# Patient Record
Sex: FEMALE | Race: WHITE | Employment: FULL TIME | URBAN - METROPOLITAN AREA
[De-identification: names, ages, dates, MRNs, and addresses within clinical notes are randomized per-mention and may not be internally consistent; named-entity substitution may affect disease eponyms.]

---

## 2019-10-13 ENCOUNTER — HOSPITAL ENCOUNTER (OUTPATIENT)
Age: 49
Setting detail: OBSERVATION
Discharge: HOME | End: 2019-10-14
Attending: EMERGENCY MEDICINE | Admitting: INTERNAL MEDICINE
Payer: COMMERCIAL

## 2019-10-13 DIAGNOSIS — R53.1 GENERAL WEAKNESS: Primary | ICD-10-CM

## 2019-10-13 PROBLEM — G70.00 MYASTHENIA GRAVIS (HCC): Status: ACTIVE | Noted: 2019-10-13

## 2019-10-13 LAB
A/G RATIO: 1.1 (ref 1.1–2.2)
ALBUMIN SERPL-MCNC: 4.1 G/DL (ref 3.4–5)
ALP BLD-CCNC: 131 U/L (ref 40–129)
ALT SERPL-CCNC: 13 U/L (ref 10–40)
ANION GAP SERPL CALCULATED.3IONS-SCNC: 17 MMOL/L (ref 3–16)
AST SERPL-CCNC: 14 U/L (ref 15–37)
BASOPHILS ABSOLUTE: 0.1 K/UL (ref 0–0.2)
BASOPHILS RELATIVE PERCENT: 1.1 %
BILIRUB SERPL-MCNC: <0.2 MG/DL (ref 0–1)
BILIRUBIN URINE: NEGATIVE
BLOOD, URINE: NEGATIVE
BUN BLDV-MCNC: 13 MG/DL (ref 7–20)
CALCIUM SERPL-MCNC: 9 MG/DL (ref 8.3–10.6)
CHLORIDE BLD-SCNC: 100 MMOL/L (ref 99–110)
CLARITY: CLEAR
CO2: 25 MMOL/L (ref 21–32)
COLOR: YELLOW
CREAT SERPL-MCNC: 0.5 MG/DL (ref 0.6–1.1)
EOSINOPHILS ABSOLUTE: 0.1 K/UL (ref 0–0.6)
EOSINOPHILS RELATIVE PERCENT: 0.9 %
FOLATE: >20 NG/ML (ref 4.78–24.2)
GFR AFRICAN AMERICAN: >60
GFR NON-AFRICAN AMERICAN: >60
GLOBULIN: 3.9 G/DL
GLUCOSE BLD-MCNC: 92 MG/DL (ref 70–99)
GLUCOSE URINE: NEGATIVE MG/DL
HCG(URINE) PREGNANCY TEST: NEGATIVE
HCT VFR BLD CALC: 38.7 % (ref 36–48)
HEMOGLOBIN: 12.9 G/DL (ref 12–16)
KETONES, URINE: NEGATIVE MG/DL
LEUKOCYTE ESTERASE, URINE: NEGATIVE
LYMPHOCYTES ABSOLUTE: 2.2 K/UL (ref 1–5.1)
LYMPHOCYTES RELATIVE PERCENT: 23.3 %
MAGNESIUM: 1.9 MG/DL (ref 1.8–2.4)
MCH RBC QN AUTO: 30.5 PG (ref 26–34)
MCHC RBC AUTO-ENTMCNC: 33.4 G/DL (ref 31–36)
MCV RBC AUTO: 91.6 FL (ref 80–100)
MICROSCOPIC EXAMINATION: NORMAL
MONOCYTES ABSOLUTE: 0.5 K/UL (ref 0–1.3)
MONOCYTES RELATIVE PERCENT: 5.1 %
NEUTROPHILS ABSOLUTE: 6.5 K/UL (ref 1.7–7.7)
NEUTROPHILS RELATIVE PERCENT: 69.6 %
NITRITE, URINE: NEGATIVE
PDW BLD-RTO: 13.7 % (ref 12.4–15.4)
PH UA: 6 (ref 5–8)
PHOSPHORUS: 3.2 MG/DL (ref 2.5–4.9)
PLATELET # BLD: 316 K/UL (ref 135–450)
PMV BLD AUTO: 6.9 FL (ref 5–10.5)
POTASSIUM SERPL-SCNC: 3.8 MMOL/L (ref 3.5–5.1)
PROTEIN UA: NEGATIVE MG/DL
RBC # BLD: 4.22 M/UL (ref 4–5.2)
SODIUM BLD-SCNC: 142 MMOL/L (ref 136–145)
SPECIFIC GRAVITY UA: 1.01 (ref 1–1.03)
TOTAL PROTEIN: 8 G/DL (ref 6.4–8.2)
TSH REFLEX: 0.39 UIU/ML (ref 0.27–4.2)
URINE REFLEX TO CULTURE: NORMAL
URINE TYPE: NORMAL
UROBILINOGEN, URINE: 0.2 E.U./DL
VITAMIN B-12: 358 PG/ML (ref 211–911)
WBC # BLD: 9.3 K/UL (ref 4–11)

## 2019-10-13 PROCEDURE — 36415 COLL VENOUS BLD VENIPUNCTURE: CPT

## 2019-10-13 PROCEDURE — 82607 VITAMIN B-12: CPT

## 2019-10-13 PROCEDURE — 93005 ELECTROCARDIOGRAM TRACING: CPT | Performed by: EMERGENCY MEDICINE

## 2019-10-13 PROCEDURE — 84703 CHORIONIC GONADOTROPIN ASSAY: CPT

## 2019-10-13 PROCEDURE — 2580000003 HC RX 258: Performed by: INTERNAL MEDICINE

## 2019-10-13 PROCEDURE — G0378 HOSPITAL OBSERVATION PER HR: HCPCS

## 2019-10-13 PROCEDURE — 84100 ASSAY OF PHOSPHORUS: CPT

## 2019-10-13 PROCEDURE — 85025 COMPLETE CBC W/AUTO DIFF WBC: CPT

## 2019-10-13 PROCEDURE — 81003 URINALYSIS AUTO W/O SCOPE: CPT

## 2019-10-13 PROCEDURE — 84443 ASSAY THYROID STIM HORMONE: CPT

## 2019-10-13 PROCEDURE — 6370000000 HC RX 637 (ALT 250 FOR IP): Performed by: PSYCHIATRY & NEUROLOGY

## 2019-10-13 PROCEDURE — 6370000000 HC RX 637 (ALT 250 FOR IP): Performed by: INTERNAL MEDICINE

## 2019-10-13 PROCEDURE — 82746 ASSAY OF FOLIC ACID SERUM: CPT

## 2019-10-13 PROCEDURE — 83735 ASSAY OF MAGNESIUM: CPT

## 2019-10-13 PROCEDURE — 6370000000 HC RX 637 (ALT 250 FOR IP): Performed by: NURSE PRACTITIONER

## 2019-10-13 PROCEDURE — 80053 COMPREHEN METABOLIC PANEL: CPT

## 2019-10-13 PROCEDURE — 99285 EMERGENCY DEPT VISIT HI MDM: CPT

## 2019-10-13 RX ORDER — DOXEPIN HYDROCHLORIDE 10 MG/1
10 CAPSULE ORAL NIGHTLY
COMMUNITY

## 2019-10-13 RX ORDER — SODIUM CHLORIDE 9 MG/ML
INJECTION, SOLUTION INTRAVENOUS CONTINUOUS
Status: DISCONTINUED | OUTPATIENT
Start: 2019-10-13 | End: 2019-10-14

## 2019-10-13 RX ORDER — LEVOTHYROXINE SODIUM 0.12 MG/1
125 TABLET ORAL DAILY
Status: DISCONTINUED | OUTPATIENT
Start: 2019-10-14 | End: 2019-10-14 | Stop reason: HOSPADM

## 2019-10-13 RX ORDER — DOXEPIN HYDROCHLORIDE 10 MG/1
10 CAPSULE ORAL NIGHTLY
Status: DISCONTINUED | OUTPATIENT
Start: 2019-10-13 | End: 2019-10-14 | Stop reason: HOSPADM

## 2019-10-13 RX ORDER — ESTRADIOL 1 MG/1
2 TABLET ORAL DAILY
Status: DISCONTINUED | OUTPATIENT
Start: 2019-10-14 | End: 2019-10-14 | Stop reason: HOSPADM

## 2019-10-13 RX ORDER — CITALOPRAM 40 MG/1
40 TABLET ORAL EVERY MORNING
COMMUNITY

## 2019-10-13 RX ORDER — ESTRADIOL 2 MG/1
2 TABLET ORAL NIGHTLY
COMMUNITY

## 2019-10-13 RX ORDER — FOLIC ACID 1 MG/1
1 TABLET ORAL DAILY
COMMUNITY

## 2019-10-13 RX ORDER — ONDANSETRON 2 MG/ML
4 INJECTION INTRAMUSCULAR; INTRAVENOUS EVERY 6 HOURS PRN
Status: DISCONTINUED | OUTPATIENT
Start: 2019-10-13 | End: 2019-10-14 | Stop reason: HOSPADM

## 2019-10-13 RX ORDER — FOLIC ACID 1 MG/1
1 TABLET ORAL DAILY
Status: DISCONTINUED | OUTPATIENT
Start: 2019-10-14 | End: 2019-10-14 | Stop reason: HOSPADM

## 2019-10-13 RX ORDER — CITALOPRAM 20 MG/1
40 TABLET ORAL DAILY
Status: DISCONTINUED | OUTPATIENT
Start: 2019-10-14 | End: 2019-10-14 | Stop reason: HOSPADM

## 2019-10-13 RX ORDER — ZOLPIDEM TARTRATE 5 MG/1
5 TABLET ORAL NIGHTLY PRN
Status: DISCONTINUED | OUTPATIENT
Start: 2019-10-13 | End: 2019-10-14 | Stop reason: HOSPADM

## 2019-10-13 RX ORDER — ACETAMINOPHEN 325 MG/1
650 TABLET ORAL EVERY 4 HOURS PRN
Status: DISCONTINUED | OUTPATIENT
Start: 2019-10-13 | End: 2019-10-14 | Stop reason: HOSPADM

## 2019-10-13 RX ORDER — DIPHENHYDRAMINE HCL 25 MG
25 TABLET ORAL EVERY 6 HOURS PRN
Status: ON HOLD | COMMUNITY
End: 2019-11-05

## 2019-10-13 RX ORDER — ZOLPIDEM TARTRATE 10 MG/1
10 TABLET ORAL NIGHTLY
COMMUNITY

## 2019-10-13 RX ORDER — LEVOTHYROXINE SODIUM 0.12 MG/1
125 TABLET ORAL DAILY
Status: ON HOLD | COMMUNITY
End: 2020-08-09 | Stop reason: SDUPTHER

## 2019-10-13 RX ORDER — SODIUM CHLORIDE 0.9 % (FLUSH) 0.9 %
10 SYRINGE (ML) INJECTION EVERY 12 HOURS SCHEDULED
Status: DISCONTINUED | OUTPATIENT
Start: 2019-10-13 | End: 2019-10-14 | Stop reason: HOSPADM

## 2019-10-13 RX ORDER — PYRIDOSTIGMINE BROMIDE 60 MG/1
30 TABLET ORAL EVERY 8 HOURS SCHEDULED
Status: DISCONTINUED | OUTPATIENT
Start: 2019-10-13 | End: 2019-10-14 | Stop reason: HOSPADM

## 2019-10-13 RX ORDER — SODIUM CHLORIDE 0.9 % (FLUSH) 0.9 %
10 SYRINGE (ML) INJECTION PRN
Status: DISCONTINUED | OUTPATIENT
Start: 2019-10-13 | End: 2019-10-14 | Stop reason: HOSPADM

## 2019-10-13 RX ADMIN — DOXEPIN HYDROCHLORIDE 10 MG: 10 CAPSULE ORAL at 21:56

## 2019-10-13 RX ADMIN — ZOLPIDEM TARTRATE 5 MG: 5 TABLET ORAL at 21:56

## 2019-10-13 RX ADMIN — Medication 10 ML: at 21:57

## 2019-10-13 RX ADMIN — ACETAMINOPHEN 650 MG: 325 TABLET ORAL at 17:52

## 2019-10-13 RX ADMIN — SODIUM CHLORIDE: 9 INJECTION, SOLUTION INTRAVENOUS at 16:58

## 2019-10-13 RX ADMIN — PYRIDOSTIGMINE BROMIDE 30 MG: 60 TABLET ORAL at 21:57

## 2019-10-13 SDOH — HEALTH STABILITY: MENTAL HEALTH: HOW OFTEN DO YOU HAVE A DRINK CONTAINING ALCOHOL?: NEVER

## 2019-10-13 ASSESSMENT — PAIN DESCRIPTION - ONSET: ONSET: SUDDEN

## 2019-10-13 ASSESSMENT — PAIN DESCRIPTION - PAIN TYPE: TYPE: ACUTE PAIN

## 2019-10-13 ASSESSMENT — PAIN SCALES - GENERAL
PAINLEVEL_OUTOF10: 0
PAINLEVEL_OUTOF10: 3
PAINLEVEL_OUTOF10: 0
PAINLEVEL_OUTOF10: 0

## 2019-10-13 ASSESSMENT — PAIN - FUNCTIONAL ASSESSMENT: PAIN_FUNCTIONAL_ASSESSMENT: ACTIVITIES ARE NOT PREVENTED

## 2019-10-13 ASSESSMENT — PAIN DESCRIPTION - FREQUENCY: FREQUENCY: INTERMITTENT

## 2019-10-13 ASSESSMENT — PAIN DESCRIPTION - LOCATION: LOCATION: HEAD

## 2019-10-13 ASSESSMENT — PAIN DESCRIPTION - PROGRESSION: CLINICAL_PROGRESSION: NOT CHANGED

## 2019-10-13 ASSESSMENT — PAIN DESCRIPTION - DESCRIPTORS: DESCRIPTORS: ACHING

## 2019-10-13 ASSESSMENT — PAIN DESCRIPTION - ORIENTATION: ORIENTATION: ANTERIOR

## 2019-10-14 VITALS
WEIGHT: 175.27 LBS | DIASTOLIC BLOOD PRESSURE: 76 MMHG | HEIGHT: 62 IN | TEMPERATURE: 98.6 F | RESPIRATION RATE: 18 BRPM | OXYGEN SATURATION: 93 % | BODY MASS INDEX: 32.25 KG/M2 | SYSTOLIC BLOOD PRESSURE: 129 MMHG | HEART RATE: 65 BPM

## 2019-10-14 LAB
A/G RATIO: 1.1 (ref 1.1–2.2)
ALBUMIN SERPL-MCNC: 3.3 G/DL (ref 3.4–5)
ALP BLD-CCNC: 107 U/L (ref 40–129)
ALT SERPL-CCNC: 11 U/L (ref 10–40)
ANION GAP SERPL CALCULATED.3IONS-SCNC: 13 MMOL/L (ref 3–16)
AST SERPL-CCNC: 13 U/L (ref 15–37)
BILIRUB SERPL-MCNC: <0.2 MG/DL (ref 0–1)
BUN BLDV-MCNC: 12 MG/DL (ref 7–20)
CALCIUM SERPL-MCNC: 8.2 MG/DL (ref 8.3–10.6)
CHLORIDE BLD-SCNC: 106 MMOL/L (ref 99–110)
CO2: 24 MMOL/L (ref 21–32)
CREAT SERPL-MCNC: <0.5 MG/DL (ref 0.6–1.1)
EKG ATRIAL RATE: 63 BPM
EKG DIAGNOSIS: NORMAL
EKG P AXIS: 7 DEGREES
EKG P-R INTERVAL: 198 MS
EKG Q-T INTERVAL: 418 MS
EKG QRS DURATION: 92 MS
EKG QTC CALCULATION (BAZETT): 427 MS
EKG R AXIS: 8 DEGREES
EKG T AXIS: -1 DEGREES
EKG VENTRICULAR RATE: 63 BPM
GFR AFRICAN AMERICAN: >60
GFR NON-AFRICAN AMERICAN: >60
GLOBULIN: 3 G/DL
GLUCOSE BLD-MCNC: 87 MG/DL (ref 70–99)
MAGNESIUM: 1.8 MG/DL (ref 1.8–2.4)
POTASSIUM REFLEX MAGNESIUM: 4 MMOL/L (ref 3.5–5.1)
SODIUM BLD-SCNC: 143 MMOL/L (ref 136–145)
TOTAL PROTEIN: 6.3 G/DL (ref 6.4–8.2)

## 2019-10-14 PROCEDURE — 96372 THER/PROPH/DIAG INJ SC/IM: CPT

## 2019-10-14 PROCEDURE — 6370000000 HC RX 637 (ALT 250 FOR IP): Performed by: PSYCHIATRY & NEUROLOGY

## 2019-10-14 PROCEDURE — 94760 N-INVAS EAR/PLS OXIMETRY 1: CPT

## 2019-10-14 PROCEDURE — 97116 GAIT TRAINING THERAPY: CPT

## 2019-10-14 PROCEDURE — 97161 PT EVAL LOW COMPLEX 20 MIN: CPT

## 2019-10-14 PROCEDURE — 36415 COLL VENOUS BLD VENIPUNCTURE: CPT

## 2019-10-14 PROCEDURE — G0378 HOSPITAL OBSERVATION PER HR: HCPCS

## 2019-10-14 PROCEDURE — 83735 ASSAY OF MAGNESIUM: CPT

## 2019-10-14 PROCEDURE — 93010 ELECTROCARDIOGRAM REPORT: CPT | Performed by: INTERNAL MEDICINE

## 2019-10-14 PROCEDURE — 6370000000 HC RX 637 (ALT 250 FOR IP): Performed by: INTERNAL MEDICINE

## 2019-10-14 PROCEDURE — 80053 COMPREHEN METABOLIC PANEL: CPT

## 2019-10-14 PROCEDURE — 6370000000 HC RX 637 (ALT 250 FOR IP): Performed by: NURSE PRACTITIONER

## 2019-10-14 PROCEDURE — 97165 OT EVAL LOW COMPLEX 30 MIN: CPT

## 2019-10-14 PROCEDURE — 6360000002 HC RX W HCPCS: Performed by: INTERNAL MEDICINE

## 2019-10-14 PROCEDURE — 97530 THERAPEUTIC ACTIVITIES: CPT

## 2019-10-14 RX ORDER — PYRIDOSTIGMINE BROMIDE 60 MG/1
30 TABLET ORAL EVERY 8 HOURS SCHEDULED
Qty: 60 TABLET | Refills: 1 | Status: ON HOLD | OUTPATIENT
Start: 2019-10-14 | End: 2019-11-08 | Stop reason: SDUPTHER

## 2019-10-14 RX ADMIN — ENOXAPARIN SODIUM 40 MG: 40 INJECTION SUBCUTANEOUS at 08:56

## 2019-10-14 RX ADMIN — ACETAMINOPHEN 650 MG: 325 TABLET ORAL at 12:29

## 2019-10-14 RX ADMIN — PYRIDOSTIGMINE BROMIDE 30 MG: 60 TABLET ORAL at 13:47

## 2019-10-14 RX ADMIN — LEVOTHYROXINE SODIUM 125 MCG: 125 TABLET ORAL at 07:31

## 2019-10-14 RX ADMIN — PYRIDOSTIGMINE BROMIDE 30 MG: 60 TABLET ORAL at 07:30

## 2019-10-14 RX ADMIN — CITALOPRAM HYDROBROMIDE 40 MG: 20 TABLET ORAL at 08:56

## 2019-10-14 RX ADMIN — FOLIC ACID 1 MG: 1 TABLET ORAL at 08:56

## 2019-10-14 RX ADMIN — ESTRADIOL 2 MG: 1 TABLET ORAL at 11:04

## 2019-10-14 ASSESSMENT — PAIN SCALES - GENERAL
PAINLEVEL_OUTOF10: 2
PAINLEVEL_OUTOF10: 0
PAINLEVEL_OUTOF10: 4

## 2019-10-14 ASSESSMENT — PAIN DESCRIPTION - PAIN TYPE: TYPE: ACUTE PAIN

## 2019-10-14 ASSESSMENT — PAIN DESCRIPTION - DESCRIPTORS: DESCRIPTORS: ACHING

## 2019-10-14 ASSESSMENT — PAIN DESCRIPTION - PROGRESSION: CLINICAL_PROGRESSION: NOT CHANGED

## 2019-10-14 ASSESSMENT — PAIN DESCRIPTION - LOCATION: LOCATION: HEAD

## 2019-10-14 ASSESSMENT — PAIN - FUNCTIONAL ASSESSMENT: PAIN_FUNCTIONAL_ASSESSMENT: ACTIVITIES ARE NOT PREVENTED

## 2019-11-03 ENCOUNTER — HOSPITAL ENCOUNTER (EMERGENCY)
Age: 49
Discharge: HOME | DRG: 056 | End: 2019-11-03
Attending: EMERGENCY MEDICINE
Payer: COMMERCIAL

## 2019-11-03 ENCOUNTER — APPOINTMENT (OUTPATIENT)
Dept: CT IMAGING | Age: 49
DRG: 056 | End: 2019-11-03
Payer: COMMERCIAL

## 2019-11-03 ENCOUNTER — APPOINTMENT (OUTPATIENT)
Dept: GENERAL RADIOLOGY | Age: 49
DRG: 056 | End: 2019-11-03
Payer: COMMERCIAL

## 2019-11-03 VITALS
TEMPERATURE: 98 F | WEIGHT: 174.82 LBS | HEART RATE: 71 BPM | BODY MASS INDEX: 31.98 KG/M2 | RESPIRATION RATE: 18 BRPM | DIASTOLIC BLOOD PRESSURE: 74 MMHG | SYSTOLIC BLOOD PRESSURE: 137 MMHG | OXYGEN SATURATION: 97 %

## 2019-11-03 DIAGNOSIS — N39.0 ACUTE URINARY TRACT INFECTION: ICD-10-CM

## 2019-11-03 DIAGNOSIS — Z86.69 HISTORY OF MYASTHENIA GRAVIS: ICD-10-CM

## 2019-11-03 DIAGNOSIS — R53.1 GENERALIZED WEAKNESS: Primary | ICD-10-CM

## 2019-11-03 LAB
A/G RATIO: 0.9 (ref 1.1–2.2)
ALBUMIN SERPL-MCNC: 3.6 G/DL (ref 3.4–5)
ALP BLD-CCNC: 142 U/L (ref 40–129)
ALT SERPL-CCNC: 10 U/L (ref 10–40)
ANION GAP SERPL CALCULATED.3IONS-SCNC: 12 MMOL/L (ref 3–16)
AST SERPL-CCNC: 16 U/L (ref 15–37)
BACTERIA: ABNORMAL /HPF
BASOPHILS ABSOLUTE: 0 K/UL (ref 0–0.2)
BASOPHILS RELATIVE PERCENT: 0.2 %
BILIRUB SERPL-MCNC: <0.2 MG/DL (ref 0–1)
BILIRUBIN URINE: NEGATIVE
BLOOD, URINE: NEGATIVE
BUN BLDV-MCNC: 12 MG/DL (ref 7–20)
CALCIUM SERPL-MCNC: 8.9 MG/DL (ref 8.3–10.6)
CHLORIDE BLD-SCNC: 101 MMOL/L (ref 99–110)
CLARITY: ABNORMAL
CO2: 25 MMOL/L (ref 21–32)
COLOR: YELLOW
COMMENT UA: ABNORMAL
CREAT SERPL-MCNC: <0.5 MG/DL (ref 0.6–1.1)
EOSINOPHILS ABSOLUTE: 0.7 K/UL (ref 0–0.6)
EOSINOPHILS RELATIVE PERCENT: 6.2 %
EPITHELIAL CELLS, UA: 2 /HPF (ref 0–5)
GFR AFRICAN AMERICAN: >60
GFR NON-AFRICAN AMERICAN: >60
GLOBULIN: 4.1 G/DL
GLUCOSE BLD-MCNC: 86 MG/DL (ref 70–99)
GLUCOSE URINE: NEGATIVE MG/DL
HCT VFR BLD CALC: 37.7 % (ref 36–48)
HEMOGLOBIN: 12.6 G/DL (ref 12–16)
HYALINE CASTS: 1 /LPF (ref 0–8)
KETONES, URINE: NEGATIVE MG/DL
LEUKOCYTE ESTERASE, URINE: NEGATIVE
LYMPHOCYTES ABSOLUTE: 2.2 K/UL (ref 1–5.1)
LYMPHOCYTES RELATIVE PERCENT: 19.6 %
MCH RBC QN AUTO: 30.3 PG (ref 26–34)
MCHC RBC AUTO-ENTMCNC: 33.4 G/DL (ref 31–36)
MCV RBC AUTO: 90.8 FL (ref 80–100)
MICROSCOPIC EXAMINATION: YES
MONOCYTES ABSOLUTE: 0.5 K/UL (ref 0–1.3)
MONOCYTES RELATIVE PERCENT: 4.5 %
NEUTROPHILS ABSOLUTE: 7.8 K/UL (ref 1.7–7.7)
NEUTROPHILS RELATIVE PERCENT: 69.5 %
NITRITE, URINE: NEGATIVE
PDW BLD-RTO: 14.4 % (ref 12.4–15.4)
PH UA: 6 (ref 5–8)
PLATELET # BLD: 322 K/UL (ref 135–450)
PMV BLD AUTO: 6.8 FL (ref 5–10.5)
POTASSIUM REFLEX MAGNESIUM: 4.2 MMOL/L (ref 3.5–5.1)
PROTEIN UA: NEGATIVE MG/DL
RBC # BLD: 4.15 M/UL (ref 4–5.2)
RBC UA: ABNORMAL /HPF (ref 0–2)
SODIUM BLD-SCNC: 138 MMOL/L (ref 136–145)
SPECIFIC GRAVITY UA: 1.01 (ref 1–1.03)
TOTAL PROTEIN: 7.7 G/DL (ref 6.4–8.2)
TROPONIN: <0.01 NG/ML
URINE REFLEX TO CULTURE: YES
URINE TYPE: ABNORMAL
UROBILINOGEN, URINE: 0.2 E.U./DL
WBC # BLD: 11.2 K/UL (ref 4–11)
WBC UA: 48 /HPF (ref 0–5)

## 2019-11-03 PROCEDURE — 85025 COMPLETE CBC W/AUTO DIFF WBC: CPT

## 2019-11-03 PROCEDURE — 80053 COMPREHEN METABOLIC PANEL: CPT

## 2019-11-03 PROCEDURE — 81001 URINALYSIS AUTO W/SCOPE: CPT

## 2019-11-03 PROCEDURE — 70450 CT HEAD/BRAIN W/O DYE: CPT

## 2019-11-03 PROCEDURE — 87086 URINE CULTURE/COLONY COUNT: CPT

## 2019-11-03 PROCEDURE — 84484 ASSAY OF TROPONIN QUANT: CPT

## 2019-11-03 PROCEDURE — 93005 ELECTROCARDIOGRAM TRACING: CPT | Performed by: PHYSICIAN ASSISTANT

## 2019-11-03 PROCEDURE — 6370000000 HC RX 637 (ALT 250 FOR IP): Performed by: PHYSICIAN ASSISTANT

## 2019-11-03 PROCEDURE — 99285 EMERGENCY DEPT VISIT HI MDM: CPT

## 2019-11-03 PROCEDURE — 71046 X-RAY EXAM CHEST 2 VIEWS: CPT

## 2019-11-03 PROCEDURE — 36415 COLL VENOUS BLD VENIPUNCTURE: CPT

## 2019-11-03 RX ORDER — CEFUROXIME AXETIL 250 MG/1
250 TABLET ORAL 2 TIMES DAILY
Qty: 14 TABLET | Refills: 0 | Status: ON HOLD | OUTPATIENT
Start: 2019-11-03 | End: 2019-11-05

## 2019-11-03 RX ORDER — CEFUROXIME AXETIL 250 MG/1
500 TABLET ORAL ONCE
Status: COMPLETED | OUTPATIENT
Start: 2019-11-03 | End: 2019-11-03

## 2019-11-03 RX ADMIN — CEFUROXIME AXETIL 500 MG: 250 TABLET ORAL at 13:41

## 2019-11-04 ENCOUNTER — HOSPITAL ENCOUNTER (INPATIENT)
Age: 49
LOS: 4 days | Discharge: HOME | DRG: 056 | End: 2019-11-08
Attending: INTERNAL MEDICINE | Admitting: INTERNAL MEDICINE
Payer: COMMERCIAL

## 2019-11-04 DIAGNOSIS — G70.01 MYASTHENIA GRAVIS WITH (ACUTE) EXACERBATION (HCC): Primary | ICD-10-CM

## 2019-11-04 LAB
ALBUMIN SERPL-MCNC: 3.8 G/DL (ref 3.4–5)
ALP BLD-CCNC: 142 U/L (ref 40–129)
ALT SERPL-CCNC: 14 U/L (ref 10–40)
ANION GAP SERPL CALCULATED.3IONS-SCNC: 16 MMOL/L (ref 3–16)
AST SERPL-CCNC: 16 U/L (ref 15–37)
BASOPHILS ABSOLUTE: 0.1 K/UL (ref 0–0.2)
BASOPHILS RELATIVE PERCENT: 1.3 %
BILIRUB SERPL-MCNC: <0.2 MG/DL (ref 0–1)
BILIRUBIN DIRECT: <0.2 MG/DL (ref 0–0.3)
BILIRUBIN, INDIRECT: ABNORMAL MG/DL (ref 0–1)
BUN BLDV-MCNC: 11 MG/DL (ref 7–20)
CALCIUM SERPL-MCNC: 8.9 MG/DL (ref 8.3–10.6)
CHLORIDE BLD-SCNC: 101 MMOL/L (ref 99–110)
CO2: 23 MMOL/L (ref 21–32)
CREAT SERPL-MCNC: <0.5 MG/DL (ref 0.6–1.1)
EKG ATRIAL RATE: 73 BPM
EKG DIAGNOSIS: NORMAL
EKG P AXIS: 7 DEGREES
EKG P-R INTERVAL: 170 MS
EKG Q-T INTERVAL: 418 MS
EKG QRS DURATION: 92 MS
EKG QTC CALCULATION (BAZETT): 460 MS
EKG R AXIS: 15 DEGREES
EKG T AXIS: -8 DEGREES
EKG VENTRICULAR RATE: 73 BPM
EOSINOPHILS ABSOLUTE: 0.4 K/UL (ref 0–0.6)
EOSINOPHILS RELATIVE PERCENT: 5 %
GFR AFRICAN AMERICAN: >60
GFR NON-AFRICAN AMERICAN: >60
GLUCOSE BLD-MCNC: 135 MG/DL (ref 70–99)
HCT VFR BLD CALC: 37.1 % (ref 36–48)
HEMOGLOBIN: 12.6 G/DL (ref 12–16)
IGA: 403 MG/DL (ref 70–400)
LYMPHOCYTES ABSOLUTE: 2.2 K/UL (ref 1–5.1)
LYMPHOCYTES RELATIVE PERCENT: 26.9 %
MCH RBC QN AUTO: 30.6 PG (ref 26–34)
MCHC RBC AUTO-ENTMCNC: 33.9 G/DL (ref 31–36)
MCV RBC AUTO: 90.4 FL (ref 80–100)
MONOCYTES ABSOLUTE: 0.3 K/UL (ref 0–1.3)
MONOCYTES RELATIVE PERCENT: 4 %
NEUTROPHILS ABSOLUTE: 5.1 K/UL (ref 1.7–7.7)
NEUTROPHILS RELATIVE PERCENT: 62.8 %
PDW BLD-RTO: 13.8 % (ref 12.4–15.4)
PHOSPHORUS: 2.9 MG/DL (ref 2.5–4.9)
PLATELET # BLD: 317 K/UL (ref 135–450)
PMV BLD AUTO: 6.4 FL (ref 5–10.5)
POTASSIUM SERPL-SCNC: 3.4 MMOL/L (ref 3.5–5.1)
RBC # BLD: 4.11 M/UL (ref 4–5.2)
SODIUM BLD-SCNC: 140 MMOL/L (ref 136–145)
TOTAL PROTEIN: 7.6 G/DL (ref 6.4–8.2)
URINE CULTURE, ROUTINE: NORMAL
WBC # BLD: 8.2 K/UL (ref 4–11)

## 2019-11-04 PROCEDURE — 6370000000 HC RX 637 (ALT 250 FOR IP): Performed by: INTERNAL MEDICINE

## 2019-11-04 PROCEDURE — 93010 ELECTROCARDIOGRAM REPORT: CPT | Performed by: INTERNAL MEDICINE

## 2019-11-04 PROCEDURE — 6360000002 HC RX W HCPCS: Performed by: INTERNAL MEDICINE

## 2019-11-04 PROCEDURE — 2060000000 HC ICU INTERMEDIATE R&B

## 2019-11-04 PROCEDURE — 80069 RENAL FUNCTION PANEL: CPT

## 2019-11-04 PROCEDURE — 94760 N-INVAS EAR/PLS OXIMETRY 1: CPT

## 2019-11-04 PROCEDURE — 2580000003 HC RX 258: Performed by: INTERNAL MEDICINE

## 2019-11-04 PROCEDURE — 85025 COMPLETE CBC W/AUTO DIFF WBC: CPT

## 2019-11-04 PROCEDURE — 6370000000 HC RX 637 (ALT 250 FOR IP): Performed by: NURSE PRACTITIONER

## 2019-11-04 PROCEDURE — 82784 ASSAY IGA/IGD/IGG/IGM EACH: CPT

## 2019-11-04 PROCEDURE — 80076 HEPATIC FUNCTION PANEL: CPT

## 2019-11-04 PROCEDURE — 36415 COLL VENOUS BLD VENIPUNCTURE: CPT

## 2019-11-04 RX ORDER — DOXEPIN HYDROCHLORIDE 10 MG/1
10 CAPSULE ORAL NIGHTLY
Status: DISCONTINUED | OUTPATIENT
Start: 2019-11-04 | End: 2019-11-08 | Stop reason: HOSPADM

## 2019-11-04 RX ORDER — SODIUM CHLORIDE 0.9 % (FLUSH) 0.9 %
10 SYRINGE (ML) INJECTION PRN
Status: DISCONTINUED | OUTPATIENT
Start: 2019-11-04 | End: 2019-11-05 | Stop reason: SDUPTHER

## 2019-11-04 RX ORDER — PYRIDOSTIGMINE BROMIDE 60 MG/1
30 TABLET ORAL EVERY 8 HOURS SCHEDULED
Status: DISCONTINUED | OUTPATIENT
Start: 2019-11-04 | End: 2019-11-04

## 2019-11-04 RX ORDER — ZOLPIDEM TARTRATE 5 MG/1
5 TABLET ORAL NIGHTLY
Status: DISCONTINUED | OUTPATIENT
Start: 2019-11-04 | End: 2019-11-08 | Stop reason: HOSPADM

## 2019-11-04 RX ORDER — FOLIC ACID 1 MG/1
1 TABLET ORAL DAILY
Status: DISCONTINUED | OUTPATIENT
Start: 2019-11-05 | End: 2019-11-08 | Stop reason: HOSPADM

## 2019-11-04 RX ORDER — DEXTROSE AND SODIUM CHLORIDE 5; .9 G/100ML; G/100ML
INJECTION, SOLUTION INTRAVENOUS CONTINUOUS
Status: DISCONTINUED | OUTPATIENT
Start: 2019-11-04 | End: 2019-11-06

## 2019-11-04 RX ORDER — ONDANSETRON 2 MG/ML
4 INJECTION INTRAMUSCULAR; INTRAVENOUS EVERY 6 HOURS PRN
Status: DISCONTINUED | OUTPATIENT
Start: 2019-11-04 | End: 2019-11-08 | Stop reason: HOSPADM

## 2019-11-04 RX ORDER — PYRIDOSTIGMINE BROMIDE 60 MG/1
60 TABLET ORAL EVERY 8 HOURS SCHEDULED
Status: DISCONTINUED | OUTPATIENT
Start: 2019-11-04 | End: 2019-11-08 | Stop reason: HOSPADM

## 2019-11-04 RX ORDER — ESTRADIOL 1 MG/1
2 TABLET ORAL NIGHTLY
Status: DISCONTINUED | OUTPATIENT
Start: 2019-11-04 | End: 2019-11-08 | Stop reason: HOSPADM

## 2019-11-04 RX ORDER — METHYLPREDNISOLONE SODIUM SUCCINATE 125 MG/2ML
60 INJECTION, POWDER, LYOPHILIZED, FOR SOLUTION INTRAMUSCULAR; INTRAVENOUS DAILY
Status: DISCONTINUED | OUTPATIENT
Start: 2019-11-04 | End: 2019-11-08 | Stop reason: HOSPADM

## 2019-11-04 RX ORDER — LEVOTHYROXINE SODIUM 0.12 MG/1
125 TABLET ORAL
Status: DISCONTINUED | OUTPATIENT
Start: 2019-11-05 | End: 2019-11-08 | Stop reason: HOSPADM

## 2019-11-04 RX ORDER — ACETAMINOPHEN 325 MG/1
650 TABLET ORAL EVERY 4 HOURS PRN
Status: DISCONTINUED | OUTPATIENT
Start: 2019-11-04 | End: 2019-11-08 | Stop reason: HOSPADM

## 2019-11-04 RX ORDER — SODIUM CHLORIDE 0.9 % (FLUSH) 0.9 %
10 SYRINGE (ML) INJECTION EVERY 12 HOURS SCHEDULED
Status: DISCONTINUED | OUTPATIENT
Start: 2019-11-04 | End: 2019-11-05 | Stop reason: SDUPTHER

## 2019-11-04 RX ORDER — CEFUROXIME AXETIL 250 MG/1
250 TABLET ORAL 2 TIMES DAILY
Status: DISCONTINUED | OUTPATIENT
Start: 2019-11-04 | End: 2019-11-08 | Stop reason: HOSPADM

## 2019-11-04 RX ORDER — CITALOPRAM 20 MG/1
40 TABLET ORAL DAILY
Status: DISCONTINUED | OUTPATIENT
Start: 2019-11-05 | End: 2019-11-08 | Stop reason: HOSPADM

## 2019-11-04 RX ADMIN — CEFUROXIME AXETIL 250 MG: 250 TABLET ORAL at 21:28

## 2019-11-04 RX ADMIN — CEFUROXIME AXETIL 250 MG: 250 TABLET ORAL at 15:34

## 2019-11-04 RX ADMIN — DOXEPIN HYDROCHLORIDE 10 MG: 10 CAPSULE ORAL at 21:29

## 2019-11-04 RX ADMIN — ZOLPIDEM TARTRATE 5 MG: 5 TABLET ORAL at 21:28

## 2019-11-04 RX ADMIN — ONDANSETRON 4 MG: 2 INJECTION INTRAMUSCULAR; INTRAVENOUS at 17:26

## 2019-11-04 RX ADMIN — PYRIDOSTIGMINE BROMIDE 60 MG: 60 TABLET ORAL at 21:29

## 2019-11-04 RX ADMIN — PYRIDOSTIGMINE BROMIDE 60 MG: 60 TABLET ORAL at 15:34

## 2019-11-04 RX ADMIN — DEXTROSE AND SODIUM CHLORIDE: 5; 900 INJECTION, SOLUTION INTRAVENOUS at 15:32

## 2019-11-04 RX ADMIN — ESTRADIOL 2 MG: 1 TABLET ORAL at 21:30

## 2019-11-04 RX ADMIN — IMMUNE GLOBULIN INTRAVENOUS (HUMAN) 20 G: 5 INJECTION, SOLUTION INTRAVENOUS at 17:28

## 2019-11-04 RX ADMIN — ENOXAPARIN SODIUM 40 MG: 40 INJECTION SUBCUTANEOUS at 21:27

## 2019-11-04 RX ADMIN — IMMUNE GLOBULIN INTRAVENOUS (HUMAN) 10 G: 5 INJECTION, SOLUTION INTRAVENOUS at 21:09

## 2019-11-04 RX ADMIN — METHYLPREDNISOLONE SODIUM SUCCINATE 60 MG: 125 INJECTION, POWDER, FOR SOLUTION INTRAMUSCULAR; INTRAVENOUS at 17:26

## 2019-11-04 ASSESSMENT — PAIN SCALES - GENERAL
PAINLEVEL_OUTOF10: 0

## 2019-11-05 ENCOUNTER — APPOINTMENT (OUTPATIENT)
Dept: GENERAL RADIOLOGY | Age: 49
DRG: 056 | End: 2019-11-05
Attending: INTERNAL MEDICINE
Payer: COMMERCIAL

## 2019-11-05 ENCOUNTER — APPOINTMENT (OUTPATIENT)
Dept: CT IMAGING | Age: 49
DRG: 056 | End: 2019-11-05
Attending: INTERNAL MEDICINE
Payer: COMMERCIAL

## 2019-11-05 LAB
ALBUMIN SERPL-MCNC: 3.6 G/DL (ref 3.4–5)
ANION GAP SERPL CALCULATED.3IONS-SCNC: 16 MMOL/L (ref 3–16)
BACTERIA: ABNORMAL /HPF
BASE EXCESS ARTERIAL: -1.3 MMOL/L (ref -3–3)
BASOPHILS ABSOLUTE: 0 K/UL (ref 0–0.2)
BASOPHILS RELATIVE PERCENT: 0.4 %
BILIRUBIN URINE: NEGATIVE
BLOOD, URINE: ABNORMAL
BUN BLDV-MCNC: 9 MG/DL (ref 7–20)
CALCIUM SERPL-MCNC: 8.5 MG/DL (ref 8.3–10.6)
CARBOXYHEMOGLOBIN ARTERIAL: 0.8 % (ref 0–1.5)
CHLORIDE BLD-SCNC: 101 MMOL/L (ref 99–110)
CLARITY: ABNORMAL
CO2: 21 MMOL/L (ref 21–32)
COLOR: YELLOW
COMMENT UA: ABNORMAL
CREAT SERPL-MCNC: <0.5 MG/DL (ref 0.6–1.1)
EOSINOPHILS ABSOLUTE: 0 K/UL (ref 0–0.6)
EOSINOPHILS RELATIVE PERCENT: 0 %
EPITHELIAL CELLS, UA: 16 /HPF (ref 0–5)
GFR AFRICAN AMERICAN: >60
GFR NON-AFRICAN AMERICAN: >60
GLUCOSE BLD-MCNC: 116 MG/DL (ref 70–99)
GLUCOSE BLD-MCNC: 177 MG/DL (ref 70–99)
GLUCOSE BLD-MCNC: 248 MG/DL (ref 70–99)
GLUCOSE URINE: 500 MG/DL
HCO3 ARTERIAL: 22.8 MMOL/L (ref 21–29)
HCT VFR BLD CALC: 38.4 % (ref 36–48)
HEMOGLOBIN, ART, EXTENDED: 12.9 G/DL (ref 12–16)
HEMOGLOBIN: 13 G/DL (ref 12–16)
KETONES, URINE: NEGATIVE MG/DL
LEUKOCYTE ESTERASE, URINE: ABNORMAL
LYMPHOCYTES ABSOLUTE: 0.7 K/UL (ref 1–5.1)
LYMPHOCYTES RELATIVE PERCENT: 7 %
MCH RBC QN AUTO: 30.7 PG (ref 26–34)
MCHC RBC AUTO-ENTMCNC: 33.8 G/DL (ref 31–36)
MCV RBC AUTO: 90.8 FL (ref 80–100)
METHEMOGLOBIN ARTERIAL: 0.8 %
MICROSCOPIC EXAMINATION: YES
MONOCYTES ABSOLUTE: 0.1 K/UL (ref 0–1.3)
MONOCYTES RELATIVE PERCENT: 1 %
NEUTROPHILS ABSOLUTE: 9.5 K/UL (ref 1.7–7.7)
NEUTROPHILS RELATIVE PERCENT: 91.6 %
NITRITE, URINE: NEGATIVE
O2 CONTENT ARTERIAL: 19 ML/DL
O2 SAT, ARTERIAL: 99.9 %
O2 THERAPY: ABNORMAL
PCO2 ARTERIAL: 38.3 MMHG (ref 35–45)
PDW BLD-RTO: 13.9 % (ref 12.4–15.4)
PERFORMED ON: ABNORMAL
PERFORMED ON: ABNORMAL
PH ARTERIAL: 7.39 (ref 7.35–7.45)
PH UA: 5 (ref 5–8)
PHOSPHORUS: 1.8 MG/DL (ref 2.5–4.9)
PLATELET # BLD: 323 K/UL (ref 135–450)
PMV BLD AUTO: 6.7 FL (ref 5–10.5)
PO2 ARTERIAL: 402 MMHG (ref 75–108)
POTASSIUM SERPL-SCNC: 3.8 MMOL/L (ref 3.5–5.1)
PROTEIN UA: 30 MG/DL
RBC # BLD: 4.22 M/UL (ref 4–5.2)
RBC UA: ABNORMAL /HPF (ref 0–2)
SODIUM BLD-SCNC: 138 MMOL/L (ref 136–145)
SPECIFIC GRAVITY UA: 1.02 (ref 1–1.03)
TCO2 ARTERIAL: 24 MMOL/L
URINE TYPE: ABNORMAL
UROBILINOGEN, URINE: 0.2 E.U./DL
WBC # BLD: 10.4 K/UL (ref 4–11)
WBC UA: 78 /HPF (ref 0–5)

## 2019-11-05 PROCEDURE — 76937 US GUIDE VASCULAR ACCESS: CPT

## 2019-11-05 PROCEDURE — 02HV33Z INSERTION OF INFUSION DEVICE INTO SUPERIOR VENA CAVA, PERCUTANEOUS APPROACH: ICD-10-PCS | Performed by: INTERNAL MEDICINE

## 2019-11-05 PROCEDURE — 94799 UNLISTED PULMONARY SVC/PX: CPT

## 2019-11-05 PROCEDURE — 2700000000 HC OXYGEN THERAPY PER DAY

## 2019-11-05 PROCEDURE — 6370000000 HC RX 637 (ALT 250 FOR IP): Performed by: INTERNAL MEDICINE

## 2019-11-05 PROCEDURE — 99291 CRITICAL CARE FIRST HOUR: CPT | Performed by: INTERNAL MEDICINE

## 2019-11-05 PROCEDURE — 82803 BLOOD GASES ANY COMBINATION: CPT

## 2019-11-05 PROCEDURE — 94002 VENT MGMT INPAT INIT DAY: CPT

## 2019-11-05 PROCEDURE — 6360000002 HC RX W HCPCS: Performed by: INTERNAL MEDICINE

## 2019-11-05 PROCEDURE — 85025 COMPLETE CBC W/AUTO DIFF WBC: CPT

## 2019-11-05 PROCEDURE — 70450 CT HEAD/BRAIN W/O DYE: CPT

## 2019-11-05 PROCEDURE — 71045 X-RAY EXAM CHEST 1 VIEW: CPT

## 2019-11-05 PROCEDURE — 2580000003 HC RX 258: Performed by: INTERNAL MEDICINE

## 2019-11-05 PROCEDURE — 2500000003 HC RX 250 WO HCPCS: Performed by: INTERNAL MEDICINE

## 2019-11-05 PROCEDURE — 87086 URINE CULTURE/COLONY COUNT: CPT

## 2019-11-05 PROCEDURE — C1769 GUIDE WIRE: HCPCS

## 2019-11-05 PROCEDURE — 2000000000 HC ICU R&B

## 2019-11-05 PROCEDURE — 6360000002 HC RX W HCPCS: Performed by: NURSE PRACTITIONER

## 2019-11-05 PROCEDURE — 80069 RENAL FUNCTION PANEL: CPT

## 2019-11-05 PROCEDURE — 36415 COLL VENOUS BLD VENIPUNCTURE: CPT

## 2019-11-05 PROCEDURE — 94761 N-INVAS EAR/PLS OXIMETRY MLT: CPT

## 2019-11-05 PROCEDURE — 94640 AIRWAY INHALATION TREATMENT: CPT

## 2019-11-05 PROCEDURE — C9113 INJ PANTOPRAZOLE SODIUM, VIA: HCPCS | Performed by: NURSE PRACTITIONER

## 2019-11-05 PROCEDURE — 2500000003 HC RX 250 WO HCPCS: Performed by: NURSE PRACTITIONER

## 2019-11-05 PROCEDURE — 94750 HC PULMONARY COMPLIANCE STUDY: CPT

## 2019-11-05 PROCEDURE — 6370000000 HC RX 637 (ALT 250 FOR IP): Performed by: NURSE PRACTITIONER

## 2019-11-05 PROCEDURE — 95819 EEG AWAKE AND ASLEEP: CPT

## 2019-11-05 PROCEDURE — 2580000003 HC RX 258: Performed by: NURSE PRACTITIONER

## 2019-11-05 PROCEDURE — 81001 URINALYSIS AUTO W/SCOPE: CPT

## 2019-11-05 PROCEDURE — 5A1935Z RESPIRATORY VENTILATION, LESS THAN 24 CONSECUTIVE HOURS: ICD-10-PCS | Performed by: INTERNAL MEDICINE

## 2019-11-05 PROCEDURE — 72125 CT NECK SPINE W/O DYE: CPT

## 2019-11-05 PROCEDURE — 0BH17EZ INSERTION OF ENDOTRACHEAL AIRWAY INTO TRACHEA, VIA NATURAL OR ARTIFICIAL OPENING: ICD-10-PCS | Performed by: INTERNAL MEDICINE

## 2019-11-05 PROCEDURE — 36569 INSJ PICC 5 YR+ W/O IMAGING: CPT

## 2019-11-05 PROCEDURE — C1751 CATH, INF, PER/CENT/MIDLINE: HCPCS

## 2019-11-05 PROCEDURE — 6360000002 HC RX W HCPCS

## 2019-11-05 RX ORDER — FENTANYL CITRATE 50 UG/ML
INJECTION, SOLUTION INTRAMUSCULAR; INTRAVENOUS
Status: COMPLETED
Start: 2019-11-05 | End: 2019-11-05

## 2019-11-05 RX ORDER — PANTOPRAZOLE SODIUM 40 MG/10ML
40 INJECTION, POWDER, LYOPHILIZED, FOR SOLUTION INTRAVENOUS DAILY
Status: DISCONTINUED | OUTPATIENT
Start: 2019-11-05 | End: 2019-11-08 | Stop reason: HOSPADM

## 2019-11-05 RX ORDER — LORAZEPAM 0.5 MG/1
0.5 TABLET ORAL 2 TIMES DAILY PRN
COMMUNITY

## 2019-11-05 RX ORDER — PROMETHAZINE HYDROCHLORIDE 25 MG/ML
25 INJECTION, SOLUTION INTRAMUSCULAR; INTRAVENOUS ONCE
Status: DISCONTINUED | OUTPATIENT
Start: 2019-11-05 | End: 2019-11-05

## 2019-11-05 RX ORDER — FENTANYL CITRATE 50 UG/ML
INJECTION, SOLUTION INTRAMUSCULAR; INTRAVENOUS
Status: COMPLETED | OUTPATIENT
Start: 2019-11-05 | End: 2019-11-05

## 2019-11-05 RX ORDER — SODIUM CHLORIDE 0.9 % (FLUSH) 0.9 %
10 SYRINGE (ML) INJECTION PRN
Status: DISCONTINUED | OUTPATIENT
Start: 2019-11-05 | End: 2019-11-08 | Stop reason: HOSPADM

## 2019-11-05 RX ORDER — MIDAZOLAM HYDROCHLORIDE 1 MG/ML
INJECTION INTRAMUSCULAR; INTRAVENOUS
Status: COMPLETED | OUTPATIENT
Start: 2019-11-05 | End: 2019-11-05

## 2019-11-05 RX ORDER — PROPOFOL 10 MG/ML
INJECTION, EMULSION INTRAVENOUS
Status: COMPLETED
Start: 2019-11-05 | End: 2019-11-05

## 2019-11-05 RX ORDER — SUCCINYLCHOLINE CHLORIDE 20 MG/ML
INJECTION INTRAMUSCULAR; INTRAVENOUS
Status: COMPLETED | OUTPATIENT
Start: 2019-11-05 | End: 2019-11-05

## 2019-11-05 RX ORDER — ETOMIDATE 2 MG/ML
INJECTION INTRAVENOUS
Status: COMPLETED | OUTPATIENT
Start: 2019-11-05 | End: 2019-11-05

## 2019-11-05 RX ORDER — SODIUM CHLORIDE 9 MG/ML
10 INJECTION INTRAVENOUS DAILY
Status: DISCONTINUED | OUTPATIENT
Start: 2019-11-05 | End: 2019-11-08 | Stop reason: HOSPADM

## 2019-11-05 RX ORDER — LORAZEPAM 2 MG/ML
1 INJECTION INTRAMUSCULAR PRN
Status: DISCONTINUED | OUTPATIENT
Start: 2019-11-05 | End: 2019-11-08 | Stop reason: HOSPADM

## 2019-11-05 RX ORDER — ONDANSETRON 2 MG/ML
INJECTION INTRAMUSCULAR; INTRAVENOUS
Status: COMPLETED | OUTPATIENT
Start: 2019-11-05 | End: 2019-11-05

## 2019-11-05 RX ORDER — PROMETHAZINE HYDROCHLORIDE 25 MG/ML
6.25 INJECTION, SOLUTION INTRAMUSCULAR; INTRAVENOUS ONCE
Status: DISCONTINUED | OUTPATIENT
Start: 2019-11-05 | End: 2019-11-05

## 2019-11-05 RX ORDER — LIDOCAINE HYDROCHLORIDE 10 MG/ML
5 INJECTION, SOLUTION EPIDURAL; INFILTRATION; INTRACAUDAL; PERINEURAL ONCE
Status: DISCONTINUED | OUTPATIENT
Start: 2019-11-05 | End: 2019-11-05

## 2019-11-05 RX ORDER — SODIUM CHLORIDE 9 MG/ML
INJECTION, SOLUTION INTRAVENOUS CONTINUOUS PRN
Status: COMPLETED | OUTPATIENT
Start: 2019-11-05 | End: 2019-11-05

## 2019-11-05 RX ORDER — SODIUM CHLORIDE 0.9 % (FLUSH) 0.9 %
10 SYRINGE (ML) INJECTION EVERY 12 HOURS SCHEDULED
Status: DISCONTINUED | OUTPATIENT
Start: 2019-11-05 | End: 2019-11-08 | Stop reason: HOSPADM

## 2019-11-05 RX ORDER — PROPOFOL 10 MG/ML
10 INJECTION, EMULSION INTRAVENOUS
Status: DISCONTINUED | OUTPATIENT
Start: 2019-11-05 | End: 2019-11-05

## 2019-11-05 RX ORDER — PHENYTOIN SODIUM 50 MG/ML
100 INJECTION, SOLUTION INTRAMUSCULAR; INTRAVENOUS EVERY 8 HOURS
Status: DISCONTINUED | OUTPATIENT
Start: 2019-11-06 | End: 2019-11-05

## 2019-11-05 RX ORDER — BUTALBITAL, ACETAMINOPHEN AND CAFFEINE 50; 325; 40 MG/1; MG/1; MG/1
1 TABLET ORAL EVERY 4 HOURS PRN
COMMUNITY

## 2019-11-05 RX ORDER — CHLORHEXIDINE GLUCONATE 0.12 MG/ML
15 RINSE ORAL 2 TIMES DAILY
Status: DISCONTINUED | OUTPATIENT
Start: 2019-11-05 | End: 2019-11-06

## 2019-11-05 RX ORDER — IPRATROPIUM BROMIDE AND ALBUTEROL SULFATE 2.5; .5 MG/3ML; MG/3ML
1 SOLUTION RESPIRATORY (INHALATION)
Status: DISCONTINUED | OUTPATIENT
Start: 2019-11-05 | End: 2019-11-07

## 2019-11-05 RX ADMIN — PROPOFOL 10 MCG/KG/MIN: 10 INJECTION, EMULSION INTRAVENOUS at 10:55

## 2019-11-05 RX ADMIN — FOLIC ACID 1 MG: 1 TABLET ORAL at 08:01

## 2019-11-05 RX ADMIN — IMMUNE GLOBULIN INTRAVENOUS (HUMAN) 10 G: 5 INJECTION, SOLUTION INTRAVENOUS at 22:12

## 2019-11-05 RX ADMIN — ONDANSETRON 4 MG: 2 INJECTION INTRAMUSCULAR; INTRAVENOUS at 09:40

## 2019-11-05 RX ADMIN — Medication 10 ML: at 21:00

## 2019-11-05 RX ADMIN — FENTANYL CITRATE 50 MCG: 50 INJECTION INTRAMUSCULAR; INTRAVENOUS at 09:59

## 2019-11-05 RX ADMIN — PYRIDOSTIGMINE BROMIDE 60 MG: 60 TABLET ORAL at 06:02

## 2019-11-05 RX ADMIN — ZOLPIDEM TARTRATE 5 MG: 5 TABLET ORAL at 20:59

## 2019-11-05 RX ADMIN — LIDOCAINE HYDROCHLORIDE 5 ML: 10 INJECTION, SOLUTION EPIDURAL; INFILTRATION; INTRACAUDAL; PERINEURAL at 15:30

## 2019-11-05 RX ADMIN — SODIUM CHLORIDE 999 ML/HR: 9 INJECTION, SOLUTION INTRAVENOUS at 09:52

## 2019-11-05 RX ADMIN — CEFUROXIME AXETIL 250 MG: 250 TABLET ORAL at 08:01

## 2019-11-05 RX ADMIN — SUCCINYLCHOLINE CHLORIDE 100 MG: 20 INJECTION, SOLUTION INTRAMUSCULAR; INTRAVENOUS at 10:05

## 2019-11-05 RX ADMIN — Medication 10 ML: at 18:13

## 2019-11-05 RX ADMIN — MIDAZOLAM 2 MG: 1 INJECTION INTRAMUSCULAR; INTRAVENOUS at 10:00

## 2019-11-05 RX ADMIN — LEVOTHYROXINE SODIUM 125 MCG: 125 TABLET ORAL at 06:02

## 2019-11-05 RX ADMIN — PYRIDOSTIGMINE BROMIDE 60 MG: 60 TABLET ORAL at 22:12

## 2019-11-05 RX ADMIN — CITALOPRAM HYDROBROMIDE 40 MG: 20 TABLET ORAL at 08:01

## 2019-11-05 RX ADMIN — CEFUROXIME AXETIL 250 MG: 250 TABLET ORAL at 20:59

## 2019-11-05 RX ADMIN — ETOMIDATE 20 MG: 2 INJECTION INTRAVENOUS at 09:57

## 2019-11-05 RX ADMIN — ENOXAPARIN SODIUM 40 MG: 40 INJECTION SUBCUTANEOUS at 20:59

## 2019-11-05 RX ADMIN — FENTANYL CITRATE: 50 INJECTION INTRAMUSCULAR; INTRAVENOUS at 10:30

## 2019-11-05 RX ADMIN — PYRIDOSTIGMINE BROMIDE 60 MG: 60 TABLET ORAL at 17:56

## 2019-11-05 RX ADMIN — IMMUNE GLOBULIN INTRAVENOUS (HUMAN) 20 G: 5 INJECTION, SOLUTION INTRAVENOUS at 18:14

## 2019-11-05 RX ADMIN — PANTOPRAZOLE SODIUM 40 MG: 40 INJECTION, POWDER, FOR SOLUTION INTRAVENOUS at 18:13

## 2019-11-05 RX ADMIN — IPRATROPIUM BROMIDE AND ALBUTEROL SULFATE 1 AMPULE: .5; 3 SOLUTION RESPIRATORY (INHALATION) at 16:01

## 2019-11-05 RX ADMIN — DEXTROSE AND SODIUM CHLORIDE: 5; 900 INJECTION, SOLUTION INTRAVENOUS at 15:22

## 2019-11-05 RX ADMIN — DOXEPIN HYDROCHLORIDE 10 MG: 10 CAPSULE ORAL at 20:59

## 2019-11-05 RX ADMIN — ACETAMINOPHEN 650 MG: 325 TABLET ORAL at 17:56

## 2019-11-05 RX ADMIN — MIDAZOLAM 2 MG: 1 INJECTION INTRAMUSCULAR; INTRAVENOUS at 10:02

## 2019-11-05 RX ADMIN — ESTRADIOL 2 MG: 1 TABLET ORAL at 20:59

## 2019-11-05 RX ADMIN — IPRATROPIUM BROMIDE AND ALBUTEROL SULFATE 1 AMPULE: .5; 3 SOLUTION RESPIRATORY (INHALATION) at 20:42

## 2019-11-05 RX ADMIN — METHYLPREDNISOLONE SODIUM SUCCINATE 60 MG: 125 INJECTION, POWDER, FOR SOLUTION INTRAMUSCULAR; INTRAVENOUS at 08:02

## 2019-11-05 ASSESSMENT — PAIN DESCRIPTION - PROGRESSION: CLINICAL_PROGRESSION: NOT CHANGED

## 2019-11-05 ASSESSMENT — PAIN - FUNCTIONAL ASSESSMENT: PAIN_FUNCTIONAL_ASSESSMENT: PREVENTS OR INTERFERES SOME ACTIVE ACTIVITIES AND ADLS

## 2019-11-05 ASSESSMENT — PAIN DESCRIPTION - PAIN TYPE
TYPE: ACUTE PAIN
TYPE: ACUTE PAIN

## 2019-11-05 ASSESSMENT — PULMONARY FUNCTION TESTS
PIF_VALUE: 20
PIF_VALUE: 19
PIF_VALUE: 11
PIF_VALUE: 17
PIF_VALUE: 23
PIF_VALUE: 20
PIF_VALUE: 18
PIF_VALUE: 17

## 2019-11-05 ASSESSMENT — PAIN SCALES - GENERAL
PAINLEVEL_OUTOF10: 0
PAINLEVEL_OUTOF10: 0
PAINLEVEL_OUTOF10: 2
PAINLEVEL_OUTOF10: 4
PAINLEVEL_OUTOF10: 4
PAINLEVEL_OUTOF10: 0

## 2019-11-05 ASSESSMENT — PAIN DESCRIPTION - DESCRIPTORS
DESCRIPTORS: SHARP;DULL
DESCRIPTORS: ACHING;SORE

## 2019-11-05 ASSESSMENT — PAIN DESCRIPTION - FREQUENCY
FREQUENCY: INTERMITTENT
FREQUENCY: INTERMITTENT

## 2019-11-05 ASSESSMENT — PAIN DESCRIPTION - ONSET: ONSET: SUDDEN

## 2019-11-05 ASSESSMENT — PAIN DESCRIPTION - LOCATION
LOCATION: SHOULDER
LOCATION: SHOULDER

## 2019-11-05 ASSESSMENT — PAIN DESCRIPTION - ORIENTATION
ORIENTATION: LEFT
ORIENTATION: LEFT

## 2019-11-06 ENCOUNTER — APPOINTMENT (OUTPATIENT)
Dept: MRI IMAGING | Age: 49
DRG: 056 | End: 2019-11-06
Attending: INTERNAL MEDICINE
Payer: COMMERCIAL

## 2019-11-06 ENCOUNTER — APPOINTMENT (OUTPATIENT)
Dept: GENERAL RADIOLOGY | Age: 49
DRG: 056 | End: 2019-11-06
Attending: INTERNAL MEDICINE
Payer: COMMERCIAL

## 2019-11-06 LAB
ANION GAP SERPL CALCULATED.3IONS-SCNC: 10 MMOL/L (ref 3–16)
BASOPHILS ABSOLUTE: 0 K/UL (ref 0–0.2)
BASOPHILS RELATIVE PERCENT: 0 %
BUN BLDV-MCNC: 9 MG/DL (ref 7–20)
CALCIUM SERPL-MCNC: 7.1 MG/DL (ref 8.3–10.6)
CHLORIDE BLD-SCNC: 109 MMOL/L (ref 99–110)
CO2: 22 MMOL/L (ref 21–32)
CREAT SERPL-MCNC: <0.5 MG/DL (ref 0.6–1.1)
EOSINOPHILS ABSOLUTE: 0 K/UL (ref 0–0.6)
EOSINOPHILS RELATIVE PERCENT: 0 %
ESTIMATED AVERAGE GLUCOSE: 99.7 MG/DL
GFR AFRICAN AMERICAN: >60
GFR NON-AFRICAN AMERICAN: >60
GLUCOSE BLD-MCNC: 141 MG/DL (ref 70–99)
GLUCOSE BLD-MCNC: 151 MG/DL (ref 70–99)
GLUCOSE BLD-MCNC: 177 MG/DL (ref 70–99)
GLUCOSE BLD-MCNC: 439 MG/DL (ref 70–99)
GLUCOSE BLD-MCNC: 92 MG/DL (ref 70–99)
HBA1C MFR BLD: 5.1 %
HCT VFR BLD CALC: 30.7 % (ref 36–48)
HEMOGLOBIN: 10.5 G/DL (ref 12–16)
LYMPHOCYTES ABSOLUTE: 1.2 K/UL (ref 1–5.1)
LYMPHOCYTES RELATIVE PERCENT: 11 %
MAGNESIUM: 1.6 MG/DL (ref 1.8–2.4)
MCH RBC QN AUTO: 31.2 PG (ref 26–34)
MCHC RBC AUTO-ENTMCNC: 34.1 G/DL (ref 31–36)
MCV RBC AUTO: 91.4 FL (ref 80–100)
MONOCYTES ABSOLUTE: 0.3 K/UL (ref 0–1.3)
MONOCYTES RELATIVE PERCENT: 3 %
NEUTROPHILS ABSOLUTE: 9.4 K/UL (ref 1.7–7.7)
NEUTROPHILS RELATIVE PERCENT: 86 %
PDW BLD-RTO: 13.7 % (ref 12.4–15.4)
PERFORMED ON: ABNORMAL
PERFORMED ON: NORMAL
PHOSPHORUS: 2.4 MG/DL (ref 2.5–4.9)
PLATELET # BLD: 222 K/UL (ref 135–450)
PMV BLD AUTO: 6.8 FL (ref 5–10.5)
POTASSIUM SERPL-SCNC: 3.3 MMOL/L (ref 3.5–5.1)
RBC # BLD: 3.36 M/UL (ref 4–5.2)
RBC # BLD: NORMAL 10*6/UL
SODIUM BLD-SCNC: 141 MMOL/L (ref 136–145)
WBC # BLD: 10.9 K/UL (ref 4–11)

## 2019-11-06 PROCEDURE — 97162 PT EVAL MOD COMPLEX 30 MIN: CPT

## 2019-11-06 PROCEDURE — 6360000002 HC RX W HCPCS: Performed by: INTERNAL MEDICINE

## 2019-11-06 PROCEDURE — 94640 AIRWAY INHALATION TREATMENT: CPT

## 2019-11-06 PROCEDURE — 6370000000 HC RX 637 (ALT 250 FOR IP): Performed by: NURSE PRACTITIONER

## 2019-11-06 PROCEDURE — 80048 BASIC METABOLIC PNL TOTAL CA: CPT

## 2019-11-06 PROCEDURE — 36592 COLLECT BLOOD FROM PICC: CPT

## 2019-11-06 PROCEDURE — 2580000003 HC RX 258: Performed by: NURSE PRACTITIONER

## 2019-11-06 PROCEDURE — 84100 ASSAY OF PHOSPHORUS: CPT

## 2019-11-06 PROCEDURE — 6370000000 HC RX 637 (ALT 250 FOR IP): Performed by: INTERNAL MEDICINE

## 2019-11-06 PROCEDURE — 85025 COMPLETE CBC W/AUTO DIFF WBC: CPT

## 2019-11-06 PROCEDURE — 2000000000 HC ICU R&B

## 2019-11-06 PROCEDURE — C9113 INJ PANTOPRAZOLE SODIUM, VIA: HCPCS | Performed by: NURSE PRACTITIONER

## 2019-11-06 PROCEDURE — 83036 HEMOGLOBIN GLYCOSYLATED A1C: CPT

## 2019-11-06 PROCEDURE — 97530 THERAPEUTIC ACTIVITIES: CPT

## 2019-11-06 PROCEDURE — 97166 OT EVAL MOD COMPLEX 45 MIN: CPT

## 2019-11-06 PROCEDURE — 6360000004 HC RX CONTRAST MEDICATION: Performed by: NURSE PRACTITIONER

## 2019-11-06 PROCEDURE — 36415 COLL VENOUS BLD VENIPUNCTURE: CPT

## 2019-11-06 PROCEDURE — 94761 N-INVAS EAR/PLS OXIMETRY MLT: CPT

## 2019-11-06 PROCEDURE — 83735 ASSAY OF MAGNESIUM: CPT

## 2019-11-06 PROCEDURE — 6360000002 HC RX W HCPCS: Performed by: NURSE PRACTITIONER

## 2019-11-06 PROCEDURE — A9577 INJ MULTIHANCE: HCPCS | Performed by: NURSE PRACTITIONER

## 2019-11-06 PROCEDURE — 70553 MRI BRAIN STEM W/O & W/DYE: CPT

## 2019-11-06 PROCEDURE — 94799 UNLISTED PULMONARY SVC/PX: CPT

## 2019-11-06 PROCEDURE — 2500000003 HC RX 250 WO HCPCS: Performed by: NURSE PRACTITIONER

## 2019-11-06 PROCEDURE — 2580000003 HC RX 258: Performed by: INTERNAL MEDICINE

## 2019-11-06 RX ORDER — MAGNESIUM SULFATE IN WATER 40 MG/ML
2 INJECTION, SOLUTION INTRAVENOUS ONCE
Status: COMPLETED | OUTPATIENT
Start: 2019-11-06 | End: 2019-11-06

## 2019-11-06 RX ORDER — DEXTROSE MONOHYDRATE 50 MG/ML
100 INJECTION, SOLUTION INTRAVENOUS PRN
Status: DISCONTINUED | OUTPATIENT
Start: 2019-11-06 | End: 2019-11-08 | Stop reason: HOSPADM

## 2019-11-06 RX ORDER — POTASSIUM CHLORIDE 29.8 MG/ML
20 INJECTION INTRAVENOUS ONCE
Status: COMPLETED | OUTPATIENT
Start: 2019-11-06 | End: 2019-11-06

## 2019-11-06 RX ORDER — DEXTROSE MONOHYDRATE 25 G/50ML
12.5 INJECTION, SOLUTION INTRAVENOUS PRN
Status: DISCONTINUED | OUTPATIENT
Start: 2019-11-06 | End: 2019-11-08 | Stop reason: HOSPADM

## 2019-11-06 RX ORDER — NICOTINE POLACRILEX 4 MG
15 LOZENGE BUCCAL PRN
Status: DISCONTINUED | OUTPATIENT
Start: 2019-11-06 | End: 2019-11-08 | Stop reason: HOSPADM

## 2019-11-06 RX ADMIN — CEFUROXIME AXETIL 250 MG: 250 TABLET ORAL at 21:27

## 2019-11-06 RX ADMIN — GADOBENATE DIMEGLUMINE 15 ML: 529 INJECTION, SOLUTION INTRAVENOUS at 11:18

## 2019-11-06 RX ADMIN — IMMUNE GLOBULIN INTRAVENOUS (HUMAN) 10 G: 5 INJECTION, SOLUTION INTRAVENOUS at 19:33

## 2019-11-06 RX ADMIN — Medication 10 ML: at 08:36

## 2019-11-06 RX ADMIN — IPRATROPIUM BROMIDE AND ALBUTEROL SULFATE 1 AMPULE: .5; 3 SOLUTION RESPIRATORY (INHALATION) at 20:35

## 2019-11-06 RX ADMIN — ESTRADIOL 2 MG: 1 TABLET ORAL at 21:27

## 2019-11-06 RX ADMIN — PYRIDOSTIGMINE BROMIDE 60 MG: 60 TABLET ORAL at 16:44

## 2019-11-06 RX ADMIN — PANTOPRAZOLE SODIUM 40 MG: 40 INJECTION, POWDER, FOR SOLUTION INTRAVENOUS at 08:35

## 2019-11-06 RX ADMIN — POTASSIUM CHLORIDE 20 MEQ: 29.8 INJECTION, SOLUTION INTRAVENOUS at 08:39

## 2019-11-06 RX ADMIN — IMMUNE GLOBULIN INTRAVENOUS (HUMAN) 20 G: 5 INJECTION, SOLUTION INTRAVENOUS at 18:04

## 2019-11-06 RX ADMIN — DEXTROSE AND SODIUM CHLORIDE: 5; 900 INJECTION, SOLUTION INTRAVENOUS at 01:00

## 2019-11-06 RX ADMIN — INSULIN LISPRO 1 UNITS: 100 INJECTION, SOLUTION INTRAVENOUS; SUBCUTANEOUS at 16:52

## 2019-11-06 RX ADMIN — CEFUROXIME AXETIL 250 MG: 250 TABLET ORAL at 08:35

## 2019-11-06 RX ADMIN — CITALOPRAM HYDROBROMIDE 40 MG: 20 TABLET ORAL at 08:35

## 2019-11-06 RX ADMIN — ENOXAPARIN SODIUM 40 MG: 40 INJECTION SUBCUTANEOUS at 21:27

## 2019-11-06 RX ADMIN — IPRATROPIUM BROMIDE AND ALBUTEROL SULFATE 1 AMPULE: .5; 3 SOLUTION RESPIRATORY (INHALATION) at 15:43

## 2019-11-06 RX ADMIN — IPRATROPIUM BROMIDE AND ALBUTEROL SULFATE 1 AMPULE: .5; 3 SOLUTION RESPIRATORY (INHALATION) at 13:26

## 2019-11-06 RX ADMIN — Medication 10 ML: at 21:28

## 2019-11-06 RX ADMIN — PYRIDOSTIGMINE BROMIDE 60 MG: 60 TABLET ORAL at 21:28

## 2019-11-06 RX ADMIN — INSULIN LISPRO 1 UNITS: 100 INJECTION, SOLUTION INTRAVENOUS; SUBCUTANEOUS at 21:28

## 2019-11-06 RX ADMIN — MAGNESIUM SULFATE HEPTAHYDRATE 2 G: 40 INJECTION, SOLUTION INTRAVENOUS at 08:39

## 2019-11-06 RX ADMIN — ZOLPIDEM TARTRATE 5 MG: 5 TABLET ORAL at 21:27

## 2019-11-06 RX ADMIN — INSULIN LISPRO 1 UNITS: 100 INJECTION, SOLUTION INTRAVENOUS; SUBCUTANEOUS at 12:13

## 2019-11-06 RX ADMIN — DOXEPIN HYDROCHLORIDE 10 MG: 10 CAPSULE ORAL at 21:28

## 2019-11-06 RX ADMIN — FOLIC ACID 1 MG: 1 TABLET ORAL at 08:35

## 2019-11-06 RX ADMIN — IPRATROPIUM BROMIDE AND ALBUTEROL SULFATE 1 AMPULE: .5; 3 SOLUTION RESPIRATORY (INHALATION) at 07:51

## 2019-11-06 RX ADMIN — PYRIDOSTIGMINE BROMIDE 60 MG: 60 TABLET ORAL at 08:35

## 2019-11-06 RX ADMIN — METHYLPREDNISOLONE SODIUM SUCCINATE 60 MG: 125 INJECTION, POWDER, FOR SOLUTION INTRAMUSCULAR; INTRAVENOUS at 08:35

## 2019-11-06 RX ADMIN — POTASSIUM PHOSPHATE, MONOBASIC AND POTASSIUM PHOSPHATE, DIBASIC 20 MMOL: 224; 236 INJECTION, SOLUTION, CONCENTRATE INTRAVENOUS at 11:34

## 2019-11-06 RX ADMIN — Medication 10 ML: at 09:00

## 2019-11-06 ASSESSMENT — PAIN SCALES - GENERAL
PAINLEVEL_OUTOF10: 0
PAINLEVEL_OUTOF10: 2

## 2019-11-06 ASSESSMENT — PAIN DESCRIPTION - PAIN TYPE: TYPE: ACUTE PAIN

## 2019-11-06 ASSESSMENT — PAIN DESCRIPTION - ORIENTATION: ORIENTATION: LEFT

## 2019-11-06 ASSESSMENT — PAIN DESCRIPTION - DIRECTION: RADIATING_TOWARDS: ELBOW

## 2019-11-06 ASSESSMENT — PAIN DESCRIPTION - PROGRESSION: CLINICAL_PROGRESSION: NOT CHANGED

## 2019-11-06 ASSESSMENT — PAIN DESCRIPTION - FREQUENCY: FREQUENCY: INTERMITTENT

## 2019-11-06 ASSESSMENT — PAIN DESCRIPTION - LOCATION: LOCATION: SHOULDER;ELBOW

## 2019-11-06 ASSESSMENT — PAIN DESCRIPTION - DESCRIPTORS: DESCRIPTORS: ACHING;SORE

## 2019-11-06 ASSESSMENT — PAIN - FUNCTIONAL ASSESSMENT: PAIN_FUNCTIONAL_ASSESSMENT: PREVENTS OR INTERFERES SOME ACTIVE ACTIVITIES AND ADLS

## 2019-11-06 ASSESSMENT — PAIN DESCRIPTION - ONSET: ONSET: PROGRESSIVE

## 2019-11-07 LAB
ANION GAP SERPL CALCULATED.3IONS-SCNC: 12 MMOL/L (ref 3–16)
BASOPHILS ABSOLUTE: 0 K/UL (ref 0–0.2)
BASOPHILS RELATIVE PERCENT: 0 %
BUN BLDV-MCNC: 12 MG/DL (ref 7–20)
CALCIUM SERPL-MCNC: 8.2 MG/DL (ref 8.3–10.6)
CHLORIDE BLD-SCNC: 102 MMOL/L (ref 99–110)
CO2: 27 MMOL/L (ref 21–32)
CREAT SERPL-MCNC: <0.5 MG/DL (ref 0.6–1.1)
EOSINOPHILS ABSOLUTE: 0 K/UL (ref 0–0.6)
EOSINOPHILS RELATIVE PERCENT: 0 %
GFR AFRICAN AMERICAN: >60
GFR NON-AFRICAN AMERICAN: >60
GLUCOSE BLD-MCNC: 112 MG/DL (ref 70–99)
GLUCOSE BLD-MCNC: 154 MG/DL (ref 70–99)
GLUCOSE BLD-MCNC: 89 MG/DL (ref 70–99)
HCT VFR BLD CALC: 35.5 % (ref 36–48)
HEMOGLOBIN: 11.6 G/DL (ref 12–16)
LYMPHOCYTES ABSOLUTE: 1.8 K/UL (ref 1–5.1)
LYMPHOCYTES RELATIVE PERCENT: 15 %
MAGNESIUM: 2 MG/DL (ref 1.8–2.4)
MCH RBC QN AUTO: 29.8 PG (ref 26–34)
MCHC RBC AUTO-ENTMCNC: 32.7 G/DL (ref 31–36)
MCV RBC AUTO: 91.2 FL (ref 80–100)
MONOCYTES ABSOLUTE: 0.4 K/UL (ref 0–1.3)
MONOCYTES RELATIVE PERCENT: 3 %
NEUTROPHILS ABSOLUTE: 9.8 K/UL (ref 1.7–7.7)
NEUTROPHILS RELATIVE PERCENT: 82 %
ORGANISM: ABNORMAL
PDW BLD-RTO: 14 % (ref 12.4–15.4)
PERFORMED ON: ABNORMAL
PERFORMED ON: ABNORMAL
PHOSPHORUS: 2.8 MG/DL (ref 2.5–4.9)
PLATELET # BLD: 226 K/UL (ref 135–450)
PMV BLD AUTO: 6.6 FL (ref 5–10.5)
POTASSIUM SERPL-SCNC: 4 MMOL/L (ref 3.5–5.1)
RBC # BLD: 3.89 M/UL (ref 4–5.2)
RBC # BLD: NORMAL 10*6/UL
SODIUM BLD-SCNC: 141 MMOL/L (ref 136–145)
URINE CULTURE, ROUTINE: ABNORMAL
WBC # BLD: 12 K/UL (ref 4–11)

## 2019-11-07 PROCEDURE — C9113 INJ PANTOPRAZOLE SODIUM, VIA: HCPCS | Performed by: INTERNAL MEDICINE

## 2019-11-07 PROCEDURE — 85025 COMPLETE CBC W/AUTO DIFF WBC: CPT

## 2019-11-07 PROCEDURE — 84100 ASSAY OF PHOSPHORUS: CPT

## 2019-11-07 PROCEDURE — 2580000003 HC RX 258: Performed by: INTERNAL MEDICINE

## 2019-11-07 PROCEDURE — 94761 N-INVAS EAR/PLS OXIMETRY MLT: CPT

## 2019-11-07 PROCEDURE — 97530 THERAPEUTIC ACTIVITIES: CPT

## 2019-11-07 PROCEDURE — 97535 SELF CARE MNGMENT TRAINING: CPT

## 2019-11-07 PROCEDURE — 6370000000 HC RX 637 (ALT 250 FOR IP): Performed by: INTERNAL MEDICINE

## 2019-11-07 PROCEDURE — 80048 BASIC METABOLIC PNL TOTAL CA: CPT

## 2019-11-07 PROCEDURE — 94799 UNLISTED PULMONARY SVC/PX: CPT

## 2019-11-07 PROCEDURE — 94640 AIRWAY INHALATION TREATMENT: CPT

## 2019-11-07 PROCEDURE — 6360000002 HC RX W HCPCS: Performed by: INTERNAL MEDICINE

## 2019-11-07 PROCEDURE — 94150 VITAL CAPACITY TEST: CPT

## 2019-11-07 PROCEDURE — 6370000000 HC RX 637 (ALT 250 FOR IP): Performed by: NURSE PRACTITIONER

## 2019-11-07 PROCEDURE — 2060000000 HC ICU INTERMEDIATE R&B

## 2019-11-07 PROCEDURE — 83735 ASSAY OF MAGNESIUM: CPT

## 2019-11-07 PROCEDURE — 97116 GAIT TRAINING THERAPY: CPT

## 2019-11-07 RX ORDER — IPRATROPIUM BROMIDE AND ALBUTEROL SULFATE 2.5; .5 MG/3ML; MG/3ML
1 SOLUTION RESPIRATORY (INHALATION) EVERY 4 HOURS PRN
Status: DISCONTINUED | OUTPATIENT
Start: 2019-11-07 | End: 2019-11-08 | Stop reason: HOSPADM

## 2019-11-07 RX ADMIN — PYRIDOSTIGMINE BROMIDE 60 MG: 60 TABLET ORAL at 21:44

## 2019-11-07 RX ADMIN — ESTRADIOL 2 MG: 1 TABLET ORAL at 20:47

## 2019-11-07 RX ADMIN — IMMUNE GLOBULIN INTRAVENOUS (HUMAN) 20 G: 5 INJECTION, SOLUTION INTRAVENOUS at 16:17

## 2019-11-07 RX ADMIN — ENOXAPARIN SODIUM 40 MG: 40 INJECTION SUBCUTANEOUS at 20:47

## 2019-11-07 RX ADMIN — DOXEPIN HYDROCHLORIDE 10 MG: 10 CAPSULE ORAL at 20:48

## 2019-11-07 RX ADMIN — Medication 10 ML: at 11:24

## 2019-11-07 RX ADMIN — Medication 10 ML: at 20:48

## 2019-11-07 RX ADMIN — FOLIC ACID 1 MG: 1 TABLET ORAL at 11:24

## 2019-11-07 RX ADMIN — METHYLPREDNISOLONE SODIUM SUCCINATE 60 MG: 125 INJECTION, POWDER, FOR SOLUTION INTRAMUSCULAR; INTRAVENOUS at 11:24

## 2019-11-07 RX ADMIN — PYRIDOSTIGMINE BROMIDE 60 MG: 60 TABLET ORAL at 06:05

## 2019-11-07 RX ADMIN — PANTOPRAZOLE SODIUM 40 MG: 40 INJECTION, POWDER, FOR SOLUTION INTRAVENOUS at 11:24

## 2019-11-07 RX ADMIN — INSULIN LISPRO 1 UNITS: 100 INJECTION, SOLUTION INTRAVENOUS; SUBCUTANEOUS at 20:48

## 2019-11-07 RX ADMIN — CEFUROXIME AXETIL 250 MG: 250 TABLET ORAL at 20:47

## 2019-11-07 RX ADMIN — LEVOTHYROXINE SODIUM 125 MCG: 125 TABLET ORAL at 06:05

## 2019-11-07 RX ADMIN — ZOLPIDEM TARTRATE 5 MG: 5 TABLET ORAL at 20:47

## 2019-11-07 RX ADMIN — IPRATROPIUM BROMIDE AND ALBUTEROL SULFATE 1 AMPULE: .5; 3 SOLUTION RESPIRATORY (INHALATION) at 12:43

## 2019-11-07 RX ADMIN — CEFUROXIME AXETIL 250 MG: 250 TABLET ORAL at 11:24

## 2019-11-07 RX ADMIN — CITALOPRAM HYDROBROMIDE 40 MG: 20 TABLET ORAL at 11:24

## 2019-11-07 RX ADMIN — IMMUNE GLOBULIN INTRAVENOUS (HUMAN) 10 G: 5 INJECTION, SOLUTION INTRAVENOUS at 19:42

## 2019-11-07 RX ADMIN — IPRATROPIUM BROMIDE AND ALBUTEROL SULFATE 1 AMPULE: .5; 3 SOLUTION RESPIRATORY (INHALATION) at 07:49

## 2019-11-07 ASSESSMENT — PAIN SCALES - GENERAL
PAINLEVEL_OUTOF10: 0

## 2019-11-08 VITALS
HEART RATE: 70 BPM | OXYGEN SATURATION: 95 % | RESPIRATION RATE: 16 BRPM | BODY MASS INDEX: 31.04 KG/M2 | TEMPERATURE: 97.9 F | HEIGHT: 62 IN | SYSTOLIC BLOOD PRESSURE: 143 MMHG | WEIGHT: 168.65 LBS | DIASTOLIC BLOOD PRESSURE: 84 MMHG

## 2019-11-08 LAB
ANION GAP SERPL CALCULATED.3IONS-SCNC: 10 MMOL/L (ref 3–16)
BASOPHILS ABSOLUTE: 0 K/UL (ref 0–0.2)
BASOPHILS RELATIVE PERCENT: 0 %
BUN BLDV-MCNC: 16 MG/DL (ref 7–20)
CALCIUM SERPL-MCNC: 8.6 MG/DL (ref 8.3–10.6)
CHLORIDE BLD-SCNC: 99 MMOL/L (ref 99–110)
CO2: 29 MMOL/L (ref 21–32)
CREAT SERPL-MCNC: <0.5 MG/DL (ref 0.6–1.1)
EOSINOPHILS ABSOLUTE: 0 K/UL (ref 0–0.6)
EOSINOPHILS RELATIVE PERCENT: 0 %
GFR AFRICAN AMERICAN: >60
GFR NON-AFRICAN AMERICAN: >60
GLUCOSE BLD-MCNC: 130 MG/DL (ref 70–99)
GLUCOSE BLD-MCNC: 163 MG/DL (ref 70–99)
GLUCOSE BLD-MCNC: 86 MG/DL (ref 70–99)
GLUCOSE BLD-MCNC: 87 MG/DL (ref 70–99)
HCT VFR BLD CALC: 35 % (ref 36–48)
HEMOGLOBIN: 11.9 G/DL (ref 12–16)
LYMPHOCYTES ABSOLUTE: 1 K/UL (ref 1–5.1)
LYMPHOCYTES RELATIVE PERCENT: 9 %
MAGNESIUM: 2.1 MG/DL (ref 1.8–2.4)
MCH RBC QN AUTO: 30.6 PG (ref 26–34)
MCHC RBC AUTO-ENTMCNC: 34.1 G/DL (ref 31–36)
MCV RBC AUTO: 89.8 FL (ref 80–100)
METAMYELOCYTES RELATIVE PERCENT: 1 %
MONOCYTES ABSOLUTE: 0.6 K/UL (ref 0–1.3)
MONOCYTES RELATIVE PERCENT: 5 %
NEUTROPHILS ABSOLUTE: 9.8 K/UL (ref 1.7–7.7)
NEUTROPHILS RELATIVE PERCENT: 85 %
PDW BLD-RTO: 14.3 % (ref 12.4–15.4)
PERFORMED ON: ABNORMAL
PERFORMED ON: ABNORMAL
PERFORMED ON: NORMAL
PHOSPHORUS: 3.2 MG/DL (ref 2.5–4.9)
PLATELET # BLD: 241 K/UL (ref 135–450)
PLATELET SLIDE REVIEW: ADEQUATE
PMV BLD AUTO: 6.8 FL (ref 5–10.5)
POTASSIUM SERPL-SCNC: 4.1 MMOL/L (ref 3.5–5.1)
RBC # BLD: 3.9 M/UL (ref 4–5.2)
RBC # BLD: NORMAL 10*6/UL
SLIDE REVIEW: ABNORMAL
SODIUM BLD-SCNC: 138 MMOL/L (ref 136–145)
WBC # BLD: 11.4 K/UL (ref 4–11)

## 2019-11-08 PROCEDURE — 6370000000 HC RX 637 (ALT 250 FOR IP): Performed by: INTERNAL MEDICINE

## 2019-11-08 PROCEDURE — C9113 INJ PANTOPRAZOLE SODIUM, VIA: HCPCS | Performed by: INTERNAL MEDICINE

## 2019-11-08 PROCEDURE — 97530 THERAPEUTIC ACTIVITIES: CPT

## 2019-11-08 PROCEDURE — 94799 UNLISTED PULMONARY SVC/PX: CPT

## 2019-11-08 PROCEDURE — 83516 IMMUNOASSAY NONANTIBODY: CPT

## 2019-11-08 PROCEDURE — 84100 ASSAY OF PHOSPHORUS: CPT

## 2019-11-08 PROCEDURE — 85025 COMPLETE CBC W/AUTO DIFF WBC: CPT

## 2019-11-08 PROCEDURE — 83519 RIA NONANTIBODY: CPT

## 2019-11-08 PROCEDURE — 6360000002 HC RX W HCPCS: Performed by: INTERNAL MEDICINE

## 2019-11-08 PROCEDURE — 2580000003 HC RX 258: Performed by: INTERNAL MEDICINE

## 2019-11-08 PROCEDURE — 80048 BASIC METABOLIC PNL TOTAL CA: CPT

## 2019-11-08 PROCEDURE — 83735 ASSAY OF MAGNESIUM: CPT

## 2019-11-08 RX ORDER — PYRIDOSTIGMINE BROMIDE 60 MG/1
60 TABLET ORAL 3 TIMES DAILY
Qty: 90 TABLET | Refills: 1 | Status: ON HOLD | OUTPATIENT
Start: 2019-11-08 | End: 2019-12-14 | Stop reason: HOSPADM

## 2019-11-08 RX ORDER — PREDNISONE 20 MG/1
40 TABLET ORAL DAILY
Qty: 60 TABLET | Refills: 1 | Status: SHIPPED | OUTPATIENT
Start: 2019-11-08 | End: 2019-12-16

## 2019-11-08 RX ORDER — PANTOPRAZOLE SODIUM 40 MG/1
40 TABLET, DELAYED RELEASE ORAL DAILY
Qty: 30 TABLET | Refills: 1 | Status: SHIPPED | OUTPATIENT
Start: 2019-11-08 | End: 2020-02-25 | Stop reason: ALTCHOICE

## 2019-11-08 RX ADMIN — FOLIC ACID 1 MG: 1 TABLET ORAL at 08:34

## 2019-11-08 RX ADMIN — METHYLPREDNISOLONE SODIUM SUCCINATE 60 MG: 125 INJECTION, POWDER, FOR SOLUTION INTRAMUSCULAR; INTRAVENOUS at 08:34

## 2019-11-08 RX ADMIN — Medication 10 ML: at 08:37

## 2019-11-08 RX ADMIN — Medication 10 ML: at 08:35

## 2019-11-08 RX ADMIN — PYRIDOSTIGMINE BROMIDE 60 MG: 60 TABLET ORAL at 06:21

## 2019-11-08 RX ADMIN — LEVOTHYROXINE SODIUM 125 MCG: 125 TABLET ORAL at 06:21

## 2019-11-08 RX ADMIN — IMMUNE GLOBULIN INTRAVENOUS (HUMAN) 20 G: 5 INJECTION, SOLUTION INTRAVENOUS at 15:46

## 2019-11-08 RX ADMIN — PANTOPRAZOLE SODIUM 40 MG: 40 INJECTION, POWDER, FOR SOLUTION INTRAVENOUS at 08:34

## 2019-11-08 RX ADMIN — PYRIDOSTIGMINE BROMIDE 60 MG: 60 TABLET ORAL at 14:11

## 2019-11-08 RX ADMIN — ACETAMINOPHEN 650 MG: 325 TABLET ORAL at 08:34

## 2019-11-08 RX ADMIN — IMMUNE GLOBULIN INTRAVENOUS (HUMAN) 10 G: 5 INJECTION, SOLUTION INTRAVENOUS at 17:21

## 2019-11-08 RX ADMIN — CITALOPRAM HYDROBROMIDE 40 MG: 20 TABLET ORAL at 08:34

## 2019-11-08 RX ADMIN — CEFUROXIME AXETIL 250 MG: 250 TABLET ORAL at 08:34

## 2019-11-08 ASSESSMENT — PAIN DESCRIPTION - ONSET: ONSET: ON-GOING

## 2019-11-08 ASSESSMENT — PAIN DESCRIPTION - PROGRESSION: CLINICAL_PROGRESSION: GRADUALLY WORSENING

## 2019-11-08 ASSESSMENT — PAIN - FUNCTIONAL ASSESSMENT: PAIN_FUNCTIONAL_ASSESSMENT: ACTIVITIES ARE NOT PREVENTED

## 2019-11-08 ASSESSMENT — PAIN DESCRIPTION - ORIENTATION: ORIENTATION: POSTERIOR

## 2019-11-08 ASSESSMENT — PAIN DESCRIPTION - LOCATION: LOCATION: NECK

## 2019-11-08 ASSESSMENT — PAIN SCALES - GENERAL
PAINLEVEL_OUTOF10: 5
PAINLEVEL_OUTOF10: 3

## 2019-11-08 ASSESSMENT — PAIN DESCRIPTION - PAIN TYPE: TYPE: ACUTE PAIN

## 2019-11-08 ASSESSMENT — PAIN DESCRIPTION - FREQUENCY: FREQUENCY: CONTINUOUS

## 2019-11-08 ASSESSMENT — PAIN DESCRIPTION - DESCRIPTORS: DESCRIPTORS: SHARP;OTHER (COMMENT)

## 2019-11-10 ENCOUNTER — APPOINTMENT (OUTPATIENT)
Dept: GENERAL RADIOLOGY | Age: 49
DRG: 098 | End: 2019-11-10
Payer: COMMERCIAL

## 2019-11-10 ENCOUNTER — APPOINTMENT (OUTPATIENT)
Dept: CT IMAGING | Age: 49
DRG: 098 | End: 2019-11-10
Payer: COMMERCIAL

## 2019-11-10 ENCOUNTER — HOSPITAL ENCOUNTER (INPATIENT)
Age: 49
LOS: 4 days | Discharge: HOME | DRG: 098 | End: 2019-11-14
Attending: EMERGENCY MEDICINE | Admitting: INTERNAL MEDICINE
Payer: COMMERCIAL

## 2019-11-10 DIAGNOSIS — R51.9 ACUTE INTRACTABLE HEADACHE, UNSPECIFIED HEADACHE TYPE: Primary | ICD-10-CM

## 2019-11-10 DIAGNOSIS — Z86.69 H/O MYASTHENIA GRAVIS: ICD-10-CM

## 2019-11-10 DIAGNOSIS — H53.149 PHOTOPHOBIA: ICD-10-CM

## 2019-11-10 PROBLEM — M43.6 NECK STIFFNESS: Status: ACTIVE | Noted: 2019-11-10

## 2019-11-10 PROBLEM — K37 APPENDICITIS: Status: ACTIVE | Noted: 2019-11-10

## 2019-11-10 LAB
ANION GAP SERPL CALCULATED.3IONS-SCNC: 13 MMOL/L (ref 3–16)
BACTERIA: ABNORMAL /HPF
BASOPHILS ABSOLUTE: 0.2 K/UL (ref 0–0.2)
BASOPHILS RELATIVE PERCENT: 1.2 %
BILIRUBIN URINE: NEGATIVE
BLOOD, URINE: ABNORMAL
BUN BLDV-MCNC: 19 MG/DL (ref 7–20)
CALCIUM SERPL-MCNC: 8.5 MG/DL (ref 8.3–10.6)
CHLORIDE BLD-SCNC: 94 MMOL/L (ref 99–110)
CLARITY: CLEAR
CO2: 25 MMOL/L (ref 21–32)
COLOR: YELLOW
CREAT SERPL-MCNC: 0.7 MG/DL (ref 0.6–1.1)
EKG ATRIAL RATE: 70 BPM
EKG DIAGNOSIS: NORMAL
EKG P AXIS: 9 DEGREES
EKG P-R INTERVAL: 158 MS
EKG Q-T INTERVAL: 408 MS
EKG QRS DURATION: 84 MS
EKG QTC CALCULATION (BAZETT): 440 MS
EKG R AXIS: 30 DEGREES
EKG T AXIS: 6 DEGREES
EKG VENTRICULAR RATE: 70 BPM
EOSINOPHILS ABSOLUTE: 0.1 K/UL (ref 0–0.6)
EOSINOPHILS RELATIVE PERCENT: 0.6 %
EPITHELIAL CELLS, UA: 4 /HPF (ref 0–5)
GFR AFRICAN AMERICAN: >60
GFR NON-AFRICAN AMERICAN: >60
GLUCOSE BLD-MCNC: 93 MG/DL (ref 70–99)
GLUCOSE URINE: NEGATIVE MG/DL
HCG QUALITATIVE: NEGATIVE
HCT VFR BLD CALC: 41.6 % (ref 36–48)
HEMOGLOBIN: 13.6 G/DL (ref 12–16)
HYALINE CASTS: 2 /LPF (ref 0–8)
KETONES, URINE: NEGATIVE MG/DL
LACTIC ACID: 1.1 MMOL/L (ref 0.4–2)
LEUKOCYTE ESTERASE, URINE: ABNORMAL
LYMPHOCYTES ABSOLUTE: 3.4 K/UL (ref 1–5.1)
LYMPHOCYTES RELATIVE PERCENT: 27.2 %
MCH RBC QN AUTO: 29.8 PG (ref 26–34)
MCHC RBC AUTO-ENTMCNC: 32.7 G/DL (ref 31–36)
MCV RBC AUTO: 91.2 FL (ref 80–100)
MICROSCOPIC EXAMINATION: YES
MONOCYTES ABSOLUTE: 0.7 K/UL (ref 0–1.3)
MONOCYTES RELATIVE PERCENT: 5.8 %
NEUTROPHILS ABSOLUTE: 8.2 K/UL (ref 1.7–7.7)
NEUTROPHILS RELATIVE PERCENT: 65.2 %
NITRITE, URINE: NEGATIVE
PDW BLD-RTO: 14.4 % (ref 12.4–15.4)
PH UA: 5.5 (ref 5–8)
PLATELET # BLD: 327 K/UL (ref 135–450)
PMV BLD AUTO: 6.9 FL (ref 5–10.5)
POTASSIUM REFLEX MAGNESIUM: 3.7 MMOL/L (ref 3.5–5.1)
PROTEIN UA: NEGATIVE MG/DL
RAPID INFLUENZA  B AGN: NEGATIVE
RAPID INFLUENZA A AGN: NEGATIVE
RBC # BLD: 4.56 M/UL (ref 4–5.2)
RBC UA: 2 /HPF (ref 0–4)
SODIUM BLD-SCNC: 132 MMOL/L (ref 136–145)
SPECIFIC GRAVITY UA: 1.02 (ref 1–1.03)
URINE REFLEX TO CULTURE: YES
URINE TYPE: ABNORMAL
UROBILINOGEN, URINE: 0.2 E.U./DL
WBC # BLD: 12.6 K/UL (ref 4–11)
WBC UA: 10 /HPF (ref 0–5)

## 2019-11-10 PROCEDURE — 6360000002 HC RX W HCPCS: Performed by: INTERNAL MEDICINE

## 2019-11-10 PROCEDURE — 93010 ELECTROCARDIOGRAM REPORT: CPT | Performed by: INTERNAL MEDICINE

## 2019-11-10 PROCEDURE — 83605 ASSAY OF LACTIC ACID: CPT

## 2019-11-10 PROCEDURE — 84703 CHORIONIC GONADOTROPIN ASSAY: CPT

## 2019-11-10 PROCEDURE — 96361 HYDRATE IV INFUSION ADD-ON: CPT

## 2019-11-10 PROCEDURE — 6370000000 HC RX 637 (ALT 250 FOR IP): Performed by: INTERNAL MEDICINE

## 2019-11-10 PROCEDURE — 2580000003 HC RX 258: Performed by: NURSE PRACTITIONER

## 2019-11-10 PROCEDURE — 6360000002 HC RX W HCPCS: Performed by: NURSE PRACTITIONER

## 2019-11-10 PROCEDURE — 71046 X-RAY EXAM CHEST 2 VIEWS: CPT

## 2019-11-10 PROCEDURE — 1200000000 HC SEMI PRIVATE

## 2019-11-10 PROCEDURE — 96374 THER/PROPH/DIAG INJ IV PUSH: CPT

## 2019-11-10 PROCEDURE — 85025 COMPLETE CBC W/AUTO DIFF WBC: CPT

## 2019-11-10 PROCEDURE — 96375 TX/PRO/DX INJ NEW DRUG ADDON: CPT

## 2019-11-10 PROCEDURE — 87804 INFLUENZA ASSAY W/OPTIC: CPT

## 2019-11-10 PROCEDURE — 70450 CT HEAD/BRAIN W/O DYE: CPT

## 2019-11-10 PROCEDURE — 87040 BLOOD CULTURE FOR BACTERIA: CPT

## 2019-11-10 PROCEDURE — 2580000003 HC RX 258: Performed by: INTERNAL MEDICINE

## 2019-11-10 PROCEDURE — 87086 URINE CULTURE/COLONY COUNT: CPT

## 2019-11-10 PROCEDURE — 93005 ELECTROCARDIOGRAM TRACING: CPT | Performed by: EMERGENCY MEDICINE

## 2019-11-10 PROCEDURE — 99285 EMERGENCY DEPT VISIT HI MDM: CPT

## 2019-11-10 PROCEDURE — 6360000002 HC RX W HCPCS: Performed by: EMERGENCY MEDICINE

## 2019-11-10 PROCEDURE — 36415 COLL VENOUS BLD VENIPUNCTURE: CPT

## 2019-11-10 PROCEDURE — 80048 BASIC METABOLIC PNL TOTAL CA: CPT

## 2019-11-10 PROCEDURE — 81001 URINALYSIS AUTO W/SCOPE: CPT

## 2019-11-10 RX ORDER — MORPHINE SULFATE 4 MG/ML
4 INJECTION, SOLUTION INTRAMUSCULAR; INTRAVENOUS
Status: DISCONTINUED | OUTPATIENT
Start: 2019-11-10 | End: 2019-11-14 | Stop reason: HOSPADM

## 2019-11-10 RX ORDER — KETOROLAC TROMETHAMINE 30 MG/ML
30 INJECTION, SOLUTION INTRAMUSCULAR; INTRAVENOUS EVERY 6 HOURS PRN
Status: DISCONTINUED | OUTPATIENT
Start: 2019-11-10 | End: 2019-11-14 | Stop reason: HOSPADM

## 2019-11-10 RX ORDER — ONDANSETRON 2 MG/ML
4 INJECTION INTRAMUSCULAR; INTRAVENOUS EVERY 6 HOURS PRN
Status: DISCONTINUED | OUTPATIENT
Start: 2019-11-10 | End: 2019-11-14 | Stop reason: HOSPADM

## 2019-11-10 RX ORDER — BUTALBITAL, ACETAMINOPHEN AND CAFFEINE 50; 325; 40 MG/1; MG/1; MG/1
1 TABLET ORAL EVERY 4 HOURS PRN
Status: DISCONTINUED | OUTPATIENT
Start: 2019-11-10 | End: 2019-11-14 | Stop reason: HOSPADM

## 2019-11-10 RX ORDER — MORPHINE SULFATE 4 MG/ML
4 INJECTION, SOLUTION INTRAMUSCULAR; INTRAVENOUS ONCE
Status: COMPLETED | OUTPATIENT
Start: 2019-11-10 | End: 2019-11-10

## 2019-11-10 RX ORDER — SODIUM CHLORIDE 9 MG/ML
INJECTION, SOLUTION INTRAVENOUS CONTINUOUS
Status: DISCONTINUED | OUTPATIENT
Start: 2019-11-10 | End: 2019-11-14 | Stop reason: HOSPADM

## 2019-11-10 RX ORDER — DEXTROSE AND SODIUM CHLORIDE 5; .45 G/100ML; G/100ML
INJECTION, SOLUTION INTRAVENOUS CONTINUOUS
Status: DISCONTINUED | OUTPATIENT
Start: 2019-11-10 | End: 2019-11-10

## 2019-11-10 RX ORDER — CITALOPRAM 20 MG/1
40 TABLET ORAL DAILY
Status: DISCONTINUED | OUTPATIENT
Start: 2019-11-10 | End: 2019-11-14 | Stop reason: HOSPADM

## 2019-11-10 RX ORDER — DOXEPIN HYDROCHLORIDE 10 MG/1
10 CAPSULE ORAL NIGHTLY
Status: DISCONTINUED | OUTPATIENT
Start: 2019-11-10 | End: 2019-11-14 | Stop reason: HOSPADM

## 2019-11-10 RX ORDER — ZOLPIDEM TARTRATE 5 MG/1
10 TABLET ORAL NIGHTLY
Status: DISCONTINUED | OUTPATIENT
Start: 2019-11-10 | End: 2019-11-14 | Stop reason: HOSPADM

## 2019-11-10 RX ORDER — 0.9 % SODIUM CHLORIDE 0.9 %
1000 INTRAVENOUS SOLUTION INTRAVENOUS ONCE
Status: COMPLETED | OUTPATIENT
Start: 2019-11-10 | End: 2019-11-10

## 2019-11-10 RX ORDER — LORAZEPAM 0.5 MG/1
0.5 TABLET ORAL 2 TIMES DAILY
Status: DISCONTINUED | OUTPATIENT
Start: 2019-11-10 | End: 2019-11-14 | Stop reason: HOSPADM

## 2019-11-10 RX ORDER — MORPHINE SULFATE 2 MG/ML
2 INJECTION, SOLUTION INTRAMUSCULAR; INTRAVENOUS
Status: DISCONTINUED | OUTPATIENT
Start: 2019-11-10 | End: 2019-11-14 | Stop reason: HOSPADM

## 2019-11-10 RX ORDER — FOLIC ACID 1 MG/1
1 TABLET ORAL DAILY
Status: DISCONTINUED | OUTPATIENT
Start: 2019-11-10 | End: 2019-11-14 | Stop reason: HOSPADM

## 2019-11-10 RX ORDER — LEVOTHYROXINE SODIUM 0.12 MG/1
125 TABLET ORAL DAILY
Status: DISCONTINUED | OUTPATIENT
Start: 2019-11-10 | End: 2019-11-14 | Stop reason: HOSPADM

## 2019-11-10 RX ORDER — PANTOPRAZOLE SODIUM 40 MG/1
40 TABLET, DELAYED RELEASE ORAL DAILY
Status: DISCONTINUED | OUTPATIENT
Start: 2019-11-10 | End: 2019-11-14 | Stop reason: HOSPADM

## 2019-11-10 RX ORDER — SODIUM CHLORIDE 0.9 % (FLUSH) 0.9 %
10 SYRINGE (ML) INJECTION EVERY 12 HOURS SCHEDULED
Status: DISCONTINUED | OUTPATIENT
Start: 2019-11-10 | End: 2019-11-14 | Stop reason: HOSPADM

## 2019-11-10 RX ORDER — PYRIDOSTIGMINE BROMIDE 60 MG/1
60 TABLET ORAL 3 TIMES DAILY
Status: DISCONTINUED | OUTPATIENT
Start: 2019-11-10 | End: 2019-11-14 | Stop reason: HOSPADM

## 2019-11-10 RX ORDER — ACETAMINOPHEN 325 MG/1
650 TABLET ORAL EVERY 4 HOURS PRN
Status: DISCONTINUED | OUTPATIENT
Start: 2019-11-10 | End: 2019-11-14 | Stop reason: HOSPADM

## 2019-11-10 RX ORDER — ESTRADIOL 1 MG/1
2 TABLET ORAL NIGHTLY
Status: DISCONTINUED | OUTPATIENT
Start: 2019-11-10 | End: 2019-11-14 | Stop reason: HOSPADM

## 2019-11-10 RX ORDER — KETOROLAC TROMETHAMINE 30 MG/ML
30 INJECTION, SOLUTION INTRAMUSCULAR; INTRAVENOUS ONCE
Status: COMPLETED | OUTPATIENT
Start: 2019-11-10 | End: 2019-11-10

## 2019-11-10 RX ORDER — PREDNISONE 20 MG/1
40 TABLET ORAL DAILY
Status: DISCONTINUED | OUTPATIENT
Start: 2019-11-10 | End: 2019-11-14 | Stop reason: HOSPADM

## 2019-11-10 RX ORDER — ONDANSETRON 2 MG/ML
4 INJECTION INTRAMUSCULAR; INTRAVENOUS ONCE
Status: COMPLETED | OUTPATIENT
Start: 2019-11-10 | End: 2019-11-10

## 2019-11-10 RX ORDER — SODIUM CHLORIDE 0.9 % (FLUSH) 0.9 %
10 SYRINGE (ML) INJECTION PRN
Status: DISCONTINUED | OUTPATIENT
Start: 2019-11-10 | End: 2019-11-14 | Stop reason: HOSPADM

## 2019-11-10 RX ORDER — CIPROFLOXACIN 2 MG/ML
400 INJECTION, SOLUTION INTRAVENOUS EVERY 12 HOURS
Status: DISCONTINUED | OUTPATIENT
Start: 2019-11-10 | End: 2019-11-12

## 2019-11-10 RX ADMIN — LORAZEPAM 0.5 MG: 0.5 TABLET ORAL at 17:48

## 2019-11-10 RX ADMIN — PREDNISONE 40 MG: 20 TABLET ORAL at 18:53

## 2019-11-10 RX ADMIN — MORPHINE SULFATE 4 MG: 4 INJECTION, SOLUTION INTRAMUSCULAR; INTRAVENOUS at 13:59

## 2019-11-10 RX ADMIN — KETOROLAC TROMETHAMINE 30 MG: 30 INJECTION, SOLUTION INTRAMUSCULAR at 21:36

## 2019-11-10 RX ADMIN — DOXEPIN HYDROCHLORIDE 10 MG: 10 CAPSULE ORAL at 21:30

## 2019-11-10 RX ADMIN — ONDANSETRON 4 MG: 2 INJECTION INTRAMUSCULAR; INTRAVENOUS at 13:59

## 2019-11-10 RX ADMIN — SODIUM CHLORIDE 1000 ML: 9 INJECTION, SOLUTION INTRAVENOUS at 13:11

## 2019-11-10 RX ADMIN — ESTRADIOL 2 MG: 1 TABLET ORAL at 21:29

## 2019-11-10 RX ADMIN — FOLIC ACID 1 MG: 1 TABLET ORAL at 18:53

## 2019-11-10 RX ADMIN — PYRIDOSTIGMINE BROMIDE 60 MG: 60 TABLET ORAL at 21:31

## 2019-11-10 RX ADMIN — CIPROFLOXACIN 400 MG: 2 INJECTION, SOLUTION INTRAVENOUS at 17:49

## 2019-11-10 RX ADMIN — PANTOPRAZOLE SODIUM 40 MG: 40 TABLET, DELAYED RELEASE ORAL at 18:52

## 2019-11-10 RX ADMIN — ZOLPIDEM TARTRATE 10 MG: 5 TABLET ORAL at 21:29

## 2019-11-10 RX ADMIN — LEVOTHYROXINE SODIUM 125 MCG: 125 TABLET ORAL at 18:56

## 2019-11-10 RX ADMIN — SODIUM CHLORIDE: 9 INJECTION, SOLUTION INTRAVENOUS at 17:49

## 2019-11-10 RX ADMIN — CITALOPRAM HYDROBROMIDE 40 MG: 20 TABLET ORAL at 18:53

## 2019-11-10 RX ADMIN — MORPHINE SULFATE 4 MG: 4 INJECTION, SOLUTION INTRAMUSCULAR; INTRAVENOUS at 18:56

## 2019-11-10 RX ADMIN — KETOROLAC TROMETHAMINE 30 MG: 30 INJECTION, SOLUTION INTRAMUSCULAR at 13:59

## 2019-11-10 RX ADMIN — BUTALBITAL, ACETAMINOPHEN, AND CAFFEINE 1 TABLET: 50; 325; 40 TABLET ORAL at 17:48

## 2019-11-10 ASSESSMENT — PAIN DESCRIPTION - LOCATION
LOCATION: HEAD

## 2019-11-10 ASSESSMENT — PAIN DESCRIPTION - PAIN TYPE
TYPE: ACUTE PAIN

## 2019-11-10 ASSESSMENT — PAIN DESCRIPTION - DESCRIPTORS
DESCRIPTORS: HEADACHE

## 2019-11-10 ASSESSMENT — PAIN SCALES - GENERAL
PAINLEVEL_OUTOF10: 6
PAINLEVEL_OUTOF10: 8
PAINLEVEL_OUTOF10: 9
PAINLEVEL_OUTOF10: 7
PAINLEVEL_OUTOF10: 9
PAINLEVEL_OUTOF10: 9
PAINLEVEL_OUTOF10: 6
PAINLEVEL_OUTOF10: 5
PAINLEVEL_OUTOF10: 6
PAINLEVEL_OUTOF10: 6

## 2019-11-10 ASSESSMENT — PAIN DESCRIPTION - FREQUENCY
FREQUENCY: CONTINUOUS

## 2019-11-10 ASSESSMENT — PAIN DESCRIPTION - PROGRESSION
CLINICAL_PROGRESSION: GRADUALLY WORSENING
CLINICAL_PROGRESSION: NOT CHANGED

## 2019-11-10 ASSESSMENT — PAIN DESCRIPTION - ONSET
ONSET: ON-GOING

## 2019-11-10 ASSESSMENT — PAIN - FUNCTIONAL ASSESSMENT
PAIN_FUNCTIONAL_ASSESSMENT: PREVENTS OR INTERFERES SOME ACTIVE ACTIVITIES AND ADLS
PAIN_FUNCTIONAL_ASSESSMENT: PREVENTS OR INTERFERES SOME ACTIVE ACTIVITIES AND ADLS
PAIN_FUNCTIONAL_ASSESSMENT: PREVENTS OR INTERFERES WITH MANY ACTIVE NOT PASSIVE ACTIVITIES
PAIN_FUNCTIONAL_ASSESSMENT: PREVENTS OR INTERFERES SOME ACTIVE ACTIVITIES AND ADLS

## 2019-11-11 LAB
ANION GAP SERPL CALCULATED.3IONS-SCNC: 10 MMOL/L (ref 3–16)
BASOPHILS ABSOLUTE: 0.1 K/UL (ref 0–0.2)
BASOPHILS RELATIVE PERCENT: 0.7 %
BUN BLDV-MCNC: 14 MG/DL (ref 7–20)
CALCIUM SERPL-MCNC: 7.8 MG/DL (ref 8.3–10.6)
CHLORIDE BLD-SCNC: 100 MMOL/L (ref 99–110)
CO2: 24 MMOL/L (ref 21–32)
CREAT SERPL-MCNC: <0.5 MG/DL (ref 0.6–1.1)
EOSINOPHILS ABSOLUTE: 0 K/UL (ref 0–0.6)
EOSINOPHILS RELATIVE PERCENT: 0 %
GFR AFRICAN AMERICAN: >60
GFR NON-AFRICAN AMERICAN: >60
GLUCOSE BLD-MCNC: 142 MG/DL (ref 70–99)
HCT VFR BLD CALC: 36.1 % (ref 36–48)
HEMOGLOBIN: 12.2 G/DL (ref 12–16)
LYMPHOCYTES ABSOLUTE: 1 K/UL (ref 1–5.1)
LYMPHOCYTES RELATIVE PERCENT: 9.8 %
MAGNESIUM: 1.9 MG/DL (ref 1.8–2.4)
MCH RBC QN AUTO: 30.5 PG (ref 26–34)
MCHC RBC AUTO-ENTMCNC: 33.8 G/DL (ref 31–36)
MCV RBC AUTO: 90 FL (ref 80–100)
MONOCYTES ABSOLUTE: 0.3 K/UL (ref 0–1.3)
MONOCYTES RELATIVE PERCENT: 3 %
NEUTROPHILS ABSOLUTE: 8.4 K/UL (ref 1.7–7.7)
NEUTROPHILS RELATIVE PERCENT: 86.5 %
PDW BLD-RTO: 13.8 % (ref 12.4–15.4)
PLATELET # BLD: 222 K/UL (ref 135–450)
PMV BLD AUTO: 6.7 FL (ref 5–10.5)
POTASSIUM REFLEX MAGNESIUM: 4 MMOL/L (ref 3.5–5.1)
RBC # BLD: 4.01 M/UL (ref 4–5.2)
SODIUM BLD-SCNC: 134 MMOL/L (ref 136–145)
URINE CULTURE, ROUTINE: NORMAL
WBC # BLD: 9.7 K/UL (ref 4–11)

## 2019-11-11 PROCEDURE — 97165 OT EVAL LOW COMPLEX 30 MIN: CPT

## 2019-11-11 PROCEDURE — 85025 COMPLETE CBC W/AUTO DIFF WBC: CPT

## 2019-11-11 PROCEDURE — 6370000000 HC RX 637 (ALT 250 FOR IP): Performed by: INTERNAL MEDICINE

## 2019-11-11 PROCEDURE — 1200000000 HC SEMI PRIVATE

## 2019-11-11 PROCEDURE — 2580000003 HC RX 258: Performed by: INTERNAL MEDICINE

## 2019-11-11 PROCEDURE — 80048 BASIC METABOLIC PNL TOTAL CA: CPT

## 2019-11-11 PROCEDURE — 36415 COLL VENOUS BLD VENIPUNCTURE: CPT

## 2019-11-11 PROCEDURE — 97535 SELF CARE MNGMENT TRAINING: CPT

## 2019-11-11 PROCEDURE — 6360000002 HC RX W HCPCS: Performed by: INTERNAL MEDICINE

## 2019-11-11 PROCEDURE — 83735 ASSAY OF MAGNESIUM: CPT

## 2019-11-11 RX ADMIN — MORPHINE SULFATE 4 MG: 4 INJECTION, SOLUTION INTRAMUSCULAR; INTRAVENOUS at 06:57

## 2019-11-11 RX ADMIN — CIPROFLOXACIN 400 MG: 2 INJECTION, SOLUTION INTRAVENOUS at 17:46

## 2019-11-11 RX ADMIN — PANTOPRAZOLE SODIUM 40 MG: 40 TABLET, DELAYED RELEASE ORAL at 09:13

## 2019-11-11 RX ADMIN — CIPROFLOXACIN 400 MG: 2 INJECTION, SOLUTION INTRAVENOUS at 06:50

## 2019-11-11 RX ADMIN — ESTRADIOL 2 MG: 1 TABLET ORAL at 21:25

## 2019-11-11 RX ADMIN — MORPHINE SULFATE 4 MG: 4 INJECTION, SOLUTION INTRAMUSCULAR; INTRAVENOUS at 13:28

## 2019-11-11 RX ADMIN — LORAZEPAM 0.5 MG: 0.5 TABLET ORAL at 09:13

## 2019-11-11 RX ADMIN — SODIUM CHLORIDE: 9 INJECTION, SOLUTION INTRAVENOUS at 10:24

## 2019-11-11 RX ADMIN — MORPHINE SULFATE 4 MG: 4 INJECTION, SOLUTION INTRAMUSCULAR; INTRAVENOUS at 10:23

## 2019-11-11 RX ADMIN — CITALOPRAM HYDROBROMIDE 40 MG: 20 TABLET ORAL at 09:13

## 2019-11-11 RX ADMIN — MORPHINE SULFATE 4 MG: 4 INJECTION, SOLUTION INTRAMUSCULAR; INTRAVENOUS at 18:16

## 2019-11-11 RX ADMIN — DOXEPIN HYDROCHLORIDE 10 MG: 10 CAPSULE ORAL at 21:25

## 2019-11-11 RX ADMIN — PREDNISONE 40 MG: 20 TABLET ORAL at 09:13

## 2019-11-11 RX ADMIN — MORPHINE SULFATE 4 MG: 4 INJECTION, SOLUTION INTRAMUSCULAR; INTRAVENOUS at 21:25

## 2019-11-11 RX ADMIN — FOLIC ACID 1 MG: 1 TABLET ORAL at 09:13

## 2019-11-11 RX ADMIN — ZOLPIDEM TARTRATE 10 MG: 5 TABLET ORAL at 21:25

## 2019-11-11 RX ADMIN — PYRIDOSTIGMINE BROMIDE 60 MG: 60 TABLET ORAL at 15:32

## 2019-11-11 RX ADMIN — LORAZEPAM 0.5 MG: 0.5 TABLET ORAL at 17:46

## 2019-11-11 RX ADMIN — ENOXAPARIN SODIUM 40 MG: 40 INJECTION SUBCUTANEOUS at 09:13

## 2019-11-11 RX ADMIN — LEVOTHYROXINE SODIUM 125 MCG: 125 TABLET ORAL at 06:51

## 2019-11-11 RX ADMIN — PYRIDOSTIGMINE BROMIDE 60 MG: 60 TABLET ORAL at 09:13

## 2019-11-11 RX ADMIN — PYRIDOSTIGMINE BROMIDE 60 MG: 60 TABLET ORAL at 21:25

## 2019-11-11 ASSESSMENT — PAIN DESCRIPTION - LOCATION
LOCATION: HEAD

## 2019-11-11 ASSESSMENT — PAIN DESCRIPTION - FREQUENCY
FREQUENCY: CONTINUOUS

## 2019-11-11 ASSESSMENT — PAIN DESCRIPTION - DESCRIPTORS
DESCRIPTORS: HEADACHE

## 2019-11-11 ASSESSMENT — PAIN DESCRIPTION - PAIN TYPE
TYPE: ACUTE PAIN

## 2019-11-11 ASSESSMENT — PAIN DESCRIPTION - ONSET
ONSET: ON-GOING

## 2019-11-11 ASSESSMENT — PAIN DESCRIPTION - PROGRESSION
CLINICAL_PROGRESSION: NOT CHANGED

## 2019-11-11 ASSESSMENT — PAIN SCALES - GENERAL
PAINLEVEL_OUTOF10: 0
PAINLEVEL_OUTOF10: 5
PAINLEVEL_OUTOF10: 9
PAINLEVEL_OUTOF10: 5
PAINLEVEL_OUTOF10: 6
PAINLEVEL_OUTOF10: 5
PAINLEVEL_OUTOF10: 5
PAINLEVEL_OUTOF10: 9
PAINLEVEL_OUTOF10: 9
PAINLEVEL_OUTOF10: 5
PAINLEVEL_OUTOF10: 8
PAINLEVEL_OUTOF10: 7

## 2019-11-11 ASSESSMENT — PAIN DESCRIPTION - ORIENTATION
ORIENTATION: ANTERIOR

## 2019-11-12 LAB
ACETYLCHOLINE BINDING ANTIBODY: 0 NMOL/L (ref 0–0.4)
ACETYLCHOLINE BLOCKING AB: 0 % (ref 0–26)

## 2019-11-12 PROCEDURE — 1200000000 HC SEMI PRIVATE

## 2019-11-12 PROCEDURE — 6360000002 HC RX W HCPCS: Performed by: INTERNAL MEDICINE

## 2019-11-12 PROCEDURE — 6370000000 HC RX 637 (ALT 250 FOR IP): Performed by: INTERNAL MEDICINE

## 2019-11-12 PROCEDURE — 94760 N-INVAS EAR/PLS OXIMETRY 1: CPT

## 2019-11-12 PROCEDURE — 2580000003 HC RX 258: Performed by: INTERNAL MEDICINE

## 2019-11-12 RX ADMIN — ENOXAPARIN SODIUM 40 MG: 40 INJECTION SUBCUTANEOUS at 09:17

## 2019-11-12 RX ADMIN — ESTRADIOL 2 MG: 1 TABLET ORAL at 20:28

## 2019-11-12 RX ADMIN — ZOLPIDEM TARTRATE 10 MG: 5 TABLET ORAL at 20:28

## 2019-11-12 RX ADMIN — LEVOTHYROXINE SODIUM 125 MCG: 125 TABLET ORAL at 06:02

## 2019-11-12 RX ADMIN — PYRIDOSTIGMINE BROMIDE 60 MG: 60 TABLET ORAL at 20:28

## 2019-11-12 RX ADMIN — MORPHINE SULFATE 4 MG: 4 INJECTION, SOLUTION INTRAMUSCULAR; INTRAVENOUS at 21:43

## 2019-11-12 RX ADMIN — MORPHINE SULFATE 4 MG: 4 INJECTION, SOLUTION INTRAMUSCULAR; INTRAVENOUS at 19:16

## 2019-11-12 RX ADMIN — DOXEPIN HYDROCHLORIDE 10 MG: 10 CAPSULE ORAL at 20:28

## 2019-11-12 RX ADMIN — FOLIC ACID 1 MG: 1 TABLET ORAL at 09:18

## 2019-11-12 RX ADMIN — MORPHINE SULFATE 4 MG: 4 INJECTION, SOLUTION INTRAMUSCULAR; INTRAVENOUS at 13:31

## 2019-11-12 RX ADMIN — SODIUM CHLORIDE: 9 INJECTION, SOLUTION INTRAVENOUS at 03:29

## 2019-11-12 RX ADMIN — PREDNISONE 40 MG: 20 TABLET ORAL at 09:18

## 2019-11-12 RX ADMIN — MORPHINE SULFATE 4 MG: 4 INJECTION, SOLUTION INTRAMUSCULAR; INTRAVENOUS at 09:18

## 2019-11-12 RX ADMIN — CIPROFLOXACIN 400 MG: 2 INJECTION, SOLUTION INTRAVENOUS at 05:16

## 2019-11-12 RX ADMIN — PYRIDOSTIGMINE BROMIDE 60 MG: 60 TABLET ORAL at 14:03

## 2019-11-12 RX ADMIN — CITALOPRAM HYDROBROMIDE 40 MG: 20 TABLET ORAL at 09:18

## 2019-11-12 RX ADMIN — PANTOPRAZOLE SODIUM 40 MG: 40 TABLET, DELAYED RELEASE ORAL at 09:18

## 2019-11-12 RX ADMIN — PYRIDOSTIGMINE BROMIDE 60 MG: 60 TABLET ORAL at 09:18

## 2019-11-12 RX ADMIN — LORAZEPAM 0.5 MG: 0.5 TABLET ORAL at 17:05

## 2019-11-12 RX ADMIN — MORPHINE SULFATE 2 MG: 2 INJECTION, SOLUTION INTRAMUSCULAR; INTRAVENOUS at 17:05

## 2019-11-12 RX ADMIN — LORAZEPAM 0.5 MG: 0.5 TABLET ORAL at 09:18

## 2019-11-12 RX ADMIN — MORPHINE SULFATE 4 MG: 4 INJECTION, SOLUTION INTRAMUSCULAR; INTRAVENOUS at 05:15

## 2019-11-12 RX ADMIN — SODIUM CHLORIDE: 9 INJECTION, SOLUTION INTRAVENOUS at 13:53

## 2019-11-12 ASSESSMENT — PAIN DESCRIPTION - ONSET
ONSET: ON-GOING
ONSET: GRADUAL
ONSET: ON-GOING

## 2019-11-12 ASSESSMENT — PAIN DESCRIPTION - ORIENTATION
ORIENTATION: POSTERIOR
ORIENTATION: POSTERIOR
ORIENTATION: ANTERIOR;POSTERIOR
ORIENTATION: POSTERIOR

## 2019-11-12 ASSESSMENT — PAIN DESCRIPTION - PROGRESSION
CLINICAL_PROGRESSION: NOT CHANGED

## 2019-11-12 ASSESSMENT — PAIN DESCRIPTION - DESCRIPTORS
DESCRIPTORS: ACHING
DESCRIPTORS: HEADACHE
DESCRIPTORS: ACHING;DISCOMFORT
DESCRIPTORS: ACHING
DESCRIPTORS: HEADACHE

## 2019-11-12 ASSESSMENT — PAIN - FUNCTIONAL ASSESSMENT
PAIN_FUNCTIONAL_ASSESSMENT: PREVENTS OR INTERFERES SOME ACTIVE ACTIVITIES AND ADLS

## 2019-11-12 ASSESSMENT — PAIN SCALES - GENERAL
PAINLEVEL_OUTOF10: 8
PAINLEVEL_OUTOF10: 6
PAINLEVEL_OUTOF10: 9
PAINLEVEL_OUTOF10: 6
PAINLEVEL_OUTOF10: 8
PAINLEVEL_OUTOF10: 6
PAINLEVEL_OUTOF10: 9
PAINLEVEL_OUTOF10: 6
PAINLEVEL_OUTOF10: 7
PAINLEVEL_OUTOF10: 9
PAINLEVEL_OUTOF10: 5

## 2019-11-12 ASSESSMENT — PAIN DESCRIPTION - LOCATION
LOCATION: BACK;NECK
LOCATION: BACK;HEAD;NECK
LOCATION: HEAD
LOCATION: BACK;HEAD;NECK
LOCATION: BACK;NECK
LOCATION: BACK;NECK
LOCATION: BACK;HEAD;NECK
LOCATION: BACK;NECK
LOCATION: BACK;HEAD;NECK
LOCATION: HEAD

## 2019-11-12 ASSESSMENT — PAIN DESCRIPTION - PAIN TYPE
TYPE: ACUTE PAIN

## 2019-11-12 ASSESSMENT — PAIN DESCRIPTION - FREQUENCY
FREQUENCY: INTERMITTENT
FREQUENCY: CONTINUOUS
FREQUENCY: INTERMITTENT
FREQUENCY: CONTINUOUS

## 2019-11-13 LAB
BANDED NEUTROPHILS RELATIVE PERCENT: 2 % (ref 0–7)
BASOPHILS ABSOLUTE: 0 K/UL (ref 0–0.2)
BASOPHILS RELATIVE PERCENT: 0 %
EOSINOPHILS ABSOLUTE: 0 K/UL (ref 0–0.6)
EOSINOPHILS RELATIVE PERCENT: 0 %
HCT VFR BLD CALC: 36.1 % (ref 36–48)
HEMOGLOBIN: 12 G/DL (ref 12–16)
LYMPHOCYTES ABSOLUTE: 2.5 K/UL (ref 1–5.1)
LYMPHOCYTES RELATIVE PERCENT: 21 %
MCH RBC QN AUTO: 30.3 PG (ref 26–34)
MCHC RBC AUTO-ENTMCNC: 33.4 G/DL (ref 31–36)
MCV RBC AUTO: 90.6 FL (ref 80–100)
METAMYELOCYTES RELATIVE PERCENT: 1 %
MONOCYTES ABSOLUTE: 0.6 K/UL (ref 0–1.3)
MONOCYTES RELATIVE PERCENT: 5 %
NEUTROPHILS ABSOLUTE: 8.9 K/UL (ref 1.7–7.7)
NEUTROPHILS RELATIVE PERCENT: 71 %
PDW BLD-RTO: 14.2 % (ref 12.4–15.4)
PLATELET # BLD: 226 K/UL (ref 135–450)
PLATELET SLIDE REVIEW: ADEQUATE
PMV BLD AUTO: 7.3 FL (ref 5–10.5)
RBC # BLD: 3.98 M/UL (ref 4–5.2)
SLIDE REVIEW: ABNORMAL
WBC # BLD: 12 K/UL (ref 4–11)

## 2019-11-13 PROCEDURE — 85025 COMPLETE CBC W/AUTO DIFF WBC: CPT

## 2019-11-13 PROCEDURE — 6370000000 HC RX 637 (ALT 250 FOR IP): Performed by: INTERNAL MEDICINE

## 2019-11-13 PROCEDURE — 36415 COLL VENOUS BLD VENIPUNCTURE: CPT

## 2019-11-13 PROCEDURE — 2580000003 HC RX 258: Performed by: INTERNAL MEDICINE

## 2019-11-13 PROCEDURE — 6360000002 HC RX W HCPCS: Performed by: INTERNAL MEDICINE

## 2019-11-13 PROCEDURE — 1200000000 HC SEMI PRIVATE

## 2019-11-13 RX ADMIN — Medication 10 ML: at 19:52

## 2019-11-13 RX ADMIN — MORPHINE SULFATE 4 MG: 4 INJECTION, SOLUTION INTRAMUSCULAR; INTRAVENOUS at 09:04

## 2019-11-13 RX ADMIN — PANTOPRAZOLE SODIUM 40 MG: 40 TABLET, DELAYED RELEASE ORAL at 09:04

## 2019-11-13 RX ADMIN — MORPHINE SULFATE 4 MG: 4 INJECTION, SOLUTION INTRAMUSCULAR; INTRAVENOUS at 19:53

## 2019-11-13 RX ADMIN — LORAZEPAM 0.5 MG: 0.5 TABLET ORAL at 09:04

## 2019-11-13 RX ADMIN — MORPHINE SULFATE 4 MG: 4 INJECTION, SOLUTION INTRAMUSCULAR; INTRAVENOUS at 00:17

## 2019-11-13 RX ADMIN — DOXEPIN HYDROCHLORIDE 10 MG: 10 CAPSULE ORAL at 19:53

## 2019-11-13 RX ADMIN — MORPHINE SULFATE 4 MG: 4 INJECTION, SOLUTION INTRAMUSCULAR; INTRAVENOUS at 14:00

## 2019-11-13 RX ADMIN — SODIUM CHLORIDE: 9 INJECTION, SOLUTION INTRAVENOUS at 00:19

## 2019-11-13 RX ADMIN — MORPHINE SULFATE 4 MG: 4 INJECTION, SOLUTION INTRAMUSCULAR; INTRAVENOUS at 03:44

## 2019-11-13 RX ADMIN — PYRIDOSTIGMINE BROMIDE 60 MG: 60 TABLET ORAL at 13:57

## 2019-11-13 RX ADMIN — LEVOTHYROXINE SODIUM 125 MCG: 125 TABLET ORAL at 05:57

## 2019-11-13 RX ADMIN — MORPHINE SULFATE 4 MG: 4 INJECTION, SOLUTION INTRAMUSCULAR; INTRAVENOUS at 17:27

## 2019-11-13 RX ADMIN — LORAZEPAM 0.5 MG: 0.5 TABLET ORAL at 17:27

## 2019-11-13 RX ADMIN — ZOLPIDEM TARTRATE 10 MG: 5 TABLET ORAL at 19:52

## 2019-11-13 RX ADMIN — ENOXAPARIN SODIUM 40 MG: 40 INJECTION SUBCUTANEOUS at 09:04

## 2019-11-13 RX ADMIN — CITALOPRAM HYDROBROMIDE 40 MG: 20 TABLET ORAL at 09:04

## 2019-11-13 RX ADMIN — FOLIC ACID 1 MG: 1 TABLET ORAL at 09:04

## 2019-11-13 RX ADMIN — PYRIDOSTIGMINE BROMIDE 60 MG: 60 TABLET ORAL at 19:53

## 2019-11-13 RX ADMIN — PYRIDOSTIGMINE BROMIDE 60 MG: 60 TABLET ORAL at 09:04

## 2019-11-13 RX ADMIN — ESTRADIOL 2 MG: 1 TABLET ORAL at 19:53

## 2019-11-13 RX ADMIN — PREDNISONE 40 MG: 20 TABLET ORAL at 09:04

## 2019-11-13 ASSESSMENT — PAIN DESCRIPTION - PROGRESSION
CLINICAL_PROGRESSION: NOT CHANGED

## 2019-11-13 ASSESSMENT — PAIN DESCRIPTION - LOCATION
LOCATION: HEAD
LOCATION: BACK;HEAD;NECK
LOCATION: BACK;HEAD
LOCATION: HEAD

## 2019-11-13 ASSESSMENT — PAIN DESCRIPTION - ORIENTATION
ORIENTATION: ANTERIOR;POSTERIOR

## 2019-11-13 ASSESSMENT — PAIN DESCRIPTION - DESCRIPTORS
DESCRIPTORS: ACHING;DISCOMFORT
DESCRIPTORS: ACHING;HEAVINESS
DESCRIPTORS: ACHING
DESCRIPTORS: ACHING;HEADACHE
DESCRIPTORS: ACHING;DISCOMFORT
DESCRIPTORS: ACHING;DISCOMFORT

## 2019-11-13 ASSESSMENT — PAIN SCALES - GENERAL
PAINLEVEL_OUTOF10: 8
PAINLEVEL_OUTOF10: 7
PAINLEVEL_OUTOF10: 8
PAINLEVEL_OUTOF10: 0
PAINLEVEL_OUTOF10: 10
PAINLEVEL_OUTOF10: 7
PAINLEVEL_OUTOF10: 0
PAINLEVEL_OUTOF10: 6
PAINLEVEL_OUTOF10: 8

## 2019-11-13 ASSESSMENT — PAIN DESCRIPTION - FREQUENCY
FREQUENCY: CONTINUOUS

## 2019-11-13 ASSESSMENT — PAIN DESCRIPTION - ONSET
ONSET: ON-GOING

## 2019-11-13 ASSESSMENT — PAIN DESCRIPTION - PAIN TYPE
TYPE: ACUTE PAIN

## 2019-11-14 VITALS
SYSTOLIC BLOOD PRESSURE: 98 MMHG | OXYGEN SATURATION: 95 % | HEIGHT: 62 IN | HEART RATE: 64 BPM | TEMPERATURE: 98.2 F | WEIGHT: 173.28 LBS | BODY MASS INDEX: 31.89 KG/M2 | RESPIRATION RATE: 18 BRPM | DIASTOLIC BLOOD PRESSURE: 66 MMHG

## 2019-11-14 PROCEDURE — 6360000002 HC RX W HCPCS: Performed by: INTERNAL MEDICINE

## 2019-11-14 PROCEDURE — 94760 N-INVAS EAR/PLS OXIMETRY 1: CPT

## 2019-11-14 PROCEDURE — 2580000003 HC RX 258: Performed by: INTERNAL MEDICINE

## 2019-11-14 PROCEDURE — 6370000000 HC RX 637 (ALT 250 FOR IP): Performed by: INTERNAL MEDICINE

## 2019-11-14 RX ADMIN — ENOXAPARIN SODIUM 40 MG: 40 INJECTION SUBCUTANEOUS at 09:14

## 2019-11-14 RX ADMIN — MORPHINE SULFATE 4 MG: 4 INJECTION, SOLUTION INTRAMUSCULAR; INTRAVENOUS at 01:52

## 2019-11-14 RX ADMIN — PANTOPRAZOLE SODIUM 40 MG: 40 TABLET, DELAYED RELEASE ORAL at 09:15

## 2019-11-14 RX ADMIN — SODIUM CHLORIDE: 9 INJECTION, SOLUTION INTRAVENOUS at 04:00

## 2019-11-14 RX ADMIN — FOLIC ACID 1 MG: 1 TABLET ORAL at 09:15

## 2019-11-14 RX ADMIN — LORAZEPAM 0.5 MG: 0.5 TABLET ORAL at 09:14

## 2019-11-14 RX ADMIN — MORPHINE SULFATE 4 MG: 4 INJECTION, SOLUTION INTRAMUSCULAR; INTRAVENOUS at 03:58

## 2019-11-14 RX ADMIN — PYRIDOSTIGMINE BROMIDE 60 MG: 60 TABLET ORAL at 09:15

## 2019-11-14 RX ADMIN — PREDNISONE 40 MG: 20 TABLET ORAL at 09:14

## 2019-11-14 RX ADMIN — CITALOPRAM HYDROBROMIDE 40 MG: 20 TABLET ORAL at 09:15

## 2019-11-14 RX ADMIN — LEVOTHYROXINE SODIUM 125 MCG: 125 TABLET ORAL at 05:55

## 2019-11-14 ASSESSMENT — PAIN DESCRIPTION - FREQUENCY
FREQUENCY: CONTINUOUS
FREQUENCY: CONTINUOUS

## 2019-11-14 ASSESSMENT — PAIN DESCRIPTION - DESCRIPTORS
DESCRIPTORS: ACHING;HEAVINESS
DESCRIPTORS: ACHING;HEAVINESS

## 2019-11-14 ASSESSMENT — PAIN SCALES - GENERAL
PAINLEVEL_OUTOF10: 4
PAINLEVEL_OUTOF10: 6
PAINLEVEL_OUTOF10: 8
PAINLEVEL_OUTOF10: 6
PAINLEVEL_OUTOF10: 9
PAINLEVEL_OUTOF10: 2
PAINLEVEL_OUTOF10: 4
PAINLEVEL_OUTOF10: 5

## 2019-11-14 ASSESSMENT — PAIN DESCRIPTION - ONSET
ONSET: ON-GOING
ONSET: ON-GOING

## 2019-11-14 ASSESSMENT — PAIN DESCRIPTION - LOCATION
LOCATION: HEAD
LOCATION: HEAD

## 2019-11-14 ASSESSMENT — PAIN DESCRIPTION - PAIN TYPE
TYPE: ACUTE PAIN
TYPE: ACUTE PAIN

## 2019-11-14 ASSESSMENT — PAIN - FUNCTIONAL ASSESSMENT
PAIN_FUNCTIONAL_ASSESSMENT: PREVENTS OR INTERFERES SOME ACTIVE ACTIVITIES AND ADLS
PAIN_FUNCTIONAL_ASSESSMENT: ACTIVITIES ARE NOT PREVENTED

## 2019-11-14 ASSESSMENT — PAIN DESCRIPTION - ORIENTATION
ORIENTATION: ANTERIOR;POSTERIOR
ORIENTATION: ANTERIOR;POSTERIOR

## 2019-11-14 ASSESSMENT — PAIN DESCRIPTION - PROGRESSION
CLINICAL_PROGRESSION: NOT CHANGED
CLINICAL_PROGRESSION: NOT CHANGED

## 2019-11-15 LAB
BLOOD CULTURE, ROUTINE: NORMAL
CULTURE, BLOOD 2: NORMAL

## 2019-12-10 ENCOUNTER — APPOINTMENT (OUTPATIENT)
Dept: GENERAL RADIOLOGY | Age: 49
DRG: 057 | End: 2019-12-10
Payer: COMMERCIAL

## 2019-12-10 ENCOUNTER — HOSPITAL ENCOUNTER (INPATIENT)
Age: 49
LOS: 3 days | Discharge: HOME | DRG: 057 | End: 2019-12-14
Attending: EMERGENCY MEDICINE | Admitting: INTERNAL MEDICINE
Payer: COMMERCIAL

## 2019-12-10 DIAGNOSIS — R53.1 GENERAL WEAKNESS: Primary | ICD-10-CM

## 2019-12-10 DIAGNOSIS — N30.00 ACUTE CYSTITIS WITHOUT HEMATURIA: ICD-10-CM

## 2019-12-10 LAB
ANION GAP SERPL CALCULATED.3IONS-SCNC: 15 MMOL/L (ref 3–16)
BACTERIA: ABNORMAL /HPF
BASOPHILS ABSOLUTE: 0.1 K/UL (ref 0–0.2)
BASOPHILS RELATIVE PERCENT: 0.9 %
BILIRUBIN URINE: NEGATIVE
BLOOD, URINE: ABNORMAL
BUN BLDV-MCNC: 11 MG/DL (ref 7–20)
CALCIUM SERPL-MCNC: 8.8 MG/DL (ref 8.3–10.6)
CHLORIDE BLD-SCNC: 102 MMOL/L (ref 99–110)
CLARITY: ABNORMAL
CO2: 23 MMOL/L (ref 21–32)
COLOR: YELLOW
CREAT SERPL-MCNC: <0.5 MG/DL (ref 0.6–1.1)
EOSINOPHILS ABSOLUTE: 0.1 K/UL (ref 0–0.6)
EOSINOPHILS RELATIVE PERCENT: 2.3 %
EPITHELIAL CELLS, UA: 8 /HPF (ref 0–5)
GFR AFRICAN AMERICAN: >60
GFR NON-AFRICAN AMERICAN: >60
GLUCOSE BLD-MCNC: 91 MG/DL (ref 70–99)
GLUCOSE URINE: NEGATIVE MG/DL
HCG QUALITATIVE: NEGATIVE
HCT VFR BLD CALC: 36.8 % (ref 36–48)
HEMOGLOBIN: 12.3 G/DL (ref 12–16)
HYALINE CASTS: 5 /LPF (ref 0–8)
KETONES, URINE: NEGATIVE MG/DL
LEUKOCYTE ESTERASE, URINE: ABNORMAL
LYMPHOCYTES ABSOLUTE: 2.1 K/UL (ref 1–5.1)
LYMPHOCYTES RELATIVE PERCENT: 33.7 %
MCH RBC QN AUTO: 30.1 PG (ref 26–34)
MCHC RBC AUTO-ENTMCNC: 33.3 G/DL (ref 31–36)
MCV RBC AUTO: 90.4 FL (ref 80–100)
MICROSCOPIC EXAMINATION: YES
MONOCYTES ABSOLUTE: 0.5 K/UL (ref 0–1.3)
MONOCYTES RELATIVE PERCENT: 7.5 %
NEUTROPHILS ABSOLUTE: 3.4 K/UL (ref 1.7–7.7)
NEUTROPHILS RELATIVE PERCENT: 55.6 %
NITRITE, URINE: NEGATIVE
PDW BLD-RTO: 14 % (ref 12.4–15.4)
PH UA: 6 (ref 5–8)
PLATELET # BLD: 294 K/UL (ref 135–450)
PMV BLD AUTO: 7.1 FL (ref 5–10.5)
POTASSIUM REFLEX MAGNESIUM: 3.9 MMOL/L (ref 3.5–5.1)
PRO-BNP: 152 PG/ML (ref 0–124)
PROTEIN UA: NEGATIVE MG/DL
RBC # BLD: 4.07 M/UL (ref 4–5.2)
RBC UA: 2 /HPF (ref 0–4)
SODIUM BLD-SCNC: 140 MMOL/L (ref 136–145)
SPECIFIC GRAVITY UA: 1.02 (ref 1–1.03)
TROPONIN: <0.01 NG/ML
URINE REFLEX TO CULTURE: YES
URINE TYPE: ABNORMAL
UROBILINOGEN, URINE: 0.2 E.U./DL
WBC # BLD: 6.1 K/UL (ref 4–11)
WBC UA: 8 /HPF (ref 0–5)

## 2019-12-10 PROCEDURE — 80048 BASIC METABOLIC PNL TOTAL CA: CPT

## 2019-12-10 PROCEDURE — 84703 CHORIONIC GONADOTROPIN ASSAY: CPT

## 2019-12-10 PROCEDURE — 84484 ASSAY OF TROPONIN QUANT: CPT

## 2019-12-10 PROCEDURE — 96365 THER/PROPH/DIAG IV INF INIT: CPT

## 2019-12-10 PROCEDURE — 99285 EMERGENCY DEPT VISIT HI MDM: CPT

## 2019-12-10 PROCEDURE — 85025 COMPLETE CBC W/AUTO DIFF WBC: CPT

## 2019-12-10 PROCEDURE — 71046 X-RAY EXAM CHEST 2 VIEWS: CPT

## 2019-12-10 PROCEDURE — 6360000002 HC RX W HCPCS: Performed by: EMERGENCY MEDICINE

## 2019-12-10 PROCEDURE — 83880 ASSAY OF NATRIURETIC PEPTIDE: CPT

## 2019-12-10 PROCEDURE — G0378 HOSPITAL OBSERVATION PER HR: HCPCS

## 2019-12-10 PROCEDURE — 87086 URINE CULTURE/COLONY COUNT: CPT

## 2019-12-10 PROCEDURE — 81001 URINALYSIS AUTO W/SCOPE: CPT

## 2019-12-10 PROCEDURE — 2580000003 HC RX 258: Performed by: EMERGENCY MEDICINE

## 2019-12-10 RX ORDER — MAGNESIUM SULFATE 1 G/100ML
1 INJECTION INTRAVENOUS PRN
Status: DISCONTINUED | OUTPATIENT
Start: 2019-12-10 | End: 2019-12-14 | Stop reason: HOSPADM

## 2019-12-10 RX ORDER — SODIUM CHLORIDE 9 MG/ML
INJECTION, SOLUTION INTRAVENOUS CONTINUOUS
Status: DISCONTINUED | OUTPATIENT
Start: 2019-12-10 | End: 2019-12-14 | Stop reason: HOSPADM

## 2019-12-10 RX ORDER — POTASSIUM CHLORIDE 20 MEQ/1
40 TABLET, EXTENDED RELEASE ORAL PRN
Status: DISCONTINUED | OUTPATIENT
Start: 2019-12-10 | End: 2019-12-14 | Stop reason: HOSPADM

## 2019-12-10 RX ORDER — ACETAMINOPHEN 325 MG/1
650 TABLET ORAL EVERY 4 HOURS PRN
Status: DISCONTINUED | OUTPATIENT
Start: 2019-12-10 | End: 2019-12-14 | Stop reason: HOSPADM

## 2019-12-10 RX ORDER — SODIUM CHLORIDE 0.9 % (FLUSH) 0.9 %
10 SYRINGE (ML) INJECTION PRN
Status: DISCONTINUED | OUTPATIENT
Start: 2019-12-10 | End: 2019-12-14 | Stop reason: HOSPADM

## 2019-12-10 RX ORDER — BUTALBITAL, ACETAMINOPHEN, CAFFEINE AND CODEINE PHOSPHATE 50; 325; 40; 30 MG/1; MG/1; MG/1; MG/1
1 CAPSULE ORAL 4 TIMES DAILY PRN
Refills: 5 | COMMUNITY
Start: 2019-12-04 | End: 2019-12-16

## 2019-12-10 RX ORDER — SUMATRIPTAN 100 MG/1
100 TABLET, FILM COATED ORAL
COMMUNITY
End: 2022-01-02

## 2019-12-10 RX ORDER — SODIUM CHLORIDE 0.9 % (FLUSH) 0.9 %
10 SYRINGE (ML) INJECTION EVERY 12 HOURS SCHEDULED
Status: DISCONTINUED | OUTPATIENT
Start: 2019-12-10 | End: 2019-12-14 | Stop reason: HOSPADM

## 2019-12-10 RX ORDER — POTASSIUM CHLORIDE 7.45 MG/ML
10 INJECTION INTRAVENOUS PRN
Status: DISCONTINUED | OUTPATIENT
Start: 2019-12-10 | End: 2019-12-14 | Stop reason: HOSPADM

## 2019-12-10 RX ORDER — HEPARIN SODIUM 5000 [USP'U]/ML
5000 INJECTION, SOLUTION INTRAVENOUS; SUBCUTANEOUS EVERY 8 HOURS SCHEDULED
Status: DISCONTINUED | OUTPATIENT
Start: 2019-12-10 | End: 2019-12-14 | Stop reason: HOSPADM

## 2019-12-10 RX ORDER — ONDANSETRON 2 MG/ML
4 INJECTION INTRAMUSCULAR; INTRAVENOUS EVERY 6 HOURS PRN
Status: DISCONTINUED | OUTPATIENT
Start: 2019-12-10 | End: 2019-12-14 | Stop reason: HOSPADM

## 2019-12-10 RX ADMIN — CEFTRIAXONE 1 G: 1 INJECTION, POWDER, FOR SOLUTION INTRAMUSCULAR; INTRAVENOUS at 22:51

## 2019-12-10 ASSESSMENT — ENCOUNTER SYMPTOMS
ABDOMINAL PAIN: 0
TROUBLE SWALLOWING: 0
PHOTOPHOBIA: 0
COLOR CHANGE: 0
SHORTNESS OF BREATH: 0
COUGH: 0
VOMITING: 0

## 2019-12-11 PROBLEM — G70.01 MYASTHENIA GRAVIS WITH EXACERBATION, ADULT FORM (HCC): Status: ACTIVE | Noted: 2019-12-11

## 2019-12-11 LAB
ANION GAP SERPL CALCULATED.3IONS-SCNC: 14 MMOL/L (ref 3–16)
BUN BLDV-MCNC: 9 MG/DL (ref 7–20)
CALCIUM SERPL-MCNC: 8.6 MG/DL (ref 8.3–10.6)
CHLORIDE BLD-SCNC: 102 MMOL/L (ref 99–110)
CO2: 23 MMOL/L (ref 21–32)
CREAT SERPL-MCNC: <0.5 MG/DL (ref 0.6–1.1)
GFR AFRICAN AMERICAN: >60
GFR NON-AFRICAN AMERICAN: >60
GLUCOSE BLD-MCNC: 95 MG/DL (ref 70–99)
HCT VFR BLD CALC: 34.7 % (ref 36–48)
HEMOGLOBIN: 11.9 G/DL (ref 12–16)
MAGNESIUM: 1.8 MG/DL (ref 1.8–2.4)
MCH RBC QN AUTO: 30.8 PG (ref 26–34)
MCHC RBC AUTO-ENTMCNC: 34.2 G/DL (ref 31–36)
MCV RBC AUTO: 90.1 FL (ref 80–100)
PDW BLD-RTO: 13.8 % (ref 12.4–15.4)
PLATELET # BLD: 277 K/UL (ref 135–450)
PMV BLD AUTO: 7.3 FL (ref 5–10.5)
POTASSIUM REFLEX MAGNESIUM: 3.6 MMOL/L (ref 3.5–5.1)
RBC # BLD: 3.85 M/UL (ref 4–5.2)
SODIUM BLD-SCNC: 139 MMOL/L (ref 136–145)
T3 TOTAL: 1.87 NG/ML (ref 0.8–2)
T4 FREE: 1.3 NG/DL (ref 0.9–1.8)
TSH REFLEX: 0.24 UIU/ML (ref 0.27–4.2)
WBC # BLD: 5.9 K/UL (ref 4–11)

## 2019-12-11 PROCEDURE — 83735 ASSAY OF MAGNESIUM: CPT

## 2019-12-11 PROCEDURE — 96372 THER/PROPH/DIAG INJ SC/IM: CPT

## 2019-12-11 PROCEDURE — 6370000000 HC RX 637 (ALT 250 FOR IP): Performed by: INTERNAL MEDICINE

## 2019-12-11 PROCEDURE — 36415 COLL VENOUS BLD VENIPUNCTURE: CPT

## 2019-12-11 PROCEDURE — 2580000003 HC RX 258: Performed by: INTERNAL MEDICINE

## 2019-12-11 PROCEDURE — 84480 ASSAY TRIIODOTHYRONINE (T3): CPT

## 2019-12-11 PROCEDURE — 2060000000 HC ICU INTERMEDIATE R&B

## 2019-12-11 PROCEDURE — 84443 ASSAY THYROID STIM HORMONE: CPT

## 2019-12-11 PROCEDURE — 6360000002 HC RX W HCPCS: Performed by: INTERNAL MEDICINE

## 2019-12-11 PROCEDURE — 80048 BASIC METABOLIC PNL TOTAL CA: CPT

## 2019-12-11 PROCEDURE — 85027 COMPLETE CBC AUTOMATED: CPT

## 2019-12-11 PROCEDURE — 6360000002 HC RX W HCPCS: Performed by: NURSE PRACTITIONER

## 2019-12-11 PROCEDURE — 84439 ASSAY OF FREE THYROXINE: CPT

## 2019-12-11 RX ORDER — PYRIDOSTIGMINE BROMIDE 60 MG/1
90 TABLET ORAL 3 TIMES DAILY
Status: DISCONTINUED | OUTPATIENT
Start: 2019-12-11 | End: 2019-12-14 | Stop reason: HOSPADM

## 2019-12-11 RX ORDER — LORAZEPAM 0.5 MG/1
0.5 TABLET ORAL 2 TIMES DAILY PRN
Status: DISCONTINUED | OUTPATIENT
Start: 2019-12-11 | End: 2019-12-14 | Stop reason: HOSPADM

## 2019-12-11 RX ORDER — PREDNISONE 20 MG/1
40 TABLET ORAL DAILY
Status: DISCONTINUED | OUTPATIENT
Start: 2019-12-11 | End: 2019-12-14 | Stop reason: HOSPADM

## 2019-12-11 RX ORDER — BUTALBITAL, ACETAMINOPHEN AND CAFFEINE 50; 325; 40 MG/1; MG/1; MG/1
1 TABLET ORAL EVERY 4 HOURS PRN
Status: DISCONTINUED | OUTPATIENT
Start: 2019-12-11 | End: 2019-12-14 | Stop reason: HOSPADM

## 2019-12-11 RX ORDER — ESTRADIOL 1 MG/1
2 TABLET ORAL NIGHTLY
Status: DISCONTINUED | OUTPATIENT
Start: 2019-12-11 | End: 2019-12-14 | Stop reason: HOSPADM

## 2019-12-11 RX ORDER — PANTOPRAZOLE SODIUM 40 MG/1
40 TABLET, DELAYED RELEASE ORAL DAILY
Status: DISCONTINUED | OUTPATIENT
Start: 2019-12-11 | End: 2019-12-14 | Stop reason: HOSPADM

## 2019-12-11 RX ORDER — FOLIC ACID 1 MG/1
1 TABLET ORAL DAILY
Status: DISCONTINUED | OUTPATIENT
Start: 2019-12-11 | End: 2019-12-14 | Stop reason: HOSPADM

## 2019-12-11 RX ORDER — DOXEPIN HYDROCHLORIDE 10 MG/1
10 CAPSULE ORAL NIGHTLY
Status: DISCONTINUED | OUTPATIENT
Start: 2019-12-11 | End: 2019-12-14 | Stop reason: HOSPADM

## 2019-12-11 RX ORDER — ZOLPIDEM TARTRATE 5 MG/1
10 TABLET ORAL NIGHTLY
Status: DISCONTINUED | OUTPATIENT
Start: 2019-12-11 | End: 2019-12-14 | Stop reason: HOSPADM

## 2019-12-11 RX ORDER — SUMATRIPTAN 50 MG/1
100 TABLET, FILM COATED ORAL
Status: ACTIVE | OUTPATIENT
Start: 2019-12-11 | End: 2019-12-11

## 2019-12-11 RX ORDER — CITALOPRAM 10 MG/1
40 TABLET ORAL DAILY
Status: DISCONTINUED | OUTPATIENT
Start: 2019-12-11 | End: 2019-12-14 | Stop reason: HOSPADM

## 2019-12-11 RX ADMIN — HEPARIN SODIUM 5000 UNITS: 5000 INJECTION INTRAVENOUS; SUBCUTANEOUS at 06:36

## 2019-12-11 RX ADMIN — PYRIDOSTIGMINE BROMIDE 90 MG: 60 TABLET ORAL at 21:46

## 2019-12-11 RX ADMIN — SODIUM CHLORIDE, PRESERVATIVE FREE 10 ML: 5 INJECTION INTRAVENOUS at 01:03

## 2019-12-11 RX ADMIN — DOXEPIN HYDROCHLORIDE 10 MG: 10 CAPSULE ORAL at 22:56

## 2019-12-11 RX ADMIN — PYRIDOSTIGMINE BROMIDE 90 MG: 60 TABLET ORAL at 16:05

## 2019-12-11 RX ADMIN — SODIUM CHLORIDE, PRESERVATIVE FREE 10 ML: 5 INJECTION INTRAVENOUS at 08:16

## 2019-12-11 RX ADMIN — SODIUM CHLORIDE: 9 INJECTION, SOLUTION INTRAVENOUS at 16:20

## 2019-12-11 RX ADMIN — PYRIDOSTIGMINE BROMIDE 90 MG: 60 TABLET ORAL at 01:16

## 2019-12-11 RX ADMIN — SODIUM CHLORIDE, PRESERVATIVE FREE 10 ML: 5 INJECTION INTRAVENOUS at 20:48

## 2019-12-11 RX ADMIN — PYRIDOSTIGMINE BROMIDE 90 MG: 60 TABLET ORAL at 08:16

## 2019-12-11 RX ADMIN — ESTRADIOL 2 MG: 1 TABLET ORAL at 21:46

## 2019-12-11 RX ADMIN — IMMUNE GLOBULIN INTRAVENOUS (HUMAN) 20 G: 5 INJECTION, SOLUTION INTRAVENOUS at 16:41

## 2019-12-11 RX ADMIN — ZOLPIDEM TARTRATE 10 MG: 5 TABLET ORAL at 20:48

## 2019-12-11 RX ADMIN — HEPARIN SODIUM 5000 UNITS: 5000 INJECTION INTRAVENOUS; SUBCUTANEOUS at 20:47

## 2019-12-11 RX ADMIN — PREDNISONE 40 MG: 20 TABLET ORAL at 08:15

## 2019-12-11 RX ADMIN — CITALOPRAM HYDROBROMIDE 40 MG: 10 TABLET ORAL at 08:15

## 2019-12-11 RX ADMIN — HEPARIN SODIUM 5000 UNITS: 5000 INJECTION INTRAVENOUS; SUBCUTANEOUS at 16:04

## 2019-12-11 RX ADMIN — HEPARIN SODIUM 5000 UNITS: 5000 INJECTION INTRAVENOUS; SUBCUTANEOUS at 01:05

## 2019-12-11 RX ADMIN — LEVOTHYROXINE SODIUM 125 MCG: 100 TABLET ORAL at 06:35

## 2019-12-11 RX ADMIN — SODIUM CHLORIDE: 9 INJECTION, SOLUTION INTRAVENOUS at 01:04

## 2019-12-11 RX ADMIN — PANTOPRAZOLE SODIUM 40 MG: 40 TABLET, DELAYED RELEASE ORAL at 08:15

## 2019-12-11 RX ADMIN — FOLIC ACID 1 MG: 1 TABLET ORAL at 08:15

## 2019-12-11 RX ADMIN — LORAZEPAM 0.5 MG: 0.5 TABLET ORAL at 01:03

## 2019-12-11 RX ADMIN — ZOLPIDEM TARTRATE 10 MG: 5 TABLET ORAL at 01:00

## 2019-12-11 RX ADMIN — IMMUNE GLOBULIN INTRAVENOUS (HUMAN) 20 G: 5 INJECTION, SOLUTION INTRAVENOUS at 19:01

## 2019-12-11 ASSESSMENT — PAIN SCALES - GENERAL
PAINLEVEL_OUTOF10: 0

## 2019-12-12 LAB — URINE CULTURE, ROUTINE: NORMAL

## 2019-12-12 PROCEDURE — 94799 UNLISTED PULMONARY SVC/PX: CPT

## 2019-12-12 PROCEDURE — 94761 N-INVAS EAR/PLS OXIMETRY MLT: CPT

## 2019-12-12 PROCEDURE — 6360000002 HC RX W HCPCS: Performed by: INTERNAL MEDICINE

## 2019-12-12 PROCEDURE — 6370000000 HC RX 637 (ALT 250 FOR IP): Performed by: INTERNAL MEDICINE

## 2019-12-12 PROCEDURE — 2580000003 HC RX 258: Performed by: INTERNAL MEDICINE

## 2019-12-12 PROCEDURE — 2060000000 HC ICU INTERMEDIATE R&B

## 2019-12-12 PROCEDURE — 6360000002 HC RX W HCPCS: Performed by: PSYCHIATRY & NEUROLOGY

## 2019-12-12 RX ADMIN — SODIUM CHLORIDE: 9 INJECTION, SOLUTION INTRAVENOUS at 09:49

## 2019-12-12 RX ADMIN — PYRIDOSTIGMINE BROMIDE 90 MG: 60 TABLET ORAL at 09:49

## 2019-12-12 RX ADMIN — PYRIDOSTIGMINE BROMIDE 90 MG: 60 TABLET ORAL at 13:59

## 2019-12-12 RX ADMIN — HEPARIN SODIUM 5000 UNITS: 5000 INJECTION INTRAVENOUS; SUBCUTANEOUS at 20:43

## 2019-12-12 RX ADMIN — CEFTRIAXONE 1 G: 1 INJECTION, POWDER, FOR SOLUTION INTRAMUSCULAR; INTRAVENOUS at 20:46

## 2019-12-12 RX ADMIN — SODIUM CHLORIDE, PRESERVATIVE FREE 10 ML: 5 INJECTION INTRAVENOUS at 09:50

## 2019-12-12 RX ADMIN — ZOLPIDEM TARTRATE 10 MG: 5 TABLET ORAL at 20:44

## 2019-12-12 RX ADMIN — HEPARIN SODIUM 5000 UNITS: 5000 INJECTION INTRAVENOUS; SUBCUTANEOUS at 13:59

## 2019-12-12 RX ADMIN — SODIUM CHLORIDE, PRESERVATIVE FREE 10 ML: 5 INJECTION INTRAVENOUS at 20:46

## 2019-12-12 RX ADMIN — FOLIC ACID 1 MG: 1 TABLET ORAL at 09:49

## 2019-12-12 RX ADMIN — DOXEPIN HYDROCHLORIDE 10 MG: 10 CAPSULE ORAL at 20:45

## 2019-12-12 RX ADMIN — HEPARIN SODIUM 5000 UNITS: 5000 INJECTION INTRAVENOUS; SUBCUTANEOUS at 05:49

## 2019-12-12 RX ADMIN — IMMUNE GLOBULIN INTRAVENOUS (HUMAN) 20 G: 5 INJECTION, SOLUTION INTRAVENOUS at 14:19

## 2019-12-12 RX ADMIN — CITALOPRAM HYDROBROMIDE 40 MG: 10 TABLET ORAL at 09:50

## 2019-12-12 RX ADMIN — ESTRADIOL 2 MG: 1 TABLET ORAL at 20:44

## 2019-12-12 RX ADMIN — PREDNISONE 40 MG: 20 TABLET ORAL at 09:49

## 2019-12-12 RX ADMIN — PANTOPRAZOLE SODIUM 40 MG: 40 TABLET, DELAYED RELEASE ORAL at 06:52

## 2019-12-12 RX ADMIN — IMMUNE GLOBULIN INTRAVENOUS (HUMAN) 20 G: 5 INJECTION, SOLUTION INTRAVENOUS at 17:07

## 2019-12-12 RX ADMIN — LEVOTHYROXINE SODIUM 125 MCG: 100 TABLET ORAL at 06:52

## 2019-12-12 RX ADMIN — PYRIDOSTIGMINE BROMIDE 90 MG: 60 TABLET ORAL at 20:44

## 2019-12-12 ASSESSMENT — PAIN SCALES - GENERAL
PAINLEVEL_OUTOF10: 0
PAINLEVEL_OUTOF10: 0

## 2019-12-13 ENCOUNTER — APPOINTMENT (OUTPATIENT)
Dept: CT IMAGING | Age: 49
DRG: 057 | End: 2019-12-13
Payer: COMMERCIAL

## 2019-12-13 PROCEDURE — 2060000000 HC ICU INTERMEDIATE R&B

## 2019-12-13 PROCEDURE — 70450 CT HEAD/BRAIN W/O DYE: CPT

## 2019-12-13 PROCEDURE — 6360000002 HC RX W HCPCS: Performed by: INTERNAL MEDICINE

## 2019-12-13 PROCEDURE — 94799 UNLISTED PULMONARY SVC/PX: CPT

## 2019-12-13 PROCEDURE — 6360000002 HC RX W HCPCS: Performed by: PSYCHIATRY & NEUROLOGY

## 2019-12-13 PROCEDURE — 94760 N-INVAS EAR/PLS OXIMETRY 1: CPT

## 2019-12-13 PROCEDURE — 2580000003 HC RX 258: Performed by: INTERNAL MEDICINE

## 2019-12-13 PROCEDURE — 6370000000 HC RX 637 (ALT 250 FOR IP): Performed by: INTERNAL MEDICINE

## 2019-12-13 RX ADMIN — HEPARIN SODIUM 5000 UNITS: 5000 INJECTION INTRAVENOUS; SUBCUTANEOUS at 05:29

## 2019-12-13 RX ADMIN — ZOLPIDEM TARTRATE 10 MG: 5 TABLET ORAL at 21:24

## 2019-12-13 RX ADMIN — SODIUM CHLORIDE: 9 INJECTION, SOLUTION INTRAVENOUS at 18:42

## 2019-12-13 RX ADMIN — CITALOPRAM HYDROBROMIDE 40 MG: 10 TABLET ORAL at 10:07

## 2019-12-13 RX ADMIN — BUTALBITAL, ACETAMINOPHEN, AND CAFFEINE 1 TABLET: 50; 325; 40 TABLET ORAL at 15:40

## 2019-12-13 RX ADMIN — CEFTRIAXONE 1 G: 1 INJECTION, POWDER, FOR SOLUTION INTRAMUSCULAR; INTRAVENOUS at 21:23

## 2019-12-13 RX ADMIN — IMMUNE GLOBULIN INTRAVENOUS (HUMAN) 20 G: 5 INJECTION, SOLUTION INTRAVENOUS at 11:48

## 2019-12-13 RX ADMIN — BUTALBITAL, ACETAMINOPHEN, AND CAFFEINE 1 TABLET: 50; 325; 40 TABLET ORAL at 10:06

## 2019-12-13 RX ADMIN — SODIUM CHLORIDE, PRESERVATIVE FREE 10 ML: 5 INJECTION INTRAVENOUS at 10:08

## 2019-12-13 RX ADMIN — PREDNISONE 40 MG: 20 TABLET ORAL at 10:07

## 2019-12-13 RX ADMIN — PYRIDOSTIGMINE BROMIDE 90 MG: 60 TABLET ORAL at 15:39

## 2019-12-13 RX ADMIN — IMMUNE GLOBULIN INTRAVENOUS (HUMAN) 20 G: 5 INJECTION, SOLUTION INTRAVENOUS at 16:01

## 2019-12-13 RX ADMIN — PANTOPRAZOLE SODIUM 40 MG: 40 TABLET, DELAYED RELEASE ORAL at 06:30

## 2019-12-13 RX ADMIN — PYRIDOSTIGMINE BROMIDE 90 MG: 60 TABLET ORAL at 10:07

## 2019-12-13 RX ADMIN — HEPARIN SODIUM 5000 UNITS: 5000 INJECTION INTRAVENOUS; SUBCUTANEOUS at 15:39

## 2019-12-13 RX ADMIN — DOXEPIN HYDROCHLORIDE 10 MG: 10 CAPSULE ORAL at 21:24

## 2019-12-13 RX ADMIN — ESTRADIOL 2 MG: 1 TABLET ORAL at 21:24

## 2019-12-13 RX ADMIN — LEVOTHYROXINE SODIUM 125 MCG: 100 TABLET ORAL at 06:30

## 2019-12-13 RX ADMIN — SODIUM CHLORIDE, PRESERVATIVE FREE 10 ML: 5 INJECTION INTRAVENOUS at 21:25

## 2019-12-13 RX ADMIN — PYRIDOSTIGMINE BROMIDE 90 MG: 60 TABLET ORAL at 21:25

## 2019-12-13 RX ADMIN — SODIUM CHLORIDE: 9 INJECTION, SOLUTION INTRAVENOUS at 05:24

## 2019-12-13 RX ADMIN — HEPARIN SODIUM 5000 UNITS: 5000 INJECTION INTRAVENOUS; SUBCUTANEOUS at 21:26

## 2019-12-13 RX ADMIN — FOLIC ACID 1 MG: 1 TABLET ORAL at 10:07

## 2019-12-13 ASSESSMENT — PAIN DESCRIPTION - LOCATION
LOCATION: HEAD
LOCATION: HEAD

## 2019-12-13 ASSESSMENT — PAIN DESCRIPTION - FREQUENCY
FREQUENCY: CONTINUOUS
FREQUENCY: CONTINUOUS

## 2019-12-13 ASSESSMENT — PAIN DESCRIPTION - DESCRIPTORS
DESCRIPTORS: CONSTANT;DULL;HEADACHE
DESCRIPTORS: ACHING;DISCOMFORT;DULL;HEADACHE

## 2019-12-13 ASSESSMENT — PAIN SCALES - GENERAL
PAINLEVEL_OUTOF10: 6
PAINLEVEL_OUTOF10: 0
PAINLEVEL_OUTOF10: 6
PAINLEVEL_OUTOF10: 3
PAINLEVEL_OUTOF10: 3
PAINLEVEL_OUTOF10: 0

## 2019-12-13 ASSESSMENT — PAIN - FUNCTIONAL ASSESSMENT
PAIN_FUNCTIONAL_ASSESSMENT: ACTIVITIES ARE NOT PREVENTED
PAIN_FUNCTIONAL_ASSESSMENT: ACTIVITIES ARE NOT PREVENTED

## 2019-12-13 ASSESSMENT — PAIN DESCRIPTION - PAIN TYPE
TYPE: ACUTE PAIN
TYPE: ACUTE PAIN

## 2019-12-13 ASSESSMENT — PAIN DESCRIPTION - PROGRESSION
CLINICAL_PROGRESSION: NOT CHANGED
CLINICAL_PROGRESSION: GRADUALLY WORSENING

## 2019-12-13 ASSESSMENT — PAIN DESCRIPTION - DIRECTION
RADIATING_TOWARDS: NON-RADIATING
RADIATING_TOWARDS: NON-RADIATING

## 2019-12-13 ASSESSMENT — PAIN DESCRIPTION - ONSET
ONSET: GRADUAL
ONSET: GRADUAL

## 2019-12-14 ENCOUNTER — APPOINTMENT (OUTPATIENT)
Dept: MRI IMAGING | Age: 49
DRG: 057 | End: 2019-12-14
Payer: COMMERCIAL

## 2019-12-14 VITALS
DIASTOLIC BLOOD PRESSURE: 71 MMHG | WEIGHT: 170.42 LBS | HEIGHT: 62 IN | TEMPERATURE: 97.8 F | HEART RATE: 69 BPM | BODY MASS INDEX: 31.36 KG/M2 | OXYGEN SATURATION: 99 % | RESPIRATION RATE: 16 BRPM | SYSTOLIC BLOOD PRESSURE: 124 MMHG

## 2019-12-14 PROCEDURE — 70551 MRI BRAIN STEM W/O DYE: CPT

## 2019-12-14 PROCEDURE — 6360000002 HC RX W HCPCS: Performed by: PSYCHIATRY & NEUROLOGY

## 2019-12-14 PROCEDURE — 6360000002 HC RX W HCPCS: Performed by: INTERNAL MEDICINE

## 2019-12-14 PROCEDURE — 6370000000 HC RX 637 (ALT 250 FOR IP): Performed by: INTERNAL MEDICINE

## 2019-12-14 RX ORDER — PYRIDOSTIGMINE BROMIDE 60 MG/1
90 TABLET ORAL 3 TIMES DAILY
Qty: 60 TABLET | Refills: 3 | Status: SHIPPED | OUTPATIENT
Start: 2019-12-14 | End: 2022-01-02

## 2019-12-14 RX ADMIN — HEPARIN SODIUM 5000 UNITS: 5000 INJECTION INTRAVENOUS; SUBCUTANEOUS at 06:02

## 2019-12-14 RX ADMIN — BUTALBITAL, ACETAMINOPHEN, AND CAFFEINE 1 TABLET: 50; 325; 40 TABLET ORAL at 16:25

## 2019-12-14 RX ADMIN — IMMUNE GLOBULIN (HUMAN) 20 G: 10 INJECTION INTRAVENOUS; SUBCUTANEOUS at 10:21

## 2019-12-14 RX ADMIN — PANTOPRAZOLE SODIUM 40 MG: 40 TABLET, DELAYED RELEASE ORAL at 06:02

## 2019-12-14 RX ADMIN — HEPARIN SODIUM 5000 UNITS: 5000 INJECTION INTRAVENOUS; SUBCUTANEOUS at 13:57

## 2019-12-14 RX ADMIN — LEVOTHYROXINE SODIUM 125 MCG: 100 TABLET ORAL at 06:01

## 2019-12-14 RX ADMIN — FOLIC ACID 1 MG: 1 TABLET ORAL at 09:07

## 2019-12-14 RX ADMIN — PYRIDOSTIGMINE BROMIDE 90 MG: 60 TABLET ORAL at 13:57

## 2019-12-14 RX ADMIN — PYRIDOSTIGMINE BROMIDE 90 MG: 60 TABLET ORAL at 09:07

## 2019-12-14 RX ADMIN — METHOTREXATE 10 MG: 2.5 TABLET ORAL at 09:07

## 2019-12-14 RX ADMIN — PREDNISONE 40 MG: 20 TABLET ORAL at 09:07

## 2019-12-14 RX ADMIN — CITALOPRAM HYDROBROMIDE 40 MG: 10 TABLET ORAL at 09:07

## 2019-12-14 RX ADMIN — IMMUNE GLOBULIN (HUMAN) 20 G: 10 INJECTION INTRAVENOUS; SUBCUTANEOUS at 12:33

## 2019-12-14 ASSESSMENT — PAIN SCALES - GENERAL
PAINLEVEL_OUTOF10: 3
PAINLEVEL_OUTOF10: 0
PAINLEVEL_OUTOF10: 5

## 2019-12-14 ASSESSMENT — PAIN DESCRIPTION - ONSET: ONSET: GRADUAL

## 2019-12-14 ASSESSMENT — PAIN DESCRIPTION - FREQUENCY: FREQUENCY: CONTINUOUS

## 2019-12-14 ASSESSMENT — PAIN DESCRIPTION - DESCRIPTORS: DESCRIPTORS: HEADACHE;DISCOMFORT;CONSTANT

## 2019-12-14 ASSESSMENT — PAIN - FUNCTIONAL ASSESSMENT: PAIN_FUNCTIONAL_ASSESSMENT: ACTIVITIES ARE NOT PREVENTED

## 2019-12-14 ASSESSMENT — PAIN DESCRIPTION - LOCATION: LOCATION: HEAD

## 2019-12-14 ASSESSMENT — PAIN DESCRIPTION - PROGRESSION: CLINICAL_PROGRESSION: GRADUALLY WORSENING

## 2019-12-14 ASSESSMENT — PAIN DESCRIPTION - PAIN TYPE: TYPE: ACUTE PAIN

## 2019-12-14 ASSESSMENT — PAIN DESCRIPTION - ORIENTATION: ORIENTATION: ANTERIOR

## 2019-12-16 ENCOUNTER — ANESTHESIA EVENT (OUTPATIENT)
Dept: ENDOSCOPY | Age: 49
End: 2019-12-16
Payer: COMMERCIAL

## 2019-12-17 ENCOUNTER — ANESTHESIA (OUTPATIENT)
Dept: ENDOSCOPY | Age: 49
End: 2019-12-17
Payer: COMMERCIAL

## 2019-12-17 ENCOUNTER — HOSPITAL ENCOUNTER (OUTPATIENT)
Age: 49
Setting detail: OUTPATIENT SURGERY
Discharge: HOME | End: 2019-12-17
Attending: INTERNAL MEDICINE | Admitting: INTERNAL MEDICINE
Payer: COMMERCIAL

## 2019-12-17 VITALS
SYSTOLIC BLOOD PRESSURE: 131 MMHG | WEIGHT: 167.88 LBS | TEMPERATURE: 97.1 F | HEIGHT: 62 IN | HEART RATE: 69 BPM | RESPIRATION RATE: 16 BRPM | OXYGEN SATURATION: 99 % | BODY MASS INDEX: 30.89 KG/M2 | DIASTOLIC BLOOD PRESSURE: 79 MMHG

## 2019-12-17 VITALS
DIASTOLIC BLOOD PRESSURE: 58 MMHG | TEMPERATURE: 96.8 F | SYSTOLIC BLOOD PRESSURE: 120 MMHG | RESPIRATION RATE: 19 BRPM | OXYGEN SATURATION: 99 %

## 2019-12-17 DIAGNOSIS — K57.92 DIVERTICULITIS: ICD-10-CM

## 2019-12-17 PROCEDURE — 2580000003 HC RX 258: Performed by: ANESTHESIOLOGY

## 2019-12-17 PROCEDURE — 3609027000 HC COLONOSCOPY: Performed by: INTERNAL MEDICINE

## 2019-12-17 PROCEDURE — 88305 TISSUE EXAM BY PATHOLOGIST: CPT

## 2019-12-17 PROCEDURE — 7100000000 HC PACU RECOVERY - FIRST 15 MIN: Performed by: INTERNAL MEDICINE

## 2019-12-17 PROCEDURE — 7100000011 HC PHASE II RECOVERY - ADDTL 15 MIN: Performed by: INTERNAL MEDICINE

## 2019-12-17 PROCEDURE — 6360000002 HC RX W HCPCS: Performed by: NURSE ANESTHETIST, CERTIFIED REGISTERED

## 2019-12-17 PROCEDURE — 2709999900 HC NON-CHARGEABLE SUPPLY: Performed by: INTERNAL MEDICINE

## 2019-12-17 PROCEDURE — 3700000000 HC ANESTHESIA ATTENDED CARE: Performed by: INTERNAL MEDICINE

## 2019-12-17 PROCEDURE — 3609012400 HC EGD TRANSORAL BIOPSY SINGLE/MULTIPLE: Performed by: INTERNAL MEDICINE

## 2019-12-17 PROCEDURE — 3700000001 HC ADD 15 MINUTES (ANESTHESIA): Performed by: INTERNAL MEDICINE

## 2019-12-17 PROCEDURE — 7100000010 HC PHASE II RECOVERY - FIRST 15 MIN: Performed by: INTERNAL MEDICINE

## 2019-12-17 PROCEDURE — 3609017700 HC EGD DILATION GASTRIC/DUODENAL STRICTURE: Performed by: INTERNAL MEDICINE

## 2019-12-17 RX ORDER — PROPOFOL 10 MG/ML
INJECTION, EMULSION INTRAVENOUS CONTINUOUS PRN
Status: DISCONTINUED | OUTPATIENT
Start: 2019-12-17 | End: 2019-12-17 | Stop reason: SDUPTHER

## 2019-12-17 RX ORDER — SODIUM CHLORIDE 9 MG/ML
INJECTION, SOLUTION INTRAVENOUS CONTINUOUS
Status: DISCONTINUED | OUTPATIENT
Start: 2019-12-17 | End: 2019-12-17 | Stop reason: HOSPADM

## 2019-12-17 RX ORDER — ONDANSETRON 2 MG/ML
4 INJECTION INTRAMUSCULAR; INTRAVENOUS
Status: DISCONTINUED | OUTPATIENT
Start: 2019-12-17 | End: 2019-12-17 | Stop reason: HOSPADM

## 2019-12-17 RX ORDER — SODIUM CHLORIDE 0.9 % (FLUSH) 0.9 %
10 SYRINGE (ML) INJECTION PRN
Status: DISCONTINUED | OUTPATIENT
Start: 2019-12-17 | End: 2019-12-17 | Stop reason: HOSPADM

## 2019-12-17 RX ORDER — MIDAZOLAM HYDROCHLORIDE 1 MG/ML
INJECTION INTRAMUSCULAR; INTRAVENOUS PRN
Status: DISCONTINUED | OUTPATIENT
Start: 2019-12-17 | End: 2019-12-17 | Stop reason: SDUPTHER

## 2019-12-17 RX ORDER — SODIUM CHLORIDE 0.9 % (FLUSH) 0.9 %
10 SYRINGE (ML) INJECTION EVERY 12 HOURS SCHEDULED
Status: DISCONTINUED | OUTPATIENT
Start: 2019-12-17 | End: 2019-12-17 | Stop reason: HOSPADM

## 2019-12-17 RX ADMIN — PROPOFOL 160 MCG/KG/MIN: 10 INJECTION, EMULSION INTRAVENOUS at 07:35

## 2019-12-17 RX ADMIN — MIDAZOLAM 2 MG: 1 INJECTION INTRAMUSCULAR; INTRAVENOUS at 07:28

## 2019-12-17 RX ADMIN — SODIUM CHLORIDE: 9 INJECTION, SOLUTION INTRAVENOUS at 07:07

## 2019-12-17 RX ADMIN — SODIUM CHLORIDE: 9 INJECTION, SOLUTION INTRAVENOUS at 07:26

## 2019-12-17 ASSESSMENT — PULMONARY FUNCTION TESTS
PIF_VALUE: 0

## 2019-12-17 ASSESSMENT — PAIN SCALES - GENERAL
PAINLEVEL_OUTOF10: 0

## 2019-12-17 ASSESSMENT — PAIN - FUNCTIONAL ASSESSMENT: PAIN_FUNCTIONAL_ASSESSMENT: 0-10

## 2020-02-11 ENCOUNTER — HOSPITAL ENCOUNTER (OUTPATIENT)
Age: 50
Setting detail: OUTPATIENT SURGERY
Discharge: HOME | End: 2020-02-11
Attending: INTERNAL MEDICINE | Admitting: INTERNAL MEDICINE
Payer: COMMERCIAL

## 2020-02-11 VITALS
DIASTOLIC BLOOD PRESSURE: 74 MMHG | HEART RATE: 76 BPM | RESPIRATION RATE: 16 BRPM | HEIGHT: 62 IN | SYSTOLIC BLOOD PRESSURE: 116 MMHG | TEMPERATURE: 98 F | BODY MASS INDEX: 31.77 KG/M2 | WEIGHT: 172.62 LBS | OXYGEN SATURATION: 97 %

## 2020-02-11 PROCEDURE — 6370000000 HC RX 637 (ALT 250 FOR IP): Performed by: INTERNAL MEDICINE

## 2020-02-11 PROCEDURE — 3609015500 HC GASTRIC/DUODENAL MOTILITY &/OR MANOMETRY STUDY: Performed by: INTERNAL MEDICINE

## 2020-02-11 PROCEDURE — 2709999900 HC NON-CHARGEABLE SUPPLY: Performed by: INTERNAL MEDICINE

## 2020-02-11 RX ORDER — LIDOCAINE HYDROCHLORIDE 20 MG/ML
JELLY TOPICAL
Status: COMPLETED | OUTPATIENT
Start: 2020-02-11 | End: 2020-02-11

## 2020-02-11 ASSESSMENT — PAIN - FUNCTIONAL ASSESSMENT: PAIN_FUNCTIONAL_ASSESSMENT: 0-10

## 2020-02-14 NOTE — PROCEDURES
[address] 16                                 PROCEDURE NOTE    PATIENT NAME: Bindu Clement                  :        1970  MED REC NO:   [id number]                          ROOM:  ACCOUNT NO:   [de-identified]                           ADMIT DATE: 2020  PROVIDER:     Gregor Rome MD      ESOPHAGEAL MANOMETRY    DATE OF PROCEDURE:  2020    REFERRING PROVIDERS:  Sravan Wells MD and Dr. Larisa Caldwell. INDICATIONS:  The patient is undergoing preoperative evaluation for  surgical revision of a fundoplication. The patient stated that she has  recurrent refractory acid reflux symptoms. PROCEDURE:  The esophageal manometry catheter was inserted per naris and  passed into the gastric lumen. High-resolution esophageal manometry was  then performed. The lower esophageal sphincter was first assessed. The  upper limit of the sphincter was located at 34.0 cm. The mean resting  pressure was actually elevated at 54 mmHg. The upper limit of normal is  45 mmHg. The mean residual pressure with swallows was 12 mmHg, which is  slightly elevated. The esophageal body was then assessed. All 10 wet  swallows were peristaltic. Several were of somewhat prolonged duration,  however. Finally, the upper esophageal sphincter was assessed. The  proximal extent of the sphincter was located at 15.1 cm. The mean  resting pressure was normal at 74 mmHg. Residual pressure with swallows  was slightly elevated at 12 mmHg. ESTIMATED BLOOD LOSS:  None. IMPRESSION:  1. The patient has an elevated mean resting pressure of the lower  esophageal sphincter. This would suggest that the surgical  fundoplication is still intact. The sphincter has very mild incomplete  relaxation, which again would be consistent with a surgical  fundoplication. 2.  Normal esophageal peristalsis.   3.  An essentially normal upper esophageal sphincter. PLAN:  The patient is now to undergo an EGD with 48-hour esophageal pH  study.         Leila Stahl MD    D: 02/13/2020 16:55:48       T: 02/13/2020 23:55:48     [id number]  Job#: [id number]     Doc#: [id number]    CC:  Toni Tena MD

## 2020-02-25 NOTE — PROGRESS NOTES
[address] [facility] [place] time_____1315_______        Surgery time____________    Take the following medications with a sip of water: Follow your Doctor's pre procedure instructions regarding medications    Do not eat or drink anything after 12:00 midnight prior to your surgery. This includes water chewing gum, mints and ice chips. You may brush your teeth and gargle the morning of your surgery, but do not swallow the water     Please see your family doctor/pediatrician for a history and physical and/or concerning medications. Bring any test results/reports from your physicians office. If you are under the care of a heart doctor or specialist doctor, please be aware that you may be asked to them for clearance    You may be asked to stop blood thinners such as Coumadin, Plavix, Fragmin, Lovenox, etc., or any anti-inflammatories such as:  Aspirin, Ibuprofen, Advil, Naproxen prior to your surgery. We also ask that you stop any OTC medications such as fish oil, vitamin E, glucosamine, garlic, Multivitamins, COQ 10, etc.    We ask that you do not smoke 24 hours prior to surgery  We ask that you do not  drink any alcoholic beverages 24 hours prior to surgery     You must make arrangements for a responsible adult to take you home after your surgery. For your safety you will not be allowed to leave alone or drive yourself home. Your surgery will be cancelled if you do not have a ride home. Also for your safety, it is strongly suggested that someone stay with you the first 24 hours after your surgery. A parent or legal guardian must accompany a child scheduled for surgery and plan to stay at the hospital until the child is discharged. Please do not bring other children with you. For your comfort, please wear simple loose fitting clothing to the hospital.  Please do not bring valuables.     Do not wear any make-up or nail polish on your fingers or toes      For your instructions regarding medications

## 2020-02-27 ENCOUNTER — ANESTHESIA EVENT (OUTPATIENT)
Dept: ENDOSCOPY | Age: 50
End: 2020-02-27
Payer: COMMERCIAL

## 2020-02-28 ENCOUNTER — ANESTHESIA (OUTPATIENT)
Dept: ENDOSCOPY | Age: 50
End: 2020-02-28
Payer: COMMERCIAL

## 2020-02-28 ENCOUNTER — HOSPITAL ENCOUNTER (OUTPATIENT)
Age: 50
Setting detail: OUTPATIENT SURGERY
Discharge: HOME | End: 2020-02-28
Attending: INTERNAL MEDICINE | Admitting: INTERNAL MEDICINE
Payer: COMMERCIAL

## 2020-02-28 VITALS
WEIGHT: 171.96 LBS | OXYGEN SATURATION: 97 % | BODY MASS INDEX: 31.64 KG/M2 | HEART RATE: 67 BPM | DIASTOLIC BLOOD PRESSURE: 71 MMHG | TEMPERATURE: 96.9 F | HEIGHT: 62 IN | SYSTOLIC BLOOD PRESSURE: 98 MMHG | RESPIRATION RATE: 16 BRPM

## 2020-02-28 VITALS
RESPIRATION RATE: 17 BRPM | OXYGEN SATURATION: 100 % | SYSTOLIC BLOOD PRESSURE: 116 MMHG | DIASTOLIC BLOOD PRESSURE: 55 MMHG

## 2020-02-28 PROCEDURE — 3604323500 HC GERD TEST W/ELECTRODE (BRAVO): Performed by: INTERNAL MEDICINE

## 2020-02-28 PROCEDURE — 7100000010 HC PHASE II RECOVERY - FIRST 15 MIN: Performed by: INTERNAL MEDICINE

## 2020-02-28 PROCEDURE — 2709999900 HC NON-CHARGEABLE SUPPLY: Performed by: INTERNAL MEDICINE

## 2020-02-28 PROCEDURE — 6360000002 HC RX W HCPCS: Performed by: ANESTHESIOLOGY

## 2020-02-28 PROCEDURE — 2720000010 HC SURG SUPPLY STERILE: Performed by: INTERNAL MEDICINE

## 2020-02-28 PROCEDURE — 3609019000 HC EGD CAPSULE ENDOSCOPY: Performed by: INTERNAL MEDICINE

## 2020-02-28 PROCEDURE — 3700000000 HC ANESTHESIA ATTENDED CARE: Performed by: INTERNAL MEDICINE

## 2020-02-28 PROCEDURE — 3609017100 HC EGD: Performed by: INTERNAL MEDICINE

## 2020-02-28 PROCEDURE — 7100000011 HC PHASE II RECOVERY - ADDTL 15 MIN: Performed by: INTERNAL MEDICINE

## 2020-02-28 PROCEDURE — 2580000003 HC RX 258: Performed by: ANESTHESIOLOGY

## 2020-02-28 PROCEDURE — 6360000002 HC RX W HCPCS

## 2020-02-28 PROCEDURE — 7100000001 HC PACU RECOVERY - ADDTL 15 MIN: Performed by: INTERNAL MEDICINE

## 2020-02-28 PROCEDURE — 7100000000 HC PACU RECOVERY - FIRST 15 MIN: Performed by: INTERNAL MEDICINE

## 2020-02-28 PROCEDURE — 2500000003 HC RX 250 WO HCPCS

## 2020-02-28 PROCEDURE — 3700000001 HC ADD 15 MINUTES (ANESTHESIA): Performed by: INTERNAL MEDICINE

## 2020-02-28 PROCEDURE — 2780000010 HC IMPLANT OTHER: Performed by: INTERNAL MEDICINE

## 2020-02-28 RX ORDER — MIDAZOLAM HYDROCHLORIDE 1 MG/ML
INJECTION INTRAMUSCULAR; INTRAVENOUS PRN
Status: DISCONTINUED | OUTPATIENT
Start: 2020-02-28 | End: 2020-02-28 | Stop reason: SDUPTHER

## 2020-02-28 RX ORDER — SODIUM CHLORIDE 9 MG/ML
INJECTION, SOLUTION INTRAVENOUS CONTINUOUS
Status: DISCONTINUED | OUTPATIENT
Start: 2020-02-28 | End: 2020-02-28 | Stop reason: HOSPADM

## 2020-02-28 RX ORDER — SODIUM CHLORIDE 0.9 % (FLUSH) 0.9 %
10 SYRINGE (ML) INJECTION EVERY 12 HOURS SCHEDULED
Status: DISCONTINUED | OUTPATIENT
Start: 2020-02-28 | End: 2020-02-28 | Stop reason: HOSPADM

## 2020-02-28 RX ORDER — GLYCOPYRROLATE 0.2 MG/ML
INJECTION INTRAMUSCULAR; INTRAVENOUS PRN
Status: DISCONTINUED | OUTPATIENT
Start: 2020-02-28 | End: 2020-02-28 | Stop reason: SDUPTHER

## 2020-02-28 RX ORDER — LIDOCAINE HYDROCHLORIDE 20 MG/ML
INJECTION, SOLUTION EPIDURAL; INFILTRATION; INTRACAUDAL; PERINEURAL PRN
Status: DISCONTINUED | OUTPATIENT
Start: 2020-02-28 | End: 2020-02-28 | Stop reason: SDUPTHER

## 2020-02-28 RX ORDER — PROPOFOL 10 MG/ML
INJECTION, EMULSION INTRAVENOUS CONTINUOUS PRN
Status: DISCONTINUED | OUTPATIENT
Start: 2020-02-28 | End: 2020-02-28 | Stop reason: SDUPTHER

## 2020-02-28 RX ORDER — FENTANYL CITRATE 50 UG/ML
50 INJECTION, SOLUTION INTRAMUSCULAR; INTRAVENOUS ONCE
Status: COMPLETED | OUTPATIENT
Start: 2020-02-28 | End: 2020-02-28

## 2020-02-28 RX ORDER — SODIUM CHLORIDE 0.9 % (FLUSH) 0.9 %
10 SYRINGE (ML) INJECTION PRN
Status: DISCONTINUED | OUTPATIENT
Start: 2020-02-28 | End: 2020-02-28 | Stop reason: HOSPADM

## 2020-02-28 RX ORDER — ONDANSETRON 2 MG/ML
4 INJECTION INTRAMUSCULAR; INTRAVENOUS
Status: DISCONTINUED | OUTPATIENT
Start: 2020-02-28 | End: 2020-02-28 | Stop reason: HOSPADM

## 2020-02-28 RX ORDER — PROPOFOL 10 MG/ML
INJECTION, EMULSION INTRAVENOUS PRN
Status: DISCONTINUED | OUTPATIENT
Start: 2020-02-28 | End: 2020-02-28 | Stop reason: SDUPTHER

## 2020-02-28 RX ADMIN — SODIUM CHLORIDE: 9 INJECTION, SOLUTION INTRAVENOUS at 13:38

## 2020-02-28 RX ADMIN — PROPOFOL 200 MCG/KG/MIN: 10 INJECTION, EMULSION INTRAVENOUS at 14:47

## 2020-02-28 RX ADMIN — PROPOFOL 80 MG: 10 INJECTION, EMULSION INTRAVENOUS at 14:47

## 2020-02-28 RX ADMIN — GLYCOPYRROLATE 0.1 MG: 0.2 INJECTION, SOLUTION INTRAMUSCULAR; INTRAVENOUS at 14:39

## 2020-02-28 RX ADMIN — FENTANYL CITRATE 50 MCG: 50 INJECTION, SOLUTION INTRAMUSCULAR; INTRAVENOUS at 13:41

## 2020-02-28 RX ADMIN — LIDOCAINE HYDROCHLORIDE 80 MG: 20 INJECTION, SOLUTION EPIDURAL; INFILTRATION; INTRACAUDAL; PERINEURAL at 14:47

## 2020-02-28 RX ADMIN — MIDAZOLAM 2 MG: 1 INJECTION INTRAMUSCULAR; INTRAVENOUS at 14:47

## 2020-02-28 ASSESSMENT — PULMONARY FUNCTION TESTS
PIF_VALUE: 0

## 2020-02-28 ASSESSMENT — PAIN - FUNCTIONAL ASSESSMENT
PAIN_FUNCTIONAL_ASSESSMENT: 0-10
PAIN_FUNCTIONAL_ASSESSMENT: ACTIVITIES ARE NOT PREVENTED
PAIN_FUNCTIONAL_ASSESSMENT: ACTIVITIES ARE NOT PREVENTED
PAIN_FUNCTIONAL_ASSESSMENT: 0-10
PAIN_FUNCTIONAL_ASSESSMENT: ACTIVITIES ARE NOT PREVENTED
PAIN_FUNCTIONAL_ASSESSMENT: 0-10

## 2020-02-28 ASSESSMENT — PAIN SCALES - GENERAL
PAINLEVEL_OUTOF10: 0
PAINLEVEL_OUTOF10: 7
PAINLEVEL_OUTOF10: 0

## 2020-02-28 ASSESSMENT — PAIN DESCRIPTION - DESCRIPTORS
DESCRIPTORS: ACHING

## 2020-02-28 NOTE — ANESTHESIA PRE PROCEDURE
 sodium chloride flush 0.9 % injection 10 mL  10 mL Intravenous PRN Abby Orellana MD         Vital Signs (Current)   There were no vitals filed for this visit. BP Readings from Last 3 Encounters:   02/11/20 116/74   12/17/19 (!) 120/58   12/17/19 131/79     Vital Signs Statistics (for past 48 hrs)     No data recorded  BP Readings from Last 3 Encounters:   02/11/20 116/74   12/17/19 (!) 120/58   12/17/19 131/79       BMI  There is no height or weight on file to calculate BMI. Estimated body mass index is 31.45 kg/m² as calculated from the following:    Height as of an earlier encounter on 2/28/20: 5' 2\" (1.575 m). Weight as of an earlier encounter on 2/28/20: 171 lb 15.3 oz (78 kg). CBC   Lab Results   Component Value Date    WBC 5.9 12/11/2019    RBC 3.85 12/11/2019    HGB 11.9 12/11/2019    HCT 34.7 12/11/2019    MCV 90.1 12/11/2019    RDW 13.8 12/11/2019     12/11/2019     CMP    Lab Results   Component Value Date     12/11/2019    K 3.6 12/11/2019     12/11/2019    CO2 23 12/11/2019    BUN 9 12/11/2019    CREATININE <0.5 12/11/2019    GFRAA >60 12/11/2019    AGRATIO 0.9 11/03/2019    LABGLOM >60 12/11/2019    GLUCOSE 95 12/11/2019    PROT 7.6 11/04/2019    CALCIUM 8.6 12/11/2019    BILITOT <0.2 11/04/2019    ALKPHOS 142 11/04/2019    AST 16 11/04/2019    ALT 14 11/04/2019     BMP    Lab Results   Component Value Date     12/11/2019    K 3.6 12/11/2019     12/11/2019    CO2 23 12/11/2019    BUN 9 12/11/2019    CREATININE <0.5 12/11/2019    CALCIUM 8.6 12/11/2019    GFRAA >60 12/11/2019    LABGLOM >60 12/11/2019    GLUCOSE 95 12/11/2019     POCGlucose  No results for input(s): GLUCOSE in the last 72 hours.    Coags  No results found for: PROTIME, INR, APTT  HCG (If Applicable)   Lab Results   Component Value Date    PREGTESTUR Negative 10/13/2019      ABGs   Lab Results   Component Value Date    PHART 7.392 11/05/2019    PO2ART 402.0 11/05/2019    XBK2ZLN 38.3 11/05/2019    ENA6TSQ 22.8 11/05/2019    BEART -1.3 11/05/2019    Y7ZQXWRJ 99.9 11/05/2019      Type & Screen (If Applicable)  No results found for: LABABO, LABRH                         BMI: Wt Readings from Last 3 Encounters:       NPO Status:                          Anesthesia Evaluation  Patient summary reviewed no history of anesthetic complications:   Airway: Mallampati: II  TM distance: >3 FB   Neck ROM: full  Mouth opening: > = 3 FB Dental:    (+) upper dentures and lower dentures      Pulmonary:       (-) pneumonia and COPD                           Cardiovascular:    (+) hyperlipidemia    (-) pacemaker, hypertension and valvular problems/murmurs                Neuro/Psych:   (+) seizures:, neuromuscular disease (Myasthenia gravis):, headaches:, psychiatric history:             ROS comment: Hospitalized last week with MG exacerbation--feels back to baseline and she discussed procedure/ANS with neurologist--ok to proceed. GI/Hepatic/Renal:   (+) GERD:,      (-) liver disease and no renal disease       Endo/Other:    (+) hypothyroidism, blood dyscrasia: anemia, arthritis: OA., electrolyte abnormalities, malignancy/cancer. Abdominal:           Vascular:     - DVT and PE. Anesthesia Plan      MAC     ASA 3     (Myasthenia gravis)  Induction: intravenous. Anesthetic plan and risks discussed with patient. Plan discussed with CRNA. Attending anesthesiologist reviewed and agrees with Pre Eval content              This pre-anesthesia assessment may be used as a history and physical.    DOS STAFF ADDENDUM:    Pt seen and examined, chart reviewed (including anesthesia, drug and allergy history). No interval changes to history and physical examination. Anesthetic plan, risks, benefits, alternatives, and personnel involved discussed with patient. Patient verbalized an understanding and agrees to proceed.       Maribel Lundy

## 2020-02-28 NOTE — BRIEF OP NOTE
Brief Postoperative Note    Ova Antes  YOB: 1970  [id number]    Pre-operative Diagnosis: GERD    Post-operative Diagnosis: Same    Procedure: EGD    Anesthesia: MAC    Surgeons/Assistants: Erickson Lantigua MD    Estimated Blood Loss: None    Complications: None    Specimens: Was Not Obtained    Findings: See dictated report    Electronically signed by Erickson Lantigua MD on 2/28/2020 at 3:01 PM

## 2020-02-28 NOTE — H&P
tablet Take 2 mg by mouth nightly   Yes Historical Provider, MD   levothyroxine (SYNTHROID) 125 MCG tablet Take 125 mcg by mouth Daily   Yes Historical Provider, MD   folic acid (FOLVITE) 1 MG tablet Take 1 mg by mouth daily   Yes Historical Provider, MD   zolpidem (AMBIEN) 10 MG tablet Take 10 mg by mouth nightly. Yes Historical Provider, MD   citalopram (CELEXA) 40 MG tablet Take 40 mg by mouth daily   Yes Historical Provider, MD   butalbital-acetaminophen-caffeine (FIORICET, ESGIC) -40 MG per tablet Take 1 tablet by mouth every 4 hours as needed for Migraine    Historical Provider, MD   LORazepam (ATIVAN) 0.5 MG tablet Take 0.5 mg by mouth 2 times daily. Historical Provider, MD     Allergies:    Immune globulins and Pcn [penicillins]  Social History:   Social History     Socioeconomic History    Marital status:       Spouse name: Not on file    Number of children: Not on file    Years of education: Not on file    Highest education level: Not on file   Occupational History    Not on file   Social Needs    Financial resource strain: Not on file    Food insecurity:     Worry: Not on file     Inability: Not on file    Transportation needs:     Medical: Not on file     Non-medical: Not on file   Tobacco Use    Smoking status: Never Smoker    Smokeless tobacco: Never Used   Substance and Sexual Activity    Alcohol use: Never     Frequency: Never    Drug use: Never    Sexual activity: Not Currently   Lifestyle    Physical activity:     Days per week: Not on file     Minutes per session: Not on file    Stress: Not on file   Relationships    Social connections:     Talks on phone: Not on file     Gets together: Not on file     Attends Protestant service: Not on file     Active member of club or organization: Not on file     Attends meetings of clubs or organizations: Not on file     Relationship status: Not on file    Intimate partner violence:     Fear of current or ex partner: Not on file     Emotionally abused: Not on file     Physically abused: Not on file     Forced sexual activity: Not on file   Other Topics Concern    Not on file   Social History Narrative    Not on file           Family History:   Family History   Problem Relation Age of Onset    Heart Disease Father     High Cholesterol Father     High Blood Pressure Father          PHYSICAL EXAM:      /75   Pulse 74   Temp 98.7 °F (37.1 °C)   Resp 14   Ht 5' 2\" (1.575 m)   Wt 171 lb 15.3 oz (78 kg)   SpO2 98%   BMI 31.45 kg/m²  I        Heart: Normal    Lungs: Normal    Abdomen: Normal      ASA Grade: ASA 3 - Patient with moderate systemic disease with functional limitations    II (soft palate, uvula, fauces visible)  ASSESSMENT AND PLAN:    1. Patient is a 52 y.o. female here for EGD  2. Procedure options, risks and benefits reviewed with patient who expresses understanding.

## 2020-02-29 NOTE — PROCEDURES
[address] [facility]                                 PROCEDURE NOTE    PATIENT NAME: Greg Elliott                  :        1970  MED REC NO:   [id number]                          ROOM:  ACCOUNT NO:   [de-identified]                           ADMIT DATE: 2020  PROVIDER:     Kashmir Frausto MD    DATE OF PROCEDURE:  2020    This is an EGD and BRAVO capsule placement note. REFERRING PROVIDERS:  Dr. Katerina Tierney and Kiana García MD    PATIENT HISTORY:  This is a 41-year-old female, outpatient. INSTRUMENT USED:  Olympus GIF-H190. SURGEON:  Kashmir Frausto MD    ANESTHESIA:  The patient was premedicated with Diprivan intravenously as  administered by the anesthesiology service. INDICATIONS:  The patient has presented with recurrent gastroesophageal  reflux symptoms, status post prior surgical fundoplication. She has had  an esophageal manometry performed and is now undergoing a preoperative  48-hour esophageal pH study. PROCEDURE:  The endoscope was inserted into the esophagus without  difficulty. The esophageal mucosa was entirely normal, revealing no  evidence of inflammatory or metaplastic change. The Z-line was located  at 30 cm. The stomach was normal.  Retroflexed view in the fundus  demonstrated at least a qualitatively intact fundoplication. The  duodenal bulb and descending duodenum were normal.  It was determined  that the capsule should be deployed at the level of 24 cm. The  endoscope was withdrawn and the Bravo capsule catheter was inserted and  the capsule was placed. Repeat examination with the endoscope confirmed  good placement and this was photo-documented. ESTIMATED BLOOD LOSS:  None. IMPRESSION:  1. Normal upper gastrointestinal endoscopy. 2.  Status post Bravo capsule placement.     PLAN:  The patient will return in 48 hours with the  so that

## 2020-06-30 ENCOUNTER — HOSPITAL ENCOUNTER (INPATIENT)
Age: 50
LOS: 3 days | Discharge: HOME | DRG: 057 | End: 2020-07-03
Attending: INTERNAL MEDICINE | Admitting: INTERNAL MEDICINE

## 2020-06-30 PROBLEM — G43.909 MIGRAINE: Status: ACTIVE | Noted: 2020-06-30

## 2020-06-30 PROBLEM — K21.9 GERD (GASTROESOPHAGEAL REFLUX DISEASE): Status: ACTIVE | Noted: 2020-06-30

## 2020-06-30 PROBLEM — G47.00 INSOMNIA: Status: ACTIVE | Noted: 2020-06-30

## 2020-06-30 PROBLEM — R56.9 SEIZURE (HCC): Status: ACTIVE | Noted: 2020-06-30

## 2020-06-30 PROBLEM — F41.9 ANXIETY: Status: ACTIVE | Noted: 2020-06-30

## 2020-06-30 PROBLEM — E78.5 HYPERLIPIDEMIA: Status: ACTIVE | Noted: 2020-06-30

## 2020-06-30 PROBLEM — G70.01 MYASTHENIA GRAVIS WITH EXACERBATION (HCC): Status: ACTIVE | Noted: 2020-06-30

## 2020-06-30 LAB
A/G RATIO: 1.1 (ref 1.1–2.2)
ALBUMIN SERPL-MCNC: 4 G/DL (ref 3.4–5)
ALP BLD-CCNC: 161 U/L (ref 40–129)
ALT SERPL-CCNC: 19 U/L (ref 10–40)
ANION GAP SERPL CALCULATED.3IONS-SCNC: 11 MMOL/L (ref 3–16)
AST SERPL-CCNC: 28 U/L (ref 15–37)
BACTERIA: ABNORMAL /HPF
BASOPHILS ABSOLUTE: 0.1 K/UL (ref 0–0.2)
BASOPHILS RELATIVE PERCENT: 1.5 %
BILIRUB SERPL-MCNC: <0.2 MG/DL (ref 0–1)
BILIRUBIN URINE: NEGATIVE
BLOOD, URINE: NEGATIVE
BUN BLDV-MCNC: 11 MG/DL (ref 7–20)
CALCIUM SERPL-MCNC: 9.2 MG/DL (ref 8.3–10.6)
CHLORIDE BLD-SCNC: 96 MMOL/L (ref 99–110)
CLARITY: ABNORMAL
CO2: 30 MMOL/L (ref 21–32)
COLOR: YELLOW
CREAT SERPL-MCNC: 0.8 MG/DL (ref 0.6–1.1)
EOSINOPHILS ABSOLUTE: 0.3 K/UL (ref 0–0.6)
EOSINOPHILS RELATIVE PERCENT: 5.2 %
EPITHELIAL CELLS, UA: 16 /HPF (ref 0–5)
GFR AFRICAN AMERICAN: >60
GFR NON-AFRICAN AMERICAN: >60
GLOBULIN: 3.8 G/DL
GLUCOSE BLD-MCNC: 83 MG/DL (ref 70–99)
GLUCOSE URINE: NEGATIVE MG/DL
HCT VFR BLD CALC: 40.7 % (ref 36–48)
HEMOGLOBIN: 13.8 G/DL (ref 12–16)
HYALINE CASTS: 4 /LPF (ref 0–8)
KETONES, URINE: NEGATIVE MG/DL
LEUKOCYTE ESTERASE, URINE: NEGATIVE
LYMPHOCYTES ABSOLUTE: 2 K/UL (ref 1–5.1)
LYMPHOCYTES RELATIVE PERCENT: 31.5 %
MCH RBC QN AUTO: 30.8 PG (ref 26–34)
MCHC RBC AUTO-ENTMCNC: 33.8 G/DL (ref 31–36)
MCV RBC AUTO: 91.1 FL (ref 80–100)
MICROSCOPIC EXAMINATION: YES
MONOCYTES ABSOLUTE: 0.3 K/UL (ref 0–1.3)
MONOCYTES RELATIVE PERCENT: 5.4 %
NEUTROPHILS ABSOLUTE: 3.5 K/UL (ref 1.7–7.7)
NEUTROPHILS RELATIVE PERCENT: 56.4 %
NITRITE, URINE: NEGATIVE
PDW BLD-RTO: 16.5 % (ref 12.4–15.4)
PH UA: 7 (ref 5–8)
PLATELET # BLD: 261 K/UL (ref 135–450)
PMV BLD AUTO: 7.2 FL (ref 5–10.5)
POTASSIUM REFLEX MAGNESIUM: 4 MMOL/L (ref 3.5–5.1)
PROTEIN UA: NEGATIVE MG/DL
RBC # BLD: 4.47 M/UL (ref 4–5.2)
RBC UA: 1 /HPF (ref 0–4)
SODIUM BLD-SCNC: 137 MMOL/L (ref 136–145)
SPECIFIC GRAVITY UA: 1.01 (ref 1–1.03)
TOTAL PROTEIN: 7.8 G/DL (ref 6.4–8.2)
URINE REFLEX TO CULTURE: ABNORMAL
URINE TYPE: ABNORMAL
UROBILINOGEN, URINE: 0.2 E.U./DL
WBC # BLD: 6.3 K/UL (ref 4–11)
WBC UA: 1 /HPF (ref 0–5)

## 2020-06-30 PROCEDURE — 81001 URINALYSIS AUTO W/SCOPE: CPT

## 2020-06-30 PROCEDURE — 2580000003 HC RX 258: Performed by: PHYSICIAN ASSISTANT

## 2020-06-30 PROCEDURE — 1200000000 HC SEMI PRIVATE

## 2020-06-30 PROCEDURE — 85025 COMPLETE CBC W/AUTO DIFF WBC: CPT

## 2020-06-30 PROCEDURE — 99285 EMERGENCY DEPT VISIT HI MDM: CPT

## 2020-06-30 PROCEDURE — 80053 COMPREHEN METABOLIC PANEL: CPT

## 2020-06-30 PROCEDURE — 96374 THER/PROPH/DIAG INJ IV PUSH: CPT

## 2020-06-30 PROCEDURE — 96375 TX/PRO/DX INJ NEW DRUG ADDON: CPT

## 2020-06-30 PROCEDURE — 6360000002 HC RX W HCPCS: Performed by: PHYSICIAN ASSISTANT

## 2020-06-30 PROCEDURE — 6370000000 HC RX 637 (ALT 250 FOR IP): Performed by: NURSE PRACTITIONER

## 2020-06-30 RX ORDER — BUTALBITAL, ACETAMINOPHEN AND CAFFEINE 50; 325; 40 MG/1; MG/1; MG/1
1 TABLET ORAL EVERY 4 HOURS PRN
Status: DISCONTINUED | OUTPATIENT
Start: 2020-06-30 | End: 2020-07-03 | Stop reason: HOSPADM

## 2020-06-30 RX ORDER — ESTRADIOL 1 MG/1
2 TABLET ORAL NIGHTLY
Status: DISCONTINUED | OUTPATIENT
Start: 2020-06-30 | End: 2020-07-03 | Stop reason: HOSPADM

## 2020-06-30 RX ORDER — 0.9 % SODIUM CHLORIDE 0.9 %
1000 INTRAVENOUS SOLUTION INTRAVENOUS ONCE
Status: COMPLETED | OUTPATIENT
Start: 2020-06-30 | End: 2020-06-30

## 2020-06-30 RX ORDER — METHYLPREDNISOLONE SODIUM SUCCINATE 125 MG/2ML
125 INJECTION, POWDER, LYOPHILIZED, FOR SOLUTION INTRAMUSCULAR; INTRAVENOUS ONCE
Status: COMPLETED | OUTPATIENT
Start: 2020-06-30 | End: 2020-06-30

## 2020-06-30 RX ORDER — DOXEPIN HYDROCHLORIDE 10 MG/1
10 CAPSULE ORAL NIGHTLY
Status: DISCONTINUED | OUTPATIENT
Start: 2020-06-30 | End: 2020-07-03 | Stop reason: HOSPADM

## 2020-06-30 RX ORDER — ZOLPIDEM TARTRATE 5 MG/1
10 TABLET ORAL NIGHTLY PRN
Status: DISCONTINUED | OUTPATIENT
Start: 2020-06-30 | End: 2020-07-03 | Stop reason: HOSPADM

## 2020-06-30 RX ORDER — ONDANSETRON 2 MG/ML
4 INJECTION INTRAMUSCULAR; INTRAVENOUS EVERY 6 HOURS PRN
Status: DISCONTINUED | OUTPATIENT
Start: 2020-06-30 | End: 2020-07-03 | Stop reason: HOSPADM

## 2020-06-30 RX ORDER — SODIUM CHLORIDE 9 MG/ML
INJECTION, SOLUTION INTRAVENOUS CONTINUOUS
Status: DISCONTINUED | OUTPATIENT
Start: 2020-06-30 | End: 2020-07-03 | Stop reason: HOSPADM

## 2020-06-30 RX ORDER — ACETAMINOPHEN 325 MG/1
650 TABLET ORAL EVERY 6 HOURS PRN
Status: DISCONTINUED | OUTPATIENT
Start: 2020-06-30 | End: 2020-07-03 | Stop reason: HOSPADM

## 2020-06-30 RX ORDER — PROMETHAZINE HYDROCHLORIDE 25 MG/1
12.5 TABLET ORAL EVERY 6 HOURS PRN
Status: DISCONTINUED | OUTPATIENT
Start: 2020-06-30 | End: 2020-07-03 | Stop reason: HOSPADM

## 2020-06-30 RX ORDER — FOLIC ACID 1 MG/1
1 TABLET ORAL DAILY
Status: DISCONTINUED | OUTPATIENT
Start: 2020-07-01 | End: 2020-07-03 | Stop reason: HOSPADM

## 2020-06-30 RX ORDER — CITALOPRAM 20 MG/1
40 TABLET ORAL DAILY
Status: DISCONTINUED | OUTPATIENT
Start: 2020-07-01 | End: 2020-07-03 | Stop reason: HOSPADM

## 2020-06-30 RX ORDER — PYRIDOSTIGMINE BROMIDE 60 MG/1
90 TABLET ORAL 3 TIMES DAILY
Status: DISCONTINUED | OUTPATIENT
Start: 2020-06-30 | End: 2020-07-03 | Stop reason: HOSPADM

## 2020-06-30 RX ORDER — SODIUM CHLORIDE 0.9 % (FLUSH) 0.9 %
10 SYRINGE (ML) INJECTION EVERY 12 HOURS SCHEDULED
Status: DISCONTINUED | OUTPATIENT
Start: 2020-06-30 | End: 2020-07-03 | Stop reason: HOSPADM

## 2020-06-30 RX ORDER — LORAZEPAM 0.5 MG/1
0.5 TABLET ORAL EVERY 12 HOURS PRN
Status: DISCONTINUED | OUTPATIENT
Start: 2020-06-30 | End: 2020-07-03 | Stop reason: HOSPADM

## 2020-06-30 RX ORDER — LEVOTHYROXINE SODIUM 0.12 MG/1
125 TABLET ORAL DAILY
Status: DISCONTINUED | OUTPATIENT
Start: 2020-07-01 | End: 2020-07-03 | Stop reason: HOSPADM

## 2020-06-30 RX ORDER — SODIUM CHLORIDE 0.9 % (FLUSH) 0.9 %
10 SYRINGE (ML) INJECTION PRN
Status: DISCONTINUED | OUTPATIENT
Start: 2020-06-30 | End: 2020-07-03 | Stop reason: HOSPADM

## 2020-06-30 RX ORDER — ACETAMINOPHEN 650 MG/1
650 SUPPOSITORY RECTAL EVERY 6 HOURS PRN
Status: DISCONTINUED | OUTPATIENT
Start: 2020-06-30 | End: 2020-07-03 | Stop reason: HOSPADM

## 2020-06-30 RX ORDER — POLYETHYLENE GLYCOL 3350 17 G/17G
17 POWDER, FOR SOLUTION ORAL DAILY PRN
Status: DISCONTINUED | OUTPATIENT
Start: 2020-06-30 | End: 2020-07-03 | Stop reason: HOSPADM

## 2020-06-30 RX ADMIN — SODIUM CHLORIDE 1000 ML: 9 INJECTION, SOLUTION INTRAVENOUS at 18:08

## 2020-06-30 RX ADMIN — IMMUNE GLOBULIN (HUMAN) 20 G: 10 INJECTION INTRAVENOUS; SUBCUTANEOUS at 20:24

## 2020-06-30 RX ADMIN — ESTRADIOL 2 MG: 1 TABLET ORAL at 23:16

## 2020-06-30 RX ADMIN — PYRIDOSTIGMINE BROMIDE 90 MG: 60 TABLET ORAL at 23:17

## 2020-06-30 RX ADMIN — ZOLPIDEM TARTRATE 10 MG: 5 TABLET ORAL at 23:15

## 2020-06-30 RX ADMIN — METHYLPREDNISOLONE SODIUM SUCCINATE 125 MG: 125 INJECTION, POWDER, FOR SOLUTION INTRAMUSCULAR; INTRAVENOUS at 20:11

## 2020-06-30 RX ADMIN — DOXEPIN HYDROCHLORIDE 10 MG: 10 CAPSULE ORAL at 23:15

## 2020-06-30 ASSESSMENT — ENCOUNTER SYMPTOMS
TROUBLE SWALLOWING: 1
BACK PAIN: 0
SORE THROAT: 0
SHORTNESS OF BREATH: 0
VOMITING: 0
NAUSEA: 0
ABDOMINAL PAIN: 0

## 2020-06-30 ASSESSMENT — PAIN SCALES - GENERAL: PAINLEVEL_OUTOF10: 0

## 2020-06-30 NOTE — H&P
Hospital Medicine History & Physical      PCP: Chuy Pimentel MD    Date of Admission: 6/30/2020    Date of Service: Pt seen/examined on 6/30/2020 and Admitted to Inpatient with expected LOS greater than two midnights due to medical therapy. Chief Complaint: Weakness      History Of Present Illness:      48 y.o. female with PMHx of myasthenia gravis, a remote history of seizure, thyroid cancer, GERD, hyperlipidemia, insomnia, migraines and anxiety presented to [facility] emergency department with a one-week complaint of extremity weakness and heaviness. She has a history of myasthenia gravis and was receiving IVIG infusions up until 6 weeks ago when her insurance denied further infusions. She states her neurologist Dr. Lucky Hamman has been working with the insurance company to get the infusions approved again. On Sunday he started her on oral steroid and she states she has not improved so she called the office today and they directed her to come to the emergency department. She denies body aches, fever, chills, cough, shortness of breath, chest pain, nausea, vomiting. She denies vertigo or any falls. She denies numbness or paresthesias to the extremities. She states that her arms and her legs feel heavy and weak bilaterally. She does report feeling like she has to swallow \"extra\" when she is eating food. She does have a history of esophageal dilatation with her last dilatation in January of this year per her admission. She is drinking and eating samuel crackers and peanut butter without difficulty during my interview.     Past Medical History:          Diagnosis Date    Anxiety     Arthritis     Cancer ([facility] [address]     thyroid    GERD (gastroesophageal reflux disease)     Hyperlipidemia     Insomnia     Migraines     Myasthenia gravis with exacerbation (HCC)     Seizures ([facility] [address]     Thyroid disease        Past Surgical History:          Procedure Laterality Date    APPENDECTOMY      CAPSULE ENDOSCOPY N/A 2/28/2020    ESOPHAGEAL CAPSULE ENDOSCOPY performed by Melba Perdue MD at [address] [facility] N/A 12/17/2019    COLONOSCOPY performed by Shailesh Diallo MD at [address] N/A 2/11/2020    ESOPHAGEAL MANOMETRY performed by Melba Perdue MD at [address]      rotator cuff repair- bilateral    THYROIDECTOMY      TUMOR REMOVAL      UPPER GASTROINTESTINAL ENDOSCOPY N/A 12/17/2019    EGD BIOPSY performed by Shailesh Diallo MD at [place] [address] 12/17/2019    EGD DILATION SAVORY performed by Shailesh Diallo MD at [place] [address]  2/28/2020    EGD DIAGNOSTIC ONLY performed by Melba Perdue MD at [place] [address]  2/28/2020    Esophagogastroduodenoscopy with Bravo Capsule placement performed by Melba Perdue MD at [address] [facility]       Medications Prior to Admission:      Prior to Admission medications    Medication Sig Start Date End Date Taking? Authorizing Provider   pyridostigmine (MESTINON) 60 MG tablet Take 1.5 tablets by mouth 3 times daily 01/01/19  Yes BETH Echevarria - CNP   SUMAtriptan (IMITREX) 100 MG tablet Take 100 mg by mouth once as needed for Migraine Take 100 mg by mouth as needed for migraines if initial dose was partially effective or headache recurs, may repeat a dose after 2 hours. (max of 200 mg in 24 hour period)   Yes Historical Provider, MD   butalbital-acetaminophen-caffeine (FIORICET, ESGIC) -40 MG per tablet Take 1 tablet by mouth every 4 hours as needed for Migraine   Yes Historical Provider, MD   LORazepam (ATIVAN) 0.5 MG tablet Take 0.5 mg by mouth 2 times daily.    Yes Historical Provider, MD   methotrexate (RHEUMATREX) 2.5 MG chemo tablet Take 10 mg by mouth once a week Mondays   Yes Historical Provider, MD   doxepin (SINEQUAN) 10 MG capsule No clubbing, cyanosis or edema bilaterally. Full range of motion without deformity. Skin: Skin color, texture, turgor normal.  No rashes or lesions. Neurologic:  Neurovascularly intact without any focal sensory/motor deficits. Cranial nerves: II-XII intact, grossly non-focal.  Speech is clear. GCS is 15. Equal hand grasps. Equal strength in the bilateral lower extremities. Sensation intact to light touch to the distal extremities. Performs finger-to-nose touch exam without difficulty and without tremor. Psychiatric:  Alert and oriented x4, thought content appropriate, normal insight  Capillary Refill: Brisk,< 3 seconds   Peripheral Pulses: +2 palpable, equal bilaterally       Labs:     Recent Labs     06/30/20  1809   WBC 6.3   HGB 13.8   HCT 40.7        Recent Labs     06/30/20  1809      K 4.0   CL 96*   CO2 30   BUN 11   CREATININE 0.8   CALCIUM 9.2     Recent Labs     06/30/20  1809   AST 28   ALT 19   BILITOT <0.2   ALKPHOS 161*     No results for input(s): INR in the last 72 hours. No results for input(s): Tiney Soliman in the last 72 hours.     Urinalysis:      Lab Results   Component Value Date    NITRU Negative 06/30/2020    WBCUA 1 06/30/2020    BACTERIA RARE 06/30/2020    RBCUA 1 06/30/2020    BLOODU Negative 06/30/2020    SPECGRAV 1.009 06/30/2020    GLUCOSEU Negative 06/30/2020       Radiology:     CXR: I have reviewed the CXR with the following interpretation: None performed in ED  EKG:  I have reviewed the EKG with the following interpretation: None performed in ED    No orders to display       ASSESSMENT:    Active Hospital Problems    Diagnosis Date Noted    Hyperlipidemia [E78.5] 06/30/2020    GERD (gastroesophageal reflux disease) [K21.9] 06/30/2020    Migraine [G43.909] 06/30/2020    Anxiety [F41.9] 06/30/2020    Insomnia [G47.00] 06/30/2020    Generalized weakness [R53.1] 12/10/2019    Myasthenia gravis ([facility] [address] [G70.00] 10/13/2019         PLAN:    Generalized Weakness secondary to myasthenia gravis  -She received 125 mg of IV Solu-Medrol and Gamunex-C IV while in the emergency department  -Check B12 and folate level  -Neurologic exam is unremarkable  -She is eating and drinking without difficulty  -No respiratory distress, do not think this is an exacerbation  -Consult to neurology for further IVIG and steroid orders  -Consult social work to help with insurance coverage for IVIG  -Takes methotrexate once a week and folic acid daily  -Takes pyridostigmine TID    Hyperlipidemia  -On no medications, diet controlled    Seizure  -Remote history of seizure about 5 years ago secondary to hypoglycemic episode per patient    GERD  -Recently had a sleeve performed and states she cannot take any acid controlling medications    Migraine  -Home Fioricet and Imitrex as needed    Anxiety  -Home Ativan twice a day as needed    Insomnia  -Home Ambien 10 mg as needed      DVT Prophylaxis: lovenox  Diet: DIET GENERAL;  Code Status: Full Code    PT/OT Eval Status: na    Dispo - Admission       BETH Porter - CNP    Thank you Anisha Groves MD for the opportunity to be involved in this patient's care. If you have any questions or concerns please feel free to contact me at [phone].

## 2020-06-30 NOTE — ED TRIAGE NOTES
Patient to ED c/o fatigue/increased weakness and states she is having weak swallowing. Hx myaststhenia gravis. Patient unable to get her IVIG transfusions due to insurance issues. Reports last treatment 6 weeks ago. Patient reports getting them every two weeks. Denies falls. Denies choking. Reports eating and drinking fine.

## 2020-06-30 NOTE — ED PROVIDER NOTES
headaches. Psychiatric/Behavioral: The patient is not nervous/anxious. All other systems reviewed and are negative. Except as noted above the remainder ofthe review of systems was reviewed and negative.        PAST MEDICALHISTORY         Diagnosis Date    Anxiety     Arthritis     Cancer ([facility]     thyroid    GERD (gastroesophageal reflux disease)     Hyperlipidemia     Insomnia     Migraines     Myasthenia gravis with exacerbation ([facility]     Seizures ([facility]     Thyroid disease        SURGICAL HISTORY           Procedure Laterality Date    APPENDECTOMY      CAPSULE ENDOSCOPY N/A 2/28/2020    ESOPHAGEAL CAPSULE ENDOSCOPY performed by Maddy Johnston MD at [address] 12/17/2019    COLONOSCOPY performed by Ilda Ormond, MD at [address] 2/11/2020    ESOPHAGEAL MANOMETRY performed by Maddy Johnston MD at [address]      rotator cuff repair- bilateral    THYROIDECTOMY      TUMOR REMOVAL      UPPER GASTROINTESTINAL ENDOSCOPY N/A 12/17/2019    EGD BIOPSY performed by Ilda Ormond, MD at [address] [facility] 12/17/2019    EGD DILATION SAVORY performed by Ilda Ormond, MD at [address] [facility]  2/28/2020    EGD DIAGNOSTIC ONLY performed by Maddy Johnston MD at [address] [facility]  2/28/2020    Esophagogastroduodenoscopy with Bravo Capsule placement performed by Maddy Johnston MD at [address]       Previous Medications    BUTALBITAL-ACETAMINOPHEN-CAFFEINE (FIORICET, ESGIC) -40 MG PER TABLET    Take 1 tablet by mouth every 4 hours as needed for Migraine    CITALOPRAM (CELEXA) 40 MG TABLET    Take 40 mg by mouth daily    DOXEPIN (SINEQUAN) 10 MG CAPSULE    Take 10 mg by mouth nightly    ESTRADIOL (ESTRACE) 2 MG TABLET    Take 2 mg by mouth Abdominal:      General: Abdomen is flat. Tenderness: There is no abdominal tenderness. Musculoskeletal: Normal range of motion. Skin:     General: Skin is warm and dry. Neurological:      Mental Status: She is alert and oriented to person, place, and time. Psychiatric:         Behavior: Behavior normal.            DIAGNOSTIC RESULTS       LABS:  Labs Reviewed   CBC WITH AUTO DIFFERENTIAL - Abnormal; Notable for the following components:       Result Value    RDW 16.5 (*)     All other components within normal limits    Narrative:     Performed at:  [address] [employer]   Phone [phone]   COMPREHENSIVE METABOLIC PANEL W/ REFLEX TO MG FOR LOW K - Abnormal; Notable for the following components:    Chloride 96 (*)     Alkaline Phosphatase 161 (*)     All other components within normal limits    Narrative:     Performed at:  [address] [facility]   Phone [phone]   URINE RT REFLEX TO CULTURE - Abnormal; Notable for the following components:    Clarity, UA CLOUDY (*)     All other components within normal limits    Narrative:     Performed at:  [address] [facility]   Phone [phone]   MICROSCOPIC URINALYSIS - Abnormal; Notable for the following components:    Bacteria, UA RARE (*)     Epithelial Cells, UA 16 (*)     All other components within normal limits    Narrative:     Performed at:  [address] [facility]   Phone [phone]       All other labs were within normal range or not returned as of this dictation.     EMERGENCY DEPARTMENT COURSE and DIFFERENTIAL DIAGNOSIS/MDM:   Vitals:    Vitals:    06/30/20 1702 06/30/20 1715 06/30/20 1745   BP: (!) 166/89 (!) 161/83 (!) 154/87   Pulse: 52 51 53   Resp: 13 13 13   SpO2: 98% 96% 100%   Weight: 186 lb 4.6 oz (84.5 kg)     Height: 5' 2\" (1.575 m)          I have evaluated this patient. My supervising physician was available for consultation. The patient has a reassuring exam but has declined significantly despite outpatient steroids. Immune globulin (gamunex, patient intolerant of flebogamma), fluids, solumedrol ordered. Baseline labs unremarkable. Urinalysis clear. I have reached out to the hospitalist for admission for further inpatient management. PROCEDURES:  None    FINAL IMPRESSION      1. Myasthenia gravis with acute exacerbation ([facility]          DISPOSITION/PLAN   DISPOSITION Decision To Admit 06/30/2020 07:11:03 PM      PATIENT REFERRED TO:  No follow-up provider specified.     MEDICATIONS:  New Prescriptions    No medications on file       (Please note that portions of this note were completed with a voice recognition program.  Efforts were made toedit the dictations but occasionally words are mis-transcribed.)    BIANCA Khan PA-C  06/30/20 1913

## 2020-06-30 NOTE — ED NOTES
Pt stats she would like something to eat and drink. Kelsea GARCIA notified and states that pt may eat and drink. Pt provided with juice and a sandwich. Swallow evaluation completed at this time.       Martine Miller RN  06/30/20 7110

## 2020-07-01 ENCOUNTER — APPOINTMENT (OUTPATIENT)
Dept: CT IMAGING | Age: 50
DRG: 057 | End: 2020-07-01

## 2020-07-01 ENCOUNTER — APPOINTMENT (OUTPATIENT)
Dept: GENERAL RADIOLOGY | Age: 50
DRG: 057 | End: 2020-07-01

## 2020-07-01 LAB
A/G RATIO: 0.9 (ref 1.1–2.2)
ALBUMIN SERPL-MCNC: 3.8 G/DL (ref 3.4–5)
ALP BLD-CCNC: 148 U/L (ref 40–129)
ALT SERPL-CCNC: 17 U/L (ref 10–40)
ANION GAP SERPL CALCULATED.3IONS-SCNC: 14 MMOL/L (ref 3–16)
AST SERPL-CCNC: 22 U/L (ref 15–37)
BASOPHILS ABSOLUTE: 0 K/UL (ref 0–0.2)
BASOPHILS RELATIVE PERCENT: 0.4 %
BILIRUB SERPL-MCNC: 0.3 MG/DL (ref 0–1)
BUN BLDV-MCNC: 11 MG/DL (ref 7–20)
CALCIUM SERPL-MCNC: 8.6 MG/DL (ref 8.3–10.6)
CHLORIDE BLD-SCNC: 94 MMOL/L (ref 99–110)
CO2: 26 MMOL/L (ref 21–32)
CREAT SERPL-MCNC: 0.7 MG/DL (ref 0.6–1.1)
EOSINOPHILS ABSOLUTE: 0 K/UL (ref 0–0.6)
EOSINOPHILS RELATIVE PERCENT: 0.1 %
FOLATE: 18.48 NG/ML (ref 4.78–24.2)
GFR AFRICAN AMERICAN: >60
GFR NON-AFRICAN AMERICAN: >60
GLOBULIN: 4.3 G/DL
GLUCOSE BLD-MCNC: 144 MG/DL (ref 70–99)
HCT VFR BLD CALC: 39.2 % (ref 36–48)
HEMOGLOBIN: 13.3 G/DL (ref 12–16)
LYMPHOCYTES ABSOLUTE: 0.7 K/UL (ref 1–5.1)
LYMPHOCYTES RELATIVE PERCENT: 16.3 %
MCH RBC QN AUTO: 30.8 PG (ref 26–34)
MCHC RBC AUTO-ENTMCNC: 33.9 G/DL (ref 31–36)
MCV RBC AUTO: 90.9 FL (ref 80–100)
MONOCYTES ABSOLUTE: 0 K/UL (ref 0–1.3)
MONOCYTES RELATIVE PERCENT: 0.6 %
NEUTROPHILS ABSOLUTE: 3.3 K/UL (ref 1.7–7.7)
NEUTROPHILS RELATIVE PERCENT: 82.6 %
PDW BLD-RTO: 16.7 % (ref 12.4–15.4)
PLATELET # BLD: 260 K/UL (ref 135–450)
PMV BLD AUTO: 7.2 FL (ref 5–10.5)
POTASSIUM REFLEX MAGNESIUM: 4.1 MMOL/L (ref 3.5–5.1)
RBC # BLD: 4.31 M/UL (ref 4–5.2)
SODIUM BLD-SCNC: 134 MMOL/L (ref 136–145)
TOTAL PROTEIN: 8.1 G/DL (ref 6.4–8.2)
VITAMIN B-12: 484 PG/ML (ref 211–911)
WBC # BLD: 4 K/UL (ref 4–11)

## 2020-07-01 PROCEDURE — 80053 COMPREHEN METABOLIC PANEL: CPT

## 2020-07-01 PROCEDURE — 36415 COLL VENOUS BLD VENIPUNCTURE: CPT

## 2020-07-01 PROCEDURE — 82607 VITAMIN B-12: CPT

## 2020-07-01 PROCEDURE — 2580000003 HC RX 258: Performed by: NURSE PRACTITIONER

## 2020-07-01 PROCEDURE — 1200000000 HC SEMI PRIVATE

## 2020-07-01 PROCEDURE — 6370000000 HC RX 637 (ALT 250 FOR IP): Performed by: NURSE PRACTITIONER

## 2020-07-01 PROCEDURE — 71250 CT THORAX DX C-: CPT

## 2020-07-01 PROCEDURE — 6360000002 HC RX W HCPCS: Performed by: NURSE PRACTITIONER

## 2020-07-01 PROCEDURE — 72125 CT NECK SPINE W/O DYE: CPT

## 2020-07-01 PROCEDURE — 94760 N-INVAS EAR/PLS OXIMETRY 1: CPT

## 2020-07-01 PROCEDURE — 82746 ASSAY OF FOLIC ACID SERUM: CPT

## 2020-07-01 PROCEDURE — 71045 X-RAY EXAM CHEST 1 VIEW: CPT

## 2020-07-01 PROCEDURE — 85025 COMPLETE CBC W/AUTO DIFF WBC: CPT

## 2020-07-01 PROCEDURE — 94799 UNLISTED PULMONARY SVC/PX: CPT

## 2020-07-01 PROCEDURE — 70450 CT HEAD/BRAIN W/O DYE: CPT

## 2020-07-01 RX ORDER — PREDNISONE 20 MG/1
20 TABLET ORAL ONCE
Status: COMPLETED | OUTPATIENT
Start: 2020-07-01 | End: 2020-07-01

## 2020-07-01 RX ADMIN — PREDNISONE 20 MG: 20 TABLET ORAL at 15:10

## 2020-07-01 RX ADMIN — IMMUNE GLOBULIN (HUMAN) 20 G: 10 INJECTION INTRAVENOUS; SUBCUTANEOUS at 13:49

## 2020-07-01 RX ADMIN — PYRIDOSTIGMINE BROMIDE 90 MG: 60 TABLET ORAL at 15:10

## 2020-07-01 RX ADMIN — ONDANSETRON 4 MG: 2 INJECTION INTRAMUSCULAR; INTRAVENOUS at 23:31

## 2020-07-01 RX ADMIN — LEVOTHYROXINE SODIUM 125 MCG: 0.12 TABLET ORAL at 05:45

## 2020-07-01 RX ADMIN — PYRIDOSTIGMINE BROMIDE 90 MG: 60 TABLET ORAL at 20:40

## 2020-07-01 RX ADMIN — ENOXAPARIN SODIUM 40 MG: 40 INJECTION SUBCUTANEOUS at 08:01

## 2020-07-01 RX ADMIN — ZOLPIDEM TARTRATE 10 MG: 5 TABLET ORAL at 20:44

## 2020-07-01 RX ADMIN — IMMUNE GLOBULIN (HUMAN) 20 G: 10 INJECTION INTRAVENOUS; SUBCUTANEOUS at 15:49

## 2020-07-01 RX ADMIN — FOLIC ACID 1 MG: 1 TABLET ORAL at 08:01

## 2020-07-01 RX ADMIN — LORAZEPAM 0.5 MG: 0.5 TABLET ORAL at 19:34

## 2020-07-01 RX ADMIN — IMMUNE GLOBULIN (HUMAN) 20 G: 10 INJECTION INTRAVENOUS; SUBCUTANEOUS at 18:06

## 2020-07-01 RX ADMIN — PYRIDOSTIGMINE BROMIDE 90 MG: 60 TABLET ORAL at 12:03

## 2020-07-01 RX ADMIN — SODIUM CHLORIDE: 9 INJECTION, SOLUTION INTRAVENOUS at 11:17

## 2020-07-01 RX ADMIN — DOXEPIN HYDROCHLORIDE 10 MG: 10 CAPSULE ORAL at 20:40

## 2020-07-01 RX ADMIN — ESTRADIOL 2 MG: 1 TABLET ORAL at 20:40

## 2020-07-01 RX ADMIN — SODIUM CHLORIDE, PRESERVATIVE FREE 10 ML: 5 INJECTION INTRAVENOUS at 20:41

## 2020-07-01 RX ADMIN — CITALOPRAM HYDROBROMIDE 40 MG: 20 TABLET ORAL at 08:01

## 2020-07-01 RX ADMIN — SODIUM CHLORIDE: 9 INJECTION, SOLUTION INTRAVENOUS at 01:14

## 2020-07-01 ASSESSMENT — PAIN SCALES - GENERAL
PAINLEVEL_OUTOF10: 0
PAINLEVEL_OUTOF10: 0
PAINLEVEL_OUTOF10: 7
PAINLEVEL_OUTOF10: 0

## 2020-07-01 ASSESSMENT — PAIN DESCRIPTION - LOCATION: LOCATION: HEAD;BACK;KNEE

## 2020-07-01 ASSESSMENT — PAIN DESCRIPTION - ORIENTATION: ORIENTATION: MID

## 2020-07-01 ASSESSMENT — PAIN DESCRIPTION - PAIN TYPE: TYPE: ACUTE PAIN

## 2020-07-01 ASSESSMENT — PAIN - FUNCTIONAL ASSESSMENT: PAIN_FUNCTIONAL_ASSESSMENT: PREVENTS OR INTERFERES WITH MANY ACTIVE NOT PASSIVE ACTIVITIES

## 2020-07-01 ASSESSMENT — PAIN DESCRIPTION - DESCRIPTORS: DESCRIPTORS: ACHING

## 2020-07-01 NOTE — PROGRESS NOTES
Hospitalist Progress Note  7/1/2020 11:03 AM    PCP: Jazmin Barahona MD    [phone]     Date of Admission: 6/30/2020                                                                                                                     HOSPITAL COURSE    Patient demographics:  The patient  Wendy Love is a 48 y.o. female     Significant past medical history:   Patient Active Problem List   Diagnosis    Myasthenia gravis ([address]    Myasthenia gravis with (acute) exacerbation ([address]    Appendicitis    Neck stiffness    Generalized weakness    Myasthenia gravis with exacerbation, adult form ([address]    Hyperlipidemia    Seizure ([address]    GERD (gastroesophageal reflux disease)    Migraine    Anxiety    Insomnia    Myasthenia gravis with exacerbation ([address]         Presenting symptoms:  Weakness    Diagnostic workup:      CONSULTS DURING ADMISSION :   IP CONSULT TO HOSPITALIST  IP CONSULT TO SOCIAL WORK  IP CONSULT TO NEUROLOGY      Patient was diagnosed with:        Treatment while inpatient:    pt presented to [facility] emergency department with a one-week complaint of extremity weakness and heaviness                                                                                       ----------------------------------------------------------      SUBJECTIVE COMPLAINTS-   Follow up for Weakness    Diet: DIET GENERAL;      OBJECTIVE:   Patient Active Problem List   Diagnosis    Myasthenia gravis ([address]    Myasthenia gravis with (acute) exacerbation ([address]    Appendicitis    Neck stiffness    Generalized weakness    Myasthenia gravis with exacerbation, adult form ([address]    Hyperlipidemia    Seizure ([address]    GERD (gastroesophageal reflux disease)    Migraine    Anxiety    Insomnia    Myasthenia gravis with exacerbation (HCC)       Allergies  Immune globulins; Iodides;  Other; Adhesive tape; and Penicillins    Medications    Scheduled Meds:   citalopram  40 mg Oral Daily    doxepin  10 mg Oral Nightly    estradiol  2 mg Oral Nightly    folic acid  1 mg Oral Daily    levothyroxine  125 mcg Oral Daily    pyridostigmine  90 mg Oral TID    sodium chloride flush  10 mL Intravenous 2 times per day    enoxaparin  40 mg Subcutaneous Daily    [START ON 7/6/2020] methotrexate  10 mg Oral Weekly     Continuous Infusions:   sodium chloride 100 mL/hr at 07/01/20 0114     PRN Meds:  butalbital-acetaminophen-caffeine, LORazepam, zolpidem, sodium chloride flush, acetaminophen **OR** acetaminophen, polyethylene glycol, promethazine **OR** ondansetron    Vitals   Vitals /wt   Patient Vitals for the past 8 hrs:   BP Temp Temp src Pulse Resp SpO2 Weight   07/01/20 0840 -- -- -- -- 16 95 % --   07/01/20 0603 138/84 97.3 °F (36.3 °C) Oral 65 18 94 % 186 lb 4.6 oz (84.5 kg)        72HR INTAKE/OUTPUT:      Intake/Output Summary (Last 24 hours) at 7/1/2020 1103  Last data filed at 7/1/2020 0919  Gross per 24 hour   Intake 1164 ml   Output --   Net 1164 ml       Exam:    Gen:   Alert and oriented ×3   Eyes: PERRL. No sclera icterus. No conjunctival injection. ENT: No discharge. Pharynx clear. External appearance of ears and nose normal.  Neck: Trachea midline. No obvious mass. Resp: No accessory muscle use. No crackles. No wheezes. No rhonchi. CV: Regular rate. Regular rhythm. No murmur or rub. No edema. GI: Non-tender. Non-distended. No hernia. Skin: Warm, dry, normal texture and turgor. Lymph: No cervical LAD. No supraclavicular LAD. M/S: / Ext. No cyanosis. No clubbing. No joint deformity. Neuro: CN 2-12 are intact,  no neurologic deficits noted. PT/INR: No results for input(s): PROTIME, INR in the last 72 hours. APTT: No results for input(s): APTT in the last 72 hours.     CBC:   Recent Labs     06/30/20  1809 07/01/20  0705   WBC 6.3 4.0   HGB 13.8 13.3   HCT 40.7 39.2   MCV 91.1 90.9    260       BMP:   Recent Labs     06/30/20  1809 07/01/20  0705    134*   K 4.0 4.1   CL 96* 94* CO2 30 26   BUN 11 11   CREATININE 0.8 0.7       LIVER PROFILE:   Recent Labs     06/30/20  1809 07/01/20  0705   ALKPHOS 161* 148*   AST 28 22   ALT 19 17   BILITOT <0.2 0.3     No results for input(s): AMYLASE in the last 72 hours. No results for input(s): LIPASE in the last 72 hours. UA:  Recent Labs     06/30/20  1820   WBCUA 1   RBCUA 1       TROPONIN: No results for input(s): Vallarie Merari in the last 72 hours. Lab Results   Component Value Date/Time    URRFLXCULT Not Indicated 06/30/2020 06:20 PM       No results for input(s): TSHREFLEX in the last 72 hours. No components found for: [id number]  POC GLUCOSE:  No results for input(s): POCGLU in the last 72 hours. No results for input(s): LABA1C in the last 72 hours. Lab Results   Component Value Date    LABA1C 5.1 11/06/2019         ASSESSMENT AND PLAN  Myasthenia gravis exacerbation  General weakness  -She received 125 mg of IV Solu-Medrol and Gamunex-C IV while in the emergency department  Neurologic exam is nonfocal  Vitamin B12 and folate within normal limits  Consulted neurology  Consult social work to help with insurance coverage for IVIG  -Takes methotrexate once a week and folic acid daily-continue  -Takes pyridostigmine TID     Hyperlipidemia  -On no medications,  Diet controlled     Seizure  -Remote history of seizure about 5 years ago secondary to hypoglycemic episode per patient     GERD  -Recently had a sleeve performed and states she cannot take any acid controlling medications     Migraine  -Home Fioricet and Imitrex as needed     Anxiety  -Home Ativan twice a day as needed     Insomnia  -Home Ambien 10 mg as needed     Code Status: Full Code        Dispo -cc      The patient and / or the family were informed of the results of any tests, a time was given to answer questions, a plan was proposed and they agreed with plan. Fiona Jansen MD    This note was transcribed using [id number].  Please disregard any translational errors.

## 2020-07-01 NOTE — PLAN OF CARE
Problem: Falls - Risk of:  Goal: Will remain free from falls  Description: Will remain free from falls  Note: Patient stated that she is able to get around independently; however, since her myasthenia gravis is flaring up I've asked that she call for assistance to the bathroom. Patient call light and bedside table within reach. Bed in low/locked position with bed alarm on.

## 2020-07-01 NOTE — CONSULTS
Neurology Consult Note  Reason for Consult: generalized weakness    Chief complaint: weakness    Dr Roseann Blackman MD asked me to see Emili Clancy in consultation for evaluation of generalized weakness    History of Present Illness:  Emili Clancy is a 48 y.o. female who presents with weakness. I obtained my information via interview w/ the patient, supplemented by chart review. The patient is known to Dr. Manuel Sebastian w/ [address] [place] [address] [address]. She has a known hx of MG s/p thymectomy in 2006. She had been doing well w/out therapy until late last year when she began to decline and Mestinon was initiated. Currently she is on Mestinon, Methotrexate, and was recently placed on a steroid taper. She was also getting IVIG infusions every 2 weeks at home since January, though due to insurance problems had not had any infusions in the last 6 weeks. She says that over the weekend, she noticed that her eyelids were drooping, her arms and legs were weak, she was having some double vision, and she was \"double\" swallowing. He voice was becoming raspy. This was all progressive. No respiratory symptoms. She ended up calling the office and was advised to come to the ED to be evaluated. She received 20g of IVIG in the ED. Today, she feels a bit better. Of note, she likely had an adverse reaction (aspeptic meningitis) to Flebogamma last time she received that here.       Medical History:  Past Medical History:   Diagnosis Date    Anxiety     Arthritis     Cancer ([facility]     thyroid    GERD (gastroesophageal reflux disease)     Hyperlipidemia     Insomnia     Migraines     Myasthenia gravis with exacerbation (HCC)     Seizures ([facility]     Thyroid disease      Past Surgical History:   Procedure Laterality Date    APPENDECTOMY      CAPSULE ENDOSCOPY N/A 2/28/2020    ESOPHAGEAL CAPSULE ENDOSCOPY performed by Chichi Blandon MD at [address] [place] [address] N/A 12/17/2019 (SINEQUAN) 10 MG capsule, Take 10 mg by mouth nightly  estradiol (ESTRACE) 2 MG tablet, Take 2 mg by mouth nightly  levothyroxine (SYNTHROID) 125 MCG tablet, Take 125 mcg by mouth Daily  folic acid (FOLVITE) 1 MG tablet, Take 1 mg by mouth daily  zolpidem (AMBIEN) 10 MG tablet, Take 10 mg by mouth nightly. citalopram (CELEXA) 40 MG tablet, Take 40 mg by mouth daily    Allergies   Allergen Reactions    Immune Globulins Other (See Comments)     FLEBOGAMMA - ASEPTIC MENINGITIS    Iodides Anaphylaxis    Other      Steroid injection to joints    Adhesive Tape Rash and Other (See Comments)     Paper Tape  Paper Tape    Skin gets reddened under tape; not allergic to latex    Penicillins Itching and Rash     Rash  Rash       Family History   Problem Relation Age of Onset    Heart Disease Father     High Cholesterol Father     High Blood Pressure Father      Social History     Tobacco Use   Smoking Status Never Smoker   Smokeless Tobacco Never Used     Social History     Substance and Sexual Activity   Drug Use Never     Social History     Substance and Sexual Activity   Alcohol Use Never    Frequency: Never     ROS:  Constitutional- No weight loss or fevers  Eyes- + diplopia. No photophobia. Ears/nose/throat- + dysphagia. No Dysarthria  Cardiovascular- No palpitations. No chest pain  Respiratory- No dyspnea. No Cough  Gastrointestinal- No Abdominal pain. No Vomiting. Genitourinary- No incontinence. No urinary retention  Musculoskeletal- No myalgia. No arthralgia  Skin- No rash. No easy bruising. Psychiatric- No depression. No anxiety  Endocrine- No diabetes. No thyroid issues. Hematologic- No bleeding difficulty. No fatigue  Neurologic- + weakness. No Headache. Exam:  Blood pressure 138/84, pulse 65, temperature 97.3 °F (36.3 °C), temperature source Oral, resp. rate 16, height 5' 2\" (1.575 m), weight 186 lb 4.6 oz (84.5 kg), SpO2 95 %.   Constitutional    Vital signs: BP, HR, and RR reviewed   General alert,

## 2020-07-01 NOTE — ED NOTES
Handoff given to Kathleen RN to assume care of pt. Pt denies pain at this time. No distress noted. Pt A&O.       Willie Ormond, RN  06/30/20 9066

## 2020-07-01 NOTE — PROGRESS NOTES
Medication Reconciliation     List of medications patient is currently taking is complete. Source of information:   1. Conversation with patient at bedside  2. EPIC records        Notes regarding home medications:  1. Patient received only her morning and afternoon Mestinon today.         Isabella Ríos, Pharmacy Intern  6/30/2020 8:00 PM

## 2020-07-02 ENCOUNTER — APPOINTMENT (OUTPATIENT)
Dept: ULTRASOUND IMAGING | Age: 50
DRG: 057 | End: 2020-07-02

## 2020-07-02 LAB
ANION GAP SERPL CALCULATED.3IONS-SCNC: 10 MMOL/L (ref 3–16)
BUN BLDV-MCNC: 12 MG/DL (ref 7–20)
CALCIUM SERPL-MCNC: 8.2 MG/DL (ref 8.3–10.6)
CEA: 1.7 NG/ML (ref 0–5)
CHLORIDE BLD-SCNC: 98 MMOL/L (ref 99–110)
CO2: 25 MMOL/L (ref 21–32)
CREAT SERPL-MCNC: 0.7 MG/DL (ref 0.6–1.1)
GFR AFRICAN AMERICAN: >60
GFR NON-AFRICAN AMERICAN: >60
GLUCOSE BLD-MCNC: 83 MG/DL (ref 70–99)
HCT VFR BLD CALC: 35.4 % (ref 36–48)
HEMOGLOBIN: 12 G/DL (ref 12–16)
MCH RBC QN AUTO: 30.7 PG (ref 26–34)
MCHC RBC AUTO-ENTMCNC: 33.9 G/DL (ref 31–36)
MCV RBC AUTO: 90.5 FL (ref 80–100)
PDW BLD-RTO: 16.6 % (ref 12.4–15.4)
PLATELET # BLD: 226 K/UL (ref 135–450)
PMV BLD AUTO: 7.5 FL (ref 5–10.5)
POTASSIUM SERPL-SCNC: 3.9 MMOL/L (ref 3.5–5.1)
RBC # BLD: 3.91 M/UL (ref 4–5.2)
SODIUM BLD-SCNC: 133 MMOL/L (ref 136–145)
WBC # BLD: 9.9 K/UL (ref 4–11)

## 2020-07-02 PROCEDURE — 94799 UNLISTED PULMONARY SVC/PX: CPT

## 2020-07-02 PROCEDURE — 76705 ECHO EXAM OF ABDOMEN: CPT

## 2020-07-02 PROCEDURE — 82105 ALPHA-FETOPROTEIN SERUM: CPT

## 2020-07-02 PROCEDURE — 85027 COMPLETE CBC AUTOMATED: CPT

## 2020-07-02 PROCEDURE — 6370000000 HC RX 637 (ALT 250 FOR IP): Performed by: NURSE PRACTITIONER

## 2020-07-02 PROCEDURE — 82378 CARCINOEMBRYONIC ANTIGEN: CPT

## 2020-07-02 PROCEDURE — 80048 BASIC METABOLIC PNL TOTAL CA: CPT

## 2020-07-02 PROCEDURE — 36415 COLL VENOUS BLD VENIPUNCTURE: CPT

## 2020-07-02 PROCEDURE — 94760 N-INVAS EAR/PLS OXIMETRY 1: CPT

## 2020-07-02 PROCEDURE — 1200000000 HC SEMI PRIVATE

## 2020-07-02 PROCEDURE — 2580000003 HC RX 258: Performed by: NURSE PRACTITIONER

## 2020-07-02 RX ORDER — PREDNISONE 20 MG/1
20 TABLET ORAL DAILY
Status: DISCONTINUED | OUTPATIENT
Start: 2020-07-02 | End: 2020-07-03 | Stop reason: HOSPADM

## 2020-07-02 RX ADMIN — PYRIDOSTIGMINE BROMIDE 90 MG: 60 TABLET ORAL at 14:46

## 2020-07-02 RX ADMIN — ACETAMINOPHEN 650 MG: 325 TABLET ORAL at 02:07

## 2020-07-02 RX ADMIN — FOLIC ACID 1 MG: 1 TABLET ORAL at 09:47

## 2020-07-02 RX ADMIN — DOXEPIN HYDROCHLORIDE 10 MG: 10 CAPSULE ORAL at 19:39

## 2020-07-02 RX ADMIN — PYRIDOSTIGMINE BROMIDE 90 MG: 60 TABLET ORAL at 09:48

## 2020-07-02 RX ADMIN — ESTRADIOL 2 MG: 1 TABLET ORAL at 19:39

## 2020-07-02 RX ADMIN — CITALOPRAM HYDROBROMIDE 40 MG: 20 TABLET ORAL at 09:47

## 2020-07-02 RX ADMIN — PYRIDOSTIGMINE BROMIDE 90 MG: 60 TABLET ORAL at 19:38

## 2020-07-02 RX ADMIN — ZOLPIDEM TARTRATE 10 MG: 5 TABLET ORAL at 19:38

## 2020-07-02 RX ADMIN — SODIUM CHLORIDE: 9 INJECTION, SOLUTION INTRAVENOUS at 14:47

## 2020-07-02 RX ADMIN — LEVOTHYROXINE SODIUM 125 MCG: 0.12 TABLET ORAL at 06:04

## 2020-07-02 RX ADMIN — PREDNISONE 20 MG: 20 TABLET ORAL at 09:50

## 2020-07-02 RX ADMIN — SODIUM CHLORIDE: 9 INJECTION, SOLUTION INTRAVENOUS at 03:47

## 2020-07-02 RX ADMIN — ACETAMINOPHEN 650 MG: 325 TABLET ORAL at 06:04

## 2020-07-02 ASSESSMENT — PAIN DESCRIPTION - ORIENTATION
ORIENTATION: LEFT
ORIENTATION: LEFT
ORIENTATION: MID
ORIENTATION: MID
ORIENTATION: MID;UPPER;LOWER

## 2020-07-02 ASSESSMENT — PAIN SCALES - GENERAL
PAINLEVEL_OUTOF10: 3
PAINLEVEL_OUTOF10: 5
PAINLEVEL_OUTOF10: 4
PAINLEVEL_OUTOF10: 6
PAINLEVEL_OUTOF10: 0
PAINLEVEL_OUTOF10: 6
PAINLEVEL_OUTOF10: 5
PAINLEVEL_OUTOF10: 6
PAINLEVEL_OUTOF10: 0

## 2020-07-02 ASSESSMENT — PAIN DESCRIPTION - PROGRESSION
CLINICAL_PROGRESSION: NOT CHANGED
CLINICAL_PROGRESSION: GRADUALLY IMPROVING
CLINICAL_PROGRESSION: NOT CHANGED
CLINICAL_PROGRESSION: NOT CHANGED

## 2020-07-02 ASSESSMENT — PAIN DESCRIPTION - DESCRIPTORS
DESCRIPTORS: ACHING

## 2020-07-02 ASSESSMENT — PAIN DESCRIPTION - ONSET
ONSET: ON-GOING

## 2020-07-02 ASSESSMENT — PAIN DESCRIPTION - LOCATION
LOCATION: HEAD
LOCATION: HEAD;NECK;BACK
LOCATION: KNEE
LOCATION: KNEE
LOCATION: NECK

## 2020-07-02 ASSESSMENT — PAIN - FUNCTIONAL ASSESSMENT
PAIN_FUNCTIONAL_ASSESSMENT: ACTIVITIES ARE NOT PREVENTED
PAIN_FUNCTIONAL_ASSESSMENT: PREVENTS OR INTERFERES WITH MANY ACTIVE NOT PASSIVE ACTIVITIES

## 2020-07-02 ASSESSMENT — PAIN DESCRIPTION - PAIN TYPE
TYPE: ACUTE PAIN

## 2020-07-02 ASSESSMENT — PAIN DESCRIPTION - FREQUENCY
FREQUENCY: CONTINUOUS
FREQUENCY: INTERMITTENT

## 2020-07-02 NOTE — PROGRESS NOTES
Patient awake and alert this morning. Patient c/o mild to moderate pain in head. Very mild pain in neck. Medicated with tylenol. Patient not c/o pain in back. Mild pain in left knee. Patient neurological assessment back to baseline. Patient remembers fall and all post fall activity. Patient states she was taking a hot shower after her night meds. She thought there was a shower chair behind her and went to go sit down where there was not a chair. Patient states that she hit her head on the sink when she fell down in the shower. She states she remembers the shower water running in her face. Patient up to bathroom with minimal assist.  No numbness, tingling, or dizzy noted. Vitals stable as charted. Patient states that she will call her family to inform of the fall, that she does not require nursing to notify the family. No needs at this time. Patient resting comfortably in bed. Call light in reach. Bed alarm on. Will continue to monitor.    Electronically signed by Sara Dallas RN on 7/2/2020 at 6:59 AM

## 2020-07-02 NOTE — PLAN OF CARE
Fall precautions in place at all times. Call light always within reach. Pt able and agreeable to contact for safety appropriately. Pain/discomfort being managed with PRN analgesics per MD orders. Pt able to express presence and absence of pain and rate pain appropriately using numerical scale.

## 2020-07-02 NOTE — PROGRESS NOTES
Pt lert and oriented x4. VSS. Pt denies any pain or discomfort. Pt reports generalized extremities x 4 -weakness. Pt states weakness is improving. Pt respirations are clear and unlabored on room air. IVF-infusing as ordered. Pt UAL. Shift assessment completed. Pt denies other needs at present time. Call light and item need in reach.  Electronically signed by Madison Arambula RN on 7/1/2020 at 8:45 PM

## 2020-07-02 NOTE — PROGRESS NOTES
Hospitalist Progress Note  7/2/2020 9:18 AM    PCP: Eula Alvarado MD    [phone]     Date of Admission: 6/30/2020                                                                                                                     HOSPITAL COURSE    Patient demographics:  The patient  Geeta Diaz is a 48 y.o. female     Significant past medical history:   Patient Active Problem List   Diagnosis    Myasthenia gravis ([address]    Myasthenia gravis with (acute) exacerbation ([address]    Appendicitis    Neck stiffness    Generalized weakness    Myasthenia gravis with exacerbation, adult form ([address]    Hyperlipidemia    Seizure ([address]    GERD (gastroesophageal reflux disease)    Migraine    Anxiety    Insomnia    Myasthenia gravis with exacerbation ([address]         Presenting symptoms:  Weakness    Diagnostic workup:  CT CHEST WO CONTRAST   CT CERVICAL SPINE WO CONTRAST   CT HEAD WO CONTRAST       CONSULTS DURING ADMISSION :   IP CONSULT TO HOSPITALIST  IP CONSULT TO SOCIAL WORK  IP CONSULT TO NEUROLOGY      Patient was diagnosed with:  Myasthenia gravis exacerbation  Hyperlipidemia  Seizure  GERD  Migraine    Treatment while inpatient:  pt presented to [facility] emergency department with a one-week complaint of extremity weakness and heaviness                                                                                       ----------------------------------------------------------      SUBJECTIVE COMPLAINTS-   Follow up for Weakness    Diet: DIET GENERAL;      OBJECTIVE:   Patient Active Problem List   Diagnosis    Myasthenia gravis ([address]    Myasthenia gravis with (acute) exacerbation ([address]    Appendicitis    Neck stiffness    Generalized weakness    Myasthenia gravis with exacerbation, adult form ([address]    Hyperlipidemia    Seizure ([address]    GERD (gastroesophageal reflux disease)    Migraine    Anxiety    Insomnia    Myasthenia gravis with exacerbation (HCC)       Allergies  Immune globulins; Iodides; Other; Adhesive tape; and Penicillins    Medications    Scheduled Meds:   citalopram  40 mg Oral Daily    doxepin  10 mg Oral Nightly    estradiol  2 mg Oral Nightly    folic acid  1 mg Oral Daily    levothyroxine  125 mcg Oral Daily    pyridostigmine  90 mg Oral TID    sodium chloride flush  10 mL Intravenous 2 times per day    enoxaparin  40 mg Subcutaneous Daily    [START ON 7/6/2020] methotrexate  10 mg Oral Weekly     Continuous Infusions:   sodium chloride 100 mL/hr at 07/02/20 0347     PRN Meds:  butalbital-acetaminophen-caffeine, LORazepam, zolpidem, sodium chloride flush, acetaminophen **OR** acetaminophen, polyethylene glycol, promethazine **OR** ondansetron    Vitals   Vitals /wt   Patient Vitals for the past 8 hrs:   BP Temp Temp src Pulse Resp SpO2 Weight   07/02/20 0422 (!) 145/80 97.3 °F (36.3 °C) Oral 59 14 96 % 173 lb 1 oz (78.5 kg)   07/02/20 0346 (!) 147/86 97.6 °F (36.4 °C) Oral 62 18 94 % --        72HR INTAKE/OUTPUT:      Intake/Output Summary (Last 24 hours) at 7/2/2020 0918  Last data filed at 7/2/2020 3732  Gross per 24 hour   Intake 2083 ml   Output 2500 ml   Net -417 ml       Exam:    Gen:   Alert and oriented ×3   Eyes: PERRL. No sclera icterus. No conjunctival injection. ENT: No discharge. Pharynx clear. External appearance of ears and nose normal.  Neck: Trachea midline. No obvious mass. Resp: No accessory muscle use. No crackles. No wheezes. No rhonchi. CV: Regular rate. Regular rhythm. No murmur or rub. No edema. GI: Non-tender. Non-distended. No hernia. Skin: Warm, dry, normal texture and turgor. Lymph: No cervical LAD. No supraclavicular LAD. M/S: / Ext. No cyanosis. No clubbing. No joint deformity. Neuro: CN 2-12 are intact,  no neurologic deficits noted. PT/INR: No results for input(s): PROTIME, INR in the last 72 hours. APTT: No results for input(s): APTT in the last 72 hours.     CBC:   Recent Labs     06/30/20  1801 needed     Insomnia  -Home Ambien 10 mg as needed     Code Status: Full Code        Dispo -cc      The patient and / or the family were informed of the results of any tests, a time was given to answer questions, a plan was proposed and they agreed with plan. Kasia Dunham MD    This note was transcribed using [id number]. Please disregard any translational errors.

## 2020-07-02 NOTE — PROGRESS NOTES
Nightly, BETH Cannon CNP, 10 mg at 07/01/20 2040    estradiol (ESTRACE) tablet 2 mg, 2 mg, Oral, Nightly, BETH Cannon CNP, 2 mg at 55/36/67 1462    folic acid (FOLVITE) tablet 1 mg, 1 mg, Oral, Daily, BETH Cannon CNP, 1 mg at 07/01/20 0801    levothyroxine (SYNTHROID) tablet 125 mcg, 125 mcg, Oral, Daily, BETH Cannon CNP, 125 mcg at 07/02/20 0604    pyridostigmine (MESTINON) tablet 90 mg, 90 mg, Oral, TID, BETH Cannon CNP, 90 mg at 07/01/20 2040    LORazepam (ATIVAN) tablet 0.5 mg, 0.5 mg, Oral, Q12H PRN, BETH Cannon CNP, 0.5 mg at 07/01/20 1934    zolpidem (AMBIEN) tablet 10 mg, 10 mg, Oral, Nightly PRN, BETH Cannon CNP, 10 mg at 07/01/20 2044    sodium chloride flush 0.9 % injection 10 mL, 10 mL, Intravenous, 2 times per day, BETH Cannon - CNP, 10 mL at 07/01/20 2041    sodium chloride flush 0.9 % injection 10 mL, 10 mL, Intravenous, PRN, BETH Cannon CNP    acetaminophen (TYLENOL) tablet 650 mg, 650 mg, Oral, Q6H PRN, 650 mg at 07/02/20 0604 **OR** acetaminophen (TYLENOL) suppository 650 mg, 650 mg, Rectal, Q6H PRN, BETH Cannon CNP    polyethylene glycol (GLYCOLAX) packet 17 g, 17 g, Oral, Daily PRN, BETH Cannon CNP    promethazine (PHENERGAN) tablet 12.5 mg, 12.5 mg, Oral, Q6H PRN **OR** ondansetron (ZOFRAN) injection 4 mg, 4 mg, Intravenous, Q6H PRN, BETH Cannon CNP, 4 mg at 07/01/20 2331    enoxaparin (LOVENOX) injection 40 mg, 40 mg, Subcutaneous, Daily, BETH Cannon CNP, Stopped at 07/02/20 0900    0.9 % sodium chloride infusion, , Intravenous, Continuous, BETH Cannon CNP, Last Rate: 100 mL/hr at 07/02/20 0347    [START ON 7/6/2020] methotrexate (RHEUMATREX) chemo tablet 10 mg, 10 mg, Oral, Weekly, BETH Cannon CNP    Exam  Blood pressure (!) 145/80, pulse 59, temperature 97.3 °F (36.3 °C), temperature source Oral, resp. rate 14, height 5' 2\" (1.575 m), weight 173 lb 1 oz (78.5 kg), SpO2 96 %. Constitutional                          Vital signs: BP, HR, and RR reviewed            General alert, no distress  Eyes: unable to visualize the fundi  Cardiovascular: pulses symmetric in all 4 extremities. Psychiatric: cooperative with examination, no psychotic behavior noted. Neurologic  Mental status:   orientation to person, place              Attention intact as able to attend well to the exam                Language fluent in conversation              Comprehension intact; follows simple commands  Cranial nerves:   CN2: visual fields full  CN 3,4,6: extraocular muscles intact. No significant fatigability. CN7: face symmetric without dysarthria  CN8: hearing grossly intact  CN11: trap full strength on shoulder shrug  CN12: tongue midline with protrusion  Strength: mild proximal BUE and neck flexor/extension weakness, better. Sensory: light touch intact in all 4 extremities. Cerebellar/coordination: finger nose finger normal without ataxia  Tone: normal in all 4 extremities  Gait: deferred at this time for safety     ROS  Constitutional- No weight loss or fevers  Eyes- no diplopia. No photophobia. Ears/nose/throat- no dysphagia. No Dysarthria  Cardiovascular- No palpitations. No chest pain  Respiratory- No dyspnea. No Cough  Gastrointestinal- No Abdominal pain. No Vomiting. Genitourinary- No incontinence. No urinary retention  Musculoskeletal- No myalgia. No arthralgia  Skin- No rash. No easy bruising. Psychiatric- No depression. No anxiety  Endocrine- No diabetes. No thyroid issues. Hematologic- No bleeding difficulty. No fatigue  Neurologic- + weakness. No Headache. Labs  Glucose 83  Na 133  K 3.9  BUN 12  Cr 0.7    WBC 9.9K  Hg 12.0  Platelets 763    AChR negative (Nov 2019)    Studies  CT chest w/o 7/1/20, independently reviewed  Minimal cervical thymic tissue. No adenopathy. Hypodense lesion in the R lobe of the liver. CT head w/o 7/1/20, independently reviewed  No acute abnormality. Small old lacunar infarct in the R internal capsule. Possible acute sinusitis. CT cervical spine w/o 7/1/20, independently reviewed  No acute abnormality. EEG Nov 2019  Normal w/ beta activity. Impression  1. Myasthenia gravis exacerbation s/p IVIG, improved. CT chest reviewed w/ minimal thymic tissue. An incidental R liver lesion is noted. 2.  Mechanical fall, stable. Recommendations  1. Continue Mestinon, methotrexate, and prednisone as ordered. 2.  Check NIF/FVC Q8H while hospitalized. Please document in chart. 3.  Investigation into liver lesion per primary team.    4.  Follow up w/ Dr. Sandor Schaeffer after discharge. Will clarify if the patient is suitable for discharge from Neuro standpoint this afternoon. Please call back w/ any additional questions or concerns. Thank you.       Tori Schaeffer NP  [address] [place] [address] [address] Neurology    A copy of this note was provided for Dr Modesto Bush MD

## 2020-07-02 NOTE — PROGRESS NOTES
This RN went running in room 4114 after ringing staff assist on work phone. Pt was lying down in the  in pt room. Nurse Charmaine Saenz stated that she heard and IV beeping and went to check the beeping. At that Time Nurse orr found pt in shower with water running and lying on her back. Pt was breathing and had a pulse--Rapid response called. Per Nurse Orr-pt was unresponsive approximate for 2-3 mins. Pt does not remember falling. Np in the room doing neuro assessment.   Electronically signed by Hermilo Sumner RN on 7/2/2020 at 12:26 AM

## 2020-07-02 NOTE — PLAN OF CARE
Problem: Falls - Risk of:  Goal: Will remain free from falls  Description: Will remain free from falls  7/2/2020 1033 by Reynaldo Marquez RN  Outcome: Ongoing  7/2/2020 0140 by Author Jonnie RN  Outcome: Ongoing  Goal: Absence of physical injury  Description: Absence of physical injury  Outcome: Ongoing     Problem: Pain:  Goal: Pain level will decrease  Description: Pain level will decrease  7/2/2020 1033 by Reynaldo Marquez RN  Outcome: Ongoing  7/2/2020 0140 by Author Jonnie RN  Outcome: Ongoing  Goal: Control of acute pain  Description: Control of acute pain  Outcome: Ongoing  Goal: Control of chronic pain  Description: Control of chronic pain  Outcome: Ongoing     Problem: SAFETY  Goal: Free from accidental physical injury  Outcome: Ongoing     Problem: DISCHARGE BARRIERS  Goal: Patient's continuum of care needs are met  Outcome: Ongoing

## 2020-07-02 NOTE — PROGRESS NOTES
Patient transported to CT on RA. No complications, emergency equipment transported with patient.   Electronically signed by Radhika Felix RCP on 7/1/2020

## 2020-07-02 NOTE — PROGRESS NOTES
Pt transferred back in bed from the bathroom floor using dede lift. Pt covered with blanket. Pt eyes closed-pupils reactive to lights. Pt responding to voice and following commands.  Electronically signed by Shirley Johnson RN on 7/2/2020 at 12:30 AM

## 2020-07-02 NOTE — PROGRESS NOTES
Neurological assessment unchanged. Patient more awake now and speech more clear. Vitals stable as charted. Patient still c/o slight pain in head, neck, and left knee. No needs at this time. Patient resting comfortably in bed. Call light in reach. Bed alarm on. Will continue to monitor.    Electronically signed by Jovanna Alexandra RN on 7/2/2020 at 5:44 AM

## 2020-07-02 NOTE — CODE DOCUMENTATION
Pupils equal round reactive. Patient able to wiggle fingers, toes. Pt being prepared for transfer to CT.

## 2020-07-02 NOTE — CARE COORDINATION
Spoke with pt who states she has [employer]. States she was getting her IVIG at [facility] but she stopped going when insurance was not paying. Pt states she has not received ivig for past 6 weeks. She thinks it was due to lack of appropriate clinical info being sent to [facility] per MD office or from the treatment center. No insurance is listed on patients demographic info sheet. [facility] customer service number is [phone]. Pt states she sees Dr Abdiel Bird of [facility].    Electronically signed by BHAKTI Barber on 7/2/2020 at 11:52 AM

## 2020-07-02 NOTE — CARE COORDINATION
Spoke with pt who states her spouse Savi Augustine contacted his place of employment HR dept and he was told both he and patient have [place] insurance and that [place] was never cancelled. [employer] member ID number is [id number]. Informed Admitting to update demographic sheet. Pt states she has her insurance card in her room if admitting needs to scan or copy, pt states she has been a patient here at [facility] 9 times and has had the same insurance.   Electronically signed by BHAKTI Barber on 7/2/2020 at 4:01 PM

## 2020-07-02 NOTE — SIGNIFICANT EVENT
Hospital Medicine Code Blue/RRT Note    Patient: Rodrigo Boyer  MRN: [id number]    Gender: female : 1970 Age: 48 y.o. Code Status: Full Code    Brief Note: Called urgently to bedside for fall in the bathroom, found in the shower with water running over her face. Patient was found lying in the bathroom on her left side with nursing staff surrounding her. Blood pressure was stable. She was initially unresponsive but then was able to say some words. Nurse notes that shower was running over her face. At that time was able to move fingers and hands with no gait difficulty. She did complain of neck pain. ACLS protocol utilized:  no    Intubated :  no   Central line or IV placed:  no  Please see separate note for any procedures done. ROS:  Pertinent items are noted in HPI. Unable to speak and Unable to comprehend. Vitals:    Vitals:    20 2307   BP: 120/72   Pulse:    Resp:    Temp:    SpO2: 94%       Pertinent exam:    Gen: Lying flat on the ground, coughing and spitting out water  Neck: supple, trachea midline, no anterior cervical or SC LAD  Heart:  Normal s1/s2, RRR, no murmurs, gallops, or rubs. No leg edema  Lungs: CTA bilaterally, no wheeze, no rales, no rhonchi, no crackles, no use of accessory muscles, coughing up white frothy sputum  Abd: bowel sounds present, soft, nontender, nondistended, no masses  Extrem: No cyanosis, no edema, peripheral pulses intact, normal capillary refill  Skin: no rashes or lesions, normal color/perfusion  Psych: Unable to fully assess  Neuro: Pupils are PERRLA at the time, complained of neck pain and was stabilized with c-collar, was able to move fingers and toes on examination      Assessment/Plan:    Principal Problem:    Generalized weakness  Active Problems:    Myasthenia gravis (HCC)    Hyperlipidemia    GERD (gastroesophageal reflux disease)    Migraine    Anxiety    Insomnia  Resolved Problems:    * No resolved hospital problems.  *      1. Unwitnessed fall and head injury: Patient found in bathroom minimally responsive. Her C-spine was stabilized with a collar and she was sent for a stat CT head and cervical spine. Both came back unremarkable. She does have hardware noted in her neck from her prior surgery. Neurological exam remained intact. She was able to lift up her arms however these were extremely weak. She was able to wiggle her toes but were not able to lift up legs. Cranial nerves intact. Pupils PERRLA. Neurochecks ordered every 4 hours for the next 24 hours. Monitor vital signs closely for Cushing's or other changes in her neurological exam.  She is on DVT prophylaxis with Lovenox and this will be held in the morning. I spent 40 minutes providing critical care for evaluating the patient.,  Ordering tests, following up on tests, speaking with attending physician Dr. Rudy Bautista and reassessing patient. This time is excluding time spent performing procedures.           Electronically signed by BETH Mendez CNP on 7/1/2020 at 11:10 PM

## 2020-07-02 NOTE — PROGRESS NOTES
Pt back to room from CT, pt responding to voice and follows commands. Pt reports head pain, left knee pain, and some in the back. Pt rates her pain 7/10. Neuro check performed. Pt reports feeling like she is naseaus-prn 4 mg Zofran given. Pt repositioned in bed with high covarrubias position. Collor brace in place to help stabilize her neck. Hector Crabtree pt VSS. SPo2-93% on room air.  Electronically signed by Noe Marrero RN on 7/1/2020 at 11:36 PM

## 2020-07-03 VITALS
OXYGEN SATURATION: 97 % | HEIGHT: 62 IN | HEART RATE: 53 BPM | BODY MASS INDEX: 30.91 KG/M2 | WEIGHT: 167.99 LBS | TEMPERATURE: 97.7 F | SYSTOLIC BLOOD PRESSURE: 141 MMHG | DIASTOLIC BLOOD PRESSURE: 74 MMHG | RESPIRATION RATE: 16 BRPM

## 2020-07-03 PROCEDURE — 94760 N-INVAS EAR/PLS OXIMETRY 1: CPT

## 2020-07-03 PROCEDURE — 6370000000 HC RX 637 (ALT 250 FOR IP): Performed by: NURSE PRACTITIONER

## 2020-07-03 PROCEDURE — 94799 UNLISTED PULMONARY SVC/PX: CPT

## 2020-07-03 PROCEDURE — 2580000003 HC RX 258: Performed by: NURSE PRACTITIONER

## 2020-07-03 RX ADMIN — FOLIC ACID 1 MG: 1 TABLET ORAL at 08:26

## 2020-07-03 RX ADMIN — PYRIDOSTIGMINE BROMIDE 90 MG: 60 TABLET ORAL at 08:26

## 2020-07-03 RX ADMIN — PREDNISONE 20 MG: 20 TABLET ORAL at 08:26

## 2020-07-03 RX ADMIN — CITALOPRAM HYDROBROMIDE 40 MG: 20 TABLET ORAL at 08:26

## 2020-07-03 RX ADMIN — LEVOTHYROXINE SODIUM 125 MCG: 0.12 TABLET ORAL at 06:39

## 2020-07-03 RX ADMIN — SODIUM CHLORIDE, PRESERVATIVE FREE 10 ML: 5 INJECTION INTRAVENOUS at 09:02

## 2020-07-03 RX ADMIN — SODIUM CHLORIDE: 9 INJECTION, SOLUTION INTRAVENOUS at 01:01

## 2020-07-03 NOTE — PROGRESS NOTES
Pt unable to weaning parameters at this time. Pt resting comfortably on RA 96%. Will continue to monitor.     Electronically signed by Roselia Cheng RCP on 7/3/2020 at 12:39 AM

## 2020-07-03 NOTE — PROGRESS NOTES
Progress Note    Updates  No significant events overnight. Patient feels better.       Past Medical History:   Diagnosis Date    Anxiety     Arthritis     Cancer ([facility]     GERD (gastroesophageal reflux disease)     Hyperlipidemia     Insomnia     Migraines     Seizures ([facility]     Thyroid disease      Past Surgical History:   Procedure Laterality Date    APPENDECTOMY      CAPSULE ENDOSCOPY N/A 2/28/2020    ESOPHAGEAL CAPSULE ENDOSCOPY performed by Nik Rios MD at [address] [facility] N/A 12/17/2019    COLONOSCOPY performed by Tavo Maddox MD at [address] 2/11/2020    ESOPHAGEAL MANOMETRY performed by Nik Rios MD at [address]       rotator cuff repair- bilateral    THYROIDECTOMY      TUMOR REMOVAL      UPPER GASTROINTESTINAL ENDOSCOPY N/A 12/17/2019    EGD BIOPSY performed by Tavo Maddox MD at [address] [place] [address] 12/17/2019    EGD DILATION SAVORY performed by Tavo Maddox MD at [address] [place] [address]  2/28/2020    EGD DIAGNOSTIC ONLY performed by Nik Rios MD at [address] [place] [address]  2/28/2020    Esophagogastroduodenoscopy with Bravo Capsule placement performed by Nik Rios MD at [address] [facility]-Administered Medications:     predniSONE (DELTASONE) tablet 20 mg, 20 mg, Oral, Daily, BETH Helm - CNP, 20 mg at 07/03/20 2457    butalbital-acetaminophen-caffeine (FIORICET, ESGIC) per tablet 1 tablet, 1 tablet, Oral, Q4H PRN, BETH Knapp CNP    citalopram (CELEXA) tablet 40 mg, 40 mg, Oral, Daily, BETH Knapp CNP, 40 mg at 07/03/20 0826    doxepin (SINEQUAN) capsule 10 mg, 10 mg, Oral, Nightly, BETH Knapp CNP, 10 mg at 07/02/20 1939    estradiol (ESTRACE) tablet 2 mg, 2 mg, Oral, Nightly, BETH Black - CNP, 2 mg at 35/54/47 5290    folic acid (FOLVITE) tablet 1 mg, 1 mg, Oral, Daily, BETH Black - CNP, 1 mg at 07/03/20 0396    levothyroxine (SYNTHROID) tablet 125 mcg, 125 mcg, Oral, Daily, BETH Black - CNP, 125 mcg at 07/03/20 7568    pyridostigmine (MESTINON) tablet 90 mg, 90 mg, Oral, TID, BETH Black - CNP, 90 mg at 07/03/20 8330    LORazepam (ATIVAN) tablet 0.5 mg, 0.5 mg, Oral, Q12H PRN, BETH Black - CNP, 0.5 mg at 07/01/20 1934    zolpidem (AMBIEN) tablet 10 mg, 10 mg, Oral, Nightly PRN, BETH Black - CNP, 10 mg at 07/02/20 1938    sodium chloride flush 0.9 % injection 10 mL, 10 mL, Intravenous, 2 times per day, BETH Black - CNP, 10 mL at 07/03/20 0902    sodium chloride flush 0.9 % injection 10 mL, 10 mL, Intravenous, PRN, BETH Black - CNP    acetaminophen (TYLENOL) tablet 650 mg, 650 mg, Oral, Q6H PRN, 650 mg at 07/02/20 0604 **OR** acetaminophen (TYLENOL) suppository 650 mg, 650 mg, Rectal, Q6H PRN, BETH Black - CNP    polyethylene glycol (GLYCOLAX) packet 17 g, 17 g, Oral, Daily PRN, BETH Black - CNP    promethazine (PHENERGAN) tablet 12.5 mg, 12.5 mg, Oral, Q6H PRN **OR** ondansetron (ZOFRAN) injection 4 mg, 4 mg, Intravenous, Q6H PRN, BETH Black - CNP, 4 mg at 07/01/20 2331    enoxaparin (LOVENOX) injection 40 mg, 40 mg, Subcutaneous, Daily, BETH Black - CNP, Stopped at 07/02/20 0900    0.9 % sodium chloride infusion, , Intravenous, Continuous, BETH Black CNP, Last Rate: 100 mL/hr at 07/03/20 0101    [START ON 7/6/2020] methotrexate (RHEUMATREX) chemo tablet 10 mg, 10 mg, Oral, Weekly, BETH Black CNP    Exam  Blood pressure (!) 141/74, pulse 53, temperature 97.7 °F (36.5 °C), temperature source Oral, resp.  rate 16, height 5' 2\" (1.575 m), weight 167 lb 15.9 oz (76.2 kg), SpO2 97 7/1/20, independently reviewed  No acute abnormality. Impression  1. Myasthenia gravis exacerbation s/p IVIG, improved/stable. CT chest reviewed w/ minimal thymic tissue. Recommendations  1. Continue Mestinon, methotrexate, and prednisone as ordered. 2.  Close follow up w/ Dr. Abdiel Bird after discharge. Will sign off. Please call back w/ any additional questions or concerns. Thank you.      Dinora Prather NP  [address] [place] [address] [address] Neurology    A copy of this note was provided for Dr Antonio Carpenter MD

## 2020-07-03 NOTE — CARE COORDINATION
INITIAL CASE MANAGEMENT ASSESSMENT     Reviewed chart, met with patient to assess possible discharge needs. Explained Case Management role/services. SW interviewed patient via telephone.     Living Situation: Patient reports that she resides in a single family home with her  and two dogs. She reports that she has three stairs leading up to the front door and she has no trouble getting in or out of the property at this time.      ADLs: Patient reports that prior to medical admission she was independent. She reports that she does not anticipate having any needs going home.      DME: Patient reports that prior to medical admission she was not using durable medical equipment. She reports that she does not anticipate having any needs going home.      PT/OT Recs: Since medical admission patient reports that she has been ambulating without assistance. She stated that she does not anticipate any needs at discharge.      Active Services: Patient reports that prior to medical admission she was not using active services in the home setting. She reports that she does not anticipate having any needs going home.       Transportation: Patient reports that prior to medical admission she was driving. She reports that her  likely transport her home at discharge. Medications: Patient reports that she uses [facility] in [place], [place]. For future reference their phone number is [phone]. Patient reports that she has no difficulties affording medications.      PCP: Min Santillan -[phone] (phone) and [phone] (fax number). Patient reports that her last visit with this provider was in [place], [place] about a month ago.       PLAN/COMMENTS:   1) Discharge to home with family. Patient does not appear to have any psychosocial needs at this time. If anything changes please contact this writer at the number listed below.      SW provided contact information for patient or family to call

## 2020-07-03 NOTE — CARE COORDINATION
7/3 Patient [facility] has been verified. Patient lives in [facility]. I suggested that she contact her neurologist, Dr. Jaya Klein at [address] [place] [address] [phone] and ask him to send orders over to the [facility] for Outpatient infusion. She is agreeable to this and will call him on 7/6/20. Electronically signed by Adilene Sullivan RN on 7/3/2020 at 12:39 PM

## 2020-07-03 NOTE — PROGRESS NOTES
Dr. Mckay Offer her primary neurologist                                                     Discharge Condition:  stable     Discharged to:  Home     Activity:   as tolerated: Follow Up: Follow-up with Dr. Nely Collier in 1 to 2 weeks              ----------------------------------------------------------      SUBJECTIVE COMPLAINTS-   Follow up for Weakness    Diet: DIET GENERAL;      OBJECTIVE:   Patient Active Problem List   Diagnosis    [facility]ia gravis ([facility]    Myasthenia gravis with (acute) exacerbation ([facility]    Appendicitis    Neck stiffness    Generalized weakness    Myasthenia gravis with exacerbation, adult form ([facility]    Hyperlipidemia    Seizure ([facility]    GERD (gastroesophageal reflux disease)    Migraine    Anxiety    Insomnia    [facility]ast[facility]ia [facility]ation ([facility])       Allergies  Immune globulins; Iodides;  Other; Adhesive tape; and Penicillins    Medications    Scheduled Meds:   predniSONE  20 mg Oral Daily    citalopram  40 mg Oral Daily    doxepin  10 mg Oral Nightly    estradiol  2 mg Oral Nightly    folic acid  1 mg Oral Daily    levothyroxine  125 mcg Oral Daily    pyridostigmine  90 mg Oral TID    sodium chloride flush  10 mL Intravenous 2 times per day    enoxaparin  40 mg Subcutaneous Daily    [START ON 7/6/2020] methotrexate  10 mg Oral Weekly     Continuous Infusions:   sodium chloride 100 mL/hr at 07/03/20 0101     PRN Meds:  butalbital-acetaminophen-caffeine, LORazepam, zolpidem, sodium chloride flush, acetaminophen **OR** acetaminophen, polyethylene glycol, promethazine **OR** ondansetron    Vitals   Vitals /wt   Patient Vitals for the past 8 hrs:   BP Temp Temp src Pulse Resp SpO2 Weight   07/03/20 0859 -- -- -- -- 16 97 % --   07/03/20 0504 (!) 141/74 97.7 °F (36.5 °C) Oral 53 18 94 % --   07/03/20 0503 -- -- -- -- -- -- 167 lb 15.9 oz (76.2 kg)        72HR INTAKE/OUTPUT:      Intake/Output Summary (Last 24 hours) at 7/3/2020 0910  Last data filed at 7/3/2020 4487  Gross per 24 hour   Intake 1844 ml   Output 1550 ml   Net 294 ml       Exam:    Gen:   Alert and oriented ×3   Eyes: PERRL. No sclera icterus. No conjunctival injection. ENT: No discharge. Pharynx clear. External appearance of ears and nose normal.  Neck: Trachea midline. No obvious mass. Resp: No accessory muscle use. No crackles. No wheezes. No rhonchi. CV: Regular rate. Regular rhythm. No murmur or rub. No edema. GI: Non-tender. Non-distended. No hernia. Skin: Warm, dry, normal texture and turgor. Lymph: No cervical LAD. No supraclavicular LAD. M/S: / Ext. No cyanosis. No clubbing. No joint deformity. Neuro: CN 2-12 are intact,  no neurologic deficits noted. PT/INR: No results for input(s): PROTIME, INR in the last 72 hours. APTT: No results for input(s): APTT in the last 72 hours. CBC:   Recent Labs     06/30/20 1809 07/01/20  0705 07/02/20  0602   WBC 6.3 4.0 9.9   HGB 13.8 13.3 12.0   HCT 40.7 39.2 35.4*   MCV 91.1 90.9 90.5    260 226       BMP:   Recent Labs     06/30/20 1809 07/01/20  0705 07/02/20  0602    134* 133*   K 4.0 4.1 3.9   CL 96* 94* 98*   CO2 30 26 25   BUN 11 11 12   CREATININE 0.8 0.7 0.7       LIVER PROFILE:   Recent Labs     06/30/20 1809 07/01/20  0705   ALKPHOS 161* 148*   AST 28 22   ALT 19 17   BILITOT <0.2 0.3     No results for input(s): AMYLASE in the last 72 hours. No results for input(s): LIPASE in the last 72 hours. UA:  Recent Labs     06/30/20  1820   WBCUA 1   RBCUA 1       TROPONIN: No results for input(s): [place] in the last 72 hours. Lab Results   Component Value Date/Time    URRFLXCULT Not Indicated 06/30/2020 06:20 PM       No results for input(s): TSHREFLEX in the last 72 hours. No components found for: [id number]  POC GLUCOSE:  No results for input(s): POCGLU in the last 72 hours. No results for input(s): LABA1C in the last 72 hours.    Lab Results   Component Value Date    LABA1C 5.1 11/06/2019 ASSESSMENT AND PLAN  Myasthenia gravis exacerbation  General weakness  -She received 125 mg of IV Solu-Medrol and Gamunex-C IV while in the emergency department  Neurologic exam is nonfocal  Vitamin B12 and folate within normal limits  Consulted neurology  Consult social work to help with insurance coverage for IVIG  -Takes methotrexate once a week and folic acid daily-continue  -Takes pyridostigmine TID    Liver lesion  Noted on CT chest  Check right upper quadrant ultrasound for further evaluation       Hyperlipidemia  -On no medications,  Diet controlled     Seizure  -Remote history of seizure about 5 years ago secondary to hypoglycemic episode per patient     GERD  -Recently had a sleeve performed and states she cannot take any acid controlling medications     Migraine  -Home Fioricet and Imitrex as needed     Anxiety  -Home Ativan twice a day as needed     Insomnia  -Home Ambien 10 mg as needed     Code Status: Full Code        Dispo -cc      The patient and / or the family were informed of the results of any tests, a time was given to answer questions, a plan was proposed and they agreed with plan. Paulette Joyner MD    This note was transcribed using [id number] [employer]. Please disregard any translational errors.

## 2020-07-03 NOTE — PLAN OF CARE
Problem: Falls - Risk of:  Goal: Will remain free from falls  Description: Will remain free from falls  Outcome: Ongoing  Note: Pt admitted with myasthenia gravis exacerbation. Ambulates UAL. Fall precautions in place. Alarms in place. Uses call light appropriately. No falls.

## 2020-07-03 NOTE — PROGRESS NOTES
Pt awake and AAO sitting up in chair requesting to have her scheduled HS meds now. Pt denies pain at present. Pt admitted with myasthenia gravis exacerbation. Lungs clear. No sob or cough. On RA. Belly round and soft with active BS. Voids without difficulty. No edema noted. PIV to R hand infusing NS @ 100ml/hr without difficulty. Pt into bed and repositioned for comfort. Call light in reach.

## 2020-07-04 NOTE — DISCHARGE SUMMARY
Hospital Medicine Discharge Summary      Patient ID: Marge Cox , [id number]     Patient's PCP: Michaelle Orellana MD    Admit Date: 6/30/2020     Discharge Date: 7/3/2020      Admitting Physician: Christina Gonzalez MD    Discharge Physician: Saw Fuentes MD     Discharge Diagnoses: Active Hospital Problems    Diagnosis Date Noted    Hyperlipidemia [E78.5] 06/30/2020    GERD (gastroesophageal reflux disease) [K21.9] 06/30/2020    Migraine [G43.909] 06/30/2020    Anxiety [F41.9] 06/30/2020    Insomnia [G47.00] 06/30/2020    Generalized weakness [R53.1] 12/10/2019    Myasthenia gravis ([address] [G70.00] 10/13/2019         The patient was seen and examined on the day of discharge and this discharge summary is in conjunction with any daily progress note from day of discharge. HOSPITAL COURSE     Patient demographics:  The patient  Marge Cox is a 48 y.o. female      Significant past medical history:       Patient Active Problem List   Diagnosis    Myasthenia gravis ([address]    Myasthenia gravis with (acute) exacerbation ([address]    Appendicitis    Neck stiffness    Generalized weakness    Myasthenia gravis with exacerbation, adult form ([address]    Hyperlipidemia    Seizure ([address]    GERD (gastroesophageal reflux disease)    Migraine    Anxiety    Insomnia    Myasthenia gravis with exacerbation (HCC)            Presenting symptoms:  Weakness     Diagnostic workup:  CT CHEST WO CONTRAST   CT CERVICAL SPINE WO CONTRAST   CT HEAD WO CONTRAST         CONSULTS DURING ADMISSION :   IP CONSULT TO HOSPITALIST  IP CONSULT TO SOCIAL WORK  IP CONSULT TO NEUROLOGY        Patient was diagnosed with:  Myasthenia gravis exacerbation  Hyperlipidemia  Seizure  GERD  Migraine     Treatment while inpatient:  pt presented to [facility] emergency department with a one-week complaint of extremity weakness and heaviness. Patient has history of myasthenia gravis that has been under good control.   Patient Daily             LORazepam (ATIVAN) 0.5 MG tablet  Take 0.5 mg by mouth 2 times daily. methotrexate (RHEUMATREX) 2.5 MG chemo tablet  Take 10 mg by mouth once a week Mondays             pyridostigmine (MESTINON) 60 MG tablet  Take 1.5 tablets by mouth 3 times daily             SUMAtriptan (IMITREX) 100 MG tablet  Take 100 mg by mouth once as needed for Migraine Take 100 mg by mouth as needed for migraines if initial dose was partially effective or headache recurs, may repeat a dose after 2 hours. (max of 200 mg in 24 hour period)             zolpidem (AMBIEN) 10 MG tablet  Take 10 mg by mouth nightly. Time Spent on discharge is more than 30 min in the examination, evaluation, counseling and review of medications and discharge plan. Signed:  Carlos Izquierdo MD   7/3/2020      Thank you Gwyn Peters MD for the opportunity to be involved in this patient's care. If you have any questions or concerns please feel free to contact me at [phone]. This note was transcribed using [id number] [employer]. Please disregard any translational errors.

## 2020-07-07 LAB — AFP: 4.1 UG/L

## 2020-07-08 ENCOUNTER — TELEPHONE (OUTPATIENT)
Dept: CARDIOTHORACIC SURGERY | Age: 50
End: 2020-07-08

## 2020-07-08 NOTE — TELEPHONE ENCOUNTER
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  No further follow-up is needed at this time.  May be an ultrasound should be repeated in 1 year. CT scan of the chest showed minimal thymic tissue. Patient is to follow-up with Dr. Jorden Ramirez her primary neurologist     Msg sent to Dr Godfrey Multani neuro regarding a referral. Once this is confirmed, will follow up with patient. Pt aware of all, verb understanding.

## 2020-07-09 ENCOUNTER — TELEPHONE (OUTPATIENT)
Dept: CARDIOTHORACIC SURGERY | Age: 50
End: 2020-07-09

## 2020-07-09 NOTE — TELEPHONE ENCOUNTER
Per neurology:  I advised her to follow up w/ Dr. Yony Isabel following her hospitalization.  This (referral to cardiothoracic surgery) is not something that we discussed when she was here.      Will follow up with patient to confirm; ? Possibility that she wants to establish care with [facility].

## 2020-07-14 ENCOUNTER — TELEPHONE (OUTPATIENT)
Dept: CARDIOTHORACIC SURGERY | Age: 50
End: 2020-07-14

## 2020-07-14 NOTE — TELEPHONE ENCOUNTER
Pt states referral order placed per [facility] neuro; fax referral order rec'd. appt scheduled re : recurrent thymoma.

## 2020-07-22 ENCOUNTER — OFFICE VISIT (OUTPATIENT)
Dept: CARDIOTHORACIC SURGERY | Age: 50
End: 2020-07-22
Payer: COMMERCIAL

## 2020-07-22 ENCOUNTER — TELEPHONE (OUTPATIENT)
Dept: CARDIOTHORACIC SURGERY | Age: 50
End: 2020-07-22

## 2020-07-22 VITALS
SYSTOLIC BLOOD PRESSURE: 118 MMHG | WEIGHT: 168.4 LBS | RESPIRATION RATE: 12 BRPM | HEIGHT: 62 IN | OXYGEN SATURATION: 97 % | TEMPERATURE: 96.8 F | DIASTOLIC BLOOD PRESSURE: 72 MMHG | BODY MASS INDEX: 30.99 KG/M2 | HEART RATE: 69 BPM

## 2020-07-22 PROCEDURE — 99204 OFFICE O/P NEW MOD 45 MIN: CPT | Performed by: THORACIC SURGERY (CARDIOTHORACIC VASCULAR SURGERY)

## 2020-07-22 NOTE — PROGRESS NOTES
Review of Systems:  Constitutional:  No night sweats, + headaches, migraines, no weight loss. Eyes:  No glaucoma, no cataracts. ENMT:  No nosebleeds, no deviated septum. Cardiac:  No arrhythmias. Vascular:  No claudication, no varicosities. GI:  No PUD, + heartburn. :  + remote hx kidney stones, no frequent UTIs  Musculoskeletal:  No arthritis, no gout. Respiratory:  No SOB, no emphysema, no asthma. Integumentary:  No dermatitis, no itching, no rash. Neurological:  No stroke, no TIAs, no seizures. Psychiatric:  + depression, no anxiety. Endocrine: No diabetes, +  thyroid issues. Thymectomy, myasthenia gravis. Hematologic:  No bleeding, no easy bruising. Immunologic:  No known cancer, no steroid therapies.

## 2020-07-22 NOTE — PROGRESS NOTES
once a week Mondays   Yes Historical Provider, MD   doxepin (SINEQUAN) 10 MG capsule Take 10 mg by mouth nightly   Yes Historical Provider, MD   estradiol (ESTRACE) 2 MG tablet Take 2 mg by mouth nightly   Yes Historical Provider, MD   levothyroxine (SYNTHROID) 125 MCG tablet Take 125 mcg by mouth Daily   Yes Historical Provider, MD   folic acid (FOLVITE) 1 MG tablet Take 1 mg by mouth daily   Yes Historical Provider, MD   zolpidem (AMBIEN) 10 MG tablet Take 10 mg by mouth nightly. Yes Historical Provider, MD   citalopram (CELEXA) 40 MG tablet Take 40 mg by mouth daily   Yes Historical Provider, MD        Allergies: Allergies   Allergen Reactions    Immune Globulins Other (See Comments)     FLEBOGAMMA - ASEPTIC MENINGITIS    Iodides Anaphylaxis    Other      Steroid injection to joints    Adhesive Tape Rash and Other (See Comments)     Paper Tape  Paper Tape    Skin gets reddened under tape; not allergic to latex    Penicillins Itching and Rash     Rash  Rash          Social History:    Working: She runs a childcare facility    Social History     Socioeconomic History    Marital status:       Spouse name: Not on file    Number of children: Not on file    Years of education: Not on file    Highest education level: Not on file   Occupational History    Not on file   Social Needs    Financial resource strain: Not on file    Food insecurity     Worry: Not on file     Inability: Not on file    Transportation needs     Medical: Not on file     Non-medical: Not on file   Tobacco Use    Smoking status: Never Smoker    Smokeless tobacco: Never Used   Substance and Sexual Activity    Alcohol use: Never     Frequency: Never    Drug use: Never    Sexual activity: Not Currently   Lifestyle    Physical activity     Days per week: Not on file     Minutes per session: Not on file    Stress: Not on file   Relationships    Social connections     Talks on phone: Not on file     Gets together: Not on file     Attends Buddhism service: Not on file     Active member of club or organization: Not on file     Attends meetings of clubs or organizations: Not on file     Relationship status: Not on file    Intimate partner violence     Fear of current or ex partner: Not on file     Emotionally abused: Not on file     Physically abused: Not on file     Forced sexual activity: Not on file   Other Topics Concern    Not on file   Social History Narrative    Not on file       Family History:        Problem Relation Age of Onset    Heart Disease Father     High Cholesterol Father     High Blood Pressure Father        Review of Systems:  Constitutional:  No night sweats, headaches, weight loss. Eyes:  No glaucoma, cataracts. Positive for ptosis left more than right  ENMT:  No nosebleeds, deviated septum. Cardiac:  No arrhythmias, chest pain. Vascular:  No claudication, varicosities. GI:  No PUD, heartburn. :  No kidney stones, frequent UTIs  Musculoskeletal:  No arthritis, gout. Respiratory:  No SOB, emphysema, asthma. Integumentary:  No dermatitis, itching, rash. Neurological:  No stroke, TIAs, seizures. Positive for extreme muscular weakness after 3 4 hours being awake peaking at around 10 AM in the morning classic for myasthenia gravis  Psychiatric:  No depression, anxiety. Endocrine: No diabetes, thyroid issues. Hematologic:  No bleeding, easy bruising. Immunologic:  No known cancer, steroid therapies. Physical Examination:    /72 (Site: Right Upper Arm, Position: Sitting)   Pulse 69   Temp 96.8 °F (36 °C)   Resp 12   Ht 5' 2\" (1.575 m)   Wt 168 lb 6.4 oz (76.4 kg)   SpO2 97%   BMI 30.80 kg/m²    BP RUE:  BP LUE:   Weight: 168 lb 6.4 oz (76.4 kg)   Hand dominance:    General appearance: NAD, well nourished  Eyes: anicteric, PERRLA  ENMT: no scars or lesions, normal dentition, no cyanosis of oral mucosa  Neck: no masses, no JVD.   She has a well-healed suprasternal transverse thyroidectomy incision and a well-healed left neck incision from her prior spinal fusion. Respiratory: effort is unlabored, symmetric, no crackles, wheezes or rubs. No palpable/percussable abnormalities. He does have a well-healed midline sternotomy incision. Cardiovascular: regular, no murmur. PMI normal, no thrill. No carotid bruits. No edema or varicosities. Abdominal aorta cannot be appreciated given body habitus. Pulses:    carotid brachial radial femoral popliteal DP PT   RIGHT    +2    +2  +2   LEFT    +2    +2  +2   GI: abdomen soft, nondistended, no organomegaly. No masses. Lymphatic: no cervical/supraclavicular adenopathy  Musculoskeletal: strength and tone normal. Full ROM. No scoliosis. Extremities: warm and pink. No clubbing or petechiae. Skin: no dermatitis or ulceration. No nodularity or induration. Neuro: CN grossly intact. Sensation and motor function grossly intact. Psychiatric: oriented, appropriate mood/affect. MEDICAL DECISION MAKING/TESTING  Studies personally reviewed. CXR: 5/1/8178  Metallic hardware projects over the cervical spine.  Sternotomy wires are    present.  The heart size and mediastinal contours are stable.  The lungs are    clear. CT: 7/1/2020  Minimal anterior soft tissue ventral to the left arch, favoring minimal    cervical thymic tissue.  No adenopathy.         Hypodense lesion in the right lobe of the liver which should be further    evaluated.             Labs:   CBC: No results for input(s): WBC, HGB, HCT, MCV, PLT in the last 72 hours. BMP: No results for input(s): NA, K, CL, CO2, PHOS, BUN, CREATININE, CALCIUM, MG in the last 72 hours. Cardiac Enzymes: No results for input(s): CKTOTAL, CKMB, CKMBINDEX, TROPONINI in the last 72 hours. PT/INR: No results for input(s): PROTIME, INR in the last 72 hours. APTT: No results for input(s): APTT in the last 72 hours.   Liver Profile:  Lab Results   Component Value Date    AST 22 07/01/2020    ALT 17 07/01/2020    BILIDIR <0.2 11/04/2019    BILITOT 0.3 07/01/2020    ALKPHOS 148 07/01/2020   No results found for: CHOL, HDL, TRIG  TSH:  No results found for: TSH  UA:   Lab Results   Component Value Date    COLORU YELLOW 06/30/2020    PHUR 7.0 06/30/2020    WBCUA 1 06/30/2020    RBCUA 1 06/30/2020    BACTERIA RARE 06/30/2020    CLARITYU CLOUDY 06/30/2020    SPECGRAV 1.009 06/30/2020    LEUKOCYTESUR Negative 06/30/2020    UROBILINOGEN 0.2 06/30/2020    BILIRUBINUR Negative 06/30/2020    BLOODU Negative 06/30/2020    GLUCOSEU Negative 06/30/2020       History obtained: chart, pt    Risk factors: History of thymoma with current sternal thymectomy in 2005    Diagnosis: Myasthenia gravis with recent exacerbations    Plan:   63-year-old female with longstanding history of myasthenia gravis and thymoma who was doing great after 2005 when she had trans-sternal thymectomy up until December 2019. She was actually off all of her myasthenia medications for 14 years in a row. After December 2019 she started experiencing myasthenia symptoms again that required several hospitalizations and she was restarted on Mestinon and IVIG injections twice a month with significant improvement of her symptoms. Unfortunately the IVIG treatment was discontinued due to insurance issues and she had another myasthenia gravis exacerbation recently for which she was hospitalized at [facility] [place].  She was treated with IVIG and steroids and a CT scan showed some mediastinal tissue that could represent residual thymic remnant. I reviewed the CT scan with 2 of the radiologist and the Hounsfield units of this mediastinal tissue is 17 which most likely represent fluid density. It would be anticipated that if this was represents solid tissue the Hounsfield units will be in the 30 range.   Also it is noted on her CT scan that due to her previous sternotomy the ascending aorta is essentially attached to the posterior sternal table and at risk for major life-threatening vascular injury in case a redo sternotomy would be required. It is also felt that an alternate surgical approach via left VATS could also be of limited value as this remnant tissue is in close proximity to the ascending aorta and it could be difficult to access in the retrosternal space without running a risk of major vascular injury. I discussed the case with Dr. Francisco Vazquez who kindly referred the patient to me. At this point I do not feel that there is enough evidence to justify a major surgical undertaking to address this residual retrosternal tissue that is not even certain that represents remnant thymic tissue. If there is a desperate need in the future for an intervention a transthoracic image guided percutaneous needle biopsy can be contemplated by IR first before considering a major high risk surgical procedure. The patient actually is very frustrated because she thinks that if she could continue IVIG treatments she  would continue to have excellent control of her symptoms and she is in the process of negotiating with her insurance to try to get approval again to continue the IVIG treatments.                               Lewis Phan MD  7/22/2020  4:39 PM

## 2020-07-22 NOTE — TELEPHONE ENCOUNTER
LM x 2 requesting call back as soon as possible; Dr Juana Graf would like to speak w/ [facility] regarding referral.

## 2020-08-05 ENCOUNTER — HOSPITAL ENCOUNTER (INPATIENT)
Age: 50
LOS: 4 days | Discharge: HOME | DRG: 057 | End: 2020-08-09
Attending: HOSPITALIST | Admitting: HOSPITALIST
Payer: COMMERCIAL

## 2020-08-05 PROBLEM — E89.0 POSTABLATIVE HYPOTHYROIDISM: Status: ACTIVE | Noted: 2020-08-05

## 2020-08-05 PROBLEM — I49.8 SINUS ARRHYTHMIA: Status: ACTIVE | Noted: 2020-08-05

## 2020-08-05 PROBLEM — R00.1 BRADYCARDIA: Status: ACTIVE | Noted: 2020-08-05

## 2020-08-05 LAB
T3 FREE: <0.3 PG/ML (ref 2.3–4.2)
T4 FREE: <0.1 NG/DL (ref 0.9–1.8)

## 2020-08-05 PROCEDURE — 6370000000 HC RX 637 (ALT 250 FOR IP): Performed by: HOSPITALIST

## 2020-08-05 PROCEDURE — 2580000003 HC RX 258: Performed by: HOSPITALIST

## 2020-08-05 PROCEDURE — 84481 FREE ASSAY (FT-3): CPT

## 2020-08-05 PROCEDURE — 94760 N-INVAS EAR/PLS OXIMETRY 1: CPT

## 2020-08-05 PROCEDURE — 2060000000 HC ICU INTERMEDIATE R&B

## 2020-08-05 PROCEDURE — 84439 ASSAY OF FREE THYROXINE: CPT

## 2020-08-05 PROCEDURE — 36415 COLL VENOUS BLD VENIPUNCTURE: CPT

## 2020-08-05 PROCEDURE — 99222 1ST HOSP IP/OBS MODERATE 55: CPT | Performed by: INTERNAL MEDICINE

## 2020-08-05 RX ORDER — LEVOTHYROXINE SODIUM 0.07 MG/1
150 TABLET ORAL DAILY
Status: DISCONTINUED | OUTPATIENT
Start: 2020-08-05 | End: 2020-08-06

## 2020-08-05 RX ORDER — SUMATRIPTAN 50 MG/1
100 TABLET, FILM COATED ORAL
Status: ACTIVE | OUTPATIENT
Start: 2020-08-05 | End: 2020-08-05

## 2020-08-05 RX ORDER — LORAZEPAM 0.5 MG/1
0.5 TABLET ORAL 2 TIMES DAILY
Status: DISCONTINUED | OUTPATIENT
Start: 2020-08-05 | End: 2020-08-09 | Stop reason: HOSPADM

## 2020-08-05 RX ORDER — ZOLPIDEM TARTRATE 5 MG/1
10 TABLET ORAL NIGHTLY
Status: DISCONTINUED | OUTPATIENT
Start: 2020-08-05 | End: 2020-08-09 | Stop reason: HOSPADM

## 2020-08-05 RX ORDER — ESTRADIOL 1 MG/1
2 TABLET ORAL NIGHTLY
Status: DISCONTINUED | OUTPATIENT
Start: 2020-08-05 | End: 2020-08-09 | Stop reason: HOSPADM

## 2020-08-05 RX ORDER — SODIUM CHLORIDE 0.9 % (FLUSH) 0.9 %
10 SYRINGE (ML) INJECTION EVERY 12 HOURS SCHEDULED
Status: DISCONTINUED | OUTPATIENT
Start: 2020-08-05 | End: 2020-08-09 | Stop reason: HOSPADM

## 2020-08-05 RX ORDER — PYRIDOSTIGMINE BROMIDE 60 MG/1
90 TABLET ORAL 3 TIMES DAILY
Status: DISCONTINUED | OUTPATIENT
Start: 2020-08-05 | End: 2020-08-09 | Stop reason: HOSPADM

## 2020-08-05 RX ORDER — CITALOPRAM 20 MG/1
40 TABLET ORAL DAILY
Status: DISCONTINUED | OUTPATIENT
Start: 2020-08-05 | End: 2020-08-09 | Stop reason: HOSPADM

## 2020-08-05 RX ORDER — ONDANSETRON 2 MG/ML
4 INJECTION INTRAMUSCULAR; INTRAVENOUS EVERY 6 HOURS PRN
Status: DISCONTINUED | OUTPATIENT
Start: 2020-08-05 | End: 2020-08-09 | Stop reason: HOSPADM

## 2020-08-05 RX ORDER — BUTALBITAL, ACETAMINOPHEN AND CAFFEINE 50; 325; 40 MG/1; MG/1; MG/1
1 TABLET ORAL EVERY 4 HOURS PRN
Status: DISCONTINUED | OUTPATIENT
Start: 2020-08-05 | End: 2020-08-09 | Stop reason: HOSPADM

## 2020-08-05 RX ORDER — POLYETHYLENE GLYCOL 3350 17 G/17G
17 POWDER, FOR SOLUTION ORAL DAILY PRN
Status: DISCONTINUED | OUTPATIENT
Start: 2020-08-05 | End: 2020-08-09 | Stop reason: HOSPADM

## 2020-08-05 RX ORDER — SODIUM CHLORIDE 0.9 % (FLUSH) 0.9 %
10 SYRINGE (ML) INJECTION PRN
Status: DISCONTINUED | OUTPATIENT
Start: 2020-08-05 | End: 2020-08-09 | Stop reason: HOSPADM

## 2020-08-05 RX ORDER — ACETAMINOPHEN 325 MG/1
650 TABLET ORAL EVERY 6 HOURS PRN
Status: DISCONTINUED | OUTPATIENT
Start: 2020-08-05 | End: 2020-08-09 | Stop reason: HOSPADM

## 2020-08-05 RX ORDER — DOXEPIN HYDROCHLORIDE 10 MG/1
10 CAPSULE ORAL NIGHTLY
Status: DISCONTINUED | OUTPATIENT
Start: 2020-08-05 | End: 2020-08-09 | Stop reason: HOSPADM

## 2020-08-05 RX ORDER — SODIUM CHLORIDE 9 MG/ML
INJECTION, SOLUTION INTRAVENOUS CONTINUOUS
Status: DISCONTINUED | OUTPATIENT
Start: 2020-08-05 | End: 2020-08-05

## 2020-08-05 RX ORDER — POTASSIUM CHLORIDE 20 MEQ/1
40 TABLET, EXTENDED RELEASE ORAL PRN
Status: DISCONTINUED | OUTPATIENT
Start: 2020-08-05 | End: 2020-08-09 | Stop reason: HOSPADM

## 2020-08-05 RX ORDER — ACETAMINOPHEN 650 MG/1
650 SUPPOSITORY RECTAL EVERY 6 HOURS PRN
Status: DISCONTINUED | OUTPATIENT
Start: 2020-08-05 | End: 2020-08-09 | Stop reason: HOSPADM

## 2020-08-05 RX ORDER — POTASSIUM CHLORIDE 7.45 MG/ML
10 INJECTION INTRAVENOUS PRN
Status: DISCONTINUED | OUTPATIENT
Start: 2020-08-05 | End: 2020-08-09 | Stop reason: HOSPADM

## 2020-08-05 RX ORDER — FAMOTIDINE 20 MG/1
20 TABLET, FILM COATED ORAL 2 TIMES DAILY
Status: DISCONTINUED | OUTPATIENT
Start: 2020-08-05 | End: 2020-08-09 | Stop reason: HOSPADM

## 2020-08-05 RX ORDER — FOLIC ACID 1 MG/1
1 TABLET ORAL DAILY
Status: DISCONTINUED | OUTPATIENT
Start: 2020-08-05 | End: 2020-08-09 | Stop reason: HOSPADM

## 2020-08-05 RX ORDER — PROMETHAZINE HYDROCHLORIDE 25 MG/1
12.5 TABLET ORAL EVERY 6 HOURS PRN
Status: DISCONTINUED | OUTPATIENT
Start: 2020-08-05 | End: 2020-08-09 | Stop reason: HOSPADM

## 2020-08-05 RX ADMIN — ESTRADIOL 2 MG: 1 TABLET ORAL at 20:58

## 2020-08-05 RX ADMIN — FOLIC ACID 1 MG: 1 TABLET ORAL at 16:42

## 2020-08-05 RX ADMIN — SODIUM CHLORIDE, PRESERVATIVE FREE 10 ML: 5 INJECTION INTRAVENOUS at 20:59

## 2020-08-05 RX ADMIN — LORAZEPAM 0.5 MG: 0.5 TABLET ORAL at 17:33

## 2020-08-05 RX ADMIN — LEVOTHYROXINE SODIUM 150 MCG: 0.07 TABLET ORAL at 16:43

## 2020-08-05 RX ADMIN — CITALOPRAM HYDROBROMIDE 40 MG: 20 TABLET ORAL at 16:43

## 2020-08-05 RX ADMIN — DOXEPIN HYDROCHLORIDE 10 MG: 10 CAPSULE ORAL at 20:58

## 2020-08-05 RX ADMIN — ZOLPIDEM TARTRATE 10 MG: 5 TABLET ORAL at 20:58

## 2020-08-05 RX ADMIN — PYRIDOSTIGMINE BROMIDE 90 MG: 60 TABLET ORAL at 17:33

## 2020-08-05 RX ADMIN — FAMOTIDINE 20 MG: 20 TABLET ORAL at 20:58

## 2020-08-05 ASSESSMENT — PAIN DESCRIPTION - LOCATION: LOCATION: CHEST

## 2020-08-05 ASSESSMENT — PAIN DESCRIPTION - ORIENTATION: ORIENTATION: LEFT

## 2020-08-05 ASSESSMENT — PAIN DESCRIPTION - DESCRIPTORS: DESCRIPTORS: HEAVINESS;OTHER (COMMENT)

## 2020-08-05 ASSESSMENT — PAIN DESCRIPTION - PROGRESSION: CLINICAL_PROGRESSION: NOT CHANGED

## 2020-08-05 ASSESSMENT — PAIN DESCRIPTION - FREQUENCY: FREQUENCY: INTERMITTENT

## 2020-08-05 ASSESSMENT — PAIN SCALES - GENERAL
PAINLEVEL_OUTOF10: 2
PAINLEVEL_OUTOF10: 4
PAINLEVEL_OUTOF10: 0
PAINLEVEL_OUTOF10: 0

## 2020-08-05 ASSESSMENT — PAIN DESCRIPTION - ONSET: ONSET: ON-GOING

## 2020-08-05 ASSESSMENT — PAIN DESCRIPTION - PAIN TYPE: TYPE: ACUTE PAIN

## 2020-08-05 NOTE — CARE COORDINATION
DISCHARGE PLAN: Pt plans to return home with her spouse and denied any d/c needs at this time. ___________________________________  Mayra Roque w/pt to address barriers to dc. HOME: Patient reports that she resides in a single family home with her  and two dogs. 3 JAYJAY.    DME/O2: Patient reports that prior to medical admission she was not using durable medical equipment. Pt denied the need for any DME at this time. ACTIVE SERVICES:  Patient reports that prior to medical admission she was not using active services in the home setting. She reports that she does not anticipate having any needs going home. TRANSPORTATION: Patient reports that prior to medical admission she was driving. She reports that her  likely transport her home at discharge. PHARMACY: denies difficulty obtaining/taking meds. Patient reports that she uses [facility] in [place], [place]. For future reference their phone number [phone]. PCP: Dr. Candie Thurman-Pt stated that she last saw this MD about 3 months ago. DEMOGRAPHICS: verified address/phone number as correct    INSURANCE:  [employer]    HD/PD: No    THERAPY RECS Not ordered . No needs reported. Pt stated that she has been able to schedule her IVIG and was able to get her insurance issues worked out that she was struggling with last month. Pt stated that she actually had her IVIG scheduled for 8/6/2020. Pt stated that she will be going to [facility] for her future infusions. Discharge planning team will remain available for needs. Please consult for any specifics not addressed in this note.     Jannette Blackmon  [phone]  Electronically signed by Varghese Woods on 8/5/2020 at 2:41 PM

## 2020-08-05 NOTE — PROGRESS NOTES
4 Eyes Skin Assessment     The patient is being assess for  Admission    I agree that 2 RN's have performed a thorough Head to Toe Skin Assessment on the patient. ALL assessment sites listed below have been assessed. Areas assessed by both nurses:   [x]   Head, Face, and Ears   [x]   Shoulders, Back, and Chest  [x]   Arms, Elbows, and Hands   [x]   Coccyx, Sacrum, and IschIum  [x]   Legs, Feet, and Heels        Does the Patient have Skin Breakdown?   No         Adam Prevention initiated:  Yes   Wound Care Orders initiated:  No      [facility] nurse consulted for Pressure Injury (Stage 3,4, Unstageable, DTI, NWPT, and Complex wounds), New and Established Ostomies:  No      Nurse 1 eSignature: Electronically signed by Tiffany Interiano RN on 8/5/20 at 6:41 PM EDT    **SHARE this note so that the co-signing nurse is able to place an eSignature**    Nurse 2 eSignature: Electronically signed by Donna Liu RN on 8/5/20 at 7:14 PM EDT

## 2020-08-05 NOTE — H&P
Hospitalist  History and Physical    Patient:  Bentley Ovalles  MRN: [id number]  PCP: Miroslava Lowry MD    CHIEF COMPLAINT: Palpitation      HISTORY OF PRESENT ILLNESS:   The patient Bentley Ovalles is a 48 y. o.female With medical history significant for hypothyroidism and myasthenia gravis. Patient underwent a thymectomy for myasthenia gravis and patient's hypothyroidism is related to thyroid cancer and thyroidectomy. Patient presented to the Outside emergency room with concern about palpitation and bradycardia. Patient is also concerned that her myasthenia gravis is again exacerbated and she is in need of IVIG. Patient was also noted to have very high levels of for a TSH although patient reports that she has been taking her Synthroid religiously.           Past Medical History:        Diagnosis Date    Anxiety     Arthritis     Cancer ([address]     thyroid    GERD (gastroesophageal reflux disease)     Hyperlipidemia     Insomnia     Migraines     Myasthenia gravis with exacerbation (HCC)     Seizures (HCC)     Thyroid disease        Past Surgical History:        Procedure Laterality Date    APPENDECTOMY      CAPSULE ENDOSCOPY N/A 2/28/2020    ESOPHAGEAL CAPSULE ENDOSCOPY performed by Maame Munoz MD at [address] [place] [address] N/A 12/17/2019    COLONOSCOPY performed by Debbie Humphrey MD at [address] 2/11/2020    ESOPHAGEAL MANOMETRY performed by Maame Munoz MD at [address]      rotator cuff repair- bilateral    THYROIDECTOMY      TUMOR REMOVAL      UPPER GASTROINTESTINAL ENDOSCOPY N/A 12/17/2019    EGD BIOPSY performed by Debbie Humphrey MD at [address] 12/17/2019    EGD DILATION SAVORY performed by Debbie Humphrey MD at [address]  2/28/2020    EGD DIAGNOSTIC ONLY performed by Felix Cassidy MD [place] at [address]  2/28/2020    Esophagogastroduodenoscopy with Bravo Capsule placement performed by Melba Perdue MD at [facility]       Medications Prior to Admission:    Prior to Admission medications    Medication Sig Start Date End Date Taking? Authorizing Provider   pyridostigmine (MESTINON) 60 MG tablet Take 1.5 tablets by mouth 3 times daily 31/01/09   BETH Echevarria - CNP   SUMAtriptan (IMITREX) 100 MG tablet Take 100 mg by mouth once as needed for Migraine Take 100 mg by mouth as needed for migraines if initial dose was partially effective or headache recurs, may repeat a dose after 2 hours. (max of 200 mg in 24 hour period)    Historical Provider, MD   butalbital-acetaminophen-caffeine (FIORICET, ESGIC) -40 MG per tablet Take 1 tablet by mouth every 4 hours as needed for Migraine    Historical Provider, MD   LORazepam (ATIVAN) 0.5 MG tablet Take 0.5 mg by mouth 2 times daily. Historical Provider, MD   methotrexate (RHEUMATREX) 2.5 MG chemo tablet Take 10 mg by mouth once a week Mondays    Historical Provider, MD   doxepin (SINEQUAN) 10 MG capsule Take 10 mg by mouth nightly    Historical Provider, MD   estradiol (ESTRACE) 2 MG tablet Take 2 mg by mouth nightly    Historical Provider, MD   levothyroxine (SYNTHROID) 125 MCG tablet Take 125 mcg by mouth Daily    Historical Provider, MD   folic acid (FOLVITE) 1 MG tablet Take 1 mg by mouth daily    Historical Provider, MD   zolpidem (AMBIEN) 10 MG tablet Take 10 mg by mouth nightly. Historical Provider, MD   citalopram (CELEXA) 40 MG tablet Take 40 mg by mouth daily    Historical Provider, MD       Allergies:  Immune globulins; Iodides; Other; Adhesive tape; and Penicillins      Social History:   TOBACCO:   reports that she has never smoked. She has never used smokeless tobacco.  ETOH:   reports no history of alcohol use.         Family History:       Problem Relation Age of Onset    Heart Disease Father     High Cholesterol Father     High Blood Pressure Father            REVIEW OF SYSTEMS:     patients reported symptoms are in BOLD all other symptoms are negative. CONSTITUTIONAL:      fatigue, fever, chills or night sweats, recent weight gain, recent wt loss, insomnia,  General weakness, poor appetite, muscle aches and pains    HEAD: headache, dizziness    EYES:      blurriness,  double vision, dryness,  discharge, irritation,diplopia    EARS:      hearing loss, vertigo, ear discharge,  Earache. Ringing in the ears. NOSE:      Rhinorrhea, sneezing, epistaxis. Discharge, sinusitis,     MOUTH/THROAT:         sore throat, mouth ulcers, Hoarseness    RESPIRATORY:        Shortness of breath, wheezing,  cough, sputum, hemoptysis, obstructive sleep apnea,    CARDIOVASCULAR :      chest pain, palpitations, dyspnea on exercise, Lower extrimity edema (swelling),     GASTROINTESTINAL:       Dysphagia, Poor appetite,  Nausea, Vomiting, diarrhea, heartburn, abdominal pain. Blood in the stools, hematemesis. Pain with swallowing, constipation    GENITOURINARY:       Urinary frequency, hesitancy,  urgency, Dysuria, hematuria,  Urinary Incontinence. Urinary Retention. GYNECOLOGICAL: vaginal bleeding , vaginal discharge, menopause    MUSCULOSKELETAL:       joint swelling or stiffness, joint pain, muscle pain, balance problems, low back pain. NEUROLOGICAL:      Gait problems. Tremor. Dizziness. Pain and paresthesias, weakness in extremities. Seizures, memory loss    PSYCHLOGICAL:        Anxiety, depression    SKIN :      Rashes ulcers, skin color changes, easy bruisability, lymphadenopathy      Physical Exam:      Vitals: BP (!) 142/88   Pulse 58   Temp 98 °F (36.7 °C) (Oral)   Resp 18   Ht 5' 2\" (1.575 m)   Wt 177 lb 0.5 oz (80.3 kg)   SpO2 98%   BMI 32.38 kg/m²     Gen:          Alert and oriented x3  Eyes: PERRL. No sclera icterus. No conjunctival injection. ENT: No discharge.  Pharynx clear. External appearance of ears and nose normal.  Neck: Trachea midline. No obvious mass. Resp: No accessory muscle use. No crackles. No wheezes. No rhonchi. CV: Regular rate. Regular rhythm. No murmur or rub. No edema. GI: Non-tender. Non-distended. No hernia. Skin: Warm, dry, normal texture and turgor. Lymph: No cervical LAD. No supraclavicular LAD. M/S: / Ext. No cyanosis. No clubbing. No joint deformity. Neuro: Moves all four extremities. CN 2-12 tested, no deficits noted. Peripheral pulses and capillary refill is intact. CBC: No results for input(s): WBC, HGB, PLT in the last 72 hours. BMP:  No results for input(s): NA, K, CL, CO2, BUN, CREATININE, GLUCOSE in the last 72 hours. Hepatic: No results for input(s): AST, ALT, ALB, BILITOT, ALKPHOS in the last 72 hours. Troponin: No results for input(s): TROPONINI in the last 72 hours. BNP: No results for input(s): BNP in the last 72 hours. INR: No results for input(s): INR in the last 72 hours. Lab Results   Component Value Date    LABA1C 5.1 11/06/2019           No results for input(s): CKTOTAL in the last 72 hours. -----------------------------------------------------------------            Assessment / Plan     Hypothyroidism  Significantly elevated TSH and low T3 and T4  Recheck in a.m. Increase Synthroid dose to 150 mcg by mouth daily    Myasthenia gravis  Continue on by reduced to a mean  Consults neurology  Possible IVIG administration in the a.m. Bradycardia  Consult cardiology  Monitor on telemetry      Anxiety depression F 41.9  Continue on home medication        DVT and GI prophylaxis      Full Code      Sameer Lee M.D    This note was transcribed using [id number] [employer]. Please disregard any translational errors.

## 2020-08-05 NOTE — CONSULTS
DIAGNOSTIC ONLY performed by Nayana Alanis MD at [address]  2/28/2020    Esophagogastroduodenoscopy with Bravo Capsule placement performed by Nayana Alanis MD at [facility]        Medications Prior to Admission: pyridostigmine (MESTINON) 60 MG tablet, Take 1.5 tablets by mouth 3 times daily  SUMAtriptan (IMITREX) 100 MG tablet, Take 100 mg by mouth once as needed for Migraine Take 100 mg by mouth as needed for migraines if initial dose was partially effective or headache recurs, may repeat a dose after 2 hours. (max of 200 mg in 24 hour period)  butalbital-acetaminophen-caffeine (FIORICET, ESGIC) -40 MG per tablet, Take 1 tablet by mouth every 4 hours as needed for Migraine  LORazepam (ATIVAN) 0.5 MG tablet, Take 0.5 mg by mouth 2 times daily. methotrexate (RHEUMATREX) 2.5 MG chemo tablet, Take 10 mg by mouth once a week Mondays  doxepin (SINEQUAN) 10 MG capsule, Take 10 mg by mouth nightly  estradiol (ESTRACE) 2 MG tablet, Take 2 mg by mouth nightly  levothyroxine (SYNTHROID) 125 MCG tablet, Take 125 mcg by mouth Daily  folic acid (FOLVITE) 1 MG tablet, Take 1 mg by mouth daily  zolpidem (AMBIEN) 10 MG tablet, Take 10 mg by mouth nightly.   citalopram (CELEXA) 40 MG tablet, Take 40 mg by mouth daily    Allergies   Allergen Reactions    Immune Globulins Other (See Comments)     FLEBOGAMMA - ASEPTIC MENINGITIS    Iodides Anaphylaxis    Other      Steroid injection to joints    Adhesive Tape Rash and Other (See Comments)     Paper Tape  Paper Tape    Skin gets reddened under tape; not allergic to latex    Penicillins Itching and Rash     Rash  Rash        Social History     Tobacco Use    Smoking status: Never Smoker    Smokeless tobacco: Never Used   Substance Use Topics    Alcohol use: Never     Frequency: Never      Family History   Problem Relation Age of Onset    Heart Disease Father     High Cholesterol Father     High Blood Pressure Father Review of Systems:  · Constitutional: No Fever or Weight Loss, fatigue  · Eyes: No decreased vision  · ENT: No Headaches, Hearing Loss or Vertigo  · Cardiovascular: atypical chest heaviness 4 times an hour for two days  · Respiratory: No cough or wheezing    · Gastrointestinal: No abdominal pain, appetite loss, blood in stools, constipation, diarrhea or heartburn  · Genitourinary: No dysuria, trouble voiding, or hematuria  · Musculoskeletal:  No gait disturbance, weakness or joint complaints  · Integumentary: No rash or pruritis  · Neurological: No TIA or stroke symptoms  · Psychiatric: No anxiety or depression  · Endocrine: No malaise, fatigue or temperature intolerance  · Hematologic/Lymphatic: No bleeding problems, blood clots or swollen lymph nodes  · Allergic/Immunologic: No nasal congestion or hives      Objective Data:     BP (!) 142/88   Pulse 58   Temp 98 °F (36.7 °C) (Oral)   Resp 18   Ht 5' 2\" (1.575 m)   Wt 177 lb 0.5 oz (80.3 kg)   SpO2 98%   BMI 32.38 kg/m²     General appearance: alert, appears stated age and cooperative  Alert, awake, oriented x 3  Eyes:  No erythema  Head: atraumatic  Neck:  no JVD  Lungs: clear to auscultation bilaterally  Heart: normal apical impulse and irregularly irregular rhythm  Abdomen: soft, non-tender; bowel sounds normal; no masses,  no organomegaly  Extremities: extremities normal, atraumatic, no cyanosis or edema  Skin: Skin color, texture, turgor normal. No rashes or lesions  Hematologic: no remarkable bruising       ECG: none this admit yet   Monitor reveals sinus arrhythmia, fairly marked      Data Review    No results for input(s): NA, K, CL, CO2, PHOS, BUN, CREATININE in the last 72 hours. Invalid input(s): CA  No results for input(s): WBC, HGB, HCT, MCV, PLT in the last 72 hours.   Lab Results   Component Value Date    TROPONINI <0.01 12/10/2019         Assessment:     Active Problems:    Sinus arrhythmia    Postablative hypothyroidism  Resolved Problems:    * No resolved hospital problems. *      Plan:     1. The patient has fairly marked sinus arrhythmia in the presence of mestinon and hypothyroidism. Suspect the latter two are exaggerating her respiratory pattern. Suspect treatment of hypothyroidism will lessen respiratory pattern. . She does not tell me of any sx that are worrisome for bradycardia sx, but will follow. Echo and thyroid studies ordered.

## 2020-08-05 NOTE — FLOWSHEET NOTE
Readmission Assessment  Number of Days since last admission?: (P) 8-30 days  Previous Disposition: (P) Home with Family  Who is being Interviewed: (P) Patient  What was the patient's/caregiver's perception as to why they think they needed to return back to the hospital?: (P) Other (Comment)(Because there is something going on with my heart)  Did you visit your Primary Care Physician after you left the hospital, before you returned this time?: (P) No  Why weren't you able to visit your PCP?: (P) Did not have an appointment  Did you see a specialist, such as Cardiac, Pulmonary, Orthopedic Physician, etc. after you left the hospital?: (P) No  Who advised the patient to return to the hospital?: (P) Physician  Does the patient report anything that got in the way of taking their medications?: (P) No  In our efforts to provide the best possible care to you and others like you, can you think of anything that we could have done to help you after you left the hospital the first time, so that you might not have needed to return so soon?: (P) Other (Comment)(No)  Electronically signed by Ramiro Paul on 8/5/2020 at 2:36 PM

## 2020-08-06 LAB
ANION GAP SERPL CALCULATED.3IONS-SCNC: 14 MMOL/L (ref 3–16)
BUN BLDV-MCNC: 14 MG/DL (ref 7–20)
CALCIUM SERPL-MCNC: 8.9 MG/DL (ref 8.3–10.6)
CHLORIDE BLD-SCNC: 97 MMOL/L (ref 99–110)
CO2: 28 MMOL/L (ref 21–32)
CREAT SERPL-MCNC: 0.8 MG/DL (ref 0.6–1.1)
GFR AFRICAN AMERICAN: >60
GFR NON-AFRICAN AMERICAN: >60
GLUCOSE BLD-MCNC: 82 MG/DL (ref 70–99)
HCT VFR BLD CALC: 38.3 % (ref 36–48)
HEMOGLOBIN: 12.8 G/DL (ref 12–16)
LV EF: 58 %
LVEF MODALITY: NORMAL
MAGNESIUM: 2.2 MG/DL (ref 1.8–2.4)
MCH RBC QN AUTO: 30.8 PG (ref 26–34)
MCHC RBC AUTO-ENTMCNC: 33.4 G/DL (ref 31–36)
MCV RBC AUTO: 92.2 FL (ref 80–100)
PDW BLD-RTO: 15.7 % (ref 12.4–15.4)
PLATELET # BLD: 243 K/UL (ref 135–450)
PMV BLD AUTO: 7.2 FL (ref 5–10.5)
POTASSIUM REFLEX MAGNESIUM: 3.5 MMOL/L (ref 3.5–5.1)
RBC # BLD: 4.16 M/UL (ref 4–5.2)
SODIUM BLD-SCNC: 139 MMOL/L (ref 136–145)
TSH SERPL DL<=0.05 MIU/L-ACNC: 188.5 UIU/ML (ref 0.27–4.2)
WBC # BLD: 6.3 K/UL (ref 4–11)

## 2020-08-06 PROCEDURE — 6360000002 HC RX W HCPCS: Performed by: NURSE PRACTITIONER

## 2020-08-06 PROCEDURE — 84443 ASSAY THYROID STIM HORMONE: CPT

## 2020-08-06 PROCEDURE — 30233S1 TRANSFUSION OF NONAUTOLOGOUS GLOBULIN INTO PERIPHERAL VEIN, PERCUTANEOUS APPROACH: ICD-10-PCS | Performed by: INTERNAL MEDICINE

## 2020-08-06 PROCEDURE — 99233 SBSQ HOSP IP/OBS HIGH 50: CPT | Performed by: INTERNAL MEDICINE

## 2020-08-06 PROCEDURE — 94761 N-INVAS EAR/PLS OXIMETRY MLT: CPT

## 2020-08-06 PROCEDURE — 2700000000 HC OXYGEN THERAPY PER DAY

## 2020-08-06 PROCEDURE — 36415 COLL VENOUS BLD VENIPUNCTURE: CPT

## 2020-08-06 PROCEDURE — 99232 SBSQ HOSP IP/OBS MODERATE 35: CPT | Performed by: NURSE PRACTITIONER

## 2020-08-06 PROCEDURE — 6370000000 HC RX 637 (ALT 250 FOR IP): Performed by: HOSPITALIST

## 2020-08-06 PROCEDURE — 93306 TTE W/DOPPLER COMPLETE: CPT

## 2020-08-06 PROCEDURE — 85027 COMPLETE CBC AUTOMATED: CPT

## 2020-08-06 PROCEDURE — 83735 ASSAY OF MAGNESIUM: CPT

## 2020-08-06 PROCEDURE — 2060000000 HC ICU INTERMEDIATE R&B

## 2020-08-06 PROCEDURE — 80048 BASIC METABOLIC PNL TOTAL CA: CPT

## 2020-08-06 PROCEDURE — 6360000002 HC RX W HCPCS: Performed by: HOSPITALIST

## 2020-08-06 PROCEDURE — 2580000003 HC RX 258: Performed by: HOSPITALIST

## 2020-08-06 RX ORDER — LEVOTHYROXINE SODIUM 0.03 MG/1
25 TABLET ORAL ONCE
Status: COMPLETED | OUTPATIENT
Start: 2020-08-06 | End: 2020-08-06

## 2020-08-06 RX ADMIN — LORAZEPAM 0.5 MG: 0.5 TABLET ORAL at 19:28

## 2020-08-06 RX ADMIN — PYRIDOSTIGMINE BROMIDE 90 MG: 60 TABLET ORAL at 19:29

## 2020-08-06 RX ADMIN — ZOLPIDEM TARTRATE 10 MG: 5 TABLET ORAL at 21:21

## 2020-08-06 RX ADMIN — LEVOTHYROXINE SODIUM 150 MCG: 0.07 TABLET ORAL at 06:39

## 2020-08-06 RX ADMIN — CITALOPRAM HYDROBROMIDE 40 MG: 20 TABLET ORAL at 12:15

## 2020-08-06 RX ADMIN — ESTRADIOL 2 MG: 1 TABLET ORAL at 21:21

## 2020-08-06 RX ADMIN — DOXEPIN HYDROCHLORIDE 10 MG: 10 CAPSULE ORAL at 21:21

## 2020-08-06 RX ADMIN — SODIUM CHLORIDE, PRESERVATIVE FREE 10 ML: 5 INJECTION INTRAVENOUS at 21:22

## 2020-08-06 RX ADMIN — LORAZEPAM 0.5 MG: 0.5 TABLET ORAL at 12:15

## 2020-08-06 RX ADMIN — SODIUM CHLORIDE, PRESERVATIVE FREE 10 ML: 5 INJECTION INTRAVENOUS at 12:27

## 2020-08-06 RX ADMIN — BUTALBITAL, ACETAMINOPHEN, AND CAFFEINE 1 TABLET: 50; 325; 40 TABLET ORAL at 12:26

## 2020-08-06 RX ADMIN — PYRIDOSTIGMINE BROMIDE 90 MG: 60 TABLET ORAL at 00:03

## 2020-08-06 RX ADMIN — IMMUNE GLOBULIN (HUMAN) 20 G: 10 INJECTION INTRAVENOUS; SUBCUTANEOUS at 17:55

## 2020-08-06 RX ADMIN — IMMUNE GLOBULIN (HUMAN) 20 G: 10 INJECTION INTRAVENOUS; SUBCUTANEOUS at 16:58

## 2020-08-06 RX ADMIN — FOLIC ACID 1 MG: 1 TABLET ORAL at 12:14

## 2020-08-06 RX ADMIN — PYRIDOSTIGMINE BROMIDE 90 MG: 60 TABLET ORAL at 12:15

## 2020-08-06 RX ADMIN — ENOXAPARIN SODIUM 40 MG: 40 INJECTION SUBCUTANEOUS at 12:16

## 2020-08-06 RX ADMIN — FAMOTIDINE 20 MG: 20 TABLET ORAL at 12:14

## 2020-08-06 RX ADMIN — FAMOTIDINE 20 MG: 20 TABLET ORAL at 21:21

## 2020-08-06 RX ADMIN — LEVOTHYROXINE SODIUM 25 MCG: 0.03 TABLET ORAL at 19:28

## 2020-08-06 RX ADMIN — POTASSIUM BICARBONATE 40 MEQ: 782 TABLET, EFFERVESCENT ORAL at 06:50

## 2020-08-06 ASSESSMENT — PAIN DESCRIPTION - DIRECTION: RADIATING_TOWARDS: NO

## 2020-08-06 ASSESSMENT — PAIN DESCRIPTION - ORIENTATION
ORIENTATION: OTHER (COMMENT)
ORIENTATION: LEFT

## 2020-08-06 ASSESSMENT — PAIN DESCRIPTION - FREQUENCY
FREQUENCY: INTERMITTENT

## 2020-08-06 ASSESSMENT — PAIN SCALES - GENERAL
PAINLEVEL_OUTOF10: 2
PAINLEVEL_OUTOF10: 7
PAINLEVEL_OUTOF10: 2
PAINLEVEL_OUTOF10: 3

## 2020-08-06 ASSESSMENT — PAIN DESCRIPTION - PAIN TYPE
TYPE: CHRONIC PAIN

## 2020-08-06 ASSESSMENT — PAIN DESCRIPTION - ONSET
ONSET: ON-GOING

## 2020-08-06 ASSESSMENT — PAIN DESCRIPTION - LOCATION
LOCATION: HEAD
LOCATION: ARM;CHEST

## 2020-08-06 ASSESSMENT — PAIN DESCRIPTION - PROGRESSION
CLINICAL_PROGRESSION: GRADUALLY IMPROVING
CLINICAL_PROGRESSION: NOT CHANGED

## 2020-08-06 ASSESSMENT — PAIN - FUNCTIONAL ASSESSMENT
PAIN_FUNCTIONAL_ASSESSMENT: ACTIVITIES ARE NOT PREVENTED
PAIN_FUNCTIONAL_ASSESSMENT: PREVENTS OR INTERFERES SOME ACTIVE ACTIVITIES AND ADLS
PAIN_FUNCTIONAL_ASSESSMENT: PREVENTS OR INTERFERES SOME ACTIVE ACTIVITIES AND ADLS
PAIN_FUNCTIONAL_ASSESSMENT: ACTIVITIES ARE NOT PREVENTED

## 2020-08-06 ASSESSMENT — PAIN DESCRIPTION - DESCRIPTORS
DESCRIPTORS: OTHER (COMMENT)
DESCRIPTORS: ACHING
DESCRIPTORS: HEADACHE

## 2020-08-06 NOTE — CONSULTS
Neurology Consult Note  Reason for Consult: Myasthenia gravis       Chief complaint: \"I have chest tightness and feel like I am having a MG exacerbation\"    Dr Mohan Ibarra MD asked me to see Geeta Diaz in consultation for evaluation of Myasthenia gravis       History of Present Illness:  I obtained my information via the patient, supplemented with chart review. Geeta Diaz is a 48 y.o. female with hx of thyroid cancer s/p thyroidectomy, migraines, anxiety, psoriasis, seizures- had 1 multiple years ago not on AED; and myasthenia gravis follows with Dr. Benjamin Lord of our practice, who presents to the ED on 8/5/20 for evaluation of chest fluttering, and chest heaviness that radiated into her left arm which prompted her to seek evaluation. Today per my encounter the patient is tearful regarding uncertainty of cardiac status, her TSH is significantly elevated and feels like her MD symptoms are progressively worsening. \"I am just a mess\" She states that she was evaluated by Dr. Benjamin Lord last week for concern of worsening MG symptoms. Her insurance would not improve IVIG at that time so she was started on 40mg prednisone TID for which she completed her last dose on Monday. She states that she does not feel like it has given her any relief and that since being admitted her typical MG symptoms of general weakness have progressively worsened. She now endorses some double vision,  difficulty swallowing-feel like my throat is \"swollen\"; feels that her voice is raspy, and feels significantly more fatigued. When asked if she feels short of breath she has difficulty answering stating \"I feel like my chest is tight\" but later states \"I may be a little short of breath\". She said she has had similar symptoms of this severity in the past that eventually required intubation. She states that she has had IVIG treatment in the past for similar presentations with success.      Her home medications include: 90mg Mestinon TID, Methotrexate 10mg once weekly for psoriasis, levothyroxine 125mcg daily.        Medical History:  Past Medical History:   Diagnosis Date    Anxiety     Arthritis     [facility] ([facility]     thyroid    GERD (gastroesophageal reflux disease)     Hyperlipidemia     Insomnia     Migraines     Myasthenia gravis with exacerbation (HCC)     Seizures ([facility]     Thyroid disease      Past Surgical History:   Procedure Laterality Date    APPENDECTOMY      CAPSULE ENDOSCOPY N/A 2/28/2020    ESOPHAGEAL CAPSULE ENDOSCOPY performed by Mirela Barrett MD at [facility] 12/17/2019    COLONOSCOPY performed by Estelle Wan MD at [address] 2/11/2020    ESOPHAGEAL MANOMETRY performed by Mirela Barrett MD at [address]      rotator cuff repair- bilateral    THYROIDECTOMY      TUMOR REMOVAL      UPPER GASTROINTESTINAL ENDOSCOPY N/A 12/17/2019    EGD BIOPSY performed by Estelle Wan MD at [facility] 12/17/2019    EGD DILATION SAVORY performed by Estelle Wan MD at [facility]  2/28/2020    EGD DIAGNOSTIC ONLY performed by Mirela Barrett MD at [facility]  2/28/2020    Esophagogastroduodenoscopy with Bravo Capsule placement performed by Mirela Barrett MD at [facility]:   citalopram  40 mg Oral Daily    doxepin  10 mg Oral Nightly    estradiol  2 mg Oral Nightly    folic acid  1 mg Oral Daily    levothyroxine  150 mcg Oral Daily    LORazepam  0.5 mg Oral BID    pyridostigmine  90 mg Oral TID    zolpidem  10 mg Oral Nightly    sodium chloride flush  10 mL Intravenous 2 times per day    famotidine  20 mg Oral BID    enoxaparin  40 mg Subcutaneous Daily    [START ON 8/10/2020] methotrexate  10 mg Oral Weekly     Continuous hearing grossly intact to conversation. CN9/10: palate elevated symmetrically  CN11: trap full strength on shoulder shrug bilaterally, SCM without weakness. CN12: tongue midline with protrusion. Able to move tongue side to side. Strength: Grasp: BUE 4/5   RUE: 3+/5 Shoulder abduction, elbow flexion, elbow extension. LUE: 3+/5  Shoulder abduction, elbow flexion, elbow extension. Dorsiflexion: R 4/5, L 5/5 ;  Plantar flexion: R 4/5, L 5/5  RLE: 4/5 Hip flexion, Knee flexion, Knee extension. LLE: 4/5 Hip flexion, Knee flexion, Knee extension. Neck: weak neck flexion. Deep tendon reflexes:   R Bicep: 2+  R Brachioradialis: 2+  R Patellar: 2+     L Bicep 2+ L Brachioradialis[de-identified] 2+  L Patellar: 2+     Sensory: light touch intact in all 4 extremities. . No sensory extinction on double simultaneous stimulation  Cerebellar/coordination: finger nose finger normal without ataxia  Tone: normal in all 4 extremities  Gait: deferred for safety. Labs  Na: 139  K: 3.5  BUN: 14  Creatinine: 0.8  Glucose: 82  Calcium: 8.9    TSH: 188.5  T3 free: <0.3  T4 free: <0.1    WBC: 6.3  RBC: 4.16  Hgb: 12.8  Hct: 38.3  Platelets: 688    Urine studies: none on file. Studies  No neuro imaging  for this admission. ECHO: pending    Impression  1. General weakness  2. Dysphagia  3. Subjective voice change  4. Myasthenia Gravis  5. Anxiety    Sahra Gannon is a 48 y.o. female with hx of anxiety and MG who presents for evaluation of chest fluttering and tightness. Has had recent concern for worsening MG symptoms recently finished prednisone dose of 40mg TID with no relief and feels she is progressively becoming generally weak, with dysphagia \"throat swollen\", raspy voice, and subjective double vision. Exam notable for  general weakness and weak neck flexion. Her SpO2 is 96-98 intermittently on 2L O2 for headache. Concern for possible MG exacerbation, anxiety of cardiac status could be contributing as well.

## 2020-08-06 NOTE — PROGRESS NOTES
Hospitalist Progress Note  8/6/2020 10:50 AM    PCP: Nicola Wadsworth MD    [phone]     Date of Admission: 8/5/2020                                                                                                                     HOSPITAL COURSE    Patient demographics:  The patient  Rodrigo Boyer is a 48 y.o. female     Significant past medical history:   Patient Active Problem List   Diagnosis    Myasthenia gravis ([address]    Myasthenia gravis with (acute) exacerbation ([address]    Appendicitis    Neck stiffness    Generalized weakness    Myasthenia gravis with exacerbation, adult form ([address]    Hyperlipidemia    Seizure ([address]    GERD (gastroesophageal reflux disease)    Migraine    Anxiety    Insomnia    Myasthenia gravis with exacerbation (HCC)    Bradycardia    Sinus arrhythmia    Postablative hypothyroidism         Presenting symptoms:  Palpitation    Diagnostic workup:      CONSULTS DURING ADMISSION :   IP CONSULT TO CARDIOLOGY  IP CONSULT TO NEUROLOGY      Patient was diagnosed with:        Treatment while inpatient:                                                                                         ----------------------------------------------------------      SUBJECTIVE COMPLAINTS- follow up for palpitation and bradycardia. Diet: DIET GENERAL;      OBJECTIVE:   Patient Active Problem List   Diagnosis    Myasthenia gravis ([address]    Myasthenia gravis with (acute) exacerbation (HCC)    Appendicitis    Neck stiffness    Generalized weakness    Myasthenia gravis with exacerbation, adult form ([address]    Hyperlipidemia    Seizure ([address]    GERD (gastroesophageal reflux disease)    Migraine    Anxiety    Insomnia    Myasthenia gravis with exacerbation (HCC)    Bradycardia    Sinus arrhythmia    Postablative hypothyroidism       Allergies  Immune globulins; Iodides;  Other; Adhesive tape; and Penicillins    Medications    Scheduled Meds:   citalopram  40 mg Oral Daily    doxepin  10 mg Oral Nightly    estradiol  2 mg Oral Nightly    folic acid  1 mg Oral Daily    levothyroxine  150 mcg Oral Daily    LORazepam  0.5 mg Oral BID    pyridostigmine  90 mg Oral TID    zolpidem  10 mg Oral Nightly    sodium chloride flush  10 mL Intravenous 2 times per day    famotidine  20 mg Oral BID    enoxaparin  40 mg Subcutaneous Daily    [START ON 8/10/2020] methotrexate  10 mg Oral Weekly     Continuous Infusions:  PRN Meds:  butalbital-acetaminophen-caffeine, sodium chloride flush, potassium chloride **OR** potassium alternative oral replacement **OR** potassium chloride, acetaminophen **OR** acetaminophen, polyethylene glycol, promethazine **OR** ondansetron, perflutren lipid microspheres    Vitals   Vitals /wt   Patient Vitals for the past 8 hrs:   BP Temp Temp src Pulse Resp SpO2 Weight   08/06/20 0748 (!) 143/78 98 °F (36.7 °C) Oral 50 16 96 % --   08/06/20 0747 -- -- -- -- -- 99 % --   08/06/20 0459 116/68 97.5 °F (36.4 °C) Oral (!) 48 16 99 % 166 lb 0.1 oz (75.3 kg)        72HR INTAKE/OUTPUT:      Intake/Output Summary (Last 24 hours) at 8/6/2020 1050  Last data filed at 8/5/2020 1648  Gross per 24 hour   Intake 360 ml   Output --   Net 360 ml       Exam:    Gen:   Alert and oriented ×3   Eyes: PERRL. No sclera icterus. No conjunctival injection. ENT: No discharge. Pharynx clear. External appearance of ears and nose normal.  Neck: Trachea midline. No obvious mass. Resp: No accessory muscle use. No crackles. No wheezes. No rhonchi. CV: Regular rate. Regular rhythm. No murmur or rub. No edema. GI: Non-tender. Non-distended. No hernia. Skin: Warm, dry, normal texture and turgor. Lymph: No cervical LAD. No supraclavicular LAD. M/S: / Ext. No cyanosis. No clubbing. No joint deformity. Neuro: CN 2-12 are intact,  no neurologic deficits noted. PT/INR: No results for input(s): PROTIME, INR in the last 72 hours. APTT: No results for input(s):  APTT in the last 72 hours.    CBC:   Recent Labs     08/06/20  0541   WBC 6.3   HGB 12.8   HCT 38.3   MCV 92.2          BMP:   Recent Labs     08/06/20  0541      K 3.5   CL 97*   CO2 28   BUN 14   CREATININE 0.8       LIVER PROFILE: No results for input(s): ALKPHOS, AST, ALT, ALB, BILIDIR, BILITOT, ALKPHOS in the last 72 hours. No results for input(s): AMYLASE in the last 72 hours. No results for input(s): LIPASE in the last 72 hours. UA:No results for input(s): NITRITE, LABCAST, WBCUA, RBCUA, MUCUS in the last 72 hours. TROPONIN: No results for input(s): [employer] in the last 72 hours. Lab Results   Component Value Date/Time    URRFLXCULT Not Indicated 06/30/2020 06:20 PM       No results for input(s): TSHREFLEX in the last 72 hours. No components found for: [id number]  POC GLUCOSE:  No results for input(s): POCGLU in the last 72 hours. No results for input(s): LABA1C in the last 72 hours. Lab Results   Component Value Date    LABA1C 5.1 11/06/2019         ASSESSMENT AND PLAN  Hypothyroidism  Significantly elevated TSH and low T3 and T4  Pharmacy confirmed patient's noncompliance with Synthroid  Continue on home dose of Synthroid     Myasthenia gravis  Neurology consult is appreciated  Plan is to continue Mestinon  And start patient on IVIG treatment        Bradycardia  Cardiology consult is appreciated  No further intervention is recommended  Echocardiogram is unremarkable  Monitor on telemetry        Anxiety depression F 41.9  Continue on home medication                Code Status: Full Code        Dispo - cc        The patient and / or the family were informed of the results of any tests, a time was given to answer questions, a plan was proposed and they agreed with plan. Jose Thapa MD    This note was transcribed using [id number] [employer]. Please disregard any translational errors.

## 2020-08-06 NOTE — PROGRESS NOTES
[facility]              Progress Note      Admit Date 2020  HPI: Apparently she can not find her thyroid medication at home thus not taking which explains her condition of hypothyroidism. That will exaggerate her MG and respiratory pattern and logically increase the mestinon in system, thus more bradycardia. Interval history:     Ms. Perez Valdovinos denies chest pain, shortness of breath, palpitations      Subjective:       Scheduled Meds:   citalopram  40 mg Oral Daily    doxepin  10 mg Oral Nightly    estradiol  2 mg Oral Nightly    folic acid  1 mg Oral Daily    levothyroxine  150 mcg Oral Daily    LORazepam  0.5 mg Oral BID    pyridostigmine  90 mg Oral TID    zolpidem  10 mg Oral Nightly    sodium chloride flush  10 mL Intravenous 2 times per day    famotidine  20 mg Oral BID    enoxaparin  40 mg Subcutaneous Daily    [START ON 8/10/2020] methotrexate  10 mg Oral Weekly     Continuous Infusions:  PRN Meds:butalbital-acetaminophen-caffeine, sodium chloride flush, potassium chloride **OR** potassium alternative oral replacement **OR** potassium chloride, acetaminophen **OR** acetaminophen, polyethylene glycol, promethazine **OR** ondansetron, perflutren lipid microspheres       Objective:      Wt Readings from Last 3 Encounters:   20 166 lb 0.1 oz (75.3 kg)   20 168 lb 6.4 oz (76.4 kg)   20 167 lb 15.9 oz (76.2 kg)   Admit weight: Weight: 177 lb 0.5 oz (80.3 kg)      Temperature range over 24hrs:   Temp  Av.7 °F (36.5 °C)  Min: 97.4 °F (36.3 °C)  Max: 98 °F (36.7 °C)  Current Respiratory Rate:  Resp: 14  Current Pulse:  Pulse: 57  Current Blood Pressure:  BP: 122/84  24hr Blood Pressure Range:  Systolic (59XWK), JCB:567 , Min:106 , LNI:421   ; Diastolic (98OPN), OFO:23, Min:52, Max:88    Current Pulse Oximetry:  SpO2: 96 %      Intake/Output Summary (Last 24 hours) at 2020 1346  Last data filed at 2020 1648  Gross per 24 hour   Intake 360 ml   Output --   Net 360 ml       Telemetry monitor:     Physical Exam:  General:  Awake, alert, NAD  Skin:  Warm and dry  Neck:  No JVD  Chest:  Clear to auscultation, respiration easy  Cardiovascular:  RRR S1S2 no murmur to auscultation  Abdomen: Bowel sounds normal, abd soft, non-tender  Extremities:  No edema        Imaging    Echo:      Summary   Normal left ventricle size, wall thickness, and systolic function with an   estimated ejection fraction of 55-60%. No regional wall motion abnormalities   are seen. The right ventricle is not well visualized but appears grossly normal in   size and function. No significant valve abnormalities noted.       Signature      ------------------------------------------------------------------   Electronically signed by Bear Erwin MD   (Interpreting physician) on 08/06/2020 at 12:49 PM   ------------------------------------------------------------------        Lab Review     Renal Profile:   Lab Results   Component Value Date    CREATININE 0.8 08/06/2020    BUN 14 08/06/2020     08/06/2020    K 3.5 08/06/2020    CL 97 08/06/2020    CO2 28 08/06/2020     CBC:    Lab Results   Component Value Date    WBC 6.3 08/06/2020    RBC 4.16 08/06/2020    HGB 12.8 08/06/2020    HCT 38.3 08/06/2020    MCV 92.2 08/06/2020    RDW 15.7 08/06/2020     08/06/2020     BNP:  No results found for: BNP  Fasting Lipid Panel:  No results found for: CHOL, HDL, TRIG  Cardiac Enzymes:    Lab Results   Component Value Date    TROPONINI <0.01 12/10/2019     PT/ INR No results found for: INR, PROTIME  PTT No results found for: PTT   Lab Results   Component Value Date    MG 2.20 08/06/2020      Lab Results   Component Value Date    .50 08/06/2020       Assessment/Plan:     Patient Active Problem List   Diagnosis    [facility]henia [facility]is ([facility]    Myasthenia gravis with (acute) exacerbation ([facility]    Appendicitis    Neck stiffness    Generalized weakness    Myasthenia gravis with exacerbation, adult form ([address]    Hyperlipidemia    Seizure ([address]    GERD (gastroesophageal reflux disease)    Migraine    Anxiety    Insomnia    Myasthenia gravis with exacerbation (HCC)    Bradycardia    Sinus arrhythmia    Postablative hypothyroidism      The overnight respiratory pattern is likely apnea related in presence of hypothyroidism. There is currently no concern about her having sx related to respiratory  \"bradycardia' commonly termed sinus arrhythmia. Echo is normal.  Will follow as needed and see as outpatient if desired. Suspect as thyroid med reinstitution her heart rate will increase and respiratory pattern will lessen. Reviewed in detail with patient and daughter.

## 2020-08-06 NOTE — PROGRESS NOTES
hours) at 2020 1130  Last data filed at 2020 1648  Gross per 24 hour   Intake 360 ml   Output --   Net 360 ml     In: 360 [P.O.:360]  Out: -    Wt Readings from Last 3 Encounters:   20 166 lb 0.1 oz (75.3 kg)   20 168 lb 6.4 oz (76.4 kg)   20 167 lb 15.9 oz (76.2 kg)     Temp  Av.8 °F (36.6 °C)  Min: 97.4 °F (36.3 °C)  Max: 98.1 °F (36.7 °C)  Pulse  Av  Min: 38  Max: 58  BP  Min: 106/52  Max: 143/78  SpO2  Av.6 %  Min: 95 %  Max: 99 %    · Telemetry: Sinus rhythm with sinus arrhythmia  · Constitutional: Alert, in no acute distress. Appears stated age. · Head: Normocephalic and atraumatic. · Eyes: Conjunctivae normal. EOM are normal.   · Neck: Neck supple. No lymphadenopathy. No rigidity. No JVD present. · Cardiovascular: Normal rate, regular rhythm. No murmurs, rubs or gallops. No S3 or S4.  · Pulmonary/Chest: Clear breath sounds bilaterally. No crackles, wheezes or rhonchi. No respiratory accessory muscle use. · Abdominal: Soft. Normal bowel sounds present. No distension, No tenderness. · Musculoskeletal: No tenderness. No edema    · Lymphadenopathy: Has no cervical adenopathy. · Neurological: Alert and oriented. No gross deficits. · Skin: Skin is warm and dry. No rash, lesions, ulcerations noted. · Psychiatric: No anxiety or agitation. Labs, diagnostic and imaging results reviewed. Reviewed. Recent Labs     20  0541      K 3.5   CL 97*   CO2 28   BUN 14   CREATININE 0.8     Recent Labs     20  0541   WBC 6.3   HGB 12.8   HCT 38.3   MCV 92.2        Lab Results   Component Value Date    TROPONINI <0.01 12/10/2019     Estimated Creatinine Clearance: 80 mL/min (based on SCr of 0.8 mg/dL).    No results found for: BNP  No results found for: PROTIME, INR  No results found for: CHOL, HDL, TRIG    Scheduled Meds:   citalopram  40 mg Oral Daily    doxepin  10 mg Oral Nightly    estradiol  2 mg Oral Nightly    folic acid  1 mg Oral Daily    levothyroxine  150 mcg Oral Daily    LORazepam  0.5 mg Oral BID    pyridostigmine  90 mg Oral TID    zolpidem  10 mg Oral Nightly    sodium chloride flush  10 mL Intravenous 2 times per day    famotidine  20 mg Oral BID    enoxaparin  40 mg Subcutaneous Daily    [START ON 8/10/2020] methotrexate  10 mg Oral Weekly     Continuous Infusions:  PRN Meds:butalbital-acetaminophen-caffeine, sodium chloride flush, potassium chloride **OR** potassium alternative oral replacement **OR** potassium chloride, acetaminophen **OR** acetaminophen, polyethylene glycol, promethazine **OR** ondansetron, perflutren lipid microspheres     EC20 Berenice Romero)  Reviewed. Echo:  Pending. [facility]: 7/15/15 [facility])  Findings:       Left ventriculography and Hemodynamics    LVEDP 15 mm Hg   Estimated Ejection Fraction  Wall motion 50%  No RWMA   Valve function No MR or AS       Coronary angiography    Dominance  codominant LAD and RCA    Left Main  angiographically normal    Left Anterior Descending  angiographically normal; codominant    Left Circumflex  angiographically normal    Right  angiographically normal; codominant      Assessment and Plan:     Sinus bradycardia with sinus arrhythmia    - likely exaggerated respiratory response given marked hypothyroidism and Mestinon     - monitor read rates in the 20s overnight while sleeping, this is not accurate as it is a real time reading of the HR when it slows down - is not really this low if you average out the true BPMs   - pt asymptomatic    - K replaced this morning   - echo pending    Chest discomfort   - atypical for angina, C in  with patent coronaries   - sounds like more palpitations which may have correlated with PVCs seen on the monitor    Hypothyroidism     - , free T3 <0.3 and free T4 <0.1   - care per Hospitalist    Myasthenia gravis   - care per neuro    Discussed with BETH Silvestre  [address]  [address]  Phone: [phone]  Fax: [phone]    Electronically signed by BETH Vines CNP on 8/6/2020 at 11:30 AM

## 2020-08-06 NOTE — PLAN OF CARE
Problem: Pain:  Description: Pain management should include both nonpharmacologic and pharmacologic interventions.   Goal: Pain level will decrease  Description: Pain level will decrease  8/6/2020 0016 by Iva Monet RN  Outcome: Ongoing    Goal: Control of acute pain  Description: Control of acute pain  8/6/2020 0016 by Iva Monet RN  Outcome: Ongoing       Problem: Falls - Risk of:  Goal: Will remain free from falls  Description: Will remain free from falls  8/6/2020 0016 by Iva Monet RN  Outcome: Ongoing    Goal: Absence of physical injury  Description: Absence of physical injury  8/6/2020 0016 by Iva Monet RN  Outcome: Ongoing

## 2020-08-06 NOTE — PROGRESS NOTES
Patient stated that she was having double visions this morning since 0500. Also complaint of legs cramping. Patient's potassium level this morning was 3.5. Administered 40 mEq EFFER-K per replacement protocol.

## 2020-08-07 PROCEDURE — 6360000002 HC RX W HCPCS: Performed by: HOSPITALIST

## 2020-08-07 PROCEDURE — 6370000000 HC RX 637 (ALT 250 FOR IP): Performed by: HOSPITALIST

## 2020-08-07 PROCEDURE — 99231 SBSQ HOSP IP/OBS SF/LOW 25: CPT | Performed by: NURSE PRACTITIONER

## 2020-08-07 PROCEDURE — 6360000002 HC RX W HCPCS: Performed by: PSYCHIATRY & NEUROLOGY

## 2020-08-07 PROCEDURE — 2580000003 HC RX 258: Performed by: HOSPITALIST

## 2020-08-07 PROCEDURE — 94760 N-INVAS EAR/PLS OXIMETRY 1: CPT

## 2020-08-07 PROCEDURE — 2060000000 HC ICU INTERMEDIATE R&B

## 2020-08-07 PROCEDURE — 6360000002 HC RX W HCPCS: Performed by: NURSE PRACTITIONER

## 2020-08-07 RX ORDER — LEVOTHYROXINE SODIUM ANHYDROUS 100 UG/5ML
62.5 INJECTION, POWDER, LYOPHILIZED, FOR SOLUTION INTRAVENOUS DAILY
Status: COMPLETED | OUTPATIENT
Start: 2020-08-08 | End: 2020-08-09

## 2020-08-07 RX ORDER — LEVOTHYROXINE SODIUM 0.12 MG/1
125 TABLET ORAL DAILY
Status: DISCONTINUED | OUTPATIENT
Start: 2020-08-10 | End: 2020-08-09

## 2020-08-07 RX ORDER — SODIUM CHLORIDE 9 MG/ML
5 INJECTION INTRAVENOUS DAILY
Status: DISCONTINUED | OUTPATIENT
Start: 2020-08-08 | End: 2020-08-09 | Stop reason: HOSPADM

## 2020-08-07 RX ORDER — LEVOTHYROXINE SODIUM 0.12 MG/1
125 TABLET ORAL DAILY
Status: DISCONTINUED | OUTPATIENT
Start: 2020-08-07 | End: 2020-08-07

## 2020-08-07 RX ADMIN — IMMUNE GLOBULIN (HUMAN) 20 G: 10 INJECTION INTRAVENOUS; SUBCUTANEOUS at 10:47

## 2020-08-07 RX ADMIN — IMMUNE GLOBULIN (HUMAN) 20 G: 10 INJECTION INTRAVENOUS; SUBCUTANEOUS at 14:00

## 2020-08-07 RX ADMIN — FOLIC ACID 1 MG: 1 TABLET ORAL at 09:25

## 2020-08-07 RX ADMIN — ZOLPIDEM TARTRATE 10 MG: 5 TABLET ORAL at 20:42

## 2020-08-07 RX ADMIN — PYRIDOSTIGMINE BROMIDE 90 MG: 60 TABLET ORAL at 00:05

## 2020-08-07 RX ADMIN — PYRIDOSTIGMINE BROMIDE 90 MG: 60 TABLET ORAL at 09:25

## 2020-08-07 RX ADMIN — ENOXAPARIN SODIUM 40 MG: 40 INJECTION SUBCUTANEOUS at 09:25

## 2020-08-07 RX ADMIN — ESTRADIOL 2 MG: 1 TABLET ORAL at 20:42

## 2020-08-07 RX ADMIN — SODIUM CHLORIDE, PRESERVATIVE FREE 10 ML: 5 INJECTION INTRAVENOUS at 20:46

## 2020-08-07 RX ADMIN — SODIUM CHLORIDE, PRESERVATIVE FREE 10 ML: 5 INJECTION INTRAVENOUS at 09:29

## 2020-08-07 RX ADMIN — CITALOPRAM HYDROBROMIDE 40 MG: 20 TABLET ORAL at 09:25

## 2020-08-07 RX ADMIN — LORAZEPAM 0.5 MG: 0.5 TABLET ORAL at 09:25

## 2020-08-07 RX ADMIN — LEVOTHYROXINE SODIUM 125 MCG: 0.12 TABLET ORAL at 06:39

## 2020-08-07 RX ADMIN — FAMOTIDINE 20 MG: 20 TABLET ORAL at 09:25

## 2020-08-07 RX ADMIN — DOXEPIN HYDROCHLORIDE 10 MG: 10 CAPSULE ORAL at 20:42

## 2020-08-07 RX ADMIN — ACETAMINOPHEN 650 MG: 325 TABLET ORAL at 20:42

## 2020-08-07 RX ADMIN — PYRIDOSTIGMINE BROMIDE 90 MG: 60 TABLET ORAL at 18:08

## 2020-08-07 RX ADMIN — FAMOTIDINE 20 MG: 20 TABLET ORAL at 20:42

## 2020-08-07 RX ADMIN — LORAZEPAM 0.5 MG: 0.5 TABLET ORAL at 18:09

## 2020-08-07 ASSESSMENT — PAIN DESCRIPTION - LOCATION: LOCATION: HEAD

## 2020-08-07 ASSESSMENT — PAIN DESCRIPTION - DESCRIPTORS: DESCRIPTORS: HEADACHE

## 2020-08-07 ASSESSMENT — PAIN SCALES - GENERAL
PAINLEVEL_OUTOF10: 1
PAINLEVEL_OUTOF10: 3

## 2020-08-07 NOTE — PROGRESS NOTES
Progress Note  The patient is being evaluated for MG exacerbation. Updates  Receiving 2/3 IVIG during my encounter. Feels like her speech and weakness is somewhat improved. Continues to feel significantly fatigued. No acute events overnight. SPO2 92-97 on room air overnight. States she does not want to be admitted all weekend.      Active Ambulatory Problems     Diagnosis Date Noted    Myasthenia gravis ([facility] 10/13/2019    Myasthenia gravis with (acute) exacerbation ([facility] 11/04/2019    Appendicitis 11/10/2019    Neck stiffness 11/10/2019    Generalized weakness 12/10/2019    Myasthenia gravis with exacerbation, adult form ([facility] 12/11/2019    Hyperlipidemia 06/30/2020    Seizure ([facility] 06/30/2020    GERD (gastroesophageal reflux disease) 06/30/2020    Migraine 06/30/2020    Anxiety 06/30/2020    Insomnia 06/30/2020    Myasthenia gravis with exacerbation ([facility] 06/30/2020     Resolved Ambulatory Problems     Diagnosis Date Noted    No Resolved Ambulatory Problems     Past Medical History:   Diagnosis Date    Arthritis     Cancer ([facility]     Migraines     Seizures ([facility]     Thyroid disease        Current Facility-Administered Medications:     levothyroxine (SYNTHROID) tablet 125 mcg, 125 mcg, Oral, Daily, Modesto Bush MD, 125 mcg at 08/07/20 0639    immune globulin (GAMUNEX-C) 10% solution 20 g, 20 g, Intravenous, Daily, Dipika Valdivia DO, 20 g at 08/07/20 1047    [DISCONTINUED] immune globulin (GAMUNEX-C) 10% solution 20 g, 20 g, Intravenous, Daily, 20 g at 08/06/20 1755 **AND** immune globulin (GAMUNEX-C) 10% solution 20 g, 20 g, Intravenous, Daily, BETH Maurer - CNP, 20 g at 08/06/20 1658    butalbital-acetaminophen-caffeine (FIORICET, ESGIC) per tablet 1 tablet, 1 tablet, Oral, Q4H PRN, Modesto Bush MD, 1 tablet at 08/06/20 1226    citalopram (CELEXA) tablet 40 mg, 40 mg, Oral, Daily, Modesto Bush MD, 40 mg at 08/07/20 0925    doxepin (SINEQUAN) capsule 10 methotrexate (RHEUMATREX) chemo tablet 10 mg, 10 mg, Oral, Weekly, Rebecca Yan MD      ROS -   Eyes- No diplopia. No photophobia. Ears/nose/throat- No dysphagia. No Dysarthria  Cardiovascular- No palpitations. No chest pain  Respiratory- No dyspnea. No Cough  Endocrine- No diabetes. + thyroid issues. Hematologic- No bleeding difficulty. + fatigue  Neurologic- + weakness. No Headache.  levothyroxine  125 mcg Oral Daily    immune globulin  20 g Intravenous Daily    immune globulin  20 g Intravenous Daily    citalopram  40 mg Oral Daily    doxepin  10 mg Oral Nightly    estradiol  2 mg Oral Nightly    folic acid  1 mg Oral Daily    LORazepam  0.5 mg Oral BID    pyridostigmine  90 mg Oral TID    zolpidem  10 mg Oral Nightly    sodium chloride flush  10 mL Intravenous 2 times per day    famotidine  20 mg Oral BID    enoxaparin  40 mg Subcutaneous Daily    [START ON 8/10/2020] methotrexate  10 mg Oral Weekly             butalbital-acetaminophen-caffeine, sodium chloride flush, potassium chloride **OR** potassium alternative oral replacement **OR** potassium chloride, acetaminophen **OR** acetaminophen, polyethylene glycol, promethazine **OR** ondansetron, perflutren lipid microspheres     Exam:  Blood pressure 126/78, pulse 61, temperature 97.5 °F (36.4 °C), temperature source Oral, resp. rate 16, height 5' 2\" (1.575 m), weight 168 lb 3.4 oz (76.3 kg), SpO2 97 %. Constitutional    Vital signs: BP, HR, and RR reviewed   General Alert, no distress, well-nourished  Eyes: unable to visualize the fundi  Cardiovascular: pulses symmetric in all 4 extremities. No peripheral edema. Psychiatric: cooperative with examination, no  psychotic behavior noted.   Neurologic  Mental status:   orientation to person, place, time and situation   General fund of knowledge:  grossly intact   Memory: Short term and long term intact   Attention intact as able to attend well to the exam     Language fluent in conversation   Comprehension intact; follows simple commands  Cranial nerves:   CN2: Visual Fields full w/o extinction on confrontational testing,   CN 3,4,6: extraocular muscles intact,  CN7: upper and lower facial symmetric without dysarthria  CN8: hearing grossly intact to conversation. CN12: tongue midline with protrusion. Able to move tongue side to side. Strength: Grasp: BUE 4/5  RUE: 4/5 Shoulder abduction, Elbow Flexion, Elbow Extension. LUE: 4/5  Shoulder abduction, Elbow Flexion, Elbow Extension. No pronator drift bilaterally. RLE: 4/5 Hip flexion, Knee flexion, Knee extension. LLE: 4/5  Hip flexion, Knee flexion, Knee extension. Sensory: light touch intact in all 4 extremities. Cerebellar/coordination: finger nose finger normal without ataxia  Tone: normal in all 4 extremities  Gait: deferred for safety. Labs  .5   T3 free <0.3  T4 free <0.1    WBC: 6.3      Studies  No Neuro imaging on file. ECHO 8/6/20: Normal left ventricle size, wall thickness, and systolic function with an   estimated ejection fraction of 55-60%. No regional wall motion abnormalities   are seen. The right ventricle is not well visualized but appears grossly normal in   size and function. No significant valve abnormalities noted. Impression  1. General weakness  2. Myasthenia Gravis  3. Fatigue     Juanjose Marte is a 48 y.o. female with hx of anxiety and MG who presents for evaluation of chest fluttering and tightness, bradycardia. TSH significantly elevated with low free T3 T4. Has had recent concern for worsening MG symptoms recently finished prednisone dose of 40mg TID with no relief and feels she is progressively becoming generally weak, with dysphagia \"throat swollen\", raspy voice, and subjective double vision. Exam notable for fatigue, and  improvement in general weakness, otherwise symptoms have resolved. Ml Omalley  Her SpO2 has been stable on room air.      Concern for possible MG exacerbation, anxiety of cardiac status could be contributing as well. Recommendations  - Continue IVIG for 3 days total. If it is not felt that the patient is back at baseline can do for 2 more doses. ( IVIG Gamunex-C 40g)  - Continue home mestinon   - If patient were to again develop shortness of breath, or to have worsening of her MG symptoms would Recommend q4H FVC, and NIF until a baseline can be established then can reconsider frequency. - Your medical management otherwise. - Follow up with Dr. [place] Likes shortly after discharge. Neurology will follow as needed over the weekend.  Please call with any questions, concerns, or decline in exam.    [facility], [address] Neurology    A copy of this note was provided for Dr Aditya Tolbert MD

## 2020-08-07 NOTE — PLAN OF CARE
Problem: Pain:  Goal: Pain level will decrease  Description: Pain level will decrease  Outcome: Ongoing     Problem: Pain:  Goal: Control of acute pain  Description: Control of acute pain  Outcome: Ongoing     Problem: Pain:  Goal: Control of chronic pain  Description: Control of chronic pain  Outcome: Ongoing     Problem: Falls - Risk of:  Goal: Will remain free from falls  Description: Will remain free from falls  Outcome: Ongoing     Problem: Falls - Risk of:  Goal: Absence of physical injury  Description: Absence of physical injury  Outcome: Ongoing    Electronically signed by Sharon Jett RN on 8/7/2020 at 7:02 PM

## 2020-08-07 NOTE — PROGRESS NOTES
Wt Readings from Last 3 Encounters:   20 168 lb 3.4 oz (76.3 kg)   20 168 lb 6.4 oz (76.4 kg)   20 167 lb 15.9 oz (76.2 kg)     Temp  Av.8 °F (36.6 °C)  Min: 97.3 °F (36.3 °C)  Max: 98.4 °F (36.9 °C)  Pulse  Av.7  Min: 56  Max: 67  BP  Min: 99/69  Max: 127/83  SpO2  Av.7 %  Min: 92 %  Max: 97 %    · Telemetry: Sinus rhythm with sinus arrhythmia  · Constitutional: Alert, in no acute distress. Appears stated age. · Head: Normocephalic and atraumatic. · Eyes: Conjunctivae normal. EOM are normal.   · Neck: Neck supple. No lymphadenopathy. No rigidity. No JVD present. · Cardiovascular: Normal rate, regular rhythm. No murmurs, rubs or gallops. No S3 or S4.  · Pulmonary/Chest: Clear breath sounds bilaterally. No crackles, wheezes or rhonchi. No respiratory accessory muscle use. · Abdominal: Soft. Normal bowel sounds present. No distension, No tenderness. · Musculoskeletal: No tenderness. No edema    · Lymphadenopathy: Has no cervical adenopathy. · Neurological: Alert and oriented. No gross deficits. · Skin: Skin is warm and dry. No rash, lesions, ulcerations noted. · Psychiatric: No anxiety or agitation. Labs, diagnostic and imaging results reviewed. Reviewed. Recent Labs     20  0541      K 3.5   CL 97*   CO2 28   BUN 14   CREATININE 0.8     Recent Labs     20  0541   WBC 6.3   HGB 12.8   HCT 38.3   MCV 92.2        Lab Results   Component Value Date    TROPONINI <0.01 12/10/2019     Estimated Creatinine Clearance: 80 mL/min (based on SCr of 0.8 mg/dL).    No results found for: BNP  No results found for: PROTIME, INR  No results found for: CHOL, HDL, TRIG    Scheduled Meds:   levothyroxine  125 mcg Oral Daily    immune globulin  20 g Intravenous Daily    And    immune globulin  20 g Intravenous Daily    citalopram  40 mg Oral Daily    doxepin  10 mg Oral Nightly    estradiol  2 mg Oral Nightly    folic acid  1 mg Oral Daily    LORazepam  0.5 mg Oral BID    pyridostigmine  90 mg Oral TID    zolpidem  10 mg Oral Nightly    sodium chloride flush  10 mL Intravenous 2 times per day    famotidine  20 mg Oral BID    enoxaparin  40 mg Subcutaneous Daily    [START ON 8/10/2020] methotrexate  10 mg Oral Weekly     Continuous Infusions:  PRN Meds:butalbital-acetaminophen-caffeine, sodium chloride flush, potassium chloride **OR** potassium alternative oral replacement **OR** potassium chloride, acetaminophen **OR** acetaminophen, polyethylene glycol, promethazine **OR** ondansetron, perflutren lipid microspheres     EC20 [facility])  Reviewed. Echo: 20   Conclusions      Summary   Normal left ventricle size, wall thickness, and systolic function with an   estimated ejection fraction of 55-60%. No regional wall motion abnormalities   are seen. The right ventricle is not well visualized but appears grossly normal in   size and function. No significant valve abnormalities noted.      LHC: 7/15/15 ([facility])  Findings:       Left ventriculography and Hemodynamics    LVEDP 15 mm Hg   Estimated Ejection Fraction  Wall motion 50%  No RWMA   Valve function No MR or AS       Coronary angiography    Dominance  codominant LAD and RCA    Left Main  angiographically normal    Left Anterior Descending  angiographically normal; codominant    Left Circumflex  angiographically normal    Right  angiographically normal; codominant      Assessment and Plan:     Sinus bradycardia with sinus arrhythmia - improving   - likely exaggerated respiratory response given marked hypothyroidism and Mestinon      - pt asymptomatic, echo results reviewed with pt    Chest discomfort   - atypical for angina, LHC in  with patent coronaries   - sounds like more palpitations which may have correlated with PVCs seen on the monitor    Hypothyroidism     - , free T3 <0.3 and free T4 <0.1   - care per Hospitalist    Myasthenia gravis   - care per neuro      Cardiac issues are stable. Will sign off. Please contact us for further assistance.     BETH Aranda  [facility] [address] [place] [address] Way, [address] [facility]  Phone: [phone]  Fax: [phone]    Electronically signed by BETH Mo - CNP on 8/7/2020 at 8:38 AM

## 2020-08-07 NOTE — PLAN OF CARE
Problem: Pain:  Description: Pain management should include both nonpharmacologic and pharmacologic interventions.   Goal: Pain level will decrease  Description: Pain level will decrease  Outcome: Ongoing  Goal: Control of chronic pain  Description: Control of chronic pain  Outcome: Ongoing     Problem: Falls - Risk of:  Goal: Will remain free from falls  Description: Will remain free from falls  Outcome: Ongoing  Goal: Absence of physical injury  Description: Absence of physical injury  Outcome: Ongoing

## 2020-08-07 NOTE — PROGRESS NOTES
Physical Exam Performed:    /78   Pulse 61   Temp 97.5 °F (36.4 °C) (Oral)   Resp 16   Ht 5' 2\" (1.575 m)   Wt 168 lb 3.4 oz (76.3 kg)   SpO2 97%   BMI 30.77 kg/m²     General appearance: Pale, ill-appearing appears stated age and cooperative. HEENT: Pupils equal, round, and reactive to light. Conjunctivae/corneas clear. Neck: Supple, with full range of motion. No jugular venous distention. Trachea midline. Respiratory:  Normal respiratory effort. Clear to auscultation, bilaterally without Rales/Wheezes/Rhonchi. Cardiovascular: Regular rate and rhythm with normal S1/S2 without murmurs, rubs or gallops. Abdomen: Soft, non-tender, non-distended with normal bowel sounds. Musculoskeletal: No clubbing, cyanosis or edema bilaterally. Full range of motion without deformity. Skin: Skin color, texture, turgor normal.  No rashes or lesions. Neurologic:  Neurovascularly intact without any focal sensory/motor deficits. Cranial nerves: II-XII intact, grossly non-focal.  Psychiatric: Alert and oriented, thought content appropriate, normal insight  Capillary Refill: Brisk,< 3 seconds   Peripheral Pulses: +2 palpable, equal bilaterally       Labs:   Recent Labs     08/06/20  0541   WBC 6.3   HGB 12.8   HCT 38.3        Recent Labs     08/06/20  0541      K 3.5   CL 97*   CO2 28   BUN 14   CREATININE 0.8   CALCIUM 8.9     No results for input(s): AST, ALT, BILIDIR, BILITOT, ALKPHOS in the last 72 hours. No results for input(s): INR in the last 72 hours. No results for input(s): Charlynne Bicker in the last 72 hours.     Urinalysis:      Lab Results   Component Value Date    NITRU Negative 06/30/2020    WBCUA 1 06/30/2020    BACTERIA RARE 06/30/2020    RBCUA 1 06/30/2020    BLOODU Negative 06/30/2020    SPECGRAV 1.009 06/30/2020    GLUCOSEU Negative 06/30/2020       Radiology:  No orders to display           Assessment/Plan:    Active Hospital Problems    Diagnosis Date Noted    Sinus

## 2020-08-08 LAB
T3 FREE: 0.8 PG/ML (ref 2.3–4.2)
T4 FREE: 0.4 NG/DL (ref 0.9–1.8)

## 2020-08-08 PROCEDURE — 6370000000 HC RX 637 (ALT 250 FOR IP): Performed by: HOSPITALIST

## 2020-08-08 PROCEDURE — 84481 FREE ASSAY (FT-3): CPT

## 2020-08-08 PROCEDURE — 84439 ASSAY OF FREE THYROXINE: CPT

## 2020-08-08 PROCEDURE — 6360000002 HC RX W HCPCS: Performed by: HOSPITALIST

## 2020-08-08 PROCEDURE — 2500000003 HC RX 250 WO HCPCS: Performed by: INTERNAL MEDICINE

## 2020-08-08 PROCEDURE — 6360000002 HC RX W HCPCS: Performed by: PSYCHIATRY & NEUROLOGY

## 2020-08-08 PROCEDURE — 2580000003 HC RX 258: Performed by: HOSPITALIST

## 2020-08-08 PROCEDURE — 6360000002 HC RX W HCPCS: Performed by: NURSE PRACTITIONER

## 2020-08-08 PROCEDURE — 2060000000 HC ICU INTERMEDIATE R&B

## 2020-08-08 RX ADMIN — IMMUNE GLOBULIN (HUMAN) 10 G: 10 INJECTION INTRAVENOUS; SUBCUTANEOUS at 11:41

## 2020-08-08 RX ADMIN — CITALOPRAM HYDROBROMIDE 40 MG: 20 TABLET ORAL at 09:07

## 2020-08-08 RX ADMIN — PYRIDOSTIGMINE BROMIDE 90 MG: 60 TABLET ORAL at 09:07

## 2020-08-08 RX ADMIN — LORAZEPAM 0.5 MG: 0.5 TABLET ORAL at 17:44

## 2020-08-08 RX ADMIN — ZOLPIDEM TARTRATE 10 MG: 5 TABLET ORAL at 20:49

## 2020-08-08 RX ADMIN — ACETAMINOPHEN 650 MG: 325 TABLET ORAL at 20:53

## 2020-08-08 RX ADMIN — FOLIC ACID 1 MG: 1 TABLET ORAL at 09:08

## 2020-08-08 RX ADMIN — DOXEPIN HYDROCHLORIDE 10 MG: 10 CAPSULE ORAL at 20:49

## 2020-08-08 RX ADMIN — FAMOTIDINE 20 MG: 20 TABLET ORAL at 20:49

## 2020-08-08 RX ADMIN — IMMUNE GLOBULIN (HUMAN) 10 G: 10 INJECTION INTRAVENOUS; SUBCUTANEOUS at 11:54

## 2020-08-08 RX ADMIN — SODIUM CHLORIDE, PRESERVATIVE FREE 10 ML: 5 INJECTION INTRAVENOUS at 20:49

## 2020-08-08 RX ADMIN — ENOXAPARIN SODIUM 40 MG: 40 INJECTION SUBCUTANEOUS at 09:08

## 2020-08-08 RX ADMIN — PYRIDOSTIGMINE BROMIDE 90 MG: 60 TABLET ORAL at 23:40

## 2020-08-08 RX ADMIN — PYRIDOSTIGMINE BROMIDE 90 MG: 60 TABLET ORAL at 17:44

## 2020-08-08 RX ADMIN — ESTRADIOL 2 MG: 1 TABLET ORAL at 20:49

## 2020-08-08 RX ADMIN — FAMOTIDINE 20 MG: 20 TABLET ORAL at 09:08

## 2020-08-08 RX ADMIN — LEVOTHYROXINE SODIUM 62.5 MCG: 20 INJECTION, SOLUTION INTRAVENOUS at 09:08

## 2020-08-08 RX ADMIN — PYRIDOSTIGMINE BROMIDE 90 MG: 60 TABLET ORAL at 00:13

## 2020-08-08 ASSESSMENT — PAIN DESCRIPTION - DESCRIPTORS: DESCRIPTORS: HEADACHE

## 2020-08-08 ASSESSMENT — PAIN SCALES - GENERAL
PAINLEVEL_OUTOF10: 4
PAINLEVEL_OUTOF10: 1

## 2020-08-08 NOTE — PLAN OF CARE
Problem: Pain:  Goal: Pain level will decrease  Description: Pain level will decrease  8/8/2020 0209 by Carly Matos RN  Outcome: Ongoing  8/7/2020 1901 by Tico Oh RN  Outcome: Ongoing  Goal: Control of acute pain  Description: Control of acute pain  8/8/2020 0209 by Carly Matos RN  Outcome: Ongoing  8/7/2020 1901 by Tico Oh RN  Outcome: Ongoing  Goal: Control of chronic pain  Description: Control of chronic pain  8/8/2020 0209 by Carly Matos RN  Outcome: Ongoing  8/7/2020 1901 by Tico Oh RN  Outcome: Ongoing     Problem: Falls - Risk of:  Goal: Will remain free from falls  Description: Will remain free from falls  8/8/2020 0209 by Carly Matos RN  Outcome: Ongoing  8/7/2020 1901 by Tico Oh RN  Outcome: Ongoing  Goal: Absence of physical injury  Description: Absence of physical injury  8/8/2020 0209 by Carly Matos RN  Outcome: Ongoing  8/7/2020 1901 by Tico Oh RN  Outcome: Ongoing

## 2020-08-08 NOTE — PROGRESS NOTES
Hospitalist Progress Note      PCP: Jone Keene MD    Date of Admission: 8/5/2020    Chief Complaint: palpitation, bradycardia    Hospital Course:   51-year-old female with history of anxiety, myasthenia gravis who presents for evaluation of chest fluttering, and tightness, found to have bradycardia. TSH significantly elevated at 186, but low T3 and T4, with generalized weakness. Symptoms are also concerning for worsening myasthenia gravis, as she recently completed prednisone 40 mg earlier this week, concerning symptoms included generalized weakness with dysphagia swollen throat raspy voice and double vision. Seen by electrophysiology, agree that bradycardia with sinus arrhythmia likely to underlying hypothyroidism. He is now being treated with IVIG.     Subjective:   Continues to have generalized weakness, complains of leg cramps otherwise not complains      Medications:  Reviewed    Infusion Medications   Scheduled Medications    [START ON 8/10/2020] levothyroxine  125 mcg Oral Daily    levothyroxine  62.5 mcg Intravenous Daily    And    sodium chloride (PF)  5 mL Intravenous Daily    citalopram  40 mg Oral Daily    doxepin  10 mg Oral Nightly    estradiol  2 mg Oral Nightly    folic acid  1 mg Oral Daily    LORazepam  0.5 mg Oral BID    pyridostigmine  90 mg Oral TID    zolpidem  10 mg Oral Nightly    sodium chloride flush  10 mL Intravenous 2 times per day    famotidine  20 mg Oral BID    enoxaparin  40 mg Subcutaneous Daily    [START ON 8/10/2020] methotrexate  10 mg Oral Weekly     PRN Meds: butalbital-acetaminophen-caffeine, sodium chloride flush, potassium chloride **OR** potassium alternative oral replacement **OR** potassium chloride, acetaminophen **OR** acetaminophen, polyethylene glycol, promethazine **OR** ondansetron, perflutren lipid microspheres      Intake/Output Summary (Last 24 hours) at 8/8/2020 1232  Last data filed at 8/7/2020 1911  Gross per 24 hour   Intake 680 ml   Output --   Net 680 ml       Physical Exam Performed:    /82   Pulse (!) 42   Temp 97.5 °F (36.4 °C) (Oral)   Resp 16   Ht 5' 2\" (1.575 m)   Wt 167 lb 1.7 oz (75.8 kg)   SpO2 98%   BMI 30.56 kg/m²     General appearance: Pale, ill-appearing appears stated age and cooperative. HEENT: Pupils equal, round, and reactive to light. Conjunctivae/corneas clear. Neck: Supple, with full range of motion. No jugular venous distention. Trachea midline. Respiratory:  Normal respiratory effort. Clear to auscultation, bilaterally without Rales/Wheezes/Rhonchi. Cardiovascular: Regular rate and rhythm with normal S1/S2 without murmurs, rubs or gallops. Abdomen: Soft, non-tender, non-distended with normal bowel sounds. Musculoskeletal: No clubbing, cyanosis or edema bilaterally. Full range of motion without deformity. Skin: Skin color, texture, turgor normal.  No rashes or lesions. Neurologic:  Neurovascularly intact without any focal sensory/motor deficits. Cranial nerves: II-XII intact, grossly non-focal.  Psychiatric: Alert and oriented, thought content appropriate, normal insight  Capillary Refill: Brisk,< 3 seconds   Peripheral Pulses: +2 palpable, equal bilaterally       Labs:   Recent Labs     08/06/20  0541   WBC 6.3   HGB 12.8   HCT 38.3        Recent Labs     08/06/20  0541      K 3.5   CL 97*   CO2 28   BUN 14   CREATININE 0.8   CALCIUM 8.9     No results for input(s): AST, ALT, BILIDIR, BILITOT, ALKPHOS in the last 72 hours. No results for input(s): INR in the last 72 hours. No results for input(s): Lars Lust in the last 72 hours.     Urinalysis:      Lab Results   Component Value Date    NITRU Negative 06/30/2020    WBCUA 1 06/30/2020    BACTERIA RARE 06/30/2020    RBCUA 1 06/30/2020    BLOODU Negative 06/30/2020    SPECGRAV 1.009 06/30/2020    GLUCOSEU Negative 06/30/2020       Radiology:  No orders to display           Assessment/Plan:    Active Hospital Problems Diagnosis Date Noted    Sinus arrhythmia [I49.8] 08/05/2020    Postablative hypothyroidism [E89.0] 08/05/2020    Bradycardia [R00.1] 08/05/2020     1. Generalized weakness suspect due to MG exacerbation and Hypothyroidism  2. Hypothyroidism, w. significantly elevated .50, Free T4 < 0.1, Free T3 < 0.3  3. Myasthenia gravis exacerbation - recently finished prednisone dose of 40mg TID with no relief and feels she is progressively becoming generally weak, with dysphagia \"throat swollen\", raspy voice, and subjective double vision. Follows Dr. Vinicius Tadeo at [address] [place] [address] [address] neuroscience  4. Bradycardia - suspect due to hypothyroidism, echo with normal EF with no wall motion abnormalities  5. Anxiety/depression    Plan  Continue rx for hypothyroidism, likely due to non complaince, per report has not filled since April and she was not aware of that  Continue home mestinon  Also Recommend q4H FVC, and NIF until a baseline can be established then can reconsider frequency.    8/06 started on IVIG Gamunex-C 40g daily for 3 days (patient has an allergy to Flebogamma) has received Gamunex C multiple times on previous admissions and tolerated well (3 out of 3 doses completed 08/08)  Continue IV 62.5 mcg levothyroxine, check free T3, Free T4 daily x 3 days  Follow up TSH on Monday  She will need close follow up for hypothyroidism as outpatient  Continue on home medications for anxiety/depression    DVT Prophylaxis: Lovenox  Diet: DIET GENERAL;  Code Status: Full Code    PT/OT Eval Status: ordered    Dispo - continue inpatient care    Maxine Milligan MD  Hospitalist

## 2020-08-09 VITALS
OXYGEN SATURATION: 96 % | HEART RATE: 54 BPM | HEIGHT: 62 IN | SYSTOLIC BLOOD PRESSURE: 104 MMHG | DIASTOLIC BLOOD PRESSURE: 70 MMHG | BODY MASS INDEX: 30.63 KG/M2 | WEIGHT: 166.45 LBS | RESPIRATION RATE: 16 BRPM | TEMPERATURE: 98 F

## 2020-08-09 LAB
CORTISOL TOTAL: 12 UG/DL
T3 FREE: 1 PG/ML (ref 2.3–4.2)
T4 FREE: 0.5 NG/DL (ref 0.9–1.8)

## 2020-08-09 PROCEDURE — 2580000003 HC RX 258: Performed by: INTERNAL MEDICINE

## 2020-08-09 PROCEDURE — 2580000003 HC RX 258: Performed by: HOSPITALIST

## 2020-08-09 PROCEDURE — 6370000000 HC RX 637 (ALT 250 FOR IP): Performed by: HOSPITALIST

## 2020-08-09 PROCEDURE — 94760 N-INVAS EAR/PLS OXIMETRY 1: CPT

## 2020-08-09 PROCEDURE — 2500000003 HC RX 250 WO HCPCS: Performed by: INTERNAL MEDICINE

## 2020-08-09 PROCEDURE — 82533 TOTAL CORTISOL: CPT

## 2020-08-09 PROCEDURE — 84439 ASSAY OF FREE THYROXINE: CPT

## 2020-08-09 PROCEDURE — 84481 FREE ASSAY (FT-3): CPT

## 2020-08-09 PROCEDURE — 6360000002 HC RX W HCPCS: Performed by: HOSPITALIST

## 2020-08-09 RX ORDER — SODIUM CHLORIDE 9 MG/ML
5 INJECTION INTRAVENOUS DAILY
Status: DISCONTINUED | OUTPATIENT
Start: 2020-08-09 | End: 2020-08-09

## 2020-08-09 RX ORDER — SODIUM CHLORIDE 9 MG/ML
5 INJECTION INTRAVENOUS DAILY
Status: DISCONTINUED | OUTPATIENT
Start: 2020-08-09 | End: 2020-08-09 | Stop reason: HOSPADM

## 2020-08-09 RX ORDER — LEVOTHYROXINE SODIUM ANHYDROUS 100 UG/5ML
62.5 INJECTION, POWDER, LYOPHILIZED, FOR SOLUTION INTRAVENOUS DAILY
Status: DISCONTINUED | OUTPATIENT
Start: 2020-08-09 | End: 2020-08-09

## 2020-08-09 RX ORDER — LEVOTHYROXINE SODIUM 0.12 MG/1
125 TABLET ORAL DAILY
Qty: 30 TABLET | Refills: 3 | Status: SHIPPED | OUTPATIENT
Start: 2020-08-09 | End: 2022-01-02

## 2020-08-09 RX ORDER — LEVOTHYROXINE SODIUM ANHYDROUS 100 UG/5ML
62.5 INJECTION, POWDER, LYOPHILIZED, FOR SOLUTION INTRAVENOUS DAILY
Status: DISCONTINUED | OUTPATIENT
Start: 2020-08-10 | End: 2020-08-09 | Stop reason: HOSPADM

## 2020-08-09 RX ADMIN — ENOXAPARIN SODIUM 40 MG: 40 INJECTION SUBCUTANEOUS at 08:19

## 2020-08-09 RX ADMIN — FOLIC ACID 1 MG: 1 TABLET ORAL at 08:20

## 2020-08-09 RX ADMIN — LEVOTHYROXINE SODIUM 62.5 MCG: 20 INJECTION, SOLUTION INTRAVENOUS at 08:20

## 2020-08-09 RX ADMIN — PYRIDOSTIGMINE BROMIDE 90 MG: 60 TABLET ORAL at 08:20

## 2020-08-09 RX ADMIN — BUTALBITAL, ACETAMINOPHEN, AND CAFFEINE 1 TABLET: 50; 325; 40 TABLET ORAL at 11:17

## 2020-08-09 RX ADMIN — FAMOTIDINE 20 MG: 20 TABLET ORAL at 08:19

## 2020-08-09 RX ADMIN — SODIUM CHLORIDE, PRESERVATIVE FREE 10 ML: 5 INJECTION INTRAVENOUS at 08:27

## 2020-08-09 RX ADMIN — Medication 5 ML: at 08:22

## 2020-08-09 RX ADMIN — CITALOPRAM HYDROBROMIDE 40 MG: 20 TABLET ORAL at 08:19

## 2020-08-09 ASSESSMENT — PAIN DESCRIPTION - FREQUENCY
FREQUENCY: INTERMITTENT
FREQUENCY: CONTINUOUS

## 2020-08-09 ASSESSMENT — PAIN DESCRIPTION - DESCRIPTORS
DESCRIPTORS: HEADACHE
DESCRIPTORS: HEADACHE

## 2020-08-09 ASSESSMENT — PAIN DESCRIPTION - ONSET
ONSET: ON-GOING
ONSET: ON-GOING

## 2020-08-09 ASSESSMENT — PAIN DESCRIPTION - PROGRESSION
CLINICAL_PROGRESSION: NOT CHANGED
CLINICAL_PROGRESSION: GRADUALLY WORSENING

## 2020-08-09 ASSESSMENT — PAIN SCALES - GENERAL
PAINLEVEL_OUTOF10: 6
PAINLEVEL_OUTOF10: 2
PAINLEVEL_OUTOF10: 6

## 2020-08-09 ASSESSMENT — PAIN DESCRIPTION - PAIN TYPE
TYPE: ACUTE PAIN;CHRONIC PAIN
TYPE: ACUTE PAIN

## 2020-08-09 ASSESSMENT — PAIN DESCRIPTION - LOCATION
LOCATION: HEAD
LOCATION: HEAD

## 2020-08-09 ASSESSMENT — PAIN DESCRIPTION - ORIENTATION
ORIENTATION: OTHER (COMMENT)
ORIENTATION: OTHER (COMMENT)

## 2020-08-09 NOTE — PROGRESS NOTES
Patient c/o 6/10 headache all over. Fiorcet given per emar. Pt laying back in bed on her r side at this time.

## 2020-08-09 NOTE — CARE COORDINATION
Discharge ordered noted. Chart reviewed. No additional discharge needs identified.     Electronically signed by Jeff Brown RN MSN on 8/9/2020 at 12:00 PM

## 2020-08-09 NOTE — PROGRESS NOTES
Data- discharge order received, pt verbalized agreement to discharge, disposition to previous residence, no needs for HHC/DME. Action- discharge instructions prepared and given to patient and spouse, pt verbalized understanding. Medication information packet given r/t NEW and/or CHANGED prescriptions emphasizing name/purpose/side effects, pt verbalized understanding. Discharge instruction summary: Diet- regular, Activity- as tolerated, Primary Care Physician as follows: Lisa Ku [phone] f/u appointment call for appt and also follow up with dr Saúl Nam, immunizations reviewed, prescription medications filled at [facility] in [place] [address]. Explained importance of filling her synthroid script and taking daily. Response- Pt belongings gathered, IV and tele were removed. Disposition is home (no HHC/DME needs), transported with belongings, taken to lobby via w/c w/ writer, no complications. Taken by wheelchair to spouses truck.

## 2020-08-09 NOTE — PLAN OF CARE
Problem: Pain:  Goal: Pain level will decrease  Description: Pain level will decrease  8/9/2020 0920 by Indira Whiting RN  Outcome: Ongoing     Problem: Pain:  Goal: Control of acute pain  Description: Control of acute pain  8/9/2020 0920 by Indira Whiting RN  Outcome: Ongoing     Problem: Pain:  Goal: Control of chronic pain  Description: Control of chronic pain  8/9/2020 0920 by Indira Whiting RN  Outcome: Ongoing     Problem: Falls - Risk of:  Goal: Will remain free from falls  Description: Will remain free from falls  8/9/2020 0920 by Indira Whiting RN  Outcome: Ongoing

## 2020-08-09 NOTE — DISCHARGE INSTR - COC
Continuity of Care Form    Patient Name: Bharathi Ramos   :  1970  MRN:  [id number]    [facility] date:  2020  Discharge date:  ***    Code Status Order: Full Code   Advance Directives:   Advance Care Flowsheet Documentation     Date/Time Healthcare Directive Type of Healthcare Directive Copy in [address] Agent's Name Healthcare Agent's Phone Number    20 1403  Yes, patient has an advance directive for healthcare treatment  Durable power of  for health care;Living will  No, copy requested from family  Spouse  kellen pridehi  [phone]    20 1341  Yes, patient has an advance directive for healthcare treatment  Durable power of  for health care  No, copy requested from family  Spouse  kellen shai  [phone]          Admitting Physician:  Jozef Shah MD  PCP: April Ferreira MD    Discharging Nurse: [facility] Unit/Room#: C7I-6615/5120-01  Discharging Unit Phone Number: ***    Emergency Contact:   Extended Emergency Contact Information  Primary Emergency Contact: [facility] Heriberto Spear [facility] Phone: [phone]  Relation: Spouse  Preferred language: English   needed?  No  Secondary Emergency Contact: [facility] acute hospital Phone: [phone]  Relation: Child    Past Surgical History:  Past Surgical History:   Procedure Laterality Date    APPENDECTOMY      CAPSULE ENDOSCOPY N/A 2020    ESOPHAGEAL CAPSULE ENDOSCOPY performed by Mirela Barrett MD at [facility] COLONOSCOPY N/A 2019    COLONOSCOPY performed by Estelle Wan MD at [address] 2020    ESOPHAGEAL MANOMETRY performed by Mirela Barrett MD at [address]      rotator cuff repair- bilateral    THYROIDECTOMY      TUMOR REMOVAL      UPPER GASTROINTESTINAL ENDOSCOPY N/A 2019    EGD BIOPSY performed by Estelle Wan MD at [facility]    UPPER GASTROINTESTINAL ENDOSCOPY N/A 2019    EGD DILATION SAVORY performed by Salazar Meredith MD at [facility] 27  2020    EGD DIAGNOSTIC ONLY performed by Geovany Edmonds MD at [facility] 2020    Esophagogastroduodenoscopy with Bravo Capsule placement performed by Geovany Edmonds MD at [facility] ENDOSCOPY       Immunization History:   Immunization History   Administered Date(s) Administered    Influenza Virus Vaccine 10/04/2019       Active Problems:  Patient Active Problem List   Diagnosis Code    Myasthenia gravis ([facility] [address] G70.00    Myasthenia gravis with (acute) exacerbation ([facility]) G70.01    Appendicitis K37    Neck stiffness M43.6    Generalized weakness R53.1    Myasthenia gravis with exacerbation, adult form (HCC) G70.01    Hyperlipidemia E78.5    Seizure ([facility]) R56.9    GERD (gastroesophageal reflux disease) K21.9    Migraine G43.909    Anxiety F41.9    Insomnia G47.00    Myasthenia gravis with exacerbation ([facility]) G70.01    Bradycardia R00.1    Sinus arrhythmia I49.8    Postablative hypothyroidism E89.0       Isolation/Infection:   Isolation          No Isolation        Patient Infection Status     None to display          Nurse Assessment:  Last Vital Signs: /70   Pulse 54   Temp 98 °F (36.7 °C) (Oral)   Resp 16   Ht 5' 2\" (1.575 m)   Wt 166 lb 7.2 oz (75.5 kg)   SpO2 96%   BMI 30.44 kg/m²     Last documented pain score (0-10 scale): Pain Level: 6  Last Weight:   Wt Readings from Last 1 Encounters:   20 166 lb 7.2 oz (75.5 kg)     Mental Status:  {IP PT MENTAL STATUS:13950}    IV Access:  { SHAI IV ACCESS:240861566}    Nursing Mobility/ADLs:  Walking   {P DME RVYP:277598783}  Transfer  {P DME RVO}  Bathing  {P DME SQLW:812044047}  Dressing  {P DME NVXW:043970267}  Toileting  {[facility] DME BWDK:974894270}  Feeding  {[facility] DME JWL}  Med Admin  {[facility] DME POJK:438228688}  Med Delivery   [facility] MED Delivery:516680567}    Wound Care Documentation and Therapy:        Elimination:  Continence:   · Bowel: {YES / GF:76045}  · Bladder: {YES / XY:36816}  Urinary Catheter: {Urinary Catheter:379358835}   Colostomy/Ileostomy/Ileal Conduit: {YES / GI:00865}       Date of Last BM: ***    Intake/Output Summary (Last 24 hours) at 2020 1252  Last data filed at 2020 1002  Gross per 24 hour   Intake 690 ml   Output --   Net 690 ml     I/O last 3 completed shifts:   In: 360 [P.O.:360]  Out: -     Safety Concerns:     [address] Concerns:657468233}    Impairments/Disabilities:      [facility] Impairments/Disabilities:824283799}    Nutrition Therapy:  Current Nutrition Therapy:   [facility] Diet List:491742737}    Routes of Feeding: {[facility] DME Other Feedings:516479369}  Liquids: {Slp liquid thickness:27088}  Daily Fluid Restriction: {CHP DME Yes amt example:136151677}  Last Modified Barium Swallow with Video (Video Swallowing Test): {Done Not Done ZDCE:845205719}    Treatments at the Time of Hospital Discharge:   Respiratory Treatments: ***  Oxygen Therapy:  {Therapy; copd oxygen:59158}  Ventilator:    { CC Vent EIOW:247464204}    Rehab Therapies: {THERAPEUTIC INTERVENTION:3416590951}  Weight Bearing Status/Restrictions: [facility] Weight Bearin}  Other Medical Equipment (for information only, NOT a DME order):  {EQUIPMENT:505985948}  Other Treatments: ***    Patient's personal belongings (please select all that are sent with patient):  {[facility] DME Belongings:489504499}    RN SIGNATURE:  {Esignature:686937356}    CASE MANAGEMENT/SOCIAL WORK SECTION    Inpatient Status Date: ***    Readmission Risk Assessment Score:  Readmission Risk              Risk of Unplanned Readmission:        14           Discharging to Facility/ Agency   · Name:   · Address:  · Phone:  · Fax:    Dialysis Facility (if applicable)   · Name:  · Address:  · Dialysis Schedule:  · Phone:  · Fax:    Case Manager/ signature: {Esignature:568141382}    PHYSICIAN SECTION    Prognosis: {Prognosis:0304564250}    Condition at Discharge: [address] Patient Condition:355458030}    Rehab Potential (if transferring to Rehab): {Prognosis:2128742431}    Recommended Labs or Other Treatments After Discharge: ***    Physician Certification: I certify the above information and transfer of Rodrigo Boyer  is necessary for the continuing treatment of the diagnosis listed and that she requires {Admit to Appropriate Level of Care:44182} for {GREATER/LESS:147949207} 30 days.      Update Admission H&P: {CHP DME Changes in JREZP:588153187}    PHYSICIAN SIGNATURE:  {Esignature:683436218}

## 2020-08-10 ENCOUNTER — FOLLOWUP TELEPHONE ENCOUNTER (OUTPATIENT)
Dept: INPATIENT UNIT | Age: 50
End: 2020-08-10

## 2020-08-10 NOTE — DISCHARGE SUMMARY
Hospital Medicine Discharge Summary    Patient ID: Noreen Starkey      Patient's PCP: Gwyn Peters MD    Admit Date: 8/5/2020     Discharge Date: 8/9/2020    Admitting Physician: Nisa Gee MD     Discharge Physician: Mich Ruiz MD     Discharge Diagnoses:  1. Generalized weakness suspect due to MG exacerbation and Hypothyroidism  2. Hypothyroidism, w. significantly elevated .50, Free T4 < 0.1, Free T3 < 0.3  3. Myasthenia gravis exacerbation - recently finished prednisone dose of 40mg TID with no relief and feels she is progressively becoming generally weak, with dysphagia \"throat swollen\", raspy voice, and subjective double vision. Follows Dr. Gwen Yarbrough at [address] [place] [address] [id number] neuroscience  4. Bradycardia - suspect due to hypothyroidism, echo with normal EF with no wall motion abnormalities  5. Anxiety/depression     Active Hospital Problems    Diagnosis Date Noted    Sinus arrhythmia [I49.8] 08/05/2020    Postablative hypothyroidism [E89.0] 08/05/2020    Bradycardia [R00.1] 08/05/2020       The patient was seen and examined on day of discharge and this discharge summary is in conjunction with any daily progress note from day of discharge. Hospital Course:   59-year-old female who has a history of anxiety myasthenia gravis, follows closely with neurology  , Recently was on steroid taper presents the ED with chest fluttering tightness, found to be bradycardic. Symptoms are concerning for myasthenia gravis exacerbation with generalized weakness, raspy voice and double vision. She was admitted for further evaluation. Seen by electrophysiology, she likely has sinus arrhythmia with bradycardia due to hypothyroidism. She was treated for myasthenia gravis exacerbation IVIG x3 doses. During hospitalization I treated her with IV Synthroid for better absorption. Her free levels did improve slightly but still under normal range at the time of discharge.   She felt that she was slightly dizzy but continue to improve. She was discharged on Synthroid 125 mcg once daily. Her hypothyroidism was likely due to not taking her medications, she says she did not know she has not refilled her medication since April. He was asked to be more compliant with her medications. She will follow-up with neurology in 2 weeks and follow with primary care physician within 7 to 10 days she was asked to get her TSH and free T4 levels and discuss further titration of Synthroid dose with a primary care physician. She does not help at home and declined any home care. Discharged in stable condition      Physical Exam Performed:     /70   Pulse 54   Temp 98 °F (36.7 °C) (Oral)   Resp 16   Ht 5' 2\" (1.575 m)   Wt 166 lb 7.2 oz (75.5 kg)   SpO2 96%   BMI 30.44 kg/m²       General appearance: Pale, appears stated age and cooperative. HEENT: Pupils equal, round, and reactive to light. Conjunctivae/corneas clear. Neck: Supple, with full range of motion. No jugular venous distention. Trachea midline. Respiratory:  Normal respiratory effort. Clear to auscultation, bilaterally without Rales/Wheezes/Rhonchi. Cardiovascular: Regular rate and rhythm with normal S1/S2 without murmurs, rubs or gallops. Abdomen: Soft, non-tender, non-distended with normal bowel sounds. Musculoskeletal: No clubbing, cyanosis or edema bilaterally. Full range of motion without deformity. Skin: Skin color, texture, turgor normal.  No rashes or lesions. Neurologic:  Neurovascularly intact without any focal sensory/motor deficits. Cranial nerves: II-XII intact, grossly non-focal.  Psychiatric: Alert and oriented, thought content appropriate, normal insight  Capillary Refill: Brisk,< 3 seconds   Peripheral Pulses: +2 palpable, equal bilaterally        Labs:  For convenience and continuity at follow-up the following most recent labs are provided:      CBC:    Lab Results   Component Value Date    WBC 6.3 08/06/2020    HGB 12.8 08/06/2020 HCT 38.3 08/06/2020     08/06/2020       Renal:    Lab Results   Component Value Date     08/06/2020    K 3.5 08/06/2020    CL 97 08/06/2020    CO2 28 08/06/2020    BUN 14 08/06/2020    CREATININE 0.8 08/06/2020    CALCIUM 8.9 08/06/2020    PHOS 3.2 11/08/2019         Significant Diagnostic Studies    Radiology:   No orders to display          Consults:     IP CONSULT TO CARDIOLOGY  IP CONSULT TO NEUROLOGY    Disposition:  Home     Condition at Discharge: Stable    Discharge Instructions/Follow-up:    Follow-up with primary care physician in 7 to 10 days  Follow-up with neurology in 2 weeks    Code Status:  Full Code    Activity: activity as tolerated    Diet: regular diet      Discharge Medications:     Discharge Medication List as of 8/9/2020 12:53 PM           Details   levothyroxine (SYNTHROID) 125 MCG tablet Take 1 tablet by mouth Daily, Disp-30 tablet,R-3Normal              Details   pyridostigmine (MESTINON) 60 MG tablet Take 1.5 tablets by mouth 3 times daily, Disp-60 tablet, R-3Normal      SUMAtriptan (IMITREX) 100 MG tablet Take 100 mg by mouth once as needed for Migraine Take 100 mg by mouth as needed for migraines if initial dose was partially effective or headache recurs, may repeat a dose after 2 hours. (max of 200 mg in 24 hour period)Historical Med      butalbital-acetaminophen-caffeine (FIORICET, ESGIC) -40 MG per tablet Take 1 tablet by mouth every 4 hours as needed for MigraineHistorical Med      LORazepam (ATIVAN) 0.5 MG tablet Take 0.5 mg by mouth 2 times daily. Historical Med      methotrexate (RHEUMATREX) 2.5 MG chemo tablet Take 10 mg by mouth once a week MondaysHistorical Med      doxepin (SINEQUAN) 10 MG capsule Take 10 mg by mouth nightlyHistorical Med      estradiol (ESTRACE) 2 MG tablet Take 2 mg by mouth nightlyHistorical Med      folic acid (FOLVITE) 1 MG tablet Take 1 mg by mouth dailyHistorical Med      zolpidem (AMBIEN) 10 MG tablet Take 10 mg by mouth nightly. Historical Med      citalopram (CELEXA) 40 MG tablet Take 40 mg by mouth dailyHistorical Med             Time Spent on discharge is more than 30 minutes in the examination, evaluation, counseling and review of medications and discharge plan. Signed:    Allison Savage MD   8/10/2020      Thank you Shanna Valenzuela MD for the opportunity to be involved in this patient's care. If you have any questions or concerns please feel free to contact me at [phone].

## 2021-05-19 ENCOUNTER — HOSPITAL ENCOUNTER (OUTPATIENT)
Age: 51
Discharge: HOME | End: 2021-05-19
Payer: COMMERCIAL

## 2021-05-19 ENCOUNTER — HOSPITAL ENCOUNTER (OUTPATIENT)
Dept: CT IMAGING | Age: 51
Discharge: HOME | End: 2021-05-19
Payer: COMMERCIAL

## 2021-05-19 DIAGNOSIS — K76.9 LIVER LESION: ICD-10-CM

## 2021-05-19 LAB
A/G RATIO: 1.1 (ref 1.1–2.2)
ALBUMIN SERPL-MCNC: 3.7 G/DL (ref 3.4–5)
ALP BLD-CCNC: 145 U/L (ref 40–129)
ALT SERPL-CCNC: 11 U/L (ref 10–40)
ANION GAP SERPL CALCULATED.3IONS-SCNC: 7 MMOL/L (ref 3–16)
ANISOCYTOSIS: ABNORMAL
AST SERPL-CCNC: 14 U/L (ref 15–37)
BASOPHILS ABSOLUTE: 0 K/UL (ref 0–0.2)
BASOPHILS RELATIVE PERCENT: 0.8 %
BILIRUB SERPL-MCNC: <0.2 MG/DL (ref 0–1)
BUN BLDV-MCNC: 10 MG/DL (ref 7–20)
CALCIUM SERPL-MCNC: 8.5 MG/DL (ref 8.3–10.6)
CHLORIDE BLD-SCNC: 99 MMOL/L (ref 99–110)
CHOLESTEROL, TOTAL: 213 MG/DL (ref 0–199)
CO2: 30 MMOL/L (ref 21–32)
CREAT SERPL-MCNC: <0.5 MG/DL (ref 0.6–1.1)
EOSINOPHILS ABSOLUTE: 0.1 K/UL (ref 0–0.6)
EOSINOPHILS RELATIVE PERCENT: 2.1 %
ESTIMATED AVERAGE GLUCOSE: 91.1 MG/DL
FERRITIN: 426.2 NG/ML (ref 15–150)
GFR AFRICAN AMERICAN: >60
GFR NON-AFRICAN AMERICAN: >60
GLOBULIN: 3.3 G/DL
GLUCOSE BLD-MCNC: 76 MG/DL (ref 70–99)
HBA1C MFR BLD: 4.8 %
HBV SURFACE AB TITR SER: 334 MIU/ML
HCT VFR BLD CALC: 36.5 % (ref 36–48)
HDLC SERPL-MCNC: 62 MG/DL (ref 40–60)
HEMOGLOBIN: 12.4 G/DL (ref 12–16)
HEPATITIS B SURFACE ANTIGEN INTERPRETATION: NORMAL
HEPATITIS C ANTIBODY INTERPRETATION: NORMAL
INR BLD: 0.92 (ref 0.86–1.14)
IRON SATURATION: 42 % (ref 15–50)
IRON: 100 UG/DL (ref 37–145)
LDL CHOLESTEROL CALCULATED: 109 MG/DL
LYMPHOCYTES ABSOLUTE: 1.7 K/UL (ref 1–5.1)
LYMPHOCYTES RELATIVE PERCENT: 31.7 %
MCH RBC QN AUTO: 30.3 PG (ref 26–34)
MCHC RBC AUTO-ENTMCNC: 33.9 G/DL (ref 31–36)
MCV RBC AUTO: 89.4 FL (ref 80–100)
MONOCYTES ABSOLUTE: 0.3 K/UL (ref 0–1.3)
MONOCYTES RELATIVE PERCENT: 5.7 %
NEUTROPHILS ABSOLUTE: 3.2 K/UL (ref 1.7–7.7)
NEUTROPHILS RELATIVE PERCENT: 59.7 %
PDW BLD-RTO: 18.5 % (ref 12.4–15.4)
PLATELET # BLD: 261 K/UL (ref 135–450)
PLATELET SLIDE REVIEW: ADEQUATE
PMV BLD AUTO: 7 FL (ref 5–10.5)
POTASSIUM SERPL-SCNC: 4.1 MMOL/L (ref 3.5–5.1)
PROTHROMBIN TIME: 10.7 SEC (ref 10–13.2)
RBC # BLD: 4.08 M/UL (ref 4–5.2)
SLIDE REVIEW: ABNORMAL
SODIUM BLD-SCNC: 136 MMOL/L (ref 136–145)
TOTAL IRON BINDING CAPACITY: 238 UG/DL (ref 260–445)
TOTAL PROTEIN: 7 G/DL (ref 6.4–8.2)
TRIGL SERPL-MCNC: 211 MG/DL (ref 0–150)
VLDLC SERPL CALC-MCNC: 42 MG/DL
WBC # BLD: 5.4 K/UL (ref 4–11)

## 2021-05-19 PROCEDURE — 86708 HEPATITIS A ANTIBODY: CPT

## 2021-05-19 PROCEDURE — 83516 IMMUNOASSAY NONANTIBODY: CPT

## 2021-05-19 PROCEDURE — 36415 COLL VENOUS BLD VENIPUNCTURE: CPT

## 2021-05-19 PROCEDURE — 85025 COMPLETE CBC W/AUTO DIFF WBC: CPT

## 2021-05-19 PROCEDURE — 82652 VIT D 1 25-DIHYDROXY: CPT

## 2021-05-19 PROCEDURE — 83525 ASSAY OF INSULIN: CPT

## 2021-05-19 PROCEDURE — 74178 CT ABD&PLV WO CNTR FLWD CNTR: CPT

## 2021-05-19 PROCEDURE — 80053 COMPREHEN METABOLIC PANEL: CPT

## 2021-05-19 PROCEDURE — 82105 ALPHA-FETOPROTEIN SERUM: CPT

## 2021-05-19 PROCEDURE — 6360000004 HC RX CONTRAST MEDICATION: Performed by: INTERNAL MEDICINE

## 2021-05-19 PROCEDURE — 86706 HEP B SURFACE ANTIBODY: CPT

## 2021-05-19 PROCEDURE — 80061 LIPID PANEL: CPT

## 2021-05-19 PROCEDURE — 83540 ASSAY OF IRON: CPT

## 2021-05-19 PROCEDURE — 82103 ALPHA-1-ANTITRYPSIN TOTAL: CPT

## 2021-05-19 PROCEDURE — 83550 IRON BINDING TEST: CPT

## 2021-05-19 PROCEDURE — 82728 ASSAY OF FERRITIN: CPT

## 2021-05-19 PROCEDURE — 85610 PROTHROMBIN TIME: CPT

## 2021-05-19 PROCEDURE — 86038 ANTINUCLEAR ANTIBODIES: CPT

## 2021-05-19 PROCEDURE — 87340 HEPATITIS B SURFACE AG IA: CPT

## 2021-05-19 PROCEDURE — 86803 HEPATITIS C AB TEST: CPT

## 2021-05-19 PROCEDURE — 83036 HEMOGLOBIN GLYCOSYLATED A1C: CPT

## 2021-05-19 PROCEDURE — 82104 ALPHA-1-ANTITRYPSIN PHENO: CPT

## 2021-05-19 RX ADMIN — IOPAMIDOL 75 ML: 755 INJECTION, SOLUTION INTRAVENOUS at 09:53

## 2021-05-20 LAB — ANTI-NUCLEAR ANTIBODY (ANA): NEGATIVE

## 2021-05-21 LAB
F-ACTIN AB IGG: 14 UNITS (ref 0–19)
HAV AB SERPL IA-ACNC: POSITIVE
VITAMIN D 1,25-DIHYDROXY: 77.3 PG/ML (ref 19.9–79.3)

## 2021-05-22 LAB
AFP: 2 UG/L
ALPHA-1 ANTITRYPSIN PHENOTYPE: NORMAL
ALPHA-1 ANTITRYPSIN: 158 MG/DL (ref 90–200)
INSULIN COMMENT: NORMAL
INSULIN REFERENCE RANGE:: NORMAL
INSULIN: 6.3 MU/L

## 2021-10-13 RX ORDER — ALBUMIN, HUMAN INJ 5% 5 %
2600 SOLUTION INTRAVENOUS ONCE
Status: CANCELLED
Start: 2021-10-18 | End: 2021-10-18

## 2021-10-15 ENCOUNTER — HOSPITAL ENCOUNTER (OUTPATIENT)
Dept: CT IMAGING | Age: 51
Discharge: HOME | End: 2021-10-15
Payer: COMMERCIAL

## 2021-10-15 ENCOUNTER — HOSPITAL ENCOUNTER (OUTPATIENT)
Dept: INTERVENTIONAL RADIOLOGY/VASCULAR | Age: 51
Discharge: HOME | End: 2021-10-15
Payer: COMMERCIAL

## 2021-10-15 VITALS
HEART RATE: 70 BPM | SYSTOLIC BLOOD PRESSURE: 122 MMHG | WEIGHT: 163.6 LBS | HEIGHT: 62 IN | BODY MASS INDEX: 30.11 KG/M2 | OXYGEN SATURATION: 97 % | DIASTOLIC BLOOD PRESSURE: 78 MMHG | RESPIRATION RATE: 12 BRPM | TEMPERATURE: 97 F

## 2021-10-15 DIAGNOSIS — R79.89 ABNORMAL LFTS (LIVER FUNCTION TESTS): ICD-10-CM

## 2021-10-15 DIAGNOSIS — G70.00 MYASTHENIA GRAVIS (HCC): ICD-10-CM

## 2021-10-15 LAB
INR BLD: 0.92 (ref 0.88–1.12)
PLATELET # BLD: 223 K/UL (ref 135–450)
PROTHROMBIN TIME: 10.4 SEC (ref 9.9–12.7)

## 2021-10-15 PROCEDURE — 7100000010 HC PHASE II RECOVERY - FIRST 15 MIN

## 2021-10-15 PROCEDURE — 99152 MOD SED SAME PHYS/QHP 5/>YRS: CPT

## 2021-10-15 PROCEDURE — 88307 TISSUE EXAM BY PATHOLOGIST: CPT

## 2021-10-15 PROCEDURE — 99153 MOD SED SAME PHYS/QHP EA: CPT

## 2021-10-15 PROCEDURE — 6360000004 HC RX CONTRAST MEDICATION: Performed by: INTERNAL MEDICINE

## 2021-10-15 PROCEDURE — 85049 AUTOMATED PLATELET COUNT: CPT

## 2021-10-15 PROCEDURE — 6360000002 HC RX W HCPCS: Performed by: RADIOLOGY

## 2021-10-15 PROCEDURE — 88313 SPECIAL STAINS GROUP 2: CPT

## 2021-10-15 PROCEDURE — 36415 COLL VENOUS BLD VENIPUNCTURE: CPT

## 2021-10-15 PROCEDURE — 75970 VASCULAR BIOPSY: CPT

## 2021-10-15 PROCEDURE — 37200 TRANSCATHETER BIOPSY: CPT

## 2021-10-15 PROCEDURE — 36558 INSERT TUNNELED CV CATH: CPT

## 2021-10-15 PROCEDURE — 36299 UNLISTED PX VASCULAR NJX: CPT

## 2021-10-15 PROCEDURE — C1769 GUIDE WIRE: HCPCS

## 2021-10-15 PROCEDURE — 77001 FLUOROGUIDE FOR VEIN DEVICE: CPT

## 2021-10-15 PROCEDURE — 85610 PROTHROMBIN TIME: CPT

## 2021-10-15 PROCEDURE — 7100000011 HC PHASE II RECOVERY - ADDTL 15 MIN

## 2021-10-15 PROCEDURE — 76937 US GUIDE VASCULAR ACCESS: CPT

## 2021-10-15 RX ORDER — MIDAZOLAM HYDROCHLORIDE 1 MG/ML
INJECTION INTRAMUSCULAR; INTRAVENOUS DAILY PRN
Status: COMPLETED | OUTPATIENT
Start: 2021-10-15 | End: 2021-10-15

## 2021-10-15 RX ORDER — FENTANYL CITRATE 50 UG/ML
INJECTION, SOLUTION INTRAMUSCULAR; INTRAVENOUS DAILY PRN
Status: COMPLETED | OUTPATIENT
Start: 2021-10-15 | End: 2021-10-15

## 2021-10-15 RX ORDER — ACETAMINOPHEN 325 MG/1
650 TABLET ORAL EVERY 4 HOURS PRN
Status: DISCONTINUED | OUTPATIENT
Start: 2021-10-15 | End: 2021-10-16 | Stop reason: HOSPADM

## 2021-10-15 RX ADMIN — FENTANYL CITRATE 25 MCG: 50 INJECTION INTRAMUSCULAR; INTRAVENOUS at 12:00

## 2021-10-15 RX ADMIN — FENTANYL CITRATE 25 MCG: 50 INJECTION INTRAMUSCULAR; INTRAVENOUS at 11:29

## 2021-10-15 RX ADMIN — FENTANYL CITRATE 25 MCG: 50 INJECTION INTRAMUSCULAR; INTRAVENOUS at 11:38

## 2021-10-15 RX ADMIN — IOPAMIDOL 50 ML: 612 INJECTION, SOLUTION INTRAVENOUS at 13:33

## 2021-10-15 RX ADMIN — MIDAZOLAM 0.5 MG: 1 INJECTION INTRAMUSCULAR; INTRAVENOUS at 11:39

## 2021-10-15 RX ADMIN — FENTANYL CITRATE 25 MCG: 50 INJECTION INTRAMUSCULAR; INTRAVENOUS at 11:24

## 2021-10-15 RX ADMIN — MIDAZOLAM 0.5 MG: 1 INJECTION INTRAMUSCULAR; INTRAVENOUS at 11:24

## 2021-10-15 RX ADMIN — MIDAZOLAM 1 MG: 1 INJECTION INTRAMUSCULAR; INTRAVENOUS at 11:17

## 2021-10-15 RX ADMIN — FENTANYL CITRATE 25 MCG: 50 INJECTION INTRAMUSCULAR; INTRAVENOUS at 11:53

## 2021-10-15 RX ADMIN — FENTANYL CITRATE 50 MCG: 50 INJECTION INTRAMUSCULAR; INTRAVENOUS at 11:17

## 2021-10-15 RX ADMIN — MIDAZOLAM 0.5 MG: 1 INJECTION INTRAMUSCULAR; INTRAVENOUS at 11:20

## 2021-10-15 RX ADMIN — FENTANYL CITRATE 25 MCG: 50 INJECTION INTRAMUSCULAR; INTRAVENOUS at 11:46

## 2021-10-15 ASSESSMENT — PAIN DESCRIPTION - ONSET
ONSET: ON-GOING
ONSET: ON-GOING

## 2021-10-15 ASSESSMENT — PAIN SCALES - GENERAL
PAINLEVEL_OUTOF10: 4
PAINLEVEL_OUTOF10: 3
PAINLEVEL_OUTOF10: 0
PAINLEVEL_OUTOF10: 3

## 2021-10-15 ASSESSMENT — PAIN DESCRIPTION - LOCATION
LOCATION: CHEST

## 2021-10-15 ASSESSMENT — PAIN DESCRIPTION - PROGRESSION
CLINICAL_PROGRESSION: NOT CHANGED
CLINICAL_PROGRESSION: GRADUALLY IMPROVING
CLINICAL_PROGRESSION: GRADUALLY IMPROVING

## 2021-10-15 ASSESSMENT — PAIN DESCRIPTION - PAIN TYPE
TYPE: SURGICAL PAIN

## 2021-10-15 ASSESSMENT — PAIN DESCRIPTION - DESCRIPTORS
DESCRIPTORS: ACHING;TENDER
DESCRIPTORS: ACHING;TENDER
DESCRIPTORS: ACHING

## 2021-10-15 ASSESSMENT — PAIN DESCRIPTION - FREQUENCY
FREQUENCY: CONTINUOUS

## 2021-10-15 ASSESSMENT — PAIN DESCRIPTION - ORIENTATION
ORIENTATION: RIGHT

## 2021-10-15 ASSESSMENT — PAIN - FUNCTIONAL ASSESSMENT
PAIN_FUNCTIONAL_ASSESSMENT: PREVENTS OR INTERFERES SOME ACTIVE ACTIVITIES AND ADLS
PAIN_FUNCTIONAL_ASSESSMENT: 0-10
PAIN_FUNCTIONAL_ASSESSMENT: PREVENTS OR INTERFERES SOME ACTIVE ACTIVITIES AND ADLS

## 2021-10-15 NOTE — PROGRESS NOTES
Patient is comfortable on her right side in bed. Taking fluids po with no nausea. Perm a cath in place and tegaderm secured over site. Incision above permacath is glued with no drainage.   Family at bedside

## 2021-10-15 NOTE — BRIEF OP NOTE
Brief Postoperative Note    Sarita Cassidy  YOB: 1970  [id number]    Pre-operative Diagnosis:   1. abnormal LFTs  2. Myosthenia gravis    Post-operative Diagnosis: Same    Procedure:   1. Transjugular liver biopsy  2. Tunneled HD catheter placement    Anesthesia: Moderate Sedation    Surgeons: Kayleen Salgado MD    Estimated Blood Loss: Less than 5 mL    Complications: None    Specimens: Was Obtained: 3 core specimens were taken    Findings:   1. Successful transjugular liver biopsy.   2. Successful right IJ tunneled hemodialysis catheter placement    Electronically signed by Kayleen Salgado MD on 10/15/2021 at 12:10 PM

## 2021-10-15 NOTE — PRE SEDATION
Sedation Pre-Procedure Note    Patient Name: Js Shea   YOB: 1970  Room/Bed: Room/bed info not found  Medical Record Number: [id number]  Date: 10/15/2021   Time: 10:53 AM       Indication:  Tunneled Permacath for Myesthenia Gravis, Random Transjugular Liver Biopsy for Transaminitis    Consent: I have discussed with the patient and/or the patient representative the indication, alternatives, and the possible risks and/or complications of the planned procedure and the anesthesia methods. The patient and/or patient representative appear to understand and agree to proceed. Vital Signs:   Vitals:    10/15/21 1024   BP: 138/78   Pulse: 58   Resp: 14   Temp: 97.4 °F (36.3 °C)   SpO2: 98%       Past Medical History:   has a past medical history of Anxiety, Arthritis, Cancer ([address] GERD (gastroesophageal reflux disease), Hyperlipidemia, Insomnia, Migraines, Myasthenia gravis with exacerbation ([address] Seizures ([address] and Thyroid disease. Past Surgical History:   has a past surgical history that includes Thyroidectomy; tumor removal; Hysterectomy; shoulder surgery; Appendectomy; Cholecystectomy; Upper gastrointestinal endoscopy (N/A, 12/17/2019); Colonoscopy (N/A, 12/17/2019); Upper gastrointestinal endoscopy (N/A, 12/17/2019); esophageal motility study (N/A, 2/11/2020); Upper gastrointestinal endoscopy (2/28/2020); Upper gastrointestinal endoscopy (2/28/2020); and Capsule endoscopy (N/A, 2/28/2020). Medications:   Scheduled Meds:   Continuous Infusions:   PRN Meds:   Home Meds:   Prior to Admission medications    Medication Sig Start Date End Date Taking?  Authorizing Provider   levothyroxine (SYNTHROID) 125 MCG tablet Take 1 tablet by mouth Daily 8/9/20  Yes Nora Willams MD   pyridostigmine (MESTINON) 60 MG tablet Take 1.5 tablets by mouth 3 times daily 62/21/23  Yes BETH [place] CNP   SUMAtriptan (IMITREX) 100 MG tablet Take 100 mg by mouth once as needed for Migraine Take 100 mg by mouth as needed for migraines if initial dose was partially effective or headache recurs, may repeat a dose after 2 hours. (max of 200 mg in 24 hour period)   Yes Historical Provider, MD   butalbital-acetaminophen-caffeine (FIORICET, ESGIC) -40 MG per tablet Take 1 tablet by mouth every 4 hours as needed for Migraine   Yes Historical Provider, MD   LORazepam (ATIVAN) 0.5 MG tablet Take 0.5 mg by mouth 2 times daily. Yes Historical Provider, MD   methotrexate (RHEUMATREX) 2.5 MG chemo tablet Take 10 mg by mouth once a week Mondays   Yes Historical Provider, MD   doxepin (SINEQUAN) 10 MG capsule Take 10 mg by mouth nightly   Yes Historical Provider, MD   estradiol (ESTRACE) 2 MG tablet Take 2 mg by mouth nightly   Yes Historical Provider, MD   folic acid (FOLVITE) 1 MG tablet Take 1 mg by mouth daily   Yes Historical Provider, MD   zolpidem (AMBIEN) 10 MG tablet Take 10 mg by mouth nightly. Yes Historical Provider, MD   citalopram (CELEXA) 40 MG tablet Take 40 mg by mouth daily   Yes Historical Provider, MD     Coumadin Use Last 7 Days:  no  Antiplatelet drug therapy use last 7 days: no  Other anticoagulant use last 7 days: no  Additional Medication Information:  n/a      Pre-Sedation Documentation and Exam:   Vital signs have been reviewed (see flow sheet for vitals). I have reviewed the patient's history and review of systems.     Mallampati Airway Assessment:  Mallampati Class III - (soft palate & base of uvula are visible)    Prior History of Anesthesia Complications:   none    ASA Classification:  Class 3 - A patient with severe systemic disease that limits activity but is not incapacitating    Sedation/ Anesthesia Plan:   intravenous sedation    Medications Planned:   midazolam (Versed) intravenously and fentanyl intravenously    Patient is an appropriate candidate for plan of sedation: yes    Electronically signed by Arjun Laird PA-C on 10/15/2021 at 10:53 AM

## 2021-10-18 ENCOUNTER — HOSPITAL ENCOUNTER (OUTPATIENT)
Dept: ONCOLOGY | Age: 51
Setting detail: INFUSION SERIES
Discharge: HOME | End: 2021-10-18
Payer: COMMERCIAL

## 2021-10-18 VITALS
TEMPERATURE: 97.6 F | OXYGEN SATURATION: 99 % | RESPIRATION RATE: 16 BRPM | DIASTOLIC BLOOD PRESSURE: 60 MMHG | SYSTOLIC BLOOD PRESSURE: 111 MMHG | HEART RATE: 61 BPM

## 2021-10-18 DIAGNOSIS — G70.01 MYASTHENIA GRAVIS WITH EXACERBATION (HCC): Primary | ICD-10-CM

## 2021-10-18 LAB
FIBRINOGEN: 521 MG/DL (ref 200–397)
HCT VFR BLD CALC: 32.9 % (ref 36–48)
HEMOGLOBIN: 11.1 G/DL (ref 12–16)
MCH RBC QN AUTO: 31.7 PG (ref 26–34)
MCHC RBC AUTO-ENTMCNC: 33.6 G/DL (ref 31–36)
MCV RBC AUTO: 94.3 FL (ref 80–100)
PDW BLD-RTO: 14 % (ref 12.4–15.4)
PLATELET # BLD: 239 K/UL (ref 135–450)
PMV BLD AUTO: 7.7 FL (ref 5–10.5)
RBC # BLD: 3.49 M/UL (ref 4–5.2)
WBC # BLD: 5.9 K/UL (ref 4–11)

## 2021-10-18 PROCEDURE — 85384 FIBRINOGEN ACTIVITY: CPT

## 2021-10-18 PROCEDURE — 85027 COMPLETE CBC AUTOMATED: CPT

## 2021-10-18 PROCEDURE — 6360000002 HC RX W HCPCS: Performed by: INTERNAL MEDICINE

## 2021-10-18 PROCEDURE — 96366 THER/PROPH/DIAG IV INF ADDON: CPT

## 2021-10-18 PROCEDURE — P9041 ALBUMIN (HUMAN),5%, 50ML: HCPCS | Performed by: INTERNAL MEDICINE

## 2021-10-18 PROCEDURE — 96365 THER/PROPH/DIAG IV INF INIT: CPT

## 2021-10-18 PROCEDURE — 36592 COLLECT BLOOD FROM PICC: CPT

## 2021-10-18 PROCEDURE — 36514 APHERESIS PLASMA: CPT

## 2021-10-18 PROCEDURE — 2580000003 HC RX 258: Performed by: INTERNAL MEDICINE

## 2021-10-18 PROCEDURE — 96367 TX/PROPH/DG ADDL SEQ IV INF: CPT

## 2021-10-18 RX ORDER — ALBUMIN, HUMAN INJ 5% 5 %
2600 SOLUTION INTRAVENOUS ONCE
Status: COMPLETED | OUTPATIENT
Start: 2021-10-18 | End: 2021-10-18

## 2021-10-18 RX ORDER — ALBUMIN, HUMAN INJ 5% 5 %
2600 SOLUTION INTRAVENOUS ONCE
Status: CANCELLED
Start: 2021-10-25 | End: 2021-10-25

## 2021-10-18 RX ADMIN — ALBUMIN (HUMAN) 2600 ML: 12.5 INJECTION, SOLUTION INTRAVENOUS at 13:30

## 2021-10-18 RX ADMIN — CALCIUM GLUCONATE 4000 MG: 98 INJECTION, SOLUTION INTRAVENOUS at 13:30

## 2021-10-18 NOTE — PROGRESS NOTES
Pt arrived to [facility] today for scheduled plasmapheresis procedure. Apheresis, line care, education, & lab draws all completed by Kimani CAPONE, Audrey. Review [facility] documentation for details.

## 2021-10-20 ENCOUNTER — HOSPITAL ENCOUNTER (OUTPATIENT)
Dept: ONCOLOGY | Age: 51
Setting detail: INFUSION SERIES
Discharge: HOME | End: 2021-10-20
Payer: COMMERCIAL

## 2021-10-20 VITALS
TEMPERATURE: 97.6 F | SYSTOLIC BLOOD PRESSURE: 115 MMHG | OXYGEN SATURATION: 99 % | HEART RATE: 73 BPM | DIASTOLIC BLOOD PRESSURE: 55 MMHG | RESPIRATION RATE: 16 BRPM

## 2021-10-20 DIAGNOSIS — G70.01 MYASTHENIA GRAVIS WITH EXACERBATION (HCC): Primary | ICD-10-CM

## 2021-10-20 PROCEDURE — 36514 APHERESIS PLASMA: CPT

## 2021-10-20 PROCEDURE — 96365 THER/PROPH/DIAG IV INF INIT: CPT

## 2021-10-20 PROCEDURE — 96366 THER/PROPH/DIAG IV INF ADDON: CPT

## 2021-10-20 PROCEDURE — 36592 COLLECT BLOOD FROM PICC: CPT

## 2021-10-20 RX ORDER — ALBUMIN, HUMAN INJ 5% 5 %
2600 SOLUTION INTRAVENOUS ONCE
Status: CANCELLED
Start: 2021-10-20 | End: 2021-10-20

## 2021-10-20 RX ORDER — ALBUMIN, HUMAN INJ 5% 5 %
2600 SOLUTION INTRAVENOUS ONCE
Status: DISCONTINUED | OUTPATIENT
Start: 2021-10-20 | End: 2021-10-21 | Stop reason: HOSPADM

## 2021-10-20 RX ORDER — ALBUMIN, HUMAN INJ 5% 5 %
2600 SOLUTION INTRAVENOUS ONCE
Status: CANCELLED
Start: 2021-10-25 | End: 2021-10-25

## 2021-10-22 ENCOUNTER — HOSPITAL ENCOUNTER (OUTPATIENT)
Dept: ONCOLOGY | Age: 51
Setting detail: INFUSION SERIES
Discharge: HOME | End: 2021-10-22
Payer: COMMERCIAL

## 2021-10-22 DIAGNOSIS — G70.01 MYASTHENIA GRAVIS WITH EXACERBATION (HCC): Primary | ICD-10-CM

## 2021-10-22 PROCEDURE — 36514 APHERESIS PLASMA: CPT

## 2021-10-22 PROCEDURE — 96365 THER/PROPH/DIAG IV INF INIT: CPT

## 2021-10-22 PROCEDURE — 36592 COLLECT BLOOD FROM PICC: CPT

## 2021-10-22 PROCEDURE — 96366 THER/PROPH/DIAG IV INF ADDON: CPT

## 2021-10-22 RX ORDER — ALBUMIN, HUMAN INJ 5% 5 %
2600 SOLUTION INTRAVENOUS ONCE
Status: DISCONTINUED | OUTPATIENT
Start: 2021-10-22 | End: 2021-10-23 | Stop reason: HOSPADM

## 2021-10-22 RX ORDER — ALBUMIN, HUMAN INJ 5% 5 %
2600 SOLUTION INTRAVENOUS ONCE
Status: CANCELLED
Start: 2021-11-01 | End: 2021-11-01

## 2021-10-22 NOTE — PROGRESS NOTES
Pt arrived to [facility] today for scheduled plasmapheresis procedure. Apheresis, line care, education, & lab draws all completed by Surya Alfaro. Review [facility] documentation for details.      Jose Martin Kirby, RN

## 2021-10-25 ENCOUNTER — HOSPITAL ENCOUNTER (OUTPATIENT)
Dept: ONCOLOGY | Age: 51
Setting detail: INFUSION SERIES
Discharge: HOME | End: 2021-10-25
Payer: COMMERCIAL

## 2021-10-25 DIAGNOSIS — G70.01 MYASTHENIA GRAVIS WITH EXACERBATION (HCC): Primary | ICD-10-CM

## 2021-10-25 LAB
FIBRINOGEN: 322 MG/DL (ref 200–397)
HCT VFR BLD CALC: 34.3 % (ref 36–48)
HEMOGLOBIN: 11.5 G/DL (ref 12–16)
MCH RBC QN AUTO: 31.9 PG (ref 26–34)
MCHC RBC AUTO-ENTMCNC: 33.5 G/DL (ref 31–36)
MCV RBC AUTO: 95 FL (ref 80–100)
PDW BLD-RTO: 14.4 % (ref 12.4–15.4)
PLATELET # BLD: 261 K/UL (ref 135–450)
PMV BLD AUTO: 7.1 FL (ref 5–10.5)
RBC # BLD: 3.61 M/UL (ref 4–5.2)
WBC # BLD: 7.7 K/UL (ref 4–11)

## 2021-10-25 PROCEDURE — 96366 THER/PROPH/DIAG IV INF ADDON: CPT

## 2021-10-25 PROCEDURE — P9041 ALBUMIN (HUMAN),5%, 50ML: HCPCS | Performed by: INTERNAL MEDICINE

## 2021-10-25 PROCEDURE — 36592 COLLECT BLOOD FROM PICC: CPT

## 2021-10-25 PROCEDURE — 96365 THER/PROPH/DIAG IV INF INIT: CPT

## 2021-10-25 PROCEDURE — 6360000002 HC RX W HCPCS: Performed by: INTERNAL MEDICINE

## 2021-10-25 PROCEDURE — 36514 APHERESIS PLASMA: CPT

## 2021-10-25 PROCEDURE — 85027 COMPLETE CBC AUTOMATED: CPT

## 2021-10-25 PROCEDURE — 85384 FIBRINOGEN ACTIVITY: CPT

## 2021-10-25 RX ORDER — ALBUMIN, HUMAN INJ 5% 5 %
2500 SOLUTION INTRAVENOUS ONCE
Status: CANCELLED
Start: 2021-11-01 | End: 2021-11-01

## 2021-10-25 RX ORDER — ALBUMIN, HUMAN INJ 5% 5 %
2600 SOLUTION INTRAVENOUS ONCE
Status: CANCELLED
Start: 2021-11-01 | End: 2021-11-01

## 2021-10-25 RX ORDER — ALBUMIN, HUMAN INJ 5% 5 %
2500 SOLUTION INTRAVENOUS ONCE
Status: COMPLETED | OUTPATIENT
Start: 2021-10-25 | End: 2021-10-25

## 2021-10-25 RX ADMIN — CALCIUM GLUCONATE 4000 MG: 98 INJECTION, SOLUTION INTRAVENOUS at 10:00

## 2021-10-25 RX ADMIN — ALBUMIN (HUMAN) 2500 ML: 12.5 INJECTION, SOLUTION INTRAVENOUS at 10:00

## 2021-10-25 NOTE — PROGRESS NOTES
Pt arrived to [facility] today for scheduled plasmapheresis procedure. Apheresis, line care, education, & lab draws all completed by aVnessa Ratliff. Review [facility] documentation for details.      Rosalina Ro RN

## 2021-11-01 ENCOUNTER — HOSPITAL ENCOUNTER (OUTPATIENT)
Dept: ONCOLOGY | Age: 51
Setting detail: INFUSION SERIES
Discharge: HOME | End: 2021-11-01
Payer: COMMERCIAL

## 2021-11-01 VITALS — SYSTOLIC BLOOD PRESSURE: 158 MMHG | HEART RATE: 65 BPM | OXYGEN SATURATION: 100 % | DIASTOLIC BLOOD PRESSURE: 78 MMHG

## 2021-11-01 DIAGNOSIS — G70.01 MYASTHENIA GRAVIS WITH EXACERBATION (HCC): Primary | ICD-10-CM

## 2021-11-01 LAB
FIBRINOGEN: 407 MG/DL (ref 200–397)
HCT VFR BLD CALC: 34.5 % (ref 36–48)
HEMOGLOBIN: 11.4 G/DL (ref 12–16)
MCH RBC QN AUTO: 31.6 PG (ref 26–34)
MCHC RBC AUTO-ENTMCNC: 33 G/DL (ref 31–36)
MCV RBC AUTO: 95.7 FL (ref 80–100)
PDW BLD-RTO: 13.8 % (ref 12.4–15.4)
PLATELET # BLD: 248 K/UL (ref 135–450)
PMV BLD AUTO: 7.7 FL (ref 5–10.5)
RBC # BLD: 3.6 M/UL (ref 4–5.2)
WBC # BLD: 7.7 K/UL (ref 4–11)

## 2021-11-01 PROCEDURE — 85384 FIBRINOGEN ACTIVITY: CPT

## 2021-11-01 PROCEDURE — 85027 COMPLETE CBC AUTOMATED: CPT

## 2021-11-01 PROCEDURE — 6360000002 HC RX W HCPCS: Performed by: INTERNAL MEDICINE

## 2021-11-01 PROCEDURE — P9041 ALBUMIN (HUMAN),5%, 50ML: HCPCS | Performed by: INTERNAL MEDICINE

## 2021-11-01 PROCEDURE — 96367 TX/PROPH/DG ADDL SEQ IV INF: CPT

## 2021-11-01 PROCEDURE — 96366 THER/PROPH/DIAG IV INF ADDON: CPT

## 2021-11-01 PROCEDURE — 36592 COLLECT BLOOD FROM PICC: CPT

## 2021-11-01 PROCEDURE — 96365 THER/PROPH/DIAG IV INF INIT: CPT

## 2021-11-01 RX ORDER — ALBUMIN, HUMAN INJ 5% 5 %
2500 SOLUTION INTRAVENOUS ONCE
Status: COMPLETED | OUTPATIENT
Start: 2021-11-01 | End: 2021-11-01

## 2021-11-01 RX ORDER — ALBUMIN, HUMAN INJ 5% 5 %
2500 SOLUTION INTRAVENOUS ONCE
Status: CANCELLED
Start: 2021-11-08 | End: 2021-11-08

## 2021-11-01 RX ADMIN — ALBUMIN (HUMAN) 2500 ML: 12.5 INJECTION, SOLUTION INTRAVENOUS at 13:36

## 2021-11-01 NOTE — PROGRESS NOTES
Pt arrived to [facility] today for scheduled plasmapheresis procedure. Apheresis, line care, education, & lab draws all completed by Den Harman RN. Review [facility] documentation for details.      Unknown Honor, RN

## 2021-11-03 ENCOUNTER — HOSPITAL ENCOUNTER (OUTPATIENT)
Dept: ONCOLOGY | Age: 51
Setting detail: INFUSION SERIES
Discharge: HOME | End: 2021-11-03
Payer: COMMERCIAL

## 2021-11-03 VITALS
TEMPERATURE: 98.3 F | OXYGEN SATURATION: 99 % | HEART RATE: 78 BPM | DIASTOLIC BLOOD PRESSURE: 73 MMHG | RESPIRATION RATE: 16 BRPM | SYSTOLIC BLOOD PRESSURE: 138 MMHG

## 2021-11-03 DIAGNOSIS — G70.01 MYASTHENIA GRAVIS WITH EXACERBATION (HCC): Primary | ICD-10-CM

## 2021-11-03 PROCEDURE — 96365 THER/PROPH/DIAG IV INF INIT: CPT

## 2021-11-03 PROCEDURE — 96367 TX/PROPH/DG ADDL SEQ IV INF: CPT

## 2021-11-03 PROCEDURE — P9041 ALBUMIN (HUMAN),5%, 50ML: HCPCS | Performed by: INTERNAL MEDICINE

## 2021-11-03 PROCEDURE — 36514 APHERESIS PLASMA: CPT

## 2021-11-03 PROCEDURE — 6360000002 HC RX W HCPCS: Performed by: INTERNAL MEDICINE

## 2021-11-03 PROCEDURE — 2500000003 HC RX 250 WO HCPCS: Performed by: INTERNAL MEDICINE

## 2021-11-03 RX ORDER — ALBUMIN, HUMAN INJ 5% 5 %
2500 SOLUTION INTRAVENOUS ONCE
Status: COMPLETED | OUTPATIENT
Start: 2021-11-03 | End: 2021-11-03

## 2021-11-03 RX ORDER — ALBUMIN, HUMAN INJ 5% 5 %
2500 SOLUTION INTRAVENOUS ONCE
Status: CANCELLED
Start: 2021-11-08 | End: 2021-11-08

## 2021-11-03 RX ORDER — ALBUMIN, HUMAN INJ 5% 5 %
500 SOLUTION INTRAVENOUS ONCE
Status: DISCONTINUED | OUTPATIENT
Start: 2021-11-03 | End: 2021-11-04 | Stop reason: HOSPADM

## 2021-11-03 RX ADMIN — ALBUMIN (HUMAN) 2500 ML: 12.5 INJECTION, SOLUTION INTRAVENOUS at 14:13

## 2021-11-03 RX ADMIN — Medication 4000 MG: at 14:13

## 2021-11-03 NOTE — PROGRESS NOTES
Pt arrived to [facility] today for scheduled plasmapheresis procedure. Plasmapheresis, line care, education and lab draw all completed by [facility] RN, Comfort Hebert. Review [facility] documentation for details. Pt discharged ambulatory to home.

## 2021-11-08 ENCOUNTER — HOSPITAL ENCOUNTER (OUTPATIENT)
Dept: ONCOLOGY | Age: 51
Setting detail: INFUSION SERIES
Discharge: HOME | End: 2021-11-08
Payer: COMMERCIAL

## 2021-11-08 VITALS
TEMPERATURE: 97.9 F | RESPIRATION RATE: 18 BRPM | HEART RATE: 65 BPM | OXYGEN SATURATION: 99 % | SYSTOLIC BLOOD PRESSURE: 134 MMHG | DIASTOLIC BLOOD PRESSURE: 74 MMHG

## 2021-11-08 DIAGNOSIS — G70.01 MYASTHENIA GRAVIS WITH EXACERBATION (HCC): Primary | ICD-10-CM

## 2021-11-08 LAB
FIBRINOGEN: 274 MG/DL (ref 200–397)
HCT VFR BLD CALC: 34.2 % (ref 36–48)
HEMOGLOBIN: 11.4 G/DL (ref 12–16)
MCH RBC QN AUTO: 31.8 PG (ref 26–34)
MCHC RBC AUTO-ENTMCNC: 33.3 G/DL (ref 31–36)
MCV RBC AUTO: 95.3 FL (ref 80–100)
PDW BLD-RTO: 13.8 % (ref 12.4–15.4)
PLATELET # BLD: 231 K/UL (ref 135–450)
PMV BLD AUTO: 7.3 FL (ref 5–10.5)
RBC # BLD: 3.59 M/UL (ref 4–5.2)
WBC # BLD: 8.4 K/UL (ref 4–11)

## 2021-11-08 PROCEDURE — 6360000002 HC RX W HCPCS: Performed by: INTERNAL MEDICINE

## 2021-11-08 PROCEDURE — 85027 COMPLETE CBC AUTOMATED: CPT

## 2021-11-08 PROCEDURE — P9045 ALBUMIN (HUMAN), 5%, 250 ML: HCPCS | Performed by: INTERNAL MEDICINE

## 2021-11-08 PROCEDURE — 36592 COLLECT BLOOD FROM PICC: CPT

## 2021-11-08 PROCEDURE — 96365 THER/PROPH/DIAG IV INF INIT: CPT

## 2021-11-08 PROCEDURE — 96367 TX/PROPH/DG ADDL SEQ IV INF: CPT

## 2021-11-08 PROCEDURE — 2580000003 HC RX 258: Performed by: INTERNAL MEDICINE

## 2021-11-08 PROCEDURE — 96366 THER/PROPH/DIAG IV INF ADDON: CPT

## 2021-11-08 PROCEDURE — 85384 FIBRINOGEN ACTIVITY: CPT

## 2021-11-08 RX ORDER — ALBUMIN, HUMAN INJ 5% 5 %
2500 SOLUTION INTRAVENOUS ONCE
Status: COMPLETED | OUTPATIENT
Start: 2021-11-08 | End: 2021-11-08

## 2021-11-08 RX ORDER — ALBUMIN, HUMAN INJ 5% 5 %
2500 SOLUTION INTRAVENOUS ONCE
Status: CANCELLED
Start: 2021-11-15 | End: 2021-11-15

## 2021-11-08 RX ADMIN — ALBUMIN (HUMAN) 2500 ML: 12.5 INJECTION, SOLUTION INTRAVENOUS at 13:13

## 2021-11-08 RX ADMIN — CALCIUM GLUCONATE 4000 MG: 98 INJECTION, SOLUTION INTRAVENOUS at 13:15

## 2021-11-12 ENCOUNTER — HOSPITAL ENCOUNTER (OUTPATIENT)
Dept: ONCOLOGY | Age: 51
Setting detail: INFUSION SERIES
Discharge: HOME | End: 2021-11-12
Payer: COMMERCIAL

## 2021-11-12 DIAGNOSIS — G70.01 MYASTHENIA GRAVIS WITH EXACERBATION (HCC): Primary | ICD-10-CM

## 2021-11-12 PROCEDURE — 2580000003 HC RX 258: Performed by: INTERNAL MEDICINE

## 2021-11-12 PROCEDURE — 96367 TX/PROPH/DG ADDL SEQ IV INF: CPT

## 2021-11-12 PROCEDURE — P9041 ALBUMIN (HUMAN),5%, 50ML: HCPCS | Performed by: INTERNAL MEDICINE

## 2021-11-12 PROCEDURE — 36514 APHERESIS PLASMA: CPT

## 2021-11-12 PROCEDURE — 96365 THER/PROPH/DIAG IV INF INIT: CPT

## 2021-11-12 PROCEDURE — 6360000002 HC RX W HCPCS: Performed by: INTERNAL MEDICINE

## 2021-11-12 PROCEDURE — 96366 THER/PROPH/DIAG IV INF ADDON: CPT

## 2021-11-12 RX ORDER — ALBUMIN, HUMAN INJ 5% 5 %
2500 SOLUTION INTRAVENOUS ONCE
Status: CANCELLED
Start: 2021-11-15 | End: 2021-11-15

## 2021-11-12 RX ORDER — ALBUMIN, HUMAN INJ 5% 5 %
2500 SOLUTION INTRAVENOUS ONCE
Status: COMPLETED | OUTPATIENT
Start: 2021-11-12 | End: 2021-11-12

## 2021-11-12 RX ADMIN — ALBUMIN (HUMAN) 2500 ML: 12.5 INJECTION, SOLUTION INTRAVENOUS at 09:00

## 2021-11-12 RX ADMIN — CALCIUM GLUCONATE 4000 MG: 98 INJECTION, SOLUTION INTRAVENOUS at 09:00

## 2021-11-12 NOTE — PROGRESS NOTES
Pt arrived to [facility] today for scheduled plasmapheresis procedure. Apheresis, line care, education, & lab draws all completed by Mian Vo. Review [facility] documentation for details.      Shara Cummings RN

## 2021-11-15 ENCOUNTER — HOSPITAL ENCOUNTER (OUTPATIENT)
Dept: ONCOLOGY | Age: 51
Setting detail: INFUSION SERIES
Discharge: HOME | End: 2021-11-15
Payer: COMMERCIAL

## 2021-11-15 VITALS
RESPIRATION RATE: 16 BRPM | HEART RATE: 60 BPM | DIASTOLIC BLOOD PRESSURE: 66 MMHG | OXYGEN SATURATION: 99 % | SYSTOLIC BLOOD PRESSURE: 113 MMHG

## 2021-11-15 DIAGNOSIS — G70.01 MYASTHENIA GRAVIS WITH EXACERBATION (HCC): Primary | ICD-10-CM

## 2021-11-15 LAB
FIBRINOGEN: 169 MG/DL (ref 200–397)
HCT VFR BLD CALC: 32.9 % (ref 36–48)
HEMOGLOBIN: 10.9 G/DL (ref 12–16)
MCH RBC QN AUTO: 31.2 PG (ref 26–34)
MCHC RBC AUTO-ENTMCNC: 33.3 G/DL (ref 31–36)
MCV RBC AUTO: 93.8 FL (ref 80–100)
PDW BLD-RTO: 14.4 % (ref 12.4–15.4)
PLATELET # BLD: 233 K/UL (ref 135–450)
PMV BLD AUTO: 6.9 FL (ref 5–10.5)
RBC # BLD: 3.51 M/UL (ref 4–5.2)
WBC # BLD: 12.9 K/UL (ref 4–11)

## 2021-11-15 PROCEDURE — P9041 ALBUMIN (HUMAN),5%, 50ML: HCPCS | Performed by: INTERNAL MEDICINE

## 2021-11-15 PROCEDURE — 6360000002 HC RX W HCPCS: Performed by: INTERNAL MEDICINE

## 2021-11-15 PROCEDURE — 85384 FIBRINOGEN ACTIVITY: CPT

## 2021-11-15 PROCEDURE — 96366 THER/PROPH/DIAG IV INF ADDON: CPT

## 2021-11-15 PROCEDURE — 96367 TX/PROPH/DG ADDL SEQ IV INF: CPT

## 2021-11-15 PROCEDURE — 36514 APHERESIS PLASMA: CPT

## 2021-11-15 PROCEDURE — 36592 COLLECT BLOOD FROM PICC: CPT

## 2021-11-15 PROCEDURE — 2580000003 HC RX 258: Performed by: INTERNAL MEDICINE

## 2021-11-15 PROCEDURE — 96365 THER/PROPH/DIAG IV INF INIT: CPT

## 2021-11-15 PROCEDURE — 85027 COMPLETE CBC AUTOMATED: CPT

## 2021-11-15 RX ORDER — ALBUMIN, HUMAN INJ 5% 5 %
2500 SOLUTION INTRAVENOUS ONCE
Status: CANCELLED
Start: 2021-11-22 | End: 2021-11-22

## 2021-11-15 RX ORDER — ALBUMIN, HUMAN INJ 5% 5 %
2500 SOLUTION INTRAVENOUS ONCE
Status: COMPLETED | OUTPATIENT
Start: 2021-11-15 | End: 2021-11-15

## 2021-11-15 RX ADMIN — CALCIUM GLUCONATE 4000 MG: 98 INJECTION, SOLUTION INTRAVENOUS at 10:04

## 2021-11-15 RX ADMIN — ALBUMIN (HUMAN) 2500 ML: 12.5 INJECTION, SOLUTION INTRAVENOUS at 10:05

## 2021-11-15 NOTE — PROGRESS NOTES
Pt arrived to [facility] today for scheduled plasmapheresis procedure. Apheresis, line care, education, and lab draws all completed by Kimani CAPONE, [address] . Review [facility] documentation for details.   Juve Barrera RN

## 2021-11-17 ENCOUNTER — APPOINTMENT (OUTPATIENT)
Dept: ONCOLOGY | Age: 51
End: 2021-11-17
Payer: COMMERCIAL

## 2021-11-22 ENCOUNTER — HOSPITAL ENCOUNTER (OUTPATIENT)
Dept: ONCOLOGY | Age: 51
Setting detail: INFUSION SERIES
Discharge: HOME | End: 2021-11-22
Payer: COMMERCIAL

## 2021-11-22 VITALS
SYSTOLIC BLOOD PRESSURE: 131 MMHG | HEART RATE: 70 BPM | TEMPERATURE: 97.9 F | RESPIRATION RATE: 18 BRPM | OXYGEN SATURATION: 100 % | DIASTOLIC BLOOD PRESSURE: 76 MMHG

## 2021-11-22 DIAGNOSIS — G70.01 MYASTHENIA GRAVIS WITH EXACERBATION (HCC): Primary | ICD-10-CM

## 2021-11-22 LAB
FIBRINOGEN: 415 MG/DL (ref 200–397)
HCT VFR BLD CALC: 34 % (ref 36–48)
HEMOGLOBIN: 11.4 G/DL (ref 12–16)
MCH RBC QN AUTO: 31.8 PG (ref 26–34)
MCHC RBC AUTO-ENTMCNC: 33.6 G/DL (ref 31–36)
MCV RBC AUTO: 94.7 FL (ref 80–100)
PDW BLD-RTO: 14.3 % (ref 12.4–15.4)
PLATELET # BLD: 261 K/UL (ref 135–450)
PMV BLD AUTO: 7 FL (ref 5–10.5)
RBC # BLD: 3.59 M/UL (ref 4–5.2)
WBC # BLD: 6.7 K/UL (ref 4–11)

## 2021-11-22 PROCEDURE — P9041 ALBUMIN (HUMAN),5%, 50ML: HCPCS | Performed by: INTERNAL MEDICINE

## 2021-11-22 PROCEDURE — 96366 THER/PROPH/DIAG IV INF ADDON: CPT

## 2021-11-22 PROCEDURE — 96365 THER/PROPH/DIAG IV INF INIT: CPT

## 2021-11-22 PROCEDURE — 36514 APHERESIS PLASMA: CPT

## 2021-11-22 PROCEDURE — 85384 FIBRINOGEN ACTIVITY: CPT

## 2021-11-22 PROCEDURE — 96367 TX/PROPH/DG ADDL SEQ IV INF: CPT

## 2021-11-22 PROCEDURE — 36591 DRAW BLOOD OFF VENOUS DEVICE: CPT

## 2021-11-22 PROCEDURE — 2580000003 HC RX 258: Performed by: INTERNAL MEDICINE

## 2021-11-22 PROCEDURE — 6360000002 HC RX W HCPCS: Performed by: INTERNAL MEDICINE

## 2021-11-22 PROCEDURE — 85027 COMPLETE CBC AUTOMATED: CPT

## 2021-11-22 PROCEDURE — 36592 COLLECT BLOOD FROM PICC: CPT

## 2021-11-22 RX ORDER — ALBUMIN, HUMAN INJ 5% 5 %
2500 SOLUTION INTRAVENOUS ONCE
Status: COMPLETED | OUTPATIENT
Start: 2021-11-22 | End: 2021-11-22

## 2021-11-22 RX ORDER — ALBUMIN, HUMAN INJ 5% 5 %
2500 SOLUTION INTRAVENOUS ONCE
Status: CANCELLED
Start: 2021-11-29 | End: 2021-11-29

## 2021-11-22 RX ADMIN — CALCIUM GLUCONATE 4 G: 98 INJECTION, SOLUTION INTRAVENOUS at 11:00

## 2021-11-22 RX ADMIN — ALBUMIN (HUMAN) 2500 ML: 12.5 INJECTION, SOLUTION INTRAVENOUS at 11:00

## 2021-11-22 NOTE — PROGRESS NOTES
Pt arrived to [facility] today for scheduled plasmapheresis procedure. Apheresis, line care, education, and lab draws all completed by Kimani CAPONE, Jim Miller. Review [facility] documentation for details.   Fausto Montes RN

## 2021-12-03 ENCOUNTER — HOSPITAL ENCOUNTER (OUTPATIENT)
Dept: ONCOLOGY | Age: 51
Setting detail: INFUSION SERIES
Discharge: HOME | End: 2021-12-03
Payer: COMMERCIAL

## 2021-12-03 DIAGNOSIS — G70.01 MYASTHENIA GRAVIS WITH EXACERBATION (HCC): Primary | ICD-10-CM

## 2021-12-03 LAB
FIBRINOGEN: 339 MG/DL (ref 200–397)
HCT VFR BLD CALC: 34.2 % (ref 36–48)
HEMOGLOBIN: 11.3 G/DL (ref 12–16)
MCH RBC QN AUTO: 31.4 PG (ref 26–34)
MCHC RBC AUTO-ENTMCNC: 33.1 G/DL (ref 31–36)
MCV RBC AUTO: 94.8 FL (ref 80–100)
PDW BLD-RTO: 14.6 % (ref 12.4–15.4)
PLATELET # BLD: 278 K/UL (ref 135–450)
PMV BLD AUTO: 7.2 FL (ref 5–10.5)
RBC # BLD: 3.6 M/UL (ref 4–5.2)
WBC # BLD: 6.8 K/UL (ref 4–11)

## 2021-12-03 PROCEDURE — 96367 TX/PROPH/DG ADDL SEQ IV INF: CPT

## 2021-12-03 PROCEDURE — 2580000003 HC RX 258: Performed by: INTERNAL MEDICINE

## 2021-12-03 PROCEDURE — 85384 FIBRINOGEN ACTIVITY: CPT

## 2021-12-03 PROCEDURE — 6360000002 HC RX W HCPCS: Performed by: INTERNAL MEDICINE

## 2021-12-03 PROCEDURE — 85027 COMPLETE CBC AUTOMATED: CPT

## 2021-12-03 PROCEDURE — 96366 THER/PROPH/DIAG IV INF ADDON: CPT

## 2021-12-03 PROCEDURE — 96365 THER/PROPH/DIAG IV INF INIT: CPT

## 2021-12-03 PROCEDURE — P9041 ALBUMIN (HUMAN),5%, 50ML: HCPCS | Performed by: INTERNAL MEDICINE

## 2021-12-03 PROCEDURE — 36514 APHERESIS PLASMA: CPT

## 2021-12-03 RX ORDER — ALBUMIN, HUMAN INJ 5% 5 %
2500 SOLUTION INTRAVENOUS ONCE
Status: CANCELLED
Start: 2021-12-06 | End: 2021-12-06

## 2021-12-03 RX ORDER — ALBUMIN, HUMAN INJ 5% 5 %
2500 SOLUTION INTRAVENOUS ONCE
Status: COMPLETED | OUTPATIENT
Start: 2021-12-03 | End: 2021-12-03

## 2021-12-03 RX ADMIN — CALCIUM GLUCONATE: 98 INJECTION, SOLUTION INTRAVENOUS at 09:14

## 2021-12-03 RX ADMIN — ALBUMIN (HUMAN) 2500 ML: 12.5 INJECTION, SOLUTION INTRAVENOUS at 09:13

## 2021-12-03 NOTE — PROGRESS NOTES
Patient on plasmapheresis.   Will decrease the frequency to every other week and see how she does  D/w [facility]

## 2021-12-03 NOTE — PROGRESS NOTES
Pt arrived to [facility] today for scheduled plasmapheresis procedure. Apheresis, line care, education, and lab draws all completed by Edwin España. Review [facility] documentation for details.   Unknown Honor, RN

## 2021-12-10 ENCOUNTER — HOSPITAL ENCOUNTER (OUTPATIENT)
Dept: ONCOLOGY | Age: 51
Setting detail: INFUSION SERIES
End: 2021-12-10

## 2021-12-13 ENCOUNTER — HOSPITAL ENCOUNTER (OUTPATIENT)
Dept: ONCOLOGY | Age: 51
Setting detail: INFUSION SERIES
Discharge: HOME | End: 2021-12-13
Payer: COMMERCIAL

## 2021-12-13 VITALS — SYSTOLIC BLOOD PRESSURE: 132 MMHG | DIASTOLIC BLOOD PRESSURE: 64 MMHG | HEART RATE: 74 BPM | OXYGEN SATURATION: 98 %

## 2021-12-13 DIAGNOSIS — G70.01 MYASTHENIA GRAVIS WITH EXACERBATION (HCC): Primary | ICD-10-CM

## 2021-12-13 LAB
FIBRINOGEN: 334 MG/DL (ref 200–397)
HCT VFR BLD CALC: 34.3 % (ref 36–48)
HEMOGLOBIN: 11.5 G/DL (ref 12–16)
MCH RBC QN AUTO: 32 PG (ref 26–34)
MCHC RBC AUTO-ENTMCNC: 33.5 G/DL (ref 31–36)
MCV RBC AUTO: 95.5 FL (ref 80–100)
PDW BLD-RTO: 15.3 % (ref 12.4–15.4)
PLATELET # BLD: 332 K/UL (ref 135–450)
PMV BLD AUTO: 7.6 FL (ref 5–10.5)
RBC # BLD: 3.59 M/UL (ref 4–5.2)
WBC # BLD: 9.4 K/UL (ref 4–11)

## 2021-12-13 PROCEDURE — 96366 THER/PROPH/DIAG IV INF ADDON: CPT

## 2021-12-13 PROCEDURE — 2580000003 HC RX 258: Performed by: INTERNAL MEDICINE

## 2021-12-13 PROCEDURE — 85027 COMPLETE CBC AUTOMATED: CPT

## 2021-12-13 PROCEDURE — 85384 FIBRINOGEN ACTIVITY: CPT

## 2021-12-13 PROCEDURE — 36514 APHERESIS PLASMA: CPT

## 2021-12-13 PROCEDURE — 96365 THER/PROPH/DIAG IV INF INIT: CPT

## 2021-12-13 PROCEDURE — 36592 COLLECT BLOOD FROM PICC: CPT

## 2021-12-13 PROCEDURE — 96367 TX/PROPH/DG ADDL SEQ IV INF: CPT

## 2021-12-13 PROCEDURE — 6360000002 HC RX W HCPCS: Performed by: INTERNAL MEDICINE

## 2021-12-13 RX ORDER — ALBUMIN, HUMAN INJ 5% 5 %
2500 SOLUTION INTRAVENOUS ONCE
Status: CANCELLED
Start: 2021-12-20 | End: 2021-12-20

## 2021-12-13 RX ORDER — ALBUMIN, HUMAN INJ 5% 5 %
2500 SOLUTION INTRAVENOUS ONCE
Status: DISCONTINUED | OUTPATIENT
Start: 2021-12-13 | End: 2021-12-14 | Stop reason: HOSPADM

## 2021-12-13 RX ADMIN — CALCIUM GLUCONATE 4000 MG: 98 INJECTION, SOLUTION INTRAVENOUS at 13:51

## 2021-12-13 NOTE — PROGRESS NOTES
Pt arrived to [facility] today for scheduled plasmapheresis procedure. Apheresis, line care, education, and lab draws all completed by Kimani CAPONE, [address]. Review [facility] documentation for details.   Unknown Honor, RN

## 2021-12-13 NOTE — PROGRESS NOTES
Pt arrived to [facility] today for scheduled plasmaphersis with albumin procedure. Apheresis, line care, education, & lab draws all completed by Den Harman RN. Review [facility] documentation for details.  Electronically signed by Esthela Roman RN on 12/13/2021 at 3:48 PM

## 2021-12-14 NOTE — PROGRESS NOTES
Pt seen on Pharesis   Tolerating well   She will discuss with neuro about future need for further treatments     Margot Beach MD

## 2021-12-27 ENCOUNTER — HOSPITAL ENCOUNTER (OUTPATIENT)
Dept: ONCOLOGY | Age: 51
Setting detail: INFUSION SERIES
Discharge: HOME | End: 2021-12-27
Payer: COMMERCIAL

## 2021-12-27 VITALS
HEART RATE: 72 BPM | SYSTOLIC BLOOD PRESSURE: 128 MMHG | TEMPERATURE: 98 F | RESPIRATION RATE: 16 BRPM | OXYGEN SATURATION: 100 % | DIASTOLIC BLOOD PRESSURE: 75 MMHG

## 2021-12-27 DIAGNOSIS — G70.01 MYASTHENIA GRAVIS WITH EXACERBATION (HCC): Primary | ICD-10-CM

## 2021-12-27 LAB
FIBRINOGEN: 407 MG/DL (ref 200–397)
HCT VFR BLD CALC: 34.8 % (ref 36–48)
HEMOGLOBIN: 11.6 G/DL (ref 12–16)
MCH RBC QN AUTO: 31.9 PG (ref 26–34)
MCHC RBC AUTO-ENTMCNC: 33.3 G/DL (ref 31–36)
MCV RBC AUTO: 96 FL (ref 80–100)
PDW BLD-RTO: 14.3 % (ref 12.4–15.4)
PLATELET # BLD: 253 K/UL (ref 135–450)
PMV BLD AUTO: 7.2 FL (ref 5–10.5)
RBC # BLD: 3.63 M/UL (ref 4–5.2)
WBC # BLD: 7.1 K/UL (ref 4–11)

## 2021-12-27 PROCEDURE — 96367 TX/PROPH/DG ADDL SEQ IV INF: CPT

## 2021-12-27 PROCEDURE — 85384 FIBRINOGEN ACTIVITY: CPT

## 2021-12-27 PROCEDURE — 96365 THER/PROPH/DIAG IV INF INIT: CPT

## 2021-12-27 PROCEDURE — 85027 COMPLETE CBC AUTOMATED: CPT

## 2021-12-27 PROCEDURE — 2580000003 HC RX 258: Performed by: INTERNAL MEDICINE

## 2021-12-27 PROCEDURE — 96366 THER/PROPH/DIAG IV INF ADDON: CPT

## 2021-12-27 PROCEDURE — 6360000002 HC RX W HCPCS: Performed by: INTERNAL MEDICINE

## 2021-12-27 PROCEDURE — P9041 ALBUMIN (HUMAN),5%, 50ML: HCPCS | Performed by: INTERNAL MEDICINE

## 2021-12-27 RX ORDER — ALBUMIN, HUMAN INJ 5% 5 %
2500 SOLUTION INTRAVENOUS ONCE
Status: COMPLETED | OUTPATIENT
Start: 2021-12-27 | End: 2021-12-27

## 2021-12-27 RX ORDER — ALBUMIN, HUMAN INJ 5% 5 %
2500 SOLUTION INTRAVENOUS ONCE
Status: CANCELLED
Start: 2022-01-03 | End: 2022-01-03

## 2021-12-27 RX ADMIN — CALCIUM GLUCONATE 4000 MG: 98 INJECTION, SOLUTION INTRAVENOUS at 09:00

## 2021-12-27 RX ADMIN — ALBUMIN (HUMAN) 2500 ML: 12.5 INJECTION, SOLUTION INTRAVENOUS at 09:00

## 2021-12-30 ENCOUNTER — HOSPITAL ENCOUNTER (OUTPATIENT)
Dept: ONCOLOGY | Age: 51
Setting detail: INFUSION SERIES
Discharge: HOME | End: 2021-12-30
Payer: COMMERCIAL

## 2021-12-30 VITALS
OXYGEN SATURATION: 99 % | TEMPERATURE: 98.3 F | RESPIRATION RATE: 16 BRPM | DIASTOLIC BLOOD PRESSURE: 78 MMHG | SYSTOLIC BLOOD PRESSURE: 131 MMHG | HEART RATE: 62 BPM

## 2021-12-30 DIAGNOSIS — G70.01 MYASTHENIA GRAVIS WITH EXACERBATION (HCC): Primary | ICD-10-CM

## 2021-12-30 LAB
FIBRINOGEN: 318 MG/DL (ref 200–397)
HCT VFR BLD CALC: 36.8 % (ref 36–48)
HEMOGLOBIN: 12.2 G/DL (ref 12–16)
MCH RBC QN AUTO: 32 PG (ref 26–34)
MCHC RBC AUTO-ENTMCNC: 33.2 G/DL (ref 31–36)
MCV RBC AUTO: 96.2 FL (ref 80–100)
PDW BLD-RTO: 14.3 % (ref 12.4–15.4)
PLATELET # BLD: 248 K/UL (ref 135–450)
PMV BLD AUTO: 7.8 FL (ref 5–10.5)
RBC # BLD: 3.83 M/UL (ref 4–5.2)
WBC # BLD: 8.5 K/UL (ref 4–11)

## 2021-12-30 PROCEDURE — 2580000003 HC RX 258: Performed by: INTERNAL MEDICINE

## 2021-12-30 PROCEDURE — 85027 COMPLETE CBC AUTOMATED: CPT

## 2021-12-30 PROCEDURE — 96366 THER/PROPH/DIAG IV INF ADDON: CPT

## 2021-12-30 PROCEDURE — 96365 THER/PROPH/DIAG IV INF INIT: CPT

## 2021-12-30 PROCEDURE — 96367 TX/PROPH/DG ADDL SEQ IV INF: CPT

## 2021-12-30 PROCEDURE — 36592 COLLECT BLOOD FROM PICC: CPT

## 2021-12-30 PROCEDURE — 85384 FIBRINOGEN ACTIVITY: CPT

## 2021-12-30 PROCEDURE — P9041 ALBUMIN (HUMAN),5%, 50ML: HCPCS | Performed by: INTERNAL MEDICINE

## 2021-12-30 PROCEDURE — 6360000002 HC RX W HCPCS: Performed by: INTERNAL MEDICINE

## 2021-12-30 RX ORDER — ALBUMIN, HUMAN INJ 5% 5 %
2500 SOLUTION INTRAVENOUS ONCE
Status: COMPLETED | OUTPATIENT
Start: 2021-12-30 | End: 2021-12-30

## 2021-12-30 RX ORDER — ALBUMIN, HUMAN INJ 5% 5 %
2500 SOLUTION INTRAVENOUS ONCE
Status: CANCELLED
Start: 2022-01-03 | End: 2022-01-03

## 2021-12-30 RX ADMIN — CALCIUM GLUCONATE 4000 MG: 98 INJECTION, SOLUTION INTRAVENOUS at 08:00

## 2021-12-30 RX ADMIN — ALBUMIN (HUMAN) 2500 ML: 12.5 INJECTION, SOLUTION INTRAVENOUS at 08:00

## 2021-12-30 NOTE — PROGRESS NOTES
Pt arrived to [facility] today for scheduled plasmaphersis with albumin procedure. Apheresis, line care, education, & lab draws all completed by Den Harman RN. Review [facility] documentation for details.      Electronically signed by Jesus Camp RN on 12/30/2021 at 10:07 AM

## 2022-01-02 ENCOUNTER — APPOINTMENT (OUTPATIENT)
Dept: CT IMAGING | Age: 52
DRG: 056 | End: 2022-01-02
Payer: COMMERCIAL

## 2022-01-02 ENCOUNTER — HOSPITAL ENCOUNTER (INPATIENT)
Age: 52
LOS: 4 days | Discharge: HOME | DRG: 056 | End: 2022-01-06
Attending: EMERGENCY MEDICINE | Admitting: INTERNAL MEDICINE
Payer: COMMERCIAL

## 2022-01-02 ENCOUNTER — APPOINTMENT (OUTPATIENT)
Dept: GENERAL RADIOLOGY | Age: 52
DRG: 056 | End: 2022-01-02
Payer: COMMERCIAL

## 2022-01-02 DIAGNOSIS — R53.1 LEFT-SIDED WEAKNESS: Primary | ICD-10-CM

## 2022-01-02 DIAGNOSIS — E87.6 HYPOKALEMIA: ICD-10-CM

## 2022-01-02 DIAGNOSIS — G70.00 MYASTHENIA GRAVIS (HCC): ICD-10-CM

## 2022-01-02 DIAGNOSIS — G70.01 MYASTHENIA GRAVIS WITH EXACERBATION (HCC): ICD-10-CM

## 2022-01-02 DIAGNOSIS — R47.1 DYSARTHRIA: ICD-10-CM

## 2022-01-02 LAB
ANION GAP SERPL CALCULATED.3IONS-SCNC: 14 MMOL/L (ref 3–16)
BASOPHILS ABSOLUTE: 0 K/UL (ref 0–0.2)
BASOPHILS RELATIVE PERCENT: 0.5 %
BILIRUBIN URINE: NEGATIVE
BLOOD, URINE: NEGATIVE
BUN BLDV-MCNC: 6 MG/DL (ref 7–20)
CALCIUM SERPL-MCNC: 8.2 MG/DL (ref 8.3–10.6)
CHLORIDE BLD-SCNC: 101 MMOL/L (ref 99–110)
CLARITY: CLEAR
CO2: 23 MMOL/L (ref 21–32)
COLOR: YELLOW
CREAT SERPL-MCNC: <0.5 MG/DL (ref 0.6–1.1)
EKG ATRIAL RATE: 82 BPM
EKG DIAGNOSIS: NORMAL
EKG P AXIS: 2 DEGREES
EKG P-R INTERVAL: 182 MS
EKG Q-T INTERVAL: 388 MS
EKG QRS DURATION: 104 MS
EKG QTC CALCULATION (BAZETT): 453 MS
EKG R AXIS: 3 DEGREES
EKG T AXIS: -12 DEGREES
EKG VENTRICULAR RATE: 82 BPM
EOSINOPHILS ABSOLUTE: 0 K/UL (ref 0–0.6)
EOSINOPHILS RELATIVE PERCENT: 0.6 %
GFR AFRICAN AMERICAN: >60
GFR AFRICAN AMERICAN: >60
GFR NON-AFRICAN AMERICAN: >60
GFR NON-AFRICAN AMERICAN: >60
GLUCOSE BLD-MCNC: 115 MG/DL (ref 70–99)
GLUCOSE URINE: NEGATIVE MG/DL
HCT VFR BLD CALC: 36.5 % (ref 36–48)
HEMOGLOBIN: 12.1 G/DL (ref 12–16)
KETONES, URINE: NEGATIVE MG/DL
LEUKOCYTE ESTERASE, URINE: NEGATIVE
LYMPHOCYTES ABSOLUTE: 0.7 K/UL (ref 1–5.1)
LYMPHOCYTES RELATIVE PERCENT: 9.6 %
MCH RBC QN AUTO: 31.7 PG (ref 26–34)
MCHC RBC AUTO-ENTMCNC: 33.3 G/DL (ref 31–36)
MCV RBC AUTO: 95.1 FL (ref 80–100)
MICROSCOPIC EXAMINATION: NORMAL
MONOCYTES ABSOLUTE: 0.3 K/UL (ref 0–1.3)
MONOCYTES RELATIVE PERCENT: 4.4 %
NEUTROPHILS ABSOLUTE: 6.1 K/UL (ref 1.7–7.7)
NEUTROPHILS RELATIVE PERCENT: 84.9 %
NITRITE, URINE: NEGATIVE
PDW BLD-RTO: 14.6 % (ref 12.4–15.4)
PERFORMED ON: ABNORMAL
PH UA: 6 (ref 5–8)
PLATELET # BLD: 184 K/UL (ref 135–450)
PMV BLD AUTO: 6.9 FL (ref 5–10.5)
POC CREATININE: 0.5 MG/DL (ref 0.6–1.1)
POC SAMPLE TYPE: ABNORMAL
POTASSIUM SERPL-SCNC: 3 MMOL/L (ref 3.5–5.1)
PROTEIN UA: NEGATIVE MG/DL
RAPID INFLUENZA  B AGN: NEGATIVE
RAPID INFLUENZA A AGN: NEGATIVE
RBC # BLD: 3.83 M/UL (ref 4–5.2)
SARS-COV-2, NAAT: NOT DETECTED
SODIUM BLD-SCNC: 138 MMOL/L (ref 136–145)
SPECIFIC GRAVITY UA: >1.03 (ref 1–1.03)
T4 FREE: 0.9 NG/DL (ref 0.9–1.8)
TROPONIN: <0.01 NG/ML
TSH REFLEX: 10.03 UIU/ML (ref 0.27–4.2)
URINE TYPE: NORMAL
UROBILINOGEN, URINE: 0.2 E.U./DL
WBC # BLD: 7.2 K/UL (ref 4–11)

## 2022-01-02 PROCEDURE — 87635 SARS-COV-2 COVID-19 AMP PRB: CPT

## 2022-01-02 PROCEDURE — 71046 X-RAY EXAM CHEST 2 VIEWS: CPT

## 2022-01-02 PROCEDURE — 85025 COMPLETE CBC W/AUTO DIFF WBC: CPT

## 2022-01-02 PROCEDURE — 36415 COLL VENOUS BLD VENIPUNCTURE: CPT

## 2022-01-02 PROCEDURE — 2580000003 HC RX 258: Performed by: INTERNAL MEDICINE

## 2022-01-02 PROCEDURE — 70450 CT HEAD/BRAIN W/O DYE: CPT

## 2022-01-02 PROCEDURE — 87804 INFLUENZA ASSAY W/OPTIC: CPT

## 2022-01-02 PROCEDURE — 6360000004 HC RX CONTRAST MEDICATION: Performed by: PHYSICIAN ASSISTANT

## 2022-01-02 PROCEDURE — 82565 ASSAY OF CREATININE: CPT

## 2022-01-02 PROCEDURE — 80048 BASIC METABOLIC PNL TOTAL CA: CPT

## 2022-01-02 PROCEDURE — 94761 N-INVAS EAR/PLS OXIMETRY MLT: CPT

## 2022-01-02 PROCEDURE — 6370000000 HC RX 637 (ALT 250 FOR IP): Performed by: INTERNAL MEDICINE

## 2022-01-02 PROCEDURE — 6370000000 HC RX 637 (ALT 250 FOR IP): Performed by: EMERGENCY MEDICINE

## 2022-01-02 PROCEDURE — 70496 CT ANGIOGRAPHY HEAD: CPT

## 2022-01-02 PROCEDURE — 84439 ASSAY OF FREE THYROXINE: CPT

## 2022-01-02 PROCEDURE — 99283 EMERGENCY DEPT VISIT LOW MDM: CPT

## 2022-01-02 PROCEDURE — 81003 URINALYSIS AUTO W/O SCOPE: CPT

## 2022-01-02 PROCEDURE — 2060000000 HC ICU INTERMEDIATE R&B

## 2022-01-02 PROCEDURE — 93005 ELECTROCARDIOGRAM TRACING: CPT | Performed by: PHYSICIAN ASSISTANT

## 2022-01-02 PROCEDURE — 84443 ASSAY THYROID STIM HORMONE: CPT

## 2022-01-02 PROCEDURE — 84484 ASSAY OF TROPONIN QUANT: CPT

## 2022-01-02 PROCEDURE — 94799 UNLISTED PULMONARY SVC/PX: CPT

## 2022-01-02 RX ORDER — ASPIRIN 300 MG/1
300 SUPPOSITORY RECTAL DAILY
Status: DISCONTINUED | OUTPATIENT
Start: 2022-01-03 | End: 2022-01-06

## 2022-01-02 RX ORDER — PYRIDOSTIGMINE BROMIDE 60 MG/1
60 TABLET ORAL 3 TIMES DAILY
COMMUNITY

## 2022-01-02 RX ORDER — SODIUM CHLORIDE 0.9 % (FLUSH) 0.9 %
10 SYRINGE (ML) INJECTION EVERY 12 HOURS SCHEDULED
Status: DISCONTINUED | OUTPATIENT
Start: 2022-01-02 | End: 2022-01-06 | Stop reason: HOSPADM

## 2022-01-02 RX ORDER — ONDANSETRON 4 MG/1
4 TABLET, ORALLY DISINTEGRATING ORAL EVERY 8 HOURS PRN
Status: DISCONTINUED | OUTPATIENT
Start: 2022-01-02 | End: 2022-01-06 | Stop reason: HOSPADM

## 2022-01-02 RX ORDER — ATORVASTATIN CALCIUM 40 MG/1
40 TABLET, FILM COATED ORAL NIGHTLY
Status: DISCONTINUED | OUTPATIENT
Start: 2022-01-02 | End: 2022-01-06 | Stop reason: HOSPADM

## 2022-01-02 RX ORDER — ONDANSETRON 2 MG/ML
4 INJECTION INTRAMUSCULAR; INTRAVENOUS EVERY 6 HOURS PRN
Status: DISCONTINUED | OUTPATIENT
Start: 2022-01-02 | End: 2022-01-06 | Stop reason: HOSPADM

## 2022-01-02 RX ORDER — LEVOTHYROXINE SODIUM 0.15 MG/1
150 TABLET ORAL DAILY
COMMUNITY

## 2022-01-02 RX ORDER — SODIUM CHLORIDE 0.9 % (FLUSH) 0.9 %
10 SYRINGE (ML) INJECTION PRN
Status: DISCONTINUED | OUTPATIENT
Start: 2022-01-02 | End: 2022-01-06 | Stop reason: HOSPADM

## 2022-01-02 RX ORDER — ASPIRIN 81 MG/1
81 TABLET ORAL DAILY
Status: DISCONTINUED | OUTPATIENT
Start: 2022-01-03 | End: 2022-01-06

## 2022-01-02 RX ORDER — POTASSIUM CHLORIDE 20 MEQ/1
40 TABLET, EXTENDED RELEASE ORAL ONCE
Status: COMPLETED | OUTPATIENT
Start: 2022-01-02 | End: 2022-01-02

## 2022-01-02 RX ORDER — SODIUM CHLORIDE 9 MG/ML
25 INJECTION, SOLUTION INTRAVENOUS PRN
Status: DISCONTINUED | OUTPATIENT
Start: 2022-01-02 | End: 2022-01-06 | Stop reason: HOSPADM

## 2022-01-02 RX ADMIN — POTASSIUM CHLORIDE 40 MEQ: 1500 TABLET, EXTENDED RELEASE ORAL at 18:10

## 2022-01-02 RX ADMIN — IOPAMIDOL 75 ML: 755 INJECTION, SOLUTION INTRAVENOUS at 14:23

## 2022-01-02 RX ADMIN — SODIUM CHLORIDE, PRESERVATIVE FREE 10 ML: 5 INJECTION INTRAVENOUS at 20:22

## 2022-01-02 RX ADMIN — ATORVASTATIN CALCIUM 40 MG: 40 TABLET, FILM COATED ORAL at 20:22

## 2022-01-02 ASSESSMENT — PAIN SCALES - GENERAL
PAINLEVEL_OUTOF10: 0

## 2022-01-02 NOTE — ED NOTES
Pt preformed NIF at -34 with a good effort. When attempted to do FVC ( 1.5 L) and Peak Flow (150ml) with repeated attempts (best efforts in parenthesis), pt had minimal efforts on multiple attempts.

## 2022-01-02 NOTE — ED TRIAGE NOTES
Patient to ED via private car c/o falling into wall today, pt states hit head.  history of MG with weakness progressive, dysphagia, double vision starting yesterday at 11am.

## 2022-01-02 NOTE — ED PROVIDER NOTES
Date of evaluation: 1/2/2022    ED Attending Attestation Note     CHIEF COMPLAINT     My mom has myasthenia gravis and had a flare last year similar to symptoms she is having now and we did a stroke work up and did plasma exchange then IVIG. This morning she had some difficulty with swallowing, talking, double vision, she was dragging her left side and she was so off balance she fell and hit her head. HISTORY OF PRESENT ILLNESS  (Location/Symptom, Timing/Onset,Context/Setting, Quality, Duration, Modifying Factors, Severity). Note limiting factors. This patient was seen by the advance practice provider. I have seen and examined the patient, agree with the workup, evaluation, management and diagnosis. The care plan has been discussed. Chief Complaint   Patient presents with    Fall     fall into wall today, pt states hit head. history of MG with weakness progressive, dysphagia, double vision starting yesterday at 11am     Diplopia        Rob Cervantes is a 46 y.o. female who presents to the emergency department secondary to concern for multiple symptoms earlier today. At the time of arrival here she appears to have a flat affect but no longer has any weakness, dyarthria, or difficulty controlling her secretions. She lives with her  who has been well. Daughter is present at the bedside and provides significant collateral.      Past medical history significant for anxiety, arthritis, cancer, GERD, HLD, insomnia, migraines, myasthenia gravis, seizures, thyroid disease.    Social History     Socioeconomic History    Marital status:      Spouse name: None    Number of children: None    Years of education: None    Highest education level: None   Occupational History    None   Tobacco Use    Smoking status: Never Smoker    Smokeless tobacco: Never Used   Vaping Use    Vaping Use: Never used   Substance and Sexual Activity    Alcohol use: Never    Drug use: Never    Sexual activity: Not Currently   Other Topics Concern    None   Social History Narrative    None     Social Determinants of Health     Financial Resource Strain:     Difficulty of Paying Living Expenses: Not on file   Food Insecurity:     Worried About Running Out of Food in the Last Year: Not on file    Jaky of Food in the Last Year: Not on file   Transportation Needs:     Lack of Transportation (Medical): Not on file    Lack of Transportation (Non-Medical): Not on file   Physical Activity:     Days of Exercise per Week: Not on file    Minutes of Exercise per Session: Not on file   Stress:     Feeling of Stress : Not on file   Social Connections:     Frequency of Communication with Friends and Family: Not on file    Frequency of Social Gatherings with Friends and Family: Not on file    Attends [employer] Services: Not on file    Active Member of [address] [place] [address] or Organizations: Not on file    Attends Club or Organization Meetings: Not on file    Marital Status: Not on file   Intimate Partner Violence:     Fear of Current or Ex-Partner: Not on file    Emotionally Abused: Not on file    Physically Abused: Not on file    Sexually Abused: Not on file   Housing Stability:     Unable to Pay for Housing in the Last Year: Not on file    Number of [place] in the Last Year: Not on file    Unstable Housing in the Last Year: Not on file     Aside from what is stated above denies any other symptoms or modifying factors. Nursing Notes reviewed.     Past Surgical History:   Procedure Laterality Date    APPENDECTOMY      CAPSULE ENDOSCOPY N/A 02/28/2020    ESOPHAGEAL CAPSULE ENDOSCOPY performed by Parish Suazo MD at [facility] COLONOSCOPY N/A 12/17/2019    COLONOSCOPY performed by Andres Katz MD at [address] [place] [address] 10/15/2021    19cm tunneled dual lumen dialysis catheter inserted by Dr. Eliseo Solitario N/A 02/11/2020    ESOPHAGEAL MANOMETRY performed by Shanna Vo MD at [facility] IR BIOPSY LIVER PERCUTANEOUS  10/15/2021    IR BIOPSY LIVER PERCUTANEOUS 10/15/2021 [facility]    IR TUNNELED CATHETER PLACEMENT GREATER THAN 5 YEARS  10/15/2021    IR TUNNELED CATHETER PLACEMENT GREATER THAN 5 YEARS 10/15/2021 [facility]    OTHER SURGICAL HISTORY Right 10/15/2021    Transjugular liver Biopsy    SHOULDER SURGERY      rotator cuff repair- bilateral    THYROIDECTOMY      TUMOR REMOVAL      UPPER GASTROINTESTINAL ENDOSCOPY N/A 12/17/2019    EGD BIOPSY performed by Grisel Sarabia MD at [address] 12/17/2019    EGD DILATION SAVORY performed by Grisel Sarabia MD at [facility]  02/28/2020    EGD DIAGNOSTIC ONLY performed by Shanna Vo MD at [facility]  02/28/2020    Esophagogastroduodenoscopy with Bravo Capsule placement performed by Shanna Vo MD at [facility] History   Problem Relation Age of Onset    Heart Disease Father     High Cholesterol Father     High Blood Pressure Father        CURRENT MEDICATIONS       Previous Medications    BUTALBITAL-ACETAMINOPHEN-CAFFEINE (FIORICET, ESGIC) -40 MG PER TABLET    Take 1 tablet by mouth every 4 hours as needed for Migraine    CITALOPRAM (CELEXA) 40 MG TABLET    Take 40 mg by mouth daily    DOXEPIN (SINEQUAN) 10 MG CAPSULE    Take 10 mg by mouth nightly    ESTRADIOL (ESTRACE) 2 MG TABLET    Take 2 mg by mouth nightly    FOLIC ACID (FOLVITE) 1 MG TABLET    Take 1 mg by mouth daily    LEVOTHYROXINE (SYNTHROID) 125 MCG TABLET    Take 1 tablet by mouth Daily    LORAZEPAM (ATIVAN) 0.5 MG TABLET    Take 0.5 mg by mouth 2 times daily.     METHOTREXATE (RHEUMATREX) 2.5 MG CHEMO TABLET    Take 10 mg by mouth once a week Mondays    PYRIDOSTIGMINE (MESTINON) 60 MG TABLET    Take 1.5 tablets by mouth 3 times daily    SUMATRIPTAN (IMITREX) 100 MG TABLET    Take 100 mg by mouth once as needed for Migraine Take 100 mg by mouth as needed for migraines if initial dose was partially effective or headache recurs, may repeat a dose after 2 hours. (max of 200 mg in 24 hour period)    ZOLPIDEM (AMBIEN) 10 MG TABLET    Take 10 mg by mouth nightly. DIAGNOSTIC RESULTS   EKG: interpreted by the Emergency Department Physician who either signs or Co-signs this chart in the absence of a cardiologist.  EKG Indication: Fall  EKG Interpretation: Rate 82, rhythm sinus, axis normal.  TN/QTc within normal limits. QRS borderline 104. She has some nonspecific T wave abnormalities noted with potential for incomplete right bundle branch block noted. Comparison to prior EKG from 11/10/2019 shows her QRS at that time was 84 and the T wave inversions are not noted though they were noted previously on 11/10/2019. RADIOLOGY:   Interpretation per Radiologist below, if available at the time of this note:  XR CHEST (2 VW)   Final Result   No acute process. CTA HEAD NECK W CONTRAST   Final Result   1. Unremarkable CTA of the head and neck. 2. Incomplete bony fusion along the C4-C5 disc space. Residual mobility at   this level cannot be excluded. RECOMMENDATIONS:   Unavailable         CT Head WO Contrast   Final Result   No acute intracranial abnormality. RECOMMENDATIONS:   Unavailable           Patient was given the following medications:  Orders Placed This Encounter   Medications    iopamidol (ISOVUE-370) 76 % injection 75 mL    potassium chloride (KLOR-CON M) extended release tablet 40 mEq       INITIAL VITALS: BP: 139/85, Temp: 98.3 °F (36.8 °C), Pulse: 89, Resp: 14, SpO2: 99 %   RECENT VITALS:  BP: 133/70,Temp: 98.3 °F (36.8 °C), Pulse: 81, Resp: 14, SpO2: 100 %     My assessment reveals a chronically ill-appearing white female who presents with a flat affect though no acute signs of neurologic deficits.   NIH stroke score 0. Test of skew was negative. When she sits up in the bed there is no obvious truncal instability though she states she still feels weak overall. Called RT to evaluate her TV, FVC, FEV, and NIF. She was handling her secretions well at that time. NIF wnl at 34. Labs with potassium 3.0 and elevated TSH but normal T4 free. Rapid covid and flu both negative. CT head negative. CTA head/neck unremarkable. Chest x-ray reassuring. Discussed with her neurologist, Dr. Marilee Macdonald, who recommended admission for stroke work-up and potential need for further treatment if she is having a myasthenia gravis crisis. He stated the symptoms she is reporting are not typical for myasthenia gravis. Discussed with patient and daughter who was still at the bedside. Patient understands risks of going home and though she prefers to go home is amenable to staying for further evaluation. Consulted hospitalist, Dr. Joe Waller, through [facility] who kindly accepted. Critical Care:  Due to the immediate potential for life-threatening deterioration due to weakness with concern for potential stroke versus myasthenia gravis crisis, I spent 25 minutes providing critical care. This time excludes time spent performing procedures but includes time spent on direct patient care, history retrieval, review of the chart, and discussions with patient, family, and consultant(s). FINAL IMPRESSION      1. Left-sided weakness    2. Dysarthria    3.  Myasthenia gravis [facility])        DISPOSITION/PLAN   DISPOSITION Decision To Admit 01/02/2022 05:58:03 PM             (Please note that portions of this note were completed with a voice recognition program. Efforts were made to edit the dictations but occasionally words are mis-transcribed.)    Steph Stockton MD (electronically signed)  Attending Emergency Physician       Steph Stockton MD  01/02/22 [id number]

## 2022-01-02 NOTE — ED PROVIDER NOTES
[address]  [address] [address]  Dept: [phone]  Loc: [address] ENCOUNTER        This patient was also seen and evaluated by the attending physician Dr. Marquita Mercedes. CHIEF COMPLAINT    Chief Complaint   Patient presents with    Fall     fall into wall today, pt states hit head. history of MG with weakness progressive, dysphagia, double vision starting yesterday at 11am     Diplopia       HPI    Yeimi Cloud is a 46 y.o. female with history of myasthenia gravis who presents with generalized weakness. Onset was yesterday. The duration has been constant since the onset. Daughter states that patient had L>R weakness this AM, daughter states this is typical for her MG flares. There are no alleviating factors. Patient complains of associated difficulty swallowing and double vision that started at 11 AM today. The context was a spontaneous onset. Daughter states that the patient was off balance this morning and fell, hitting her head against the wall, no loss of consciousness or vomiting. REVIEW OF SYSTEMS    Cardiac: No chest pain or palpitations  Respiratory: No shortness of breath or new cough  General: see HPI, no fevers   GI: No abdominal pain or diarrhea, no nausea, no vomiting  Neuro: see HPI, no LOC  All other systems reviewed and are negative.     PAST MEDICAL & SURGICAL HISTORY    Past Medical History:   Diagnosis Date    Anxiety     Arthritis     Cancer ([facility] [address]     thyroid    GERD (gastroesophageal reflux disease)     Hyperlipidemia     Insomnia     Migraines     Myasthenia gravis with exacerbation (HCC)     Seizures ([facility] [address]     Thyroid disease      Past Surgical History:   Procedure Laterality Date    APPENDECTOMY      CAPSULE ENDOSCOPY N/A 02/28/2020    ESOPHAGEAL CAPSULE ENDOSCOPY performed by Saskia Pereira MD at [address] [place] [address] N/A 12/17/2019    COLONOSCOPY performed by Adalberto Junior MD at [address] Right 10/15/2021    19cm tunneled dual lumen dialysis catheter inserted by Dr. Carmen Hoang N/A 02/11/2020    ESOPHAGEAL MANOMETRY performed by Carrie Bacbock MD at [facility] IR BIOPSY LIVER PERCUTANEOUS  10/15/2021    IR BIOPSY LIVER PERCUTANEOUS 10/15/2021 [facility]    IR TUNNELED CATHETER PLACEMENT GREATER THAN 5 YEARS  10/15/2021    IR TUNNELED CATHETER PLACEMENT GREATER THAN 5 YEARS 10/15/2021 [facility]    OTHER SURGICAL HISTORY Right 10/15/2021    Transjugular liver Biopsy    SHOULDER SURGERY      rotator cuff repair- bilateral    THYROIDECTOMY      TUMOR REMOVAL      UPPER GASTROINTESTINAL ENDOSCOPY N/A 12/17/2019    EGD BIOPSY performed by Adalberto Junior MD at [place] [address] 12/17/2019    EGD DILATION SAVORY performed by Adalberto Junior MD at [place] [address]  02/28/2020    EGD DIAGNOSTIC ONLY performed by Carrie Babcock MD at [place] [address]  02/28/2020    Esophagogastroduodenoscopy with Bravo Capsule placement performed by Carrie Babcock MD at [address] [place] [address]    Current Outpatient Rx   Medication Sig Dispense Refill    levothyroxine (SYNTHROID) 125 MCG tablet Take 1 tablet by mouth Daily 30 tablet 3    pyridostigmine (MESTINON) 60 MG tablet Take 1.5 tablets by mouth 3 times daily 60 tablet 3    SUMAtriptan (IMITREX) 100 MG tablet Take 100 mg by mouth once as needed for Migraine Take 100 mg by mouth as needed for migraines if initial dose was partially effective or headache recurs, may repeat a dose after 2 hours.  (max of 200 mg in 24 hour period)      butalbital-acetaminophen-caffeine (FIORICET, ESGIC) -40 MG per tablet Take 1 tablet by mouth every 4 hours as needed for Migraine      LORazepam (ATIVAN) 0.5 MG tablet Take 0.5 mg by mouth 2 times daily.  methotrexate (RHEUMATREX) 2.5 MG chemo tablet Take 10 mg by mouth once a week Mondays      doxepin (SINEQUAN) 10 MG capsule Take 10 mg by mouth nightly      estradiol (ESTRACE) 2 MG tablet Take 2 mg by mouth nightly      folic acid (FOLVITE) 1 MG tablet Take 1 mg by mouth daily      zolpidem (AMBIEN) 10 MG tablet Take 10 mg by mouth nightly.  citalopram (CELEXA) 40 MG tablet Take 40 mg by mouth daily         ALLERGIES    Allergies   Allergen Reactions    Immune Globulins Other (See Comments)     FLEBOGAMMA - ASEPTIC MENINGITIS    Other      Steroid injection to joints    Adhesive Tape Rash and Other (See Comments)     Paper Tape  Paper Tape    Skin gets reddened under tape; not allergic to latex    Penicillins Itching and Rash     Rash  Rash         SOCIAL & FAMILY HISTORY  Social History     Socioeconomic History    Marital status:      Spouse name: None    Number of children: None    Years of education: None    Highest education level: None   Occupational History    None   Tobacco Use    Smoking status: Never Smoker    Smokeless tobacco: Never Used   Vaping Use    Vaping Use: Never used   Substance and Sexual Activity    Alcohol use: Never    Drug use: Never    Sexual activity: Not Currently   Other Topics Concern    None   Social History Narrative    None     Social Determinants of Health     Financial Resource Strain:     Difficulty of Paying Living Expenses: Not on file   Food Insecurity:     Worried About Running Out of Food in the Last Year: Not on file    Jaky of Food in the Last Year: Not on file   Transportation Needs:     Lack of Transportation (Medical): Not on file    Lack of Transportation (Non-Medical):  Not on file   Physical Activity:     Days of Exercise per Week: Not on file    Minutes of Exercise per Session: Not on file   Stress:     Feeling of Stress : Not on file   Social Connections:     Frequency of Communication with Friends and Family: Not on file    Frequency of Social Gatherings with Friends and Family: Not on file    Attends [employer] Services: Not on file    Active Member of Clubs or Organizations: Not on file    Attends Club or Organization Meetings: Not on file    Marital Status: Not on file   Intimate Partner Violence:     Fear of Current or Ex-Partner: Not on file    Emotionally Abused: Not on file    Physically Abused: Not on file    Sexually Abused: Not on file   Housing Stability:     Unable to Pay for Housing in the Last Year: Not on file    Number of [place] in the Last Year: Not on file    Unstable Housing in the Last Year: Not on file     Family History   Problem Relation Age of Onset    Heart Disease Father     High Cholesterol Father     High Blood Pressure Father        PHYSICAL EXAM    VITAL SIGNS: BP (!) 142/67   Pulse 78   Temp 98.3 °F (36.8 °C) (Oral)   Resp 14   SpO2 100%   Constitutional:  Well developed, well nourished, no acute distress  Eyes:  Pupils equally round and reactive to light  HENT:  External ears normal, nose normal  NECK: Normal range of motion, no tenderness  Respiratory:  Lungs clear to auscultation bilaterally, no respiratory distress, no wheezing   Cardiovascular:  Regular rate, normal rhythm, no murmurs   GI:  Soft, nontender, nondistended, normal bowel sounds  Musculoskeletal:  No edema, no acute deformities   Integument:  Skin is warm and dry, no obvious rash    Vascular: Radial and DP pulses 2+ equal bilaterally  Neurologic: Awake, alert, oriented, no aphasia, no slurred speech, CN II-XII intact, normal finger to nose test bilaterally, 5/5 strength in all 4 extremities, sensation to light touch intact bilaterally    EKG    Please see physician's note for EKG interpretation. RADIOLOGY/PROCEDURES    XR CHEST (2 VW)   Final Result   No acute process.          CTA HEAD NECK W CONTRAST   Final Result   1. Unremarkable CTA of the head and neck. 2. Incomplete bony fusion along the C4-C5 disc space. Residual mobility at   this level cannot be excluded. RECOMMENDATIONS:   Unavailable         CT Head WO Contrast   Final Result   No acute intracranial abnormality. RECOMMENDATIONS:   Unavailable             ED COURSE & MEDICAL DECISION MAKING    Pertinent Labs & Imaging studies reviewed and interpreted. (See chart for details)    See chart for details of medications given during the ED stay. Vitals:    01/02/22 1600 01/02/22 1615 01/02/22 1645 01/02/22 1800   BP: 120/76  133/70 (!) 142/67   Pulse:   81 78   Resp:  14 14 14   Temp:       TempSrc:       SpO2: 98% 98% 100% 100%       Differential diagnosis: anemia, dehydration, acute coronary syndrome, cardiac arrhythmia, neurologic emergency, metabolic emergency, toxicologic emergency, infection, other    Patient is afebrile and nontoxic in appearance. Labs reveal no leukocytosis or anemia. Metabolic panel reveals potassium 3.0, low. Urinalysis unremarkable. CXR findings as above  . CT/CTA findings as above, no acute abnormalities. EKG interpreted by ED physician. Troponin negative. Dr. Barbara Alejandro consulted neurology who recommended admission. She contacted the hospitalist who accepted the patient for admission. FINAL IMPRESSION    1. Left-sided weakness    2. Dysarthria    3. Myasthenia gravis ([address]    4.  Hypokalemia        PLAN  Admission to the hospital    (Please note that this note was completed with a voice recognition program.  Every attempt was made to edit the dictations, but inevitably there remain words that are mis-transcribed.)            Kelsey Montgomery, [address]  01/02/22 [id number]

## 2022-01-02 NOTE — ED NOTES
Patient is tolerating her secreations well. No distress noted. Patient denies complaints.      Glenny Angel RN  01/02/22 1468

## 2022-01-03 ENCOUNTER — HOSPITAL ENCOUNTER (OUTPATIENT)
Dept: ONCOLOGY | Age: 52
Setting detail: INFUSION SERIES
Discharge: HOME | End: 2022-01-03

## 2022-01-03 ENCOUNTER — APPOINTMENT (OUTPATIENT)
Dept: MRI IMAGING | Age: 52
DRG: 056 | End: 2022-01-03
Payer: COMMERCIAL

## 2022-01-03 DIAGNOSIS — G70.01 MYASTHENIA GRAVIS WITH EXACERBATION (HCC): Primary | ICD-10-CM

## 2022-01-03 LAB
ANION GAP SERPL CALCULATED.3IONS-SCNC: 13 MMOL/L (ref 3–16)
APTT: 44.2 SEC (ref 26.2–38.6)
BUN BLDV-MCNC: 6 MG/DL (ref 7–20)
CALCIUM IONIZED: 1.13 MMOL/L (ref 1.12–1.32)
CALCIUM SERPL-MCNC: 8.3 MG/DL (ref 8.3–10.6)
CHLORIDE BLD-SCNC: 102 MMOL/L (ref 99–110)
CHOLESTEROL, TOTAL: 164 MG/DL (ref 0–199)
CO2: 24 MMOL/L (ref 21–32)
CREAT SERPL-MCNC: 0.5 MG/DL (ref 0.6–1.1)
ESTIMATED AVERAGE GLUCOSE: 88.2 MG/DL
FIBRINOGEN: 384 MG/DL (ref 200–397)
GFR AFRICAN AMERICAN: >60
GFR NON-AFRICAN AMERICAN: >60
GLUCOSE BLD-MCNC: 91 MG/DL (ref 70–99)
HBA1C MFR BLD: 4.7 %
HCT VFR BLD CALC: 36.3 % (ref 36–48)
HDLC SERPL-MCNC: 48 MG/DL (ref 40–60)
HEMOGLOBIN: 12.1 G/DL (ref 12–16)
INR BLD: 0.99 (ref 0.88–1.12)
LACTATE DEHYDROGENASE: 185 U/L (ref 100–190)
LDL CHOLESTEROL CALCULATED: 89 MG/DL
MCH RBC QN AUTO: 31.6 PG (ref 26–34)
MCHC RBC AUTO-ENTMCNC: 33.4 G/DL (ref 31–36)
MCV RBC AUTO: 94.8 FL (ref 80–100)
PDW BLD-RTO: 14.5 % (ref 12.4–15.4)
PH VENOUS: 7.43 (ref 7.35–7.45)
PLATELET # BLD: 192 K/UL (ref 135–450)
PMV BLD AUTO: 6.9 FL (ref 5–10.5)
POTASSIUM REFLEX MAGNESIUM: 3.8 MMOL/L (ref 3.5–5.1)
PROTHROMBIN TIME: 11.2 SEC (ref 9.9–12.7)
RBC # BLD: 3.83 M/UL (ref 4–5.2)
SODIUM BLD-SCNC: 139 MMOL/L (ref 136–145)
TRIGL SERPL-MCNC: 136 MG/DL (ref 0–150)
VLDLC SERPL CALC-MCNC: 27 MG/DL
WBC # BLD: 6.1 K/UL (ref 4–11)

## 2022-01-03 PROCEDURE — 99223 1ST HOSP IP/OBS HIGH 75: CPT | Performed by: HOSPITALIST

## 2022-01-03 PROCEDURE — P9041 ALBUMIN (HUMAN),5%, 50ML: HCPCS | Performed by: HOSPITALIST

## 2022-01-03 PROCEDURE — 6370000000 HC RX 637 (ALT 250 FOR IP): Performed by: INTERNAL MEDICINE

## 2022-01-03 PROCEDURE — 83036 HEMOGLOBIN GLYCOSYLATED A1C: CPT

## 2022-01-03 PROCEDURE — 2580000003 HC RX 258: Performed by: INTERNAL MEDICINE

## 2022-01-03 PROCEDURE — 83615 LACTATE (LD) (LDH) ENZYME: CPT

## 2022-01-03 PROCEDURE — 85730 THROMBOPLASTIN TIME PARTIAL: CPT

## 2022-01-03 PROCEDURE — 97530 THERAPEUTIC ACTIVITIES: CPT | Performed by: PHYSICAL THERAPIST

## 2022-01-03 PROCEDURE — 6370000000 HC RX 637 (ALT 250 FOR IP): Performed by: NURSE PRACTITIONER

## 2022-01-03 PROCEDURE — 90935 HEMODIALYSIS ONE EVALUATION: CPT

## 2022-01-03 PROCEDURE — 85610 PROTHROMBIN TIME: CPT

## 2022-01-03 PROCEDURE — 94799 UNLISTED PULMONARY SVC/PX: CPT

## 2022-01-03 PROCEDURE — 97162 PT EVAL MOD COMPLEX 30 MIN: CPT | Performed by: PHYSICAL THERAPIST

## 2022-01-03 PROCEDURE — 92610 EVALUATE SWALLOWING FUNCTION: CPT

## 2022-01-03 PROCEDURE — 6360000002 HC RX W HCPCS: Performed by: INTERNAL MEDICINE

## 2022-01-03 PROCEDURE — 85384 FIBRINOGEN ACTIVITY: CPT

## 2022-01-03 PROCEDURE — 2000000000 HC ICU R&B

## 2022-01-03 PROCEDURE — 80048 BASIC METABOLIC PNL TOTAL CA: CPT

## 2022-01-03 PROCEDURE — 6370000000 HC RX 637 (ALT 250 FOR IP): Performed by: HOSPITALIST

## 2022-01-03 PROCEDURE — 94761 N-INVAS EAR/PLS OXIMETRY MLT: CPT

## 2022-01-03 PROCEDURE — 2500000003 HC RX 250 WO HCPCS: Performed by: HOSPITALIST

## 2022-01-03 PROCEDURE — 85027 COMPLETE CBC AUTOMATED: CPT

## 2022-01-03 PROCEDURE — 36415 COLL VENOUS BLD VENIPUNCTURE: CPT

## 2022-01-03 PROCEDURE — 2580000003 HC RX 258: Performed by: HOSPITALIST

## 2022-01-03 PROCEDURE — 97530 THERAPEUTIC ACTIVITIES: CPT

## 2022-01-03 PROCEDURE — 97116 GAIT TRAINING THERAPY: CPT | Performed by: PHYSICAL THERAPIST

## 2022-01-03 PROCEDURE — 80061 LIPID PANEL: CPT

## 2022-01-03 PROCEDURE — 82330 ASSAY OF CALCIUM: CPT

## 2022-01-03 PROCEDURE — 6370000000 HC RX 637 (ALT 250 FOR IP): Performed by: STUDENT IN AN ORGANIZED HEALTH CARE EDUCATION/TRAINING PROGRAM

## 2022-01-03 PROCEDURE — 97166 OT EVAL MOD COMPLEX 45 MIN: CPT

## 2022-01-03 PROCEDURE — 6360000002 HC RX W HCPCS: Performed by: HOSPITALIST

## 2022-01-03 PROCEDURE — 36514 APHERESIS PLASMA: CPT

## 2022-01-03 PROCEDURE — 5A1D70Z PERFORMANCE OF URINARY FILTRATION, INTERMITTENT, LESS THAN 6 HOURS PER DAY: ICD-10-PCS | Performed by: HOSPITALIST

## 2022-01-03 PROCEDURE — 99223 1ST HOSP IP/OBS HIGH 75: CPT | Performed by: INTERNAL MEDICINE

## 2022-01-03 PROCEDURE — 70551 MRI BRAIN STEM W/O DYE: CPT

## 2022-01-03 RX ORDER — ALBUMIN, HUMAN INJ 5% 5 %
125 SOLUTION INTRAVENOUS CONTINUOUS
Status: DISPENSED | OUTPATIENT
Start: 2022-01-03 | End: 2022-01-03

## 2022-01-03 RX ORDER — ACETAMINOPHEN 500 MG
500 TABLET ORAL ONCE
Status: COMPLETED | OUTPATIENT
Start: 2022-01-03 | End: 2022-01-03

## 2022-01-03 RX ORDER — ACETAMINOPHEN 325 MG/1
650 TABLET ORAL ONCE
Status: COMPLETED | OUTPATIENT
Start: 2022-01-03 | End: 2022-01-03

## 2022-01-03 RX ORDER — LORAZEPAM 0.5 MG/1
0.5 TABLET ORAL 2 TIMES DAILY PRN
Status: DISCONTINUED | OUTPATIENT
Start: 2022-01-03 | End: 2022-01-06 | Stop reason: HOSPADM

## 2022-01-03 RX ORDER — ALBUMIN, HUMAN INJ 5% 5 %
2500 SOLUTION INTRAVENOUS ONCE
Status: DISCONTINUED | OUTPATIENT
Start: 2022-01-03 | End: 2022-01-03

## 2022-01-03 RX ORDER — ALBUMIN, HUMAN INJ 5% 5 %
125 SOLUTION INTRAVENOUS CONTINUOUS
Status: DISCONTINUED | OUTPATIENT
Start: 2022-01-03 | End: 2022-01-03

## 2022-01-03 RX ORDER — ANTICOAGULANT CITRATE DEXTROSE SOLUTION FORMULA A 12.25; 11; 3.65 G/500ML; G/500ML; G/500ML
SOLUTION INTRAVENOUS PRN
Status: DISCONTINUED | OUTPATIENT
Start: 2022-01-03 | End: 2022-01-06 | Stop reason: HOSPADM

## 2022-01-03 RX ORDER — HEPARIN SODIUM 1000 [USP'U]/ML
3000 INJECTION, SOLUTION INTRAVENOUS; SUBCUTANEOUS ONCE
Status: COMPLETED | OUTPATIENT
Start: 2022-01-03 | End: 2022-01-03

## 2022-01-03 RX ORDER — ALBUMIN, HUMAN INJ 5% 5 %
2500 SOLUTION INTRAVENOUS ONCE
Status: CANCELLED
Start: 2022-01-10 | End: 2022-01-10

## 2022-01-03 RX ORDER — PYRIDOSTIGMINE BROMIDE 60 MG/1
60 TABLET ORAL EVERY 8 HOURS SCHEDULED
Status: DISCONTINUED | OUTPATIENT
Start: 2022-01-03 | End: 2022-01-06 | Stop reason: HOSPADM

## 2022-01-03 RX ORDER — 0.9 % SODIUM CHLORIDE 0.9 %
2000 INTRAVENOUS SOLUTION INTRAVENOUS
Status: ACTIVE | OUTPATIENT
Start: 2022-01-03 | End: 2022-01-03

## 2022-01-03 RX ADMIN — PYRIDOSTIGMINE BROMIDE 60 MG: 60 TABLET ORAL at 23:35

## 2022-01-03 RX ADMIN — ACETAMINOPHEN 650 MG: 325 TABLET ORAL at 18:33

## 2022-01-03 RX ADMIN — CALCIUM GLUCONATE 4000 MG: 98 INJECTION, SOLUTION INTRAVENOUS at 19:25

## 2022-01-03 RX ADMIN — ALBUMIN (HUMAN) 125 G: 12.5 INJECTION, SOLUTION INTRAVENOUS at 19:19

## 2022-01-03 RX ADMIN — SODIUM CHLORIDE, PRESERVATIVE FREE 10 ML: 5 INJECTION INTRAVENOUS at 21:34

## 2022-01-03 RX ADMIN — ASPIRIN 81 MG: 81 TABLET, COATED ORAL at 08:26

## 2022-01-03 RX ADMIN — SODIUM CHLORIDE, PRESERVATIVE FREE 10 ML: 5 INJECTION INTRAVENOUS at 08:26

## 2022-01-03 RX ADMIN — ATORVASTATIN CALCIUM 40 MG: 40 TABLET, FILM COATED ORAL at 19:49

## 2022-01-03 RX ADMIN — ONDANSETRON 4 MG: 2 INJECTION INTRAMUSCULAR; INTRAVENOUS at 19:50

## 2022-01-03 RX ADMIN — HEPARIN SODIUM 3000 UNITS: 1000 INJECTION INTRAVENOUS; SUBCUTANEOUS at 21:10

## 2022-01-03 RX ADMIN — ACETAMINOPHEN 500 MG: 500 TABLET ORAL at 01:53

## 2022-01-03 RX ADMIN — ANTICOAGULANT CITRATE DEXTROSE SOLUTION FORMULA A: 12.25; 11; 3.65 SOLUTION INTRAVENOUS at 19:23

## 2022-01-03 RX ADMIN — PYRIDOSTIGMINE BROMIDE 60 MG: 60 TABLET ORAL at 14:01

## 2022-01-03 ASSESSMENT — PAIN SCALES - GENERAL
PAINLEVEL_OUTOF10: 7
PAINLEVEL_OUTOF10: 0
PAINLEVEL_OUTOF10: 9
PAINLEVEL_OUTOF10: 0
PAINLEVEL_OUTOF10: 0
PAINLEVEL_OUTOF10: 7
PAINLEVEL_OUTOF10: 0

## 2022-01-03 ASSESSMENT — PAIN DESCRIPTION - PAIN TYPE
TYPE: ACUTE PAIN
TYPE: ACUTE PAIN

## 2022-01-03 ASSESSMENT — PAIN DESCRIPTION - LOCATION
LOCATION: HEAD
LOCATION: HEAD

## 2022-01-03 ASSESSMENT — PAIN DESCRIPTION - ORIENTATION: ORIENTATION: ANTERIOR

## 2022-01-03 ASSESSMENT — PAIN DESCRIPTION - ONSET: ONSET: PROGRESSIVE

## 2022-01-03 NOTE — CONSULTS
Office: [phone]       Fax: [phone]      Nephrology Initial Consult Note        Patient's Name: Godfrey Amaya Date: 1/2/2022  Date of Visit: 1/3/2022    Reason for Consult:  Need for TPE  Requesting Physician:  Carlos Elkins MD  PCP: Troy White MD    Chief Complaint:  weakness     History of Present Illness:      Hiren Gonzalez is a 46 y.o. female with PMHx of hypothyroidism, myasthenia gravis, migraine, depression  who was hospitalized on 1/2/2022 with complaints of weakness  Pt has been getting pheresis 1-2/wk, last on 12/30  Per pt, she seems to be getting worse  No fever, shortness of breath   No current headache    NSAID use: Denies   IV contrast: None recent   Home meds reviewed     Past Medical History:   Diagnosis Date    Anxiety     Arthritis     [facility]     thyroid    GERD (gastroesophageal reflux disease)     Hyperlipidemia     Insomnia     Migraines     Myasthenia gravis with exacerbation ([facility]     Seizures ([facility]     Thyroid disease        Past Surgical History:   Procedure Laterality Date    APPENDECTOMY      CAPSULE ENDOSCOPY N/A 02/28/2020    ESOPHAGEAL CAPSULE ENDOSCOPY performed by Isak Wilkinson MD at [facility] COLONOSCOPY N/A 12/17/2019    COLONOSCOPY performed by Rachel Meyer MD at [address] [place] [address] 10/15/2021    19cm tunneled dual lumen dialysis catheter inserted by Dr. Wen Torres N/A 02/11/2020    ESOPHAGEAL MANOMETRY performed by Isak Wilkinson MD at [address] [place] [address] IR BIOPSY LIVER PERCUTANEOUS  10/15/2021    IR BIOPSY LIVER PERCUTANEOUS 10/15/2021 [facility]    IR TUNNELED CATHETER PLACEMENT GREATER THAN 5 YEARS  10/15/2021    IR TUNNELED CATHETER PLACEMENT GREATER THAN 5 YEARS 10/15/2021 [facility]    OTHER SURGICAL HISTORY Right 10/15/2021    Transjugular liver Biopsy    SHOULDER SURGERY      rotator cuff repair- bilateral    THYROIDECTOMY      TUMOR REMOVAL      UPPER GASTROINTESTINAL ENDOSCOPY N/A 12/17/2019    EGD [facility]SY [facility]acisco [facility]ino [facility] [address] [facility] [facility] 12/17/2019    EGD DILATION SAVORY performed by Fracisco Florentino MD at [address] [facility] [facility]  02/28/2020    EGD DIAGNOSTIC ONLY performed by Radha Osorio MD at [address] [facility] [facility]  02/28/2020    Esophagogastroduodenoscopy with Bravo Capsule placement performed by Radha Osorio MD at [address] History   Problem Relation Age of Onset    Heart Disease Father     High Cholesterol Father     High Blood Pressure Father         reports that she has never smoked. She has never used smokeless tobacco. She reports that she does not drink alcohol and does not use drugs. Medications:    Allergies:  Immune globulins, Other, Adhesive tape, and Penicillins    Scheduled Meds:   pyridostigmine  60 mg Oral 3 times per day    sodium chloride flush  10 mL IntraVENous 2 times per day    enoxaparin  40 mg SubCUTAneous Daily    aspirin  81 mg Oral Daily    Or    aspirin  300 mg Rectal Daily    atorvastatin  40 mg Oral Nightly     Continuous Infusions:   sodium chloride         Labs:  CBC:   Recent Labs     01/02/22  1503 01/03/22  0458   WBC 7.2 6.1   HGB 12.1 12.1    192     Ca/Mg/Phos:   Recent Labs     01/02/22  1503 01/03/22  0458   CALCIUM 8.2* 8.3     UA:  Recent Labs     01/02/22  1503   COLORU YELLOW   CLARITYU Clear   GLUCOSEU Negative   BILIRUBINUR Negative   KETUA Negative   SPECGRAV >1.030   BLOODU Negative   PHUR 6.0   PROTEINU Negative   UROBILINOGEN 0.2   NITRU Negative   LEUKOCYTESUR Negative   LABMICR Not Indicated   URINETYPE Other      Urine Microscopic: No results for input(s): LABCAST, BACTERIA, COMU, HYALCAST, WBCUA, RBCUA, EPIU in the last 72 hours. Urine Chemistry: No results for input(s): Lajean Shaver, PROTEINUR, NAUR in the last 72 hours. ROS:     All systems reviewed and negative except as in HPI    Objective:     Vitals: /69   Pulse 82   Temp 97.9 °F (36.6 °C) (Oral)   Resp 16   Ht 5' 2\" (1.575 m)   Wt 160 lb 7.9 oz (72.8 kg)   SpO2 94%   BMI 29.35 kg/m²    Wt Readings from Last 3 Encounters:   01/03/22 160 lb 7.9 oz (72.8 kg)   10/15/21 163 lb 9.6 oz (74.2 kg)   10/11/21 168 lb 3.2 oz (76.3 kg)      24HR INTAKE/OUTPUT:      Intake/Output Summary (Last 24 hours) at 1/3/2022 1133  Last data filed at 1/3/2022 0600  Gross per 24 hour   Intake 0 ml   Output 0 ml   Net 0 ml     Constitutional:  awake, NAD  HEENT:  MMM, No icterus  Neck: no bruits, No JVD  Cardiovascular:  reg rhythm  Respiratory: CTA, no crackles  Abdomen:  +BS, soft, NT, ND  Ext: no lower extremity edema  Psychiatric: mood and affect appropriate  Skin: dry/intact  CNS: alert, no agitation    IMAGING:  XR CHEST (2 VW)   Final Result   No acute process. CTA HEAD NECK W CONTRAST   Final Result   1. Unremarkable CTA of the head and neck. 2. Incomplete bony fusion along the C4-C5 disc space. Residual mobility at   this level cannot be excluded. RECOMMENDATIONS:   Unavailable         CT Head WO Contrast   Final Result   No acute intracranial abnormality. RECOMMENDATIONS:   Unavailable         MRI brain without contrast    (Results Pending)       Assessment :     1. Myasthenia gravis    -Baseline creat: <1   -Volume: Euvolemic   -needs plasmapheresis  -status post thymus removal      Recent Labs     01/02/22  1419 01/02/22  1503 01/03/22  0458   BUN  --  6* 6*   CREATININE 0.5* <0.5* 0.5*     Recent Labs     01/02/22  1503 01/03/22  0458    139   K 3.0* 3.8   CO2 23 24         2.  HTN  -Blood pressure at goal    -not on meds    BP Readings from Last 1 Encounters:   01/03/22 113/69       3. Hypothyroidism     4. Depression     Plan:     - will arrange to continue plasmapheresis per neuro recommendations: qod x5  - Access: RIJ tunneled cath  - Monitor BMP    -Monitor I/O, UOP  -Maintain MAP>65  -Avoid nephrotoxin, if able. -Dose meds to current eGFR    Thank you for allowing us to participate in care of Virginia Carranza . We will continue to follow. Feel free to contact me with any questions.       Ike Mehta MD  1/3/2022    [facility]  [address]  Office : [phone]  Fax :[phone]

## 2022-01-03 NOTE — PROCEDURES
Genna served Dr. Graham Gaytan to see if complete echo ordered could be changed to a limited exam. Per MD exam is on hold until further notice.

## 2022-01-03 NOTE — PROGRESS NOTES
Occupational Therapy   Occupational Therapy Initial Assessment  Date: 1/3/2022   Patient Name: Miranda Borjas  MRN: [id number]     : 1970    Date of Service: 1/3/2022    Discharge Recommendations:  Patient would benefit from continued therapy after discharge,5-7 sessions per week  OT Equipment Recommendations  Other: defer to [address] [place] [address] scored a 16/24 on the AM-PAC ADL Inpatient form. Current research shows that an AM-PAC score of 17 or less is typically not associated with a discharge to the patient's home setting. Based on the patient's AM-PAC score and their current ADL deficits, it is recommended that the patient have 5-7 sessions per week of Occupational Therapy at d/c to increase the patient's independence. At this time, this patient demonstrates the endurance, and/or tolerance for 3 hours of therapy each day, with a treatment frequency of 5-7x/wk. Please see assessment section for further patient specific details. If patient discharges prior to next session this note will serve as a discharge summary. Please see below for the latest assessment towards goals. Assessment   Performance deficits / Impairments: Decreased functional mobility ; Decreased strength;Decreased endurance;Decreased ADL status; Decreased safe awareness;Decreased vision/visual deficit; Decreased high-level IADLs;Decreased balance;Decreased ROM  Assessment: 45 y/o female admitted 2022 with MG flare up vs CVA. PTA pt lives at home with spouse and was independent with ADLs and functional mobility. Today, pt required min A for transfers and CGA to ambulated ~ 12 ft with RW. Pt fatigued easily and unable to ambulate further. Pt with very weak UEs when test (? effort provided) and anticipate will require up to max A for ADLs at this time. Pt is functioning below baseline and benefit from skilled therapy prior to returning home.   Prognosis: Good  Decision Making: Medium Complexity  OT Education: OT Role;Plan of Care;Transfer Training  REQUIRES OT FOLLOW UP: Yes  Activity Tolerance  Activity Tolerance: Patient limited by fatigue  Activity Tolerance: c/o dizziness/double vision with activity. Fatigued quickly  Safety Devices  Safety Devices in place: Yes  Type of devices: Left in chair;Nurse notified;Gait belt;Call light within reach; Chair alarm in place           Patient Diagnosis(es): The primary encounter diagnosis was Left-sided weakness. Diagnoses of Dysarthria, Myasthenia gravis ([address] and Hypokalemia were also pertinent to this visit. has a past medical history of Anxiety, Arthritis, Cancer ([address] GERD (gastroesophageal reflux disease), Hyperlipidemia, Insomnia, Migraines, Myasthenia gravis with exacerbation ([address] Seizures ([address] and Thyroid disease. has a past surgical history that includes Thyroidectomy; tumor removal; Hysterectomy; shoulder surgery; Appendectomy; Cholecystectomy; Upper gastrointestinal endoscopy (N/A, 12/17/2019); Colonoscopy (N/A, 12/17/2019); Upper gastrointestinal endoscopy (N/A, 12/17/2019); esophageal motility study (N/A, 02/11/2020); Upper gastrointestinal endoscopy (02/28/2020); Upper gastrointestinal endoscopy (02/28/2020); Capsule endoscopy (N/A, 02/28/2020); Dialysis Catheter Insertion (Right, 10/15/2021); other surgical history (Right, 10/15/2021); IR TUNNELED CVC PLACE WO SQ PORT/PUMP > 5 YEARS (10/15/2021); and IR BIOPSY LIVER PERCUTANEOUS (10/15/2021).            Restrictions  Restrictions/Precautions  Restrictions/Precautions: Fall Risk  Position Activity Restriction  Other position/activity restrictions: gets infusions for myasthenia gravis    Subjective   General  Chart Reviewed: Yes  Patient assessed for rehabilitation services?: Yes  Additional Pertinent Hx: 46 y.o. female with hx of myasthenia gravis, seizures, migraines, insomnia, thyroid cancer s/p thyroidectomy, HLD who presented to the ED on 1/2/21 for evaluation of multiple complaints including: weakness-left greater than right, difficulty swallowing, double vision. 46 y.o. female with hx of myasthenia gravis, seizures, migraines, insomnia, thyroid cancer s/p thyroidectomy, HLD who presented to the ED on 1/2/21 for evaluation of multiple complaints including: weakness-left greater than right, difficulty swallowing, double vision, she reports similar to her hx of myasthenic crisis. She has not tolerated IVIG and receives PLEX as outpatient. CT head negative for acute findings. MRI pending  Family / Caregiver Present: No  Referring Practitioner: Dr. Gilson Murdock  Diagnosis: MG flare up vs CVA  Subjective  Subjective: Pt seen bedside and agreeable to therapy. Pt denied pain but c/o weakness all over  General Comment  Comments: Per RN ok for therapy. 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 Very effortful and max distance pt could ambulate d/t fatigue. ADL  Additional Comments: Pt denied ADLs.  Anticipate pt will require max A for LB bathing/dressing and toileting, min A for UB Bathing/dressing and grooming seated based on balance and effort provided        Bed mobility  Sit to Supine:  (pt in chair at end of session)  Transfers  Sit to stand: Minimal assistance  Stand to sit: Minimal assistance  Transfer Comments: to/from RW     Cognition  Overall Cognitive Status: WFL      LUE PROM (degrees)  LUE PROM: WFL  Left Hand PROM (degrees)  Left Hand PROM: WFL  RUE PROM (degrees)  RUE PROM: WFL  Right Hand PROM (degrees)  Right Hand PROM: WFL  LUE Strength  LUE Strength Comment: difficult to assess to lack of effort- minimal shoulder AROM when tested but then able to move functionally for items; weak grasp  RUE Strength  RUE Strength Comment: difficult to assess to lack of effort- minimal shoulder AROM when tested but then able to move functionally for items; weak grasp        Plan   Plan  Times per week: 3-5  Current Treatment Recommendations: Strengthening,Functional Mobility Training,Gait Training,Endurance Training,Balance Training,Self-Care / ADL,ROM    AM-PAC Score  AM-[facility] Inpatient Daily Activity Raw Score: 16 (01/03/22 1205)  AM-PAC Inpatient ADL T-Scale Score : 35.96 (01/03/22 1205)  ADL Inpatient CMS 0-100% Score: 53.32 (01/03/22 1205)  ADL Inpatient CMS G-Code Modifier : CK (01/03/22 1205)    Goals  Short term goals  Time Frame for Short term goals: Prior to DC:   Short term goal 1: Pt will complete ADL transfers with supervision  Short term goal 2: Pt will complete functional mobility with supervision  Short term goal 3: Pt will tolerate standing > 5 min for functional task with supervision  Short term goal 4: Pt will complete toileting with supervision  Short term goal 5: Pt will complete LB Dressing with supervision  Patient Goals   Patient goals : to get stronger and return home       Therapy Time   Individual Concurrent Group Co-treatment   Time In 1120         Time Out 1205         Minutes 45         Timed Code Treatment Minutes: 30 Minutes     This note to serve as OT d/c summary if pt is d/c-ed prior to next therapy session.     Maria Luz Tao, OTR/L

## 2022-01-03 NOTE — PROGRESS NOTES
Pt arrived to floor via stretcher from ED and ambulated to bed. Telemetry activated. Patient oriented to room and use of call light. Call light and personal items within reach. Admission and assessment initiated. POC and education initiated and reviewed with patient. Telemetry- [address] NSR . Denied further needs or questions at this time. Will continue to monitor.

## 2022-01-03 NOTE — CONSULTS
Consult received  Full note to follow    Americo Chin MD  1/3/2022    [facility] [address]  Office : [phone]  Fax :[phone]

## 2022-01-03 NOTE — H&P
Hospital Medicine History & Physical      PCP: Kev Cuellar MD    Date of Admission: 1/2/2022    Chief Complaint: Difficulty swallowing, difficulty walking, difficulty speaking    History Of Present Illness:  Patient is a 61-year-old female with past medical history of myasthenia gravis who presents to the hospital for difficulty swallowing, double vision, difficulty walking. According to the patient she has not been able to swallow. She also has difficulty walking and feels her left side is weak than the right side. According to the patient whenever se tries to eat it feels like her food is stuck in her mouth and she is not able to swallow it down. Patient also mentions she has difficulty walking which is unusual for her myasthenia gravis flareups. Neurology was consulted from the emergency department, recommended admission for stroke work-up plus myasthenia gravis.       Past Medical History:          Diagnosis Date    Anxiety     Arthritis     Cancer ([address]     thyroid    GERD (gastroesophageal reflux disease)     Hyperlipidemia     Insomnia     Migraines     Myasthenia gravis with exacerbation (HCC)     [facility]     Thyroid disease        Past Surgical History:          Procedure Laterality Date    APPENDECTOMY      CAPSULE ENDOSCOPY N/A 02/28/2020    ESOPHAGEAL CAPSULE ENDOSCOPY performed by Paz Hoang MD at [address] [place] [address] N/A 12/17/2019    COLONOSCOPY performed by Hans Montaño MD at [address] [place] [address] 10/15/2021    19cm tunneled dual lumen dialysis catheter inserted by Dr. Aurelio Wright 02/11/2020    ESOPHAGEAL MANOMETRY performed by Paz Hoang MD at [address] [place] [address] IR BIOPSY LIVER PERCUTANEOUS  10/15/2021    IR BIOPSY LIVER PERCUTANEOUS 10/15/2021 [facility]    IR TUNNELED CATHETER PLACEMENT GREATER THAN 5 YEARS  10/15/2021    IR TUNNELED CATHETER PLACEMENT GREATER THAN 5 YEARS 10/15/2021 [facility]    OTHER SURGICAL HISTORY Right 10/15/2021    Transjugular liver Biopsy    SHOULDER SURGERY      rotator cuff repair- bilateral    THYROIDECTOMY      TUMOR REMOVAL      UPPER GASTROINTESTINAL ENDOSCOPY N/A 12/17/2019    EGD BIOPSY performed by Hans Montaño MD at [address] [place] [address] 12/17/2019    EGD DILATION SAVORY performed by Hans Montaño MD at [address] [place] [address]  02/28/2020    EGD DIAGNOSTIC ONLY performed by Paz Hoang MD at [address] [place] [address]  02/28/2020    Esophagogastroduodenoscopy with Bravo Capsule placement performed by Paz Hoang MD at [address] [place] [address]       Medications Prior to Admission:      Prior to Admission medications    Medication Sig Start Date End Date Taking? Authorizing Provider   levothyroxine (SYNTHROID) 125 MCG tablet Take 1 tablet by mouth Daily 8/9/20   Robin Chopra MD   pyridostigmine (MESTINON) 60 MG tablet Take 1.5 tablets by mouth 3 times daily 58/27/79   Ilda Hinds, APRN - CNP   SUMAtriptan (IMITREX) 100 MG tablet Take 100 mg by mouth once as needed for Migraine Take 100 mg by mouth as needed for migraines if initial dose was partially effective or headache recurs, may repeat a dose after 2 hours. (max of 200 mg in 24 hour period)    Historical Provider, MD   butalbital-acetaminophen-caffeine (FIORICET, ESGIC) -40 MG per tablet Take 1 tablet by mouth every 4 hours as needed for Migraine    Historical Provider, MD   LORazepam (ATIVAN) 0.5 MG tablet Take 0.5 mg by mouth 2 times daily.     Historical Provider, MD   methotrexate (RHEUMATREX) 2.5 MG chemo tablet Take 10 mg by mouth once a week Mondays    Historical Provider, MD   doxepin (SINEQUAN) 10 MG capsule Take 10 mg by mouth nightly    Historical Provider, MD   estradiol (ESTRACE) 2 MG tablet Take 2 mg by mouth nightly Historical Provider, MD   folic acid (FOLVITE) 1 MG tablet Take 1 mg by mouth daily    Historical Provider, MD   zolpidem (AMBIEN) 10 MG tablet Take 10 mg by mouth nightly. Historical Provider, MD   citalopram (CELEXA) 40 MG tablet Take 40 mg by mouth daily    Historical Provider, MD       Allergies:  Immune globulins, Other, Adhesive tape, and Penicillins    Social History:      TOBACCO:   reports that she has never smoked. She has never used smokeless tobacco.  ETOH:   reports no history of alcohol use. Family History:       Reviewed in detail and non contributory          Problem Relation Age of Onset    Heart Disease Father     High Cholesterol Father     High Blood Pressure Father        REVIEW OF SYSTEMS:   Pertinent positives as noted in the HPI. All other systems reviewed and negative. PHYSICAL EXAM PERFORMED:    BP (!) 142/67   Pulse 78   Temp 98.3 °F (36.8 °C) (Oral)   Resp 14   SpO2 100%     General appearance:  No apparent distress, cooperative. HEENT:  Normal cephalic, atraumatic without obvious deformity. Conjunctivae/corneas clear. Neck: Supple, with full range of motion. No cervical lymphadenopathy  Respiratory:  Normal respiratory effort. Clear to auscultation, bilaterally without Rales/Wheezes/Rhonchi. Cardiovascular:  Regular rate and rhythm with normal S1/S2 without murmurs, rubs or gallops. Abdomen: Soft, non-tender, non-distended, normal bowel sounds. Musculoskeletal:  No edema noted bilaterally. No tenderness on palpation   Skin: no rash visible  Neurologic:  Neurologically intact without any focal sensory/motor deficits.   grossly non-focal.  Psychiatric:  Alert and oriented, normal mood  Peripheral Pulses: +2 palpable, equal bilaterally       Labs:     Recent Labs     01/02/22  1503   WBC 7.2   HGB 12.1   HCT 36.5        Recent Labs     01/02/22  1419 01/02/22  1503   NA  --  138   K  --  3.0*   CL  --  101   CO2  --  23   BUN  --  6*   CREATININE 0.5* <0.5* CALCIUM  --  8.2*     No results for input(s): AST, ALT, BILIDIR, BILITOT, ALKPHOS in the last 72 hours. No results for input(s): INR in the last 72 hours. Recent Labs     01/02/22  1503   TROPONINI <0.01       Urinalysis:      Lab Results   Component Value Date    NITRU Negative 01/02/2022    WBCUA 1 06/30/2020    BACTERIA RARE 06/30/2020    RBCUA 1 06/30/2020    BLOODU Negative 01/02/2022    SPECGRAV >1.030 01/02/2022    GLUCOSEU Negative 01/02/2022       Radiology:       XR CHEST (2 VW)   Final Result   No acute process. CTA HEAD NECK W CONTRAST   Final Result   1. Unremarkable CTA of the head and neck. 2. Incomplete bony fusion along the C4-C5 disc space. Residual mobility at   this level cannot be excluded. RECOMMENDATIONS:   Unavailable         CT Head WO Contrast   Final Result   No acute intracranial abnormality. RECOMMENDATIONS:   Unavailable         MRI brain without contrast    (Results Pending)           Active Hospital Problems    Diagnosis Date Noted    Left-sided weakness [R53.1] 01/02/2022       Patient is a 63-year-old female with past medical history of myasthenia gravis who presents to the hospital for difficulty swallowing, double vision, difficulty walking. According to the patient she has not been able to swallow. She also has difficulty walking and feels her left side is weak than the right side. According to the patient whenever se tries to eat it feels like her food is stuck in her mouth and she is not able to swallow it down. Patient also mentions she has difficulty walking which is unusual for her myasthenia gravis flareups. Neurology was consulted from the emergency department, recommended admission for stroke work-up plus myasthenia gravis.     Assessment  Diplopia, dysarthria, dysphagia, differentials include worsening myasthenia gravis versus CVA  GERD  Hyperlipidemia  Seizures  Hypothyroidism    Plan  Neurology consulted from emergency department, recommends MRI, stroke work-up-, will order aspirin, statin, ordered MRI brain, PT/OT consult, neurology eval, echocardiogram  Neurology did not recommend to start IVIG at this time, will defer to neurology  DVT prophylaxis-Lovenox  Diet: Diet NPO  Code Status: Full Code    PT/OT Eval Status: ordered    Dispo - pending clinical improvement       Daniel Isabel MD    The note was completed using EMR and Dragon dictation system. Every effort was made to ensure accuracy; however, inadvertent computerized transcription errors may be present. Thank you Alvin Drew MD for the opportunity to be involved in this patient's care. If you have any questions or concerns please feel free to contact me at [phone].     Daniel Isabel MD

## 2022-01-03 NOTE — PROGRESS NOTES
Pt with history of Myasthenia Gravis. Came in because she fell at home. She still has weakness, diplopia and dysphagia. Left side is weaker than the right side which she said is normal for her. Able to swallow pills in the ED but kept on NPO for now until Speech evaluation is done. Pt is allergic to IVIG and does plasmapheresis instead. With vascath on right chest. She said that she thinks the reason why she is not getting betting is because the plasmapheresis doesn't work. Pt is alert and oriented. Normally independent at home. She said her flare ups are not usually this bad.

## 2022-01-03 NOTE — PROGRESS NOTES
Occupational Therapy    Pt transfer to ICU; pleas re-order therapy when pt medically stable to participate.     Electronically signed by Asa Lentz OT on 1/3/2022 at 3:24 PM

## 2022-01-03 NOTE — PROGRESS NOTES
NIF: -22 cwp  VC: 800 mL    Pt on RA w/ SaO2 of 93%. Reports feeling uncomfortable w/ breathing and SOB at rest.     RN informed of VC <1.5 L, physician to be notified.

## 2022-01-03 NOTE — CONSULTS
REASON FOR CONSULTATION/CC: Myasthenic crisis      Consult at request of Shaila Wu MD     PCP: Donna Schaeffer MD  Established Pulmonologist:  None    Chief Complaint   Patient presents with    Fall     fall into wall today, pt states hit head. history of MG with weakness progressive, dysphagia, double vision starting yesterday at 11am     Diplopia       HISTORY OF PRESENT ILLNESS: Godfrey Aden is a 46y.o. year old female with a history of myasthenia gravis, seizures who presents with generalized weakness. She has associated symptoms of dysphagia, double vision. She takes pyridostigmine at home. She has had some mild shortness of breath. She was using senna spirometer and was transferred to the ICU today after her incentive spirometry volume dropped below 1 L. She has a tunneled right IJ for which she gets plasmapheresis from as an outpatient.       Past Medical History:   Diagnosis Date    Anxiety     Arthritis     Cancer ([facility] [address]     thyroid    GERD (gastroesophageal reflux disease)     Hyperlipidemia     Insomnia     Migraines     Myasthenia gravis with exacerbation (HCC)     Seizures ([facility] [address]     Thyroid disease          Past Surgical History:   Procedure Laterality Date    APPENDECTOMY      CAPSULE ENDOSCOPY N/A 02/28/2020    ESOPHAGEAL CAPSULE ENDOSCOPY performed by Don Carrasquillo MD at [address] [facility] N/A 12/17/2019    COLONOSCOPY performed by Ashley Guadarrama MD at [address] 10/15/2021    19cm tunneled dual lumen dialysis catheter inserted by Dr. Ivon Ricardo 02/11/2020    ESOPHAGEAL MANOMETRY performed by Don Carrasquillo MD at [facility] IR BIOPSY LIVER PERCUTANEOUS  10/15/2021    IR BIOPSY LIVER PERCUTANEOUS 10/15/2021 [facility]    IR TUNNELED CATHETER PLACEMENT GREATER THAN 5 YEARS  10/15/2021    IR TUNNELED CATHETER PLACEMENT GREATER THAN 5 YEARS 10/15/2021 [facility]    OTHER SURGICAL HISTORY Right 10/15/2021    Transjugular liver Biopsy    SHOULDER SURGERY      rotator cuff repair- bilateral    THYROIDECTOMY      TUMOR REMOVAL      UPPER GASTROINTESTINAL ENDOSCOPY N/A 12/17/2019    EGD BIOPSY performed by Bernadine Champagne MD at [address] 12/17/2019    EGD DILATION SAVORY performed by Bernadine Champagne MD at [address] [place] [address]  02/28/2020    EGD DIAGNOSTIC ONLY performed by Ninoska Sebastian MD at [address] [place] [address]  02/28/2020    Esophagogastroduodenoscopy with Bravo Capsule placement performed by Ninoska Sebastian MD at [address] Hx  family history includes Heart Disease in her father; High Blood Pressure in her father; High Cholesterol in her father. Social Hx   reports that she has never smoked. She has never used smokeless tobacco.    Scheduled Meds:   pyridostigmine  60 mg Oral 3 times per day    heparin (porcine)  3,000 Units IntraCATHeter Once    acetaminophen  650 mg Oral Once    calcium gluconate IVPB  4,000 mg IntraVENous Once    sodium chloride flush  10 mL IntraVENous 2 times per day    enoxaparin  40 mg SubCUTAneous Daily    aspirin  81 mg Oral Daily    Or    aspirin  300 mg Rectal Daily    atorvastatin  40 mg Oral Nightly       Continuous Infusions:   albumin human      sodium chloride         PRN Meds:  sodium chloride, citrate dextrose, perflutren lipid microspheres, sodium chloride flush, sodium chloride, ondansetron **OR** ondansetron, perflutren lipid microspheres    ALLERGIES:  Patient is allergic to immune globulins, other, adhesive tape, and penicillins.     REVIEW OF SYSTEMS:  Constitutional: Negative for fever  HENT: Negative for sore throat  Eyes: Negative for redness   Respiratory: +dyspnea, -cough  Cardiovascular: Negative for chest pain  Gastrointestinal: Negative for vomiting, diarrhea   Genitourinary: Negative for hematuria   Musculoskeletal: Negative for arthralgias   Skin: Negative for rash  Neurological: Negative for syncope  Hematological: Negative for adenopathy  Psychiatric/Behavorial: Negative for anxiety    Objective:   PHYSICAL EXAM:  Blood pressure 113/69, pulse 82, temperature 97.9 °F (36.6 °C), temperature source Oral, resp. rate 20, height 5' 2\" (1.575 m), weight 160 lb 7.9 oz (72.8 kg), SpO2 93 %.'  Gen:  No acute distress. Eyes: PERRL. Anicteric sclera. No conjunctival injection. ENT: No discharge. Posterior oropharynx clear. External appearance of ears and nose normal.  Neck: Trachea midline. No mass   Resp:  No crackles. No wheezes. No rhonchi. No dullness on percussion. CV: Regular rate. Regular rhythm. No murmur or rub. No edema. GI: Soft, Non-tender. Non-distended. +BS  Skin: Warm, dry, w/o erythema. Lymph: No cervical or supraclavicular LAD. M/S: No cyanosis. No clubbing. Neuro: Generalized weakness. Psych: Mood stable. Awake alert oriented x3      Data Reviewed:   LABS:  CBC:   Recent Labs     01/02/22  1503 01/03/22  0458   WBC 7.2 6.1   HGB 12.1 12.1   HCT 36.5 36.3   MCV 95.1 94.8    192     BMP:   Recent Labs     01/02/22  1419 01/02/22  1503 01/03/22  0458   NA  --  138 139   K  --  3.0* 3.8   CL  --  101 102   CO2  --  23 24   BUN  --  6* 6*   CREATININE 0.5* <0.5* 0.5*     LIVER PROFILE: No results for input(s): AST, ALT, LIPASE, BILIDIR, BILITOT, ALKPHOS in the last 72 hours. Invalid input(s):   AMYLASE,  ALB  PT/INR:   Recent Labs     01/03/22  0458   PROTIME 11.2   INR 0.99     APTT:   Recent Labs     01/03/22  1401   APTT 44.2*     UA:  Recent Labs     01/02/22  1503   COLORU YELLOW   PHUR 6.0   CLARITYU Clear   SPECGRAV >1.030   LEUKOCYTESUR Negative   UROBILINOGEN 0.2   BILIRUBINUR Negative   BLOODU Negative   GLUCOSEU Negative     No results for input(s): PHART, LHS3EUL, PO2ART in the last 72 hours.    Vent Information  SpO2: 93 %    Radiology Review:  Pertinent images / reports were reviewed as a part of this visit. Chest x-ray images reviewed personally by me, interpretation as follows:  -No acute airspace process bilaterally. EKG  Diagnosis  Normal sinus rhythmMinimal voltage criteria for LVH, may be normal variantNonspecific ST and T wave abnormalityAbnormal ECGWhen compared with ECG of 10-NOV-2019 11:41,QRS duration has increasedT wave inversion now evident in Anterior leadsConfirmed by Arvell Harada, ADAM ([id number]) on 1/2/2022 8:36:08 PM         Assessment/Plan:       Myasthenia gravis  -Plasmapheresis, neurology and nephrology following  -Concern for possible respiratory muscle weakness, will continue to monitor. Blood gases as needed.  -On Mestinon    Focal weakness  -MRI today, neurology following  -CT head without acute findings           This note was transcribed using [id number] [employer]. Please disregard any translational errors.     Thank you for the consult    [address]  Pulmonary, Sleep and Critical Care Medicine

## 2022-01-03 NOTE — PROGRESS NOTES
4 Eyes Skin Assessment     NAME:  Addy Hodge  YOB: 1970  MEDICAL RECORD NUMBER:  [id number]    The patient is being assess for  Admission    I agree that 2 RN's have performed a thorough Head to Toe Skin Assessment on the patient. ALL assessment sites listed below have been assessed. Areas assessed by both nurses:    Head, Face, Ears, Shoulders, Back, Chest, Arms, Elbows, Hands, Sacrum. Buttock, Coccyx, Ischium and Legs. Feet and Heels        Does the Patient have a Wound?  No noted wound(s)       Adam Prevention initiated:  Yes   Wound Care Orders initiated:  No    Pressure Injury (Stage 3,4, Unstageable, DTI, NWPT, and Complex wounds) if present place consult order under [de-identified] No    New and Established Ostomies if present place consult order under : No      Nurse 1 eSignature: Electronically signed by Estuardo Isbell RN on 1/3/22 at 4:24 PM EST    **SHARE this note so that the co-signing nurse is able to place an eSignature**    Nurse 2 eSignature: Electronically signed by Obie Gosselin, RN on 1/3/22 at 11:24 PM EST

## 2022-01-03 NOTE — PLAN OF CARE
Problem: Falls - Risk of:  Goal: Will remain free from falls  Description: Will remain free from falls  Outcome: Ongoing  Goal: Absence of physical injury  Description: Absence of physical injury  Outcome: Ongoing     Problem: HEMODYNAMIC STATUS  Goal: Patient has stable vital signs and fluid balance  Outcome: Ongoing     Problem: ACTIVITY INTOLERANCE/IMPAIRED MOBILITY  Goal: Mobility/activity is maintained at optimum level for patient  Outcome: Ongoing     NAME:  Otis Wang  YOB: 1970  MEDICAL RECORD NUMBER:  [id number]  TODAYS DATE:  1/3/2022    Discussed personal risk factors for Stroke /TIA with patient/family, and ways to reduce the risk for a recurrent stroke. Patient's personal risk factors which were identified are:     [] Alcohol Abuse: check with your physician before any alcohol consumption. [] Atrial fibrillation: may cause blood clots. [] Drug Abuse: Seek help, talk with your doctor  [] Clotting Disorder  [] Diabetes  [x] Family history of stroke or heart disease  [] High Blood Pressure/Hypertension: work with your physician.  [] High cholesterol: monitor cholesterol levels with your physician.   [] Overweight/Obesity: work with your physician for your ideal body weight.  [] Physical Inactivity: get regular exercise as directed by your physician. [] Personal history of previous TIA or stroke  [] Poor Diet; decrease salt (sodium) in your diet, follow diet directed by physician. [] Smoking: Cigarette/Cigar: stop smoking. Advised pt. that you can reduce your risk for stroke/TIA by modifying/controlling the risk factors that you have. Pt.advised to take the medications as prescribed, which will be detailed in the discharge instructions, and to not stop taking them without consulting their physician. In addition, pt. advised to maintain a healthy diet, exercise regularly and to not smoke.       [facility]'s Stroke treatment and prevention, Managing your recovery notebook  provided and/or reviewed  with patient/family. The notebook includes, but not limited to, sections addressing warning signs & symptoms of a stroke, which are: sudden numbness or weakness especially on one side of the body, sudden confusion, difficulty speaking or understanding, sudden changes in vision, sudden dizziness or loss of balance/ coordination, sudden severe headache, syncope and seizure. The need to call EMS (911) immediately if signs & symptoms occur is emphasized . The notebook also provides education on Stroke community resources and stroke advocacy. The need for follow-up after discharge was highlighted with patient/family with them being able to repeat understanding of the importance of this.       Electronically signed by Indira Tellez RN on 1/3/2022 at 5:28 AM,e

## 2022-01-03 NOTE — PROGRESS NOTES
4 Eyes Skin Assessment     NAME:  Nicole Gleason  YOB: 1970  MEDICAL RECORD NUMBER:  [id number]    The patient is being assess for  Admission    I agree that 2 RN's have performed a thorough Head to Toe Skin Assessment on the patient. ALL assessment sites listed below have been assessed. Areas assessed by both nurses:    Head, Face, Ears, Shoulders, Back, Chest, Arms, Elbows, Hands, Sacrum. Buttock, Coccyx, Ischium and Legs. Feet and Heels        Does the Patient have a Wound?  No noted wound(s)       Adam Prevention initiated:  Yes   Wound Care Orders initiated:  NA    Pressure Injury (Stage 3,4, Unstageable, DTI, NWPT, and Complex wounds) if present place consult order under [de-identified] NA    New and Established Ostomies if present place consult order under : NA      Nurse 1 eSignature: Electronically signed by Alpesh Mulligan RN on 1/3/22 at 5:16 AM EST    **SHARE this note so that the co-signing nurse is able to place an eSignature**    Nurse 2 eSignature: Electronically signed by Brendan Soria RN on 1/3/22 at 5:19 AM EST

## 2022-01-03 NOTE — PROGRESS NOTES
Progress Note  Admit Date: 1/2/2022      PCP: Juan Ballesteros MD     CC: F/U for weakness    Days in hospital:  1    SUBJECTIVE / Interval History:  Patient complaining of generalized weakness. States she is never felt this bad before. Does not have shortness of breath but finds it difficult  To take deep breaths        Allergies  Immune globulins, Other, Adhesive tape, and Penicillins    Medications    Scheduled Meds:   sodium chloride flush  10 mL IntraVENous 2 times per day    enoxaparin  40 mg SubCUTAneous Daily    aspirin  81 mg Oral Daily    Or    aspirin  300 mg Rectal Daily    atorvastatin  40 mg Oral Nightly     Continuous Infusions:   sodium chloride         PRN Meds:  perflutren lipid microspheres, sodium chloride flush, sodium chloride, ondansetron **OR** ondansetron, perflutren lipid microspheres    Vitals    /72   Pulse 81   Temp 98 °F (36.7 °C) (Oral)   Resp 18   Ht 5' 2\" (1.575 m)   Wt 160 lb 7.9 oz (72.8 kg)   SpO2 97%   BMI 29.35 kg/m²     Exam:    Gen: No distress. Eyes: PERRL. No sclera icterus. No conjunctival injection. ENT: No discharge. Pharynx clear. External appearance of ears and nose normal.  Neck: Trachea midline. No obvious mass. Resp: No accessory muscle use. No crackles. No wheezes. No rhonchi. No dullness on percussion. CV: Regular rate. Regular rhythm. No murmur or rub. No edema. GI: Non-tender. Non-distended. No hernia. Skin: Warm, dry, normal texture and turgor. No nodule on exposed extremities. Lymph: No cervical LAD. No supraclavicular LAD. M/S: No cyanosis. No clubbing. No joint deformity. Neuro: Moves all four extremities. CN 2-12 tested, no defect noted. Psych: Oriented x 3. No anxiety. Awake. Alert. Intact judgement and insight.     Data    LABS  CBC:   Recent Labs     01/02/22  1503 01/03/22  0458   WBC 7.2 6.1   HGB 12.1 12.1   HCT 36.5 36.3   MCV 95.1 94.8    192     BMP:   Recent Labs     01/02/22  1419 01/02/22  1503 01/03/22  0458   NA  --  138 139   K  --  3.0* 3.8   CL  --  101 102   CO2  --  23 24   BUN  --  6* 6*   CREATININE 0.5* <0.5* 0.5*   GLUCOSE  --  115* 91     POC GLUCOSE:  No results for input(s): POCGLU in the last 72 hours. LIVER PROFILE: No results for input(s): AST, ALT, LIPASE, AMYLASE, LABALBU, BILIDIR, BILITOT, ALKPHOS in the last 72 hours. PT/INR:   Recent Labs     01/03/22  0458   PROTIME 11.2   INR 0.99     APTT: No results for input(s): APTT in the last 72 hours. UA:  Recent Labs     01/02/22  1503   COLORU YELLOW   PHUR 6.0   CLARITYU Clear   SPECGRAV >1.030   LEUKOCYTESUR Negative   UROBILINOGEN 0.2   BILIRUBINUR Negative   BLOODU Negative   GLUCOSEU Negative   KETUA Negative     Microbiology:  Wound Culture: No results for input(s): WNDABS, ORG in the last 72 hours. Invalid input(s):  LABGRAM  Nasal Culture: No results for input(s): ORG, MRSAPCR in the last 72 hours. Blood Culture: No results for input(s): BC, BLOODCULT2 in the last 72 hours. Fungal Culture:   No results for input(s): FUNGSM in the last 72 hours. No results for input(s): FUNCXBLD in the last 72 hours. CSF Culture:  No results for input(s): COLORCSF, APPEARCSF, CFTUBE, CLOTCSF, WBCCSF, RBCCSF, NEUTCSF, NUMCELLSCSF, LYMPHSCSF, MONOCSF, GLUCCSF, VOLCSF in the last 72 hours. Respiratory Culture:  No results for input(s): Betty Penta in the last 72 hours. AFB:No results for input(s): AFBSMEAR in the last 72 hours. Urine Culture  No results for input(s): LABURIN in the last 72 hours. RADIOLOGY:    XR CHEST (2 VW)   Final Result   No acute process. CTA HEAD NECK W CONTRAST   Final Result   1. Unremarkable CTA of the head and neck. 2. Incomplete bony fusion along the C4-C5 disc space. Residual mobility at   this level cannot be excluded. RECOMMENDATIONS:   Unavailable         CT Head WO Contrast   Final Result   No acute intracranial abnormality.       RECOMMENDATIONS:   Unavailable         MRI brain

## 2022-01-03 NOTE — CONSULTS
Neurology Consult Note  Reason for Consult: \"CVA\"    Chief complaint: \"weakness\"    Dr Marisa Harrington MD asked me to see Arlette Ramos in consultation for evaluation of \"CVA\"    History of Present Illness:  I obtained my information via the patient, supplemented with chart review. Arlette Ramos is a 46 y.o. female with hx of myasthenia gravis, seizures, migraines, insomnia, thyroid cancer s/p thyroidectomy, HLD who presented to the ED on 1/2/21 for evaluation of multiple complaints including: weakness-left greater than right, difficulty swallowing, double vision. She reports that her symptoms have been present for the last several weeks and have progressively worsened. She describes feeling generally weak though on chart review had previously reported asymmetric weakness. Her double vision is primarily when looking laterally and is a component of horizontal and vertical diplopia. She has had some shortness of breath and reports that she \"failed\" the breathing tests. She follows with Dr. Adebayo Schuler with [address] [place] [address] [address] Neurology as outpatient for her Myasthenia Gravis and was last seen in office on 12/1/21. Encounter was reviewed. She has not tolerated IVIG including multiple bouts of aseptic meningitis felt to be associated to this. She receives PLEX as outpatient. Per chart review it appears she was to be taking 1.5 tablets of Mestinon 60mg tabs 4x/day. She tells me that she was taking 1 tablet TID due to muscle ache side effects. . In addition she was weaning down her OP PLEX treatments which were once/week and she reports she is due for her treatment today. She had notified Dr. Adebayo Schuler office on 12/23 that she was having muscle weakness, difficulty swallowing, intermittent voice changes and dizziness for several days. She was advised to go to the ER though she told me she never went as she was fearful of zhou Covid.      She finally decided to come to the ED for which on the day of admission she tells me she was so weak she was unable to stand, possibly a component of dizziness as well. She did fall and hit her head, she denies any loss of consciousness. No nausea or vomiting. Additionally for the last few days she has felt like she has had a \"cold\" with reported cough, and chest tightness. She denies any fever. Her Covid test was negative on admission. She states her symptoms are similar to a MG crisis she had many years ago. On arrival to the ED her BP was 139/85, vitals stable. Afebrile. CT head was without acute findings. CTA head & neck was unremarkable. Today she denies any improvement in her symptoms.          Medical History:  Past Medical History:   Diagnosis Date    Anxiety     Arthritis     Cancer ([address]     thyroid    GERD (gastroesophageal reflux disease)     Hyperlipidemia     Insomnia     Migraines     Myasthenia gravis with exacerbation (HCC)     Seizures (HCC)     Thyroid disease      Past Surgical History:   Procedure Laterality Date    APPENDECTOMY      CAPSULE ENDOSCOPY N/A 02/28/2020    ESOPHAGEAL CAPSULE ENDOSCOPY performed by Kathie Hernández MD at [address] [place] [address] N/A 12/17/2019    COLONOSCOPY performed by Aixa Hernandez MD at [address] [place] [address] Right 10/15/2021    19cm tunneled dual lumen dialysis catheter inserted by Dr. Umu Harris 02/11/2020    ESOPHAGEAL MANOMETRY performed by Kathie Hernández MD at [address] [place] [address] IR BIOPSY LIVER PERCUTANEOUS  10/15/2021    IR BIOPSY LIVER PERCUTANEOUS 10/15/2021 [facility]    IR TUNNELED CATHETER PLACEMENT GREATER THAN 5 YEARS  10/15/2021    IR TUNNELED CATHETER PLACEMENT GREATER THAN 5 YEARS 10/15/2021 [facility]    OTHER SURGICAL HISTORY Right 10/15/2021    Transjugular liver Biopsy    SHOULDER SURGERY      rotator cuff repair- bilateral    THYROIDECTOMY      TUMOR REMOVAL      UPPER GASTROINTESTINAL ENDOSCOPY N/A 12/17/2019    EGD BIOPSY performed by Fracisco Florentino MD at [facility] 12/17/2019    EGD DILATION SAVORY performed by Fracisco Florentino MD at [facility]  02/28/2020    EGD DIAGNOSTIC ONLY performed by Radha Osorio MD at [facility]  02/28/2020    Esophagogastroduodenoscopy with Bravo Capsule placement performed by Radha Osorio MD at [address] [place] [address]     Scheduled Meds:   sodium chloride flush  10 mL IntraVENous 2 times per day    enoxaparin  40 mg SubCUTAneous Daily    aspirin  81 mg Oral Daily    Or    aspirin  300 mg Rectal Daily    atorvastatin  40 mg Oral Nightly     Continuous Infusions:   sodium chloride       PRN Meds:.perflutren lipid microspheres, sodium chloride flush, sodium chloride, ondansetron **OR** ondansetron, perflutren lipid microspheres    Medications Prior to Admission: pyridostigmine (MESTINON) 60 MG tablet, Take 60 mg by mouth 3 times daily  levothyroxine (SYNTHROID) 150 MCG tablet, Take 150 mcg by mouth Daily  butalbital-acetaminophen-caffeine (FIORICET, ESGIC) -40 MG per tablet, Take 1 tablet by mouth every 4 hours as needed for Migraine  LORazepam (ATIVAN) 0.5 MG tablet, Take 0.5 mg by mouth 2 times daily as needed. methotrexate (RHEUMATREX) 2.5 MG chemo tablet, Take 10 mg by mouth once a week Take 4 tablets together every Monday. doxepin (SINEQUAN) 10 MG capsule, Take 10 mg by mouth nightly  estradiol (ESTRACE) 2 MG tablet, Take 2 mg by mouth nightly  folic acid (FOLVITE) 1 MG tablet, Take 1 mg by mouth daily  zolpidem (AMBIEN) 10 MG tablet, Take 10 mg by mouth nightly.   citalopram (CELEXA) 40 MG tablet, Take 40 mg by mouth every morning     Allergies   Allergen Reactions    Immune Globulins Other (See Comments)     FLEBOGAMMA - ASEPTIC MENINGITIS    Other      Steroid injection to joints    Adhesive Tape Rash and Other (See Comments)     Paper Tape  Paper Tape    Skin gets reddened under tape; not allergic to latex    Penicillins Itching and Rash     Rash  Rash         Family History   Problem Relation Age of Onset    Heart Disease Father     High Cholesterol Father     High Blood Pressure Father        Social History     Tobacco Use   Smoking Status Never Smoker   Smokeless Tobacco Never Used     Social History     Substance and Sexual Activity   Drug Use Never     Social History     Substance and Sexual Activity   Alcohol Use Never       ROS:  Constitutional- No weight loss or fevers  Eyes- + diplopia. No photophobia. Ears/nose/throat- + dysphagia. No Dysarthria +voice changes \"In & out\"  Cardiovascular- No palpitations. No chest pain +chest tightness  Respiratory- + dyspnea. + Cough  Gastrointestinal- No Abdominal pain. No Vomiting. Genitourinary- No incontinence. No urinary retention  Musculoskeletal- No myalgia. +chronic arthralgia  Skin- No rash. No easy bruising. Psychiatric- No depression. + hx anxiety  Endocrine- No diabetes. + thyroid issues. Hematologic- No bleeding difficulty. + fatigue  Neurologic- + weakness. + chronic Headache. Exam:  Vitals:    01/02/22 2034 01/02/22 2036 01/03/22 0424 01/03/22 0815   BP: 127/74 117/71 125/88 136/72   Pulse: 82 88 82 81   Resp:  16 17 18   Temp:  98 °F (36.7 °C) 98.7 °F (37.1 °C) 98 °F (36.7 °C)   TempSrc:  Oral Oral Oral   SpO2: 100%  96% 97%   Weight:   160 lb 7.9 oz (72.8 kg)    Height:   5' 2\" (1.575 m)       Constitutional    Vital signs: BP, HR, and RR reviewed   General Alert, no distress, well-nourished  Eyes: unable to visualize the fundi  Cardiovascular: pulses symmetric in all 4 extremities. No peripheral edema. Psychiatric: cooperative with examination, no  psychotic behavior noted.   Neurologic  Mental status:   orientation to person, place, time and situation   General fund of knowledge:  grossly intact   Memory: Short term and long term intact   Attention intact as able to attend well to the exam     Language fluent in conversation. Comprehension intact; follows simple commands  Cranial nerves:   CN2: Visual Fields full w/o extinction on confrontational testing   CN 3,4,6: extraocular muscles intact, PERRLA @ 3mm. +reports diplopia + mild bilateral ptosis. CN5: V1: V2: V3: intact to light touch sensation bilaterally  CN7: upper and lower facial symmetric without dysarthria  CN8: hearing grossly intact to conversation. CN9/10: palate unable to visualize. CN11: trap full strength on shoulder shrug bilaterally, SCM without weakness. CN12: tongue midline with protrusion. Able to move tongue side to side. Strength: Some effort dependent. Best results recorded. Grasp: BUE 4/5 symmetric. .    RUE: 4-/5 Shoulder abduction, elbow flexion, elbow extension. LUE: 4-/5  Shoulder abduction, elbow flexion, elbow extension. Dorsiflexion: R 5/5, L 5-/5 ;  Plantar flexion: R 5/5, L 5-/5  RLE: 4+/5 Hip flexion, Knee flexion, Knee extension. LLE: 3+-4- /5 Hip flexion, Knee flexion, Knee extension. Neck: 3-/5 neck flexion 4-/5 neck extension   Deep tendon reflexes:   R Bicep: 1+  R Brachioradialis: 1+  R Patellar: 2+   R Babinski: Toes down  L Bicep 1+ L Brachioradialis[de-identified] 1+  L Patellar: 2+    L Babinski: Toes down  Sensory: light touch intact in all 4 extremities. No sensory extinction on double simultaneous stimulation  Cerebellar/coordination: finger nose finger normal without ataxia  Tone: normal in all 4 extremities  Gait: deferred for safety. Labs  Na: 139  K: 3.8  BUN: 6  Creatinine: 0.5  Glucose: 91  Calcium: 8.3    Troponin: <0.01    HbA1c: Pending  LDL: 89  TSH: 10.03  T4 Free: 0.9    WBC: 6.1  RBC: 3.83  Hgb: 12.1  Hct: 36.3  Platelet: 391    Influenza A&B: negative   Covid 19: Not detected    UA: Negative for nitrites and leukocyte esterase. Studies  CT Head w/o 1/2/22: Independently reviewed.  No acute intracranial abnormality. CTA Head & Neck w/ 1/2/22: Reviewed the read. 1. Unremarkable CTA of the head and neck. 2. Incomplete bony fusion along the C4-C5 disc space. Residual mobility at this level cannot be excluded. Chest Xray 1/2/22: No acute process. Impression  1. Multi week progressive weakness, dysphagia, voice changes, diplopia, dizziness, dyspnea with chest tightness she reports similar to her hx of myasthenic crisis  2. LLE focal weakness  3. Fall with head trauma  4. Myasthenia Gravis. 5. Cough     Lio Nico is a 46 y.o. female with hx of Myasthenia gravis who presented with multi week progressive weakness, dysphagia, voice changes, diplopia, dizziness, dyspnea with chest tightness she reports similar to her hx of myasthenic crisis. There were some concerns/reports of focal left sided weakness. Timeline not clear, varying answers on chart review. She does have focal LLE weakness on exam for which I would not expect with MG crisis. Clinically she does have weakness, mild bilateral ptosis, weakness with neck flexion/extension. CT head w/o acute findings. CTA head & neck unrevealing    Need to rule out vascular event, in addition to suspected myasthenic crisis. Recommendations  - Obtain MRI Brain w/o to rule out acute infarct. Discussed with MRI, will be today. If unable to be obtained today will repeat CT Head w/o to rule out any evolving infarct. If evidence of acute stroke recommend stroke work up. - Consult Nephrology for initiation of PLEX. Ideally would hold off on initiation of treatment until additional neuroimaging can be obtained, though do not want to delay PLEX. Ideally 1 treatment every other day for 5 treatments. - Continue mestinon. She was taking 1 tablet TID (ordered)  If evidence of difficulty managing secretions please hold. - Neuromuscular parameters. NIF/VC Q6H (ordered).  For any downward trend,  VC < 1.5 or evidence,or evidence of severe oral pharyngeal paresis immediately notify primary team for consideration for intubation.   - Metabolic and infectious work up per primary team.   - PT, OT, SLP  - Will need to follow up with Dr. Marilee Macdonald upon discharge.   - Dr. Chrissy Macdonald, neurology attending will see this afternoon       Simi Newman, [address] Neurology    A copy of this note was provided for Dr Murl Prader, MD

## 2022-01-03 NOTE — PROGRESS NOTES
Pharmacy Medication Reconciliation Note     List of medications Miranda Borjas is currently taking is complete. Source of information:   1. Conversation with patient at bedside  2. EMR    Notes regarding home medications:   1. Patient reports taking only AM meds PTA in the ED  2. Patient reports taking methotrexate 2.5 mg tablets, #4 tabs every Monday for Psoriasis.     Patient denies taking any other OTC or herbal medications other than those listed     Nas Garcia, Pharmacy Intern  1/2/2022  9:10 PM

## 2022-01-03 NOTE — PROGRESS NOTES
Speech Language Pathology  Facility/Department: [facility]   CLINICAL BEDSIDE SWALLOW EVALUATION    NAME: Juliette Cotton  : 1970  MRN: [id number]    ADMISSION DATE: 2022  ADMITTING DIAGNOSIS: Hypokalemia [E87.6]  Dysarthria [R47.1]  Myasthenia gravis (HCC) [G70.00]  Left-sided weakness [R481]  46year old has Myasthenia gravis ([address] Myasthenia gravis with (acute) exacerbation ([address] Appendicitis; Neck stiffness; Generalized weakness; Myasthenia gravis with exacerbation, adult form ([address] Hyperlipidemia; Seizure ([address] GERD (gastroesophageal reflux disease); Migraine; Anxiety; Insomnia; Myasthenia gravis with exacerbation ([address] Bradycardia; Sinus arrhythmia; Postablative hypothyroidism; 'light-for-dates' infant with signs of fetal malnutrition; and Left-sided weakness on their problem list.    ONSET DATE: 2022    CHART REVIEW: Patient admitted 22 with C/O left sided weakness and difficulty swallowing. H&P Patient is a 35-year-old female with past medical history of myasthenia gravis who presents to the hospital for difficulty swallowing, double vision, difficulty walking. According to the patient she has not been able to swallow. She also has difficulty walking and feels her left side is weak than the right side. According to the patient whenever se tries to eat it feels like her food is stuck in her mouth and she is not able to swallow it down. Patient also mentions she has difficulty walking which is unusual for her myasthenia gravis flareups. Neurology was consulted from the emergency department, recommended admission for stroke work-up plus myasthenia gravis.      CT HEAD 22  Impression   No acute intracranial abnormality.         CHEST X-RAY 22  Impression   No acute process.      MRI brain ordered 22    Date of Eval: 1/3/2022  Evaluating Therapist: MICHEL Keane    Current Diet level:  Current Diet : NPO (Regular at baseline)  Current Liquid Diet : NPO (Regular at baseline)      Primary Complaint  Patient Complaint: Patient reports fatigue with mastication of regular textures and has been choosing softer items, denies s/s of aspiration or penetration or globus complaints    Pain:  Pain Assessment  Pain Assessment: 0-10  Pain Level: 0  Patient's Stated Pain Goal: No pain  Pain Type: Acute pain  Pain Location: Head  Pain Orientation: Anterior  Pain Onset: Progressive    Reason for Referral  April Doherty was referred for a bedside swallow evaluation to assess the efficiency of her swallow function, identify signs and symptoms of aspiration and make recommendations regarding safe dietary consistencies, effective compensatory strategies, and safe eating environment. Impression  Dysphagia Diagnosis: Mild oral stage dysphagia;Mild pharyngeal stage dysphagia  Dysphagia Impression :   · Accepted and tolerated evaluation at bedside. ·  Patient oriented x4, able to follow complex dx and verbally responsive. · Mild oral dysphagia characterized by prolonged mastication of regular textures and soft and bite-sized with concern for fatigue due to excessive labor. Mastication appears functional for soft and bite-sized and patient reports decreased labor required. · Mild pharyngeal dysphagia characterized by decreased laryngeal elevation. Pt tolerated all consistencies with no overt s/s of aspiration or penetration or globus complaints. ·  Recommend soft and bite-sized and thin liquids. · ST to continue to monitor 2-3x for diet tolerance pending MRI results    Dysphagia Outcome Severity Scale: Level 5: Mild dysphagia- Distant supervision. May need one diet consistency restricted     Treatment Plan  Requires SLP Intervention: Yes  Duration/Frequency of Treatment: 2-3x for diet tolerance  D/C Recommendations:  To be determined     Recommended Diet and Intervention  Diet Solids Recommendation: Dysphagia Soft and Bite-Sized (Dysphagia III)  Liquid Consistency Recommendation: Thin  Recommended Form of Meds: PO  Recommendations: Dysphagia treatment  Therapeutic Interventions: Diet tolerance monitoring;Patient/Family education    Compensatory Swallowing Strategies  Compensatory Swallowing Strategies: Remain upright for 30-45 minutes after meals;Upright as possible for all oral intake    Treatment/Goals  Dysphagia Goals: The patient will tolerate recommended diet without observed clinical signs of aspiration; The patient/caregiver will demonstrate understanding of compensatory strategies for improved swallowing safety. General  Chart Reviewed: Yes  Subjective  Subjective: Patient accepted and tolerated evaluatoin at bedside  Behavior/Cognition: Alert; Cooperative;Pleasant mood  Communication Observation: Functional  Follows Directions: Complex  Dentition: Adequate  Patient Positioning: Upright in bed  Prior Dysphagia History: None documented, pt denies hx of dysphagia  Consistencies Administered: Reg solid; Dysphagia Soft and Bite-Sized (Dysphagia III); Dysphagia Minced and Moist (Dysphagia II); Dysphagia Pureed (Dysphagia I); Thin - cup; Thin - straw; Thin - teaspoon; Ice Chips; Nectar - cup    Vision/Hearing  Vision: Impaired  Vision Exceptions: Wears glasses for reading  Hearing: Within functional limits    Oral Motor Deficits  Oral/Motor  Oral Motor: Within functional limits    Oral Phase Dysfunction  Oral Phase  Oral Phase: Exceptions  Oral Phase Dysfunction  Impaired Mastication: All  Oral Phase  Oral Phase - Comment: Mild oral dysphagia characterized by prolonged mastication of regular textures and soft and bite-sized with concern for fatigue due to excessive labor. Indicators of Pharyngeal Phase Dysfunction   Pharyngeal Phase  Pharyngeal Phase: Exceptions  Indicators of Pharyngeal Phase Dysfunction  Decreased Laryngeal Elevation: All  Pharyngeal Phase   Pharyngeal: Mild pharyngeal dysphagia characterized by decreased laryngeal elevation.  Pt tolerated all consistencies with no overt s/s of aspiration or penetration or globus complaints.     Prognosis  Prognosis  Prognosis for safe diet advancement: good  Individuals consulted  Consulted and agree with results and recommendations: Patient;MD;RN    Education  Patient Education: Patient educated on purpose of ST services/goals/recommendations  Patient Education Response: Verbalizes understanding;Demonstrated understanding       Therapy Time  SLP Individual Minutes  Time In: 8776  Time Out: 6525  Minutes: [address], [place], [address] [place] [address]  Speech-Language Pathologist  On 01/03/22 at 7:47 AM

## 2022-01-04 LAB
LV EF: 58 %
LVEF MODALITY: NORMAL
MAGNESIUM: 1.9 MG/DL (ref 1.8–2.4)
PHOSPHORUS: 4.8 MG/DL (ref 2.5–4.9)

## 2022-01-04 PROCEDURE — 83735 ASSAY OF MAGNESIUM: CPT

## 2022-01-04 PROCEDURE — 6360000002 HC RX W HCPCS: Performed by: INTERNAL MEDICINE

## 2022-01-04 PROCEDURE — 2580000003 HC RX 258: Performed by: INTERNAL MEDICINE

## 2022-01-04 PROCEDURE — 97530 THERAPEUTIC ACTIVITIES: CPT

## 2022-01-04 PROCEDURE — 6370000000 HC RX 637 (ALT 250 FOR IP): Performed by: INTERNAL MEDICINE

## 2022-01-04 PROCEDURE — 2060000000 HC ICU INTERMEDIATE R&B

## 2022-01-04 PROCEDURE — 84100 ASSAY OF PHOSPHORUS: CPT

## 2022-01-04 PROCEDURE — 6370000000 HC RX 637 (ALT 250 FOR IP): Performed by: NURSE PRACTITIONER

## 2022-01-04 PROCEDURE — 93306 TTE W/DOPPLER COMPLETE: CPT

## 2022-01-04 PROCEDURE — 94761 N-INVAS EAR/PLS OXIMETRY MLT: CPT

## 2022-01-04 PROCEDURE — 94799 UNLISTED PULMONARY SVC/PX: CPT

## 2022-01-04 PROCEDURE — 36415 COLL VENOUS BLD VENIPUNCTURE: CPT

## 2022-01-04 PROCEDURE — 99232 SBSQ HOSP IP/OBS MODERATE 35: CPT | Performed by: HOSPITALIST

## 2022-01-04 RX ORDER — ACETAMINOPHEN 325 MG/1
650 TABLET ORAL EVERY 4 HOURS PRN
Status: DISCONTINUED | OUTPATIENT
Start: 2022-01-04 | End: 2022-01-06 | Stop reason: HOSPADM

## 2022-01-04 RX ORDER — ALBUMIN, HUMAN INJ 5% 5 %
2500 SOLUTION INTRAVENOUS PRN
Status: DISCONTINUED | OUTPATIENT
Start: 2022-01-05 | End: 2022-01-05

## 2022-01-04 RX ADMIN — ACETAMINOPHEN 650 MG: 325 TABLET ORAL at 20:38

## 2022-01-04 RX ADMIN — LORAZEPAM 0.5 MG: 0.5 TABLET ORAL at 23:30

## 2022-01-04 RX ADMIN — SODIUM CHLORIDE, PRESERVATIVE FREE 10 ML: 5 INJECTION INTRAVENOUS at 09:55

## 2022-01-04 RX ADMIN — ATORVASTATIN CALCIUM 40 MG: 40 TABLET, FILM COATED ORAL at 20:38

## 2022-01-04 RX ADMIN — PYRIDOSTIGMINE BROMIDE 60 MG: 60 TABLET ORAL at 21:07

## 2022-01-04 RX ADMIN — PYRIDOSTIGMINE BROMIDE 60 MG: 60 TABLET ORAL at 15:54

## 2022-01-04 RX ADMIN — ASPIRIN 81 MG: 81 TABLET, COATED ORAL at 09:51

## 2022-01-04 RX ADMIN — ENOXAPARIN SODIUM 40 MG: 100 INJECTION SUBCUTANEOUS at 09:51

## 2022-01-04 RX ADMIN — SODIUM CHLORIDE, PRESERVATIVE FREE 10 ML: 5 INJECTION INTRAVENOUS at 20:38

## 2022-01-04 RX ADMIN — PYRIDOSTIGMINE BROMIDE 60 MG: 60 TABLET ORAL at 05:38

## 2022-01-04 RX ADMIN — ACETAMINOPHEN 650 MG: 325 TABLET ORAL at 14:00

## 2022-01-04 ASSESSMENT — PAIN SCALES - GENERAL
PAINLEVEL_OUTOF10: 0
PAINLEVEL_OUTOF10: 7
PAINLEVEL_OUTOF10: 0

## 2022-01-04 ASSESSMENT — PAIN DESCRIPTION - LOCATION: LOCATION: HEAD

## 2022-01-04 ASSESSMENT — PAIN DESCRIPTION - DESCRIPTORS: DESCRIPTORS: HEADACHE

## 2022-01-04 ASSESSMENT — PAIN DESCRIPTION - ONSET: ONSET: PROGRESSIVE

## 2022-01-04 ASSESSMENT — PAIN DESCRIPTION - FREQUENCY: FREQUENCY: INTERMITTENT

## 2022-01-04 ASSESSMENT — PAIN DESCRIPTION - PROGRESSION: CLINICAL_PROGRESSION: NOT CHANGED

## 2022-01-04 ASSESSMENT — PAIN - FUNCTIONAL ASSESSMENT: PAIN_FUNCTIONAL_ASSESSMENT: ACTIVITIES ARE NOT PREVENTED

## 2022-01-04 ASSESSMENT — PAIN DESCRIPTION - ORIENTATION: ORIENTATION: RIGHT;LEFT

## 2022-01-04 ASSESSMENT — PAIN DESCRIPTION - PAIN TYPE: TYPE: CHRONIC PAIN

## 2022-01-04 NOTE — PROGRESS NOTES
Progress Note  The patient is being evaluated for MG exacerbation. Updates  Transferred to ICU yesterday afternoon given low NM parameters. She received 1/5 PLEX last night. Was found last night with multiple pills in her bed. Possibly lorazepam and phentermine. Not administered via nursing. Possibly home medications. She feels mildly improved. Complaining of sinus congestion.         Active Ambulatory Problems     Diagnosis Date Noted    Myasthenia gravis ([facility] 10/13/2019    Myasthenia gravis with (acute) exacerbation ([address] 11/04/2019    Appendicitis 11/10/2019    Neck stiffness 11/10/2019    Generalized weakness 12/10/2019    Myasthenia gravis with exacerbation, adult form ([address] 12/11/2019    Hyperlipidemia 06/30/2020    Seizure ([facility] [address] 06/30/2020    GERD (gastroesophageal reflux disease) 06/30/2020    Migraine 06/30/2020    Anxiety 06/30/2020    Insomnia 06/30/2020    Myasthenia gravis with exacerbation ([facility] [address] 06/30/2020    Bradycardia 08/05/2020    Sinus arrhythmia 08/05/2020    Postablative hypothyroidism 08/05/2020    'light-for-dates' infant with signs of fetal malnutrition 10/13/2021     Resolved Ambulatory Problems     Diagnosis Date Noted    No Resolved Ambulatory Problems     Past Medical History:   Diagnosis Date    Arthritis     Cancer ([facility]     Migraines     Seizures ([facility] [address]     Thyroid disease        Current Facility-Administered Medications:     acetaminophen (TYLENOL) tablet 650 mg, 650 mg, Oral, Q4H PRN, Eliseo Olivera MD    pyridostigmine (MESTINON) tablet 60 mg, 60 mg, Oral, 3 times per day, BETH Carmona - CNP, 60 mg at 01/04/22 0538    citrate dextrose (ACD Formula A) 0.73-2.45-2.2 GM/100ML solution, , IntraCATHeter, PRN, Param Combs MD, Given at 01/03/22 1923    LORazepam (ATIVAN) tablet 0.5 mg, 0.5 mg, Oral, BID PRN, Denise Bragg MD    sodium chloride flush 0.9 % injection 10 mL, 10 mL, IntraVENous, 2 times per day, Ivan Santiago MD, 10 mL at 01/04/22 0955    sodium chloride flush 0.9 % injection 10 mL, 10 mL, IntraVENous, PRN, Eliseo Perdomo MD    0.9 % sodium chloride infusion, 25 mL, IntraVENous, PRN, Eliseo Perdomo MD    ondansetron (ZOFRAN-ODT) disintegrating tablet 4 mg, 4 mg, Oral, Q8H PRN **OR** ondansetron (ZOFRAN) injection 4 mg, 4 mg, IntraVENous, Q6H PRN, Eliseo Perdomo MD, 4 mg at 01/03/22 1950    enoxaparin (LOVENOX) injection 40 mg, 40 mg, SubCUTAneous, Daily, Eliseo Perdomo MD, 40 mg at 01/04/22 0951    aspirin EC tablet 81 mg, 81 mg, Oral, Daily, 81 mg at 01/04/22 0951 **OR** aspirin suppository 300 mg, 300 mg, Rectal, Daily, Eliseo Perdomo MD    atorvastatin (LIPITOR) tablet 40 mg, 40 mg, Oral, Nightly, Leena Lara MD, 40 mg at 01/03/22 1949    perflutren lipid microspheres (DEFINITY) injection 1.65 mg, 1.5 mL, IntraVENous, ONCE PRN, Eliseo Perdomo MD      ROS -   Constitutional- No fevers  Eyes- +improving diplopia. No photophobia. Ears/nose/throat- No dysphagia. No Dysarthria +congestion   Cardiovascular- No palpitations. No chest pain  Respiratory- + dyspnea. + Cough  Gastrointestinal- No Abdominal pain. No Vomiting. Hematologic- No bleeding difficulty. + fatigue  Neurologic- + weakness. + mild Headache.  pyridostigmine  60 mg Oral 3 times per day    sodium chloride flush  10 mL IntraVENous 2 times per day    enoxaparin  40 mg SubCUTAneous Daily    aspirin  81 mg Oral Daily    Or    aspirin  300 mg Rectal Daily    atorvastatin  40 mg Oral Nightly         sodium chloride          acetaminophen, citrate dextrose, LORazepam, sodium chloride flush, sodium chloride, ondansetron **OR** ondansetron, perflutren lipid microspheres     Exam:  Blood pressure 133/74, pulse 79, temperature 98.2 °F (36.8 °C), temperature source Oral, resp. rate 15, height 5' 2\" (1.575 m), weight 160 lb 15 oz (73 kg), SpO2 96 %.   Constitutional    Vital signs: BP, HR, and RR reviewed   General Alert, no distress, well-nourished  Eyes: unable to visualize the fundi  Cardiovascular: pulses symmetric in all 4 extremities. No peripheral edema. Psychiatric: cooperative with examination, no  psychotic behavior noted. Neurologic  Mental status:   orientation to person, place, time and situation   General fund of knowledge:  grossly intact   Memory: Short term and long term intact   Attention intact as able to attend well to the exam     Language fluent in conversation   Comprehension intact; follows simple commands  Cranial nerves:   CN2: Visual Fields full w/o extinction on confrontational testing  CN 3,4,6: extraocular muscles intact. She reports diplopia. No obvious ptosis  CN7: upper and lower facial symmetric without dysarthria  CN8: hearing grossly intact to conversation. CN12: tongue midline with protrusion. Strength:   BUE: 4/5 give away weakness. BLE: able to lift legs few inches off of bed, weak. Non focal. No contralateral pressure on hoovers maneuver. Sensory: light touch intact in all 4 extremities. Cerebellar/coordination: finger nose finger normal without ataxia  Tone: normal in all 4 extremities  Gait: deferred for safety. Labs  Na: 139  K: 3.8  BUN: 6  Creatinine: 0.5  Glucose: 91  Calcium: 8.3  M.9  Ph: 4.88     Troponin: <0.01     HbA1c: 4.7  LDL: 89  TSH: 10.03  T4 Free: 0.9    WBC: 6.1  RBC: 3.83  Hgb: 12.1  Hct: 36.3  Platelet: 530     Influenza A&B: negative   Covid 19: Not detected     UA: Negative for nitrites and leukocyte esterase. Studies  Neuromuscular Parameters:   Date/Time NIF VC   1/3/22 1208 -22 800 mL   22 1200 -20 1.3 L       MRI Brain w/o 1/3/22: Independently reviewed. 1. No acute intracranial abnormality. No acute infarct. 2. Mild chronic microvascular ischemic changes. 3. Scattered mucosal thickening of the paranasal sinuses. CT Head w/o 22:  No acute intracranial abnormality.     CTA Head & Neck w/ 22:  1. Unremarkable CTA of the head and neck.  2. Incomplete bony fusion along the C4-C5 disc space.  Residual mobility at this level cannot be excluded.      Chest Xray 1/2/22: No acute process. Impression  1. Multi week progressive weakness, dysphagia, voice changes, diplopia, dizziness, dyspnea with chest tightness she reports similar to her hx of myasthenic crisis  2. LLE focal weakness  3. Fall with head trauma  4. Myasthenia Gravis. 5. Cough      Hiren Gonzalez is a 46 y.o. female with hx of Myasthenia gravis who presented with multi week progressive weakness, dysphagia, voice changes, diplopia, dizziness, dyspnea with chest tightness she reports similar to her hx of myasthenic crisis.      There were some concerns/reports of focal left sided weakness. Timeline not clear, varying answers on chart review. She does have focal LLE weakness on exam for which I would not expect with MG crisis.      Initial clinical exam she does have weakness, mild bilateral ptosis, weakness with neck flexion/extension. Possible some effort base. CT head w/o acute findings. CTA head & neck unrevealing. MRI brain was without acute infarct.      She started PLEX, received 1/5 light night with improvement in symptoms. NIF/VC are poor though no obvious respiratory distress. SpO2  on room air. Etiology: Treating for MG crisis. Possibly triggered by URI symptoms? Recommendations  - Continue PLEX for 5 treatments total. Appreciate nephrology assistance. - Continue Mestinon, 60mg TID (ordered)  If evidence of difficulty managing secretions please hold. - Neuromuscular parameters. NIF/VC Q6H (ordered). For any downward trend, or evidence,or evidence of severe oral pharyngeal paresis immediately notify primary team for consideration for intubation. Please record in progress note. - Metabolic and infectious work up per primary team. URI symptoms per primary team.   - PT, OT, SLP  - Will need to follow up with Dr. Alivia Walden upon discharge.       Escobar Durham, [address] Neurology    A copy of this note was provided for Dr Long Frausto MD

## 2022-01-04 NOTE — PROGRESS NOTES
1930: This RN at bedside. Dialysis RN Christina setting up plasmapheresis. Birdie Tovar RN states she found unknown pill on pt bedside table. Another pill found in patient's gown. This RN found lorazepam and phentermine Rx in pt bag. Pt is drowsy but VSS at this time. This RN explained to pt that home medications need to be sent to pharmacy while in hospital. Pt expressed understanding. 2030: Pharmacy to 2W to count meds with this RN. Receipt in pt chart and given to pt. PRN lorazepam orders given per Dr. Neelima Choe for pt anxiety. Will continue to monitor pt.

## 2022-01-04 NOTE — PROGRESS NOTES
Office: [phone]       Fax: [phone]      Nephrology Progress Note        Patient's Name: Mandeep Warner Date: 1/2/2022  Date of Visit: 1/4/2022    Reason for Consult:  Need for TPE      History of Present Illness:      Inocencio Starkey is a 46 y.o. female with PMHx of hypothyroidism, myasthenia gravis, migraine, depression  who was hospitalized on 1/2/2022 with complaints of weakness  Pt has been getting pheresis 1-2/wk, last on 12/30  Per pt, she seems to be getting worse  No fever, shortness of breath   No current headache    NSAID use: Denies   IV contrast: None recent   Home meds reviewed     INTERVAL HISTORY    Feels same  Shortness of breath: No   UOP: Good   Creat: stable  Had pheresis      Medications:    Allergies:  Immune globulins, Other, Adhesive tape, and Penicillins    Scheduled Meds:   pyridostigmine  60 mg Oral 3 times per day    sodium chloride flush  10 mL IntraVENous 2 times per day    enoxaparin  40 mg SubCUTAneous Daily    aspirin  81 mg Oral Daily    Or    aspirin  300 mg Rectal Daily    atorvastatin  40 mg Oral Nightly     Continuous Infusions:   sodium chloride         Labs:  CBC:   Recent Labs     01/02/22  1503 01/03/22  0458   WBC 7.2 6.1   HGB 12.1 12.1    192     Ca/Mg/Phos:   Recent Labs     01/02/22  1503 01/03/22  0458 01/04/22  0848   CALCIUM 8.2* 8.3  --    MG  --   --  1.90   PHOS  --   --  4.8     UA:  Recent Labs     01/02/22  1503   COLORU YELLOW   CLARITYU Clear   GLUCOSEU Negative   BILIRUBINUR Negative   KETUA Negative   SPECGRAV >1.030   BLOODU Negative   PHUR 6.0   PROTEINU Negative   UROBILINOGEN 0.2   NITRU Negative   LEUKOCYTESUR Negative   LABMICR Not Indicated   URINETYPE Other      Urine Microscopic: No results for input(s): LABCAST, BACTERIA, COMU, HYALCAST, WBCUA, RBCUA, EPIU in the last 72 hours. Urine Chemistry: No results for input(s): Winford Covington, PROTEINUR, NAUR in the last 72 hours. Objective:     Vitals: /74   Pulse 78   Temp 98.2 °F (36.8 °C) (Oral)   Resp 16   Ht 5' 2\" (1.575 m)   Wt 160 lb 15 oz (73 kg)   SpO2 96%   BMI 29.44 kg/m²    Wt Readings from Last 3 Encounters:   01/04/22 160 lb 15 oz (73 kg)   10/15/21 163 lb 9.6 oz (74.2 kg)   10/11/21 168 lb 3.2 oz (76.3 kg)      24HR INTAKE/OUTPUT:      Intake/Output Summary (Last 24 hours) at 1/4/2022 1043  Last data filed at 1/4/2022 0955  Gross per 24 hour   Intake 3371 ml   Output 3100 ml   Net 271 ml     Constitutional:  awake, NAD  HEENT:  MMM, No icterus  Neck: no bruits, No JVD  Cardiovascular:  reg rhythm  Respiratory: CTA, no crackles  Abdomen:  +BS, soft, NT, ND  Ext: no lower extremity edema  CNS: alert, no agitation    IMAGING:  MRI brain without contrast   Final Result   1. No acute intracranial abnormality. No acute infarct. 2. Mild chronic microvascular ischemic changes. 3. Scattered mucosal thickening of the paranasal sinuses. XR CHEST (2 VW)   Final Result   No acute process. CTA HEAD NECK W CONTRAST   Final Result   1. Unremarkable CTA of the head and neck. 2. Incomplete bony fusion along the C4-C5 disc space. Residual mobility at   this level cannot be excluded. RECOMMENDATIONS:   Unavailable         CT Head WO Contrast   Final Result   No acute intracranial abnormality. RECOMMENDATIONS:   Unavailable             Assessment :     1. Myasthenia gravis    -Baseline creat: <1   -Volume: Euvolemic   -needs plasmapheresis  -status post thymus removal      Recent Labs     01/02/22  1419 01/02/22  1503 01/03/22  0458   BUN  --  6* 6*   CREATININE 0.5* <0.5* 0.5*     Recent Labs     01/02/22  1503 01/03/22  0458 01/04/22  0848    139  --    K 3.0* 3.8  --    CO2 23 24  --    MG  --   --  1.90         2.  HTN  -Blood pressure at goal    -not on meds    BP Readings from Last 1 Encounters:   01/04/22 131/74       3. Hypothyroidism     4. Depression     Plan:     - Continue plasmapheresis per neuro recommendations: qod x5 (started 1/3/22)  - Access: RIJ tunneled cath  - Monitor BMP    -Monitor I/O, UOP  -Maintain MAP>65  -Avoid nephrotoxin, if able. -Dose meds to current eGFR    Spoke to Neuro    Thank you for allowing us to participate in care of Quintin Salazar . We will continue to follow. Feel free to contact me with any questions.       Reginald Starr MD  1/4/2022    [facility] [address] [place] [address]  Office : [phone]  Fax :[phone]

## 2022-01-04 NOTE — PROGRESS NOTES
Physical Therapy  Facility/Department: [facility] 3Y ICU  Daily Treatment Note  NAME: Lala Ny  : 1970  MRN: [id number]    Date of Service: 2022    Discharge Recommendations:  Patient would benefit from continued therapy after discharge,Continue to assess pending progress   PT Equipment Recommendations  Other: will continue to assess    Assessment   Body structures, Functions, Activity limitations: Decreased functional mobility ; Decreased endurance;Decreased strength  Assessment: Pt's activity tolerance is limited d/t fatigue, but she was able to participate, completing bed mobility with Min A, and several pivot transfers to/from bedside chair with walker, Min-CGA. Her standing tolerance is more limited than upon initial assessment, and she will need to demonstrate improved ambulation tolerance in order to safely return home. Will continue to assess pending pt progress - will require home PT if she does return directly home. Lala Ny scored a 15/24 on the AM-PAC short mobility form. If patient discharges prior to next session this note will serve as a discharge summary. Please see below for the latest assessment towards goals. PT Education: PT Role;General Safety  Activity Tolerance  Activity Tolerance: Patient limited by endurance; Patient limited by fatigue     Patient Diagnosis(es): The primary encounter diagnosis was Left-sided weakness. Diagnoses of Dysarthria, Myasthenia gravis ([address] Hypokalemia, and Myasthenia gravis with exacerbation ([address] were also pertinent to this visit. has a past medical history of Anxiety, Arthritis, Cancer ([address] GERD (gastroesophageal reflux disease), Hyperlipidemia, Insomnia, Migraines, Myasthenia gravis with exacerbation ([address] Seizures ([address] and Thyroid disease. has a past surgical history that includes Thyroidectomy; tumor removal; Hysterectomy; shoulder surgery; Appendectomy; Cholecystectomy;  Upper gastrointestinal endoscopy (N/A, 12/17/2019); Colonoscopy (N/A, 12/17/2019); Upper gastrointestinal endoscopy (N/A, 12/17/2019); esophageal motility study (N/A, 02/11/2020); Upper gastrointestinal endoscopy (02/28/2020); Upper gastrointestinal endoscopy (02/28/2020); Capsule endoscopy (N/A, 02/28/2020); Dialysis Catheter Insertion (Right, 10/15/2021); other surgical history (Right, 10/15/2021); IR TUNNELED CVC PLACE WO SQ PORT/PUMP > 5 YEARS (10/15/2021); and IR BIOPSY LIVER PERCUTANEOUS (10/15/2021). Restrictions  Restrictions/Precautions  Restrictions/Precautions: Fall Risk  Position Activity Restriction  Other position/activity restrictions: gets infusions for myasthenia gravis     Subjective   General  Chart Reviewed: Yes  Additional Pertinent Hx: per H&P note: \"Patient is a 40-year-old female with past medical history of myasthenia gravis who presents to the hospital for difficulty swallowing, double vision, difficulty walking. According to the patient she has not been able to swallow. She also has difficulty walking and feels her left side is weak than the right side. According to the patient whenever se tries to eat it feels like her food is stuck in her mouth and she is not able to swallow it down. Patient also mentions she has difficulty walking which is unusual for her myasthenia gravis flareups. Neurology was consulted from the emergency department, recommended admission for stroke work-up plus myasthenia gravis. \" Pt adm to R/O CVA vs myasthenia gravis exacerbation  Response To Previous Treatment: Patient with no complaints from previous session. Subjective  Subjective: Pt reports fatigue. Agreeable to activity. To be transferred to PCU. New order received.        Orientation  Orientation  Overall Orientation Status: Within Functional Limits    Objective      Bed mobility  Supine to Sit: Minimal assistance  Sit to Supine: Minimal assistance     Transfers  Sit to Stand: Minimal Assistance;Contact guard assistance (CGA from EOB; Min A from chair)  Stand to sit: Contact guard assistance  Stand Pivot Transfers: Contact guard assistance;Minimal Assistance (Using RW; Practiced x 2 trials)     Balance  Comments: Static stand x 1 min. 30 s., CGA-SBA, walker support. SPO2 in high 90s. Strength RLE  Strength RLE: WFL  Comment: Knee Ext 3+/5  Strength LLE  Strength LLE: WFL  Comment: Knee Ext 3+/5     Other Activities: Other (see comment)  Comment: Pt re-positioned for comfort in bed at end of session. *Discussed D/C plan with pt. She has assist available from her , and is intent upon returning directly home. She is aware that she will need to demonstrate improved standing tolerance, and independence with self-care tasks. With continued progress, she may be able to return directly home. AM-PAC Score  AM-PAC Inpatient Mobility Raw Score : 15 (01/04/22 1319)  AM-PAC Inpatient T-Scale Score : 39.45 (01/04/22 1319)  Mobility Inpatient CMS 0-100% Score: 57.7 (01/04/22 1319)  Mobility Inpatient CMS G-Code Modifier : CK (01/04/22 1319)          Goals  Short term goals  Time Frame for Short term goals: by discharge  Short term goal 1: bed mob MI  Short term goal 2: transfers MI  Short term goal 3: amb 48' with LRAD sup  Short term goal 4: negotiate stairs when able  Patient Goals   Patient goals : to get better    Plan    Plan  Times per week: 3-5  Current Treatment Recommendations: Functional Mobility Training,Transfer Training,Gait Training,Endurance Training,Balance Training,Strengthening  Safety Devices  Type of devices:  All fall risk precautions in place,Call light within reach,Gait belt,Left in bed,Nurse notified  Restraints  Initially in place: No     Therapy Time   Individual Concurrent Group Co-treatment   Time In 1250         Time Out 1315         Minutes 25         Timed Code Treatment Minutes: Nirav Mir PT    Electronically signed by Mahsa Kerns, PT [id number] on 1/4/2022 at 1:21 PM

## 2022-01-04 NOTE — PROGRESS NOTES
Talked with Eulogio Medrano, neurology NP updated on NIF -20 and VC 1.3L that respiratory just worked with the patient. Information also shared with Dr. Teo Estrada. OK to downgrade patient to PCU.

## 2022-01-04 NOTE — PROGRESS NOTES
4 Eyes Skin Assessment     NAME:  Carlos Alberto Blum  YOB: 1970  MEDICAL RECORD NUMBER:  [id number]    The patient is being assess for  Shift Handoff    I agree that 2 RN's have performed a thorough Head to Toe Skin Assessment on the patient. ALL assessment sites listed below have been assessed. Areas assessed by both nurses:    Head, Face, Ears, Shoulders, Back, Chest, Arms, Elbows, Hands, Sacrum. Buttock, Coccyx, Ischium and Legs. Feet and Heels        Does the Patient have a Wound?  No noted wound(s)       Adam Prevention initiated:  Yes   Wound Care Orders initiated:  NA    Pressure Injury (Stage 3,4, Unstageable, DTI, NWPT, and Complex wounds) if present place consult order under [de-identified] NA    New and Established Ostomies if present place consult order under : NA      Nurse 1 eSignature: Electronically signed by Edilma Howard RN on 1/4/22 at 6:49 AM EST    **SHARE this note so that the co-signing nurse is able to place an eSignature**    Nurse 2 eSignature: {Esignature:[id number]}       \

## 2022-01-04 NOTE — FLOWSHEET NOTE
TPE ( Therapeutic Plasma Exchange)  1  Volume of TPV exchange, with 100 % replacement. Use 5% Albumin:  2500 ml  Number of Treatment : 01 of ? total ( Started on 01/03, QOD)    Pt tolerated Well with TPE,   Fluid balance: + 161 ml ( including ACD-A, Ca gluconate, saline rinse blood back to pt)    Initial setting:  AC 3.3;  inlet 50;  plasma removal 28 , Replacement 22, ratio 15 ,      Final values:   ml;  inlet 6009 ml ;   plasma removal 3095 ml , Replacement  2530 ml;  duration 103 minutes  Started time: 19:19  End time: 2100    Medication:  5% Albumin with TPE, started @ 19:19  ACD-A ( per protocol via machine) to pt: 91 ml total during whole TPE  Ca: gluconate : 4 gm IVPB with whole TPE, started @ 19:19  Tylenol: 650 mg oral @ 1850 by ICU nurse    Next TPE scheduled on 01/05    Placed TPE flow sheets in the chart for EMR scan     01/03/22 2000 01/03/22 2100   Vital Signs   /74 136/78   Temp  --  97.9 °F (36.6 °C)   Pulse 82 89   Resp 16 21   SpO2 98 % 97 %   Musculoskeletal   RUE Limited movement;Weakness Limited movement;Weakness   LUE Limited movement;Weakness Limited movement;Weakness   RL Extremity Limited movement;Weakness Deformity;Limited movement   LL Extremity Limited movement;Weakness Deformity;Limited movement   Run Parameters   AC flow/volume  --  4.7/401   Inlet flow/volume  --  70/6009   Plasma flow/volume  --  33.6/3095   Replace flow/volume  --  26.6/2530   Post-Treatment Checklist   Post-Treatment Checklist  --  Rinseback Completed;Post-labs drawn;Post-tx CVC care/protocol;Equipment externally cleaned/disinfected;Biohazard wastes disposed per hospital protocol; Side rails up/call light within reach   Final Values   Apheresis Intake(ml)  --  3261 ml   Apheresis Output(ml)  --  3100 ml   Net Balance  --  161   AC-AC Bag  --  91   Eff. Ratio  --  1.0 Nasal Turnover Hinge Flap Text: The defect edges were debeveled with a #15 scalpel blade.  Given the size, depth, location of the defect and the defect being full thickness a nasal turnover hinge flap was deemed most appropriate.  Using a sterile surgical marker, an appropriate hinge flap was drawn incorporating the defect. The area thus outlined was incised with a #15 scalpel blade. The flap was designed to recreate the nasal mucosal lining and the alar rim. The skin margins were undermined to an appropriate distance in all directions utilizing iris scissors.

## 2022-01-04 NOTE — PROGRESS NOTES
Speech Language Pathology    Noted downgrade to ICU on 1/3; pt seen for Clinical Swallow Eval on PCU on 1/3 in AM.    Will need new orders if medical team desires continued ST intervention.     Thank you    Janie Perdomo MS, CCC-SLP #[id number]  Speech Language Pathologist

## 2022-01-04 NOTE — PROGRESS NOTES
Pt had no U.O. overnight. Pt was bladder scanned and 112 mL was found. RN encouraged pt to drink fluids.  RN also observed pt able to move more easily and had slightly improved strength in limbs this AM.

## 2022-01-04 NOTE — PLAN OF CARE
Problem: Falls - Risk of:  Goal: Will remain free from falls  Description: Will remain free from falls. Outcome: Ongoing. Fall precautions in place.   Goal: Absence of physical injury  Description: Absence of physical injury  Outcome: Ongoing     Problem: HEMODYNAMIC STATUS  Goal: Patient has stable vital signs and fluid balance  Outcome: Ongoing, VS continue to be monitored     Problem: ACTIVITY INTOLERANCE/IMPAIRED MOBILITY  Goal: Mobility/activity is maintained at optimum level for patient  Outcome: Ongoing     Problem: Pain:  Goal: Pain level will decrease  Description: Pain level will decrease  Outcome: Ongoing  Goal: Control of acute pain  Description: Control of acute pain  Outcome: Ongoing  Goal: Control of chronic pain  Description: Control of chronic pain  Outcome: Ongoing     Problem: Skin Integrity:  Goal: Will show no infection signs and symptoms  Description: Will show no infection signs and symptoms  Outcome: Ongoing  Goal: Absence of new skin breakdown  Description: Absence of new skin breakdown  Outcome: Ongoing

## 2022-01-04 NOTE — PROGRESS NOTES
Patient arrived to room 5132 at 1330 from ICU. Patient oriented to room and call light. Alert and oriented x 4. Telemetry box in place and CMU states ICU called to verify. VSS. Call light and bedside table in reach.   Electronically signed by Sharon Curtis RN on 1/4/2022 at 4:17 PM

## 2022-01-04 NOTE — PROGRESS NOTES
Report called to 5th floor RN, Micheal Mcarthur. Updated on patients reason for admission, events since admission. Updated on current medications and vital signs. All questions answered. Transportation request placed.

## 2022-01-04 NOTE — PROGRESS NOTES
Progress Note  Admit Date: 1/2/2022      PCP: Susana Rivera MD     CC: F/U for weakness    Days in hospital:  2    SUBJECTIVE / Interval History:  Pt feels a bit better, gen weakness   swallowing improved   Has a lot of URI symptoms      Allergies  Immune globulins, Other, Adhesive tape, and Penicillins    Medications    Scheduled Meds:   pyridostigmine  60 mg Oral 3 times per day    sodium chloride flush  10 mL IntraVENous 2 times per day    enoxaparin  40 mg SubCUTAneous Daily    aspirin  81 mg Oral Daily    Or    aspirin  300 mg Rectal Daily    atorvastatin  40 mg Oral Nightly     Continuous Infusions:   sodium chloride         PRN Meds:  citrate dextrose, LORazepam, perflutren lipid microspheres, sodium chloride flush, sodium chloride, ondansetron **OR** ondansetron, perflutren lipid microspheres    Vitals    /75   Pulse 78   Temp 98 °F (36.7 °C) (Oral)   Resp 17   Ht 5' 2\" (1.575 m)   Wt 160 lb 15 oz (73 kg)   SpO2 95%   BMI 29.44 kg/m²     Exam:    Gen: No distress. Eyes: PERRL. No sclera icterus. No conjunctival injection. ENT: No discharge. Pharynx clear. External appearance of ears and nose normal.  Neck: Trachea midline. No obvious mass. Resp: No accessory muscle use. No crackles. No wheezes. No rhonchi. No dullness on percussion. CV: Regular rate. Regular rhythm. No murmur or rub. No edema. GI: Non-tender. Non-distended. No hernia. Skin: Warm, dry, normal texture and turgor. No nodule on exposed extremities. Lymph: No cervical LAD. No supraclavicular LAD. M/S: No cyanosis. No clubbing. No joint deformity. Neuro: Moves all four extremities. Generalized weakness . CN 2-12 tested, no defect noted. Psych: Oriented x 3. No anxiety. Awake. Alert. Intact judgement and insight.     Data    LABS  CBC:   Recent Labs     01/02/22  1503 01/03/22  0458   WBC 7.2 6.1   HGB 12.1 12.1   HCT 36.5 36.3   MCV 95.1 94.8    192     BMP:   Recent Labs     01/02/22  1419 01/02/22  1503 01/03/22  0458   NA  --  138 139   K  --  3.0* 3.8   CL  --  101 102   CO2  --  23 24   BUN  --  6* 6*   CREATININE 0.5* <0.5* 0.5*   GLUCOSE  --  115* 91     POC GLUCOSE:  No results for input(s): POCGLU in the last 72 hours. LIVER PROFILE: No results for input(s): AST, ALT, LIPASE, AMYLASE, LABALBU, BILIDIR, BILITOT, ALKPHOS in the last 72 hours. PT/INR:   Recent Labs     01/03/22  0458   PROTIME 11.2   INR 0.99     APTT:   Recent Labs     01/03/22  1401   APTT 44.2*     UA:  Recent Labs     01/02/22  1503   COLORU YELLOW   PHUR 6.0   CLARITYU Clear   SPECGRAV >1.030   LEUKOCYTESUR Negative   UROBILINOGEN 0.2   BILIRUBINUR Negative   BLOODU Negative   GLUCOSEU Negative   KETUA Negative     Microbiology:  Wound Culture: No results for input(s): WNDABS, ORG in the last 72 hours. Invalid input(s):  LABGRAM  Nasal Culture: No results for input(s): ORG, MRSAPCR in the last 72 hours. Blood Culture: No results for input(s): BC, BLOODCULT2 in the last 72 hours. Fungal Culture:   No results for input(s): FUNGSM in the last 72 hours. No results for input(s): FUNCXBLD in the last 72 hours. CSF Culture:  No results for input(s): COLORCSF, APPEARCSF, CFTUBE, CLOTCSF, WBCCSF, RBCCSF, NEUTCSF, NUMCELLSCSF, LYMPHSCSF, MONOCSF, GLUCCSF, VOLCSF in the last 72 hours. Respiratory Culture:  No results for input(s): Oscar Cancer in the last 72 hours. AFB:No results for input(s): AFBSMEAR in the last 72 hours. Urine Culture  No results for input(s): LABURIN in the last 72 hours. RADIOLOGY:    MRI brain without contrast   Final Result   1. No acute intracranial abnormality. No acute infarct. 2. Mild chronic microvascular ischemic changes. 3. Scattered mucosal thickening of the paranasal sinuses. XR CHEST (2 VW)   Final Result   No acute process. CTA HEAD NECK W CONTRAST   Final Result   1. Unremarkable CTA of the head and neck. 2. Incomplete bony fusion along the C4-C5 disc space. Residual mobility at   this level cannot be excluded. RECOMMENDATIONS:   Unavailable         CT Head WO Contrast   Final Result   No acute intracranial abnormality. RECOMMENDATIONS:   Unavailable             CONSULTS:    IP CONSULT TO NEUROLOGY  IP CONSULT TO NEUROLOGY  IP CONSULT TO CASE MANAGEMENT  IP CONSULT TO NEPHROLOGY    ASSESSMENT AND PLAN:      Active Problems:    Left-sided weakness  Resolved Problems:    * No resolved hospital problems. *    Patient is a 63-year-old female with a past medical history of myasthenia gravis who presented to the hospital with double vision difficulty walking and difficulty swallowing. She complains of worsening left-sided weakness. Patient was on 5 understatement 4 times a day and on Plex treatment which was being weaned down. Her symptoms started about a week prior to admission and got progressively worse hence came to the ER. She was admitted with myasthenia gravis with acute flare. MRI was unremarkable. Neurology was consulted and patient was restarted on her pyridostigmine and nephrology was consulted for Plex    Myasthenia gravis with possible acute flare  -Continue pyridostigmine.  -Started on Plex treatment. Continue aggressive treatment ( every other day for 5 days)  -Monitor vital capacity and nif   -MRI okay      Hypothyroidism  -On Synthyroid    Anxiety  -On Ativan and Celexa    History of psoriasis  -Methotrexate      Diet: ADULT DIET; Dysphagia - Soft and Bite Sized  Code Status: Full Code    PT/OT Eval Status: Ordered    Discharge plan -close monitoring    The patient and / or the family were informed of the results of any tests, a time was given to answer questions, a plan was proposed and they agreed with plan. Discussed with consulting physicians, nursing and social work     The note was completed using EMR. Every effort was made to ensure accuracy; however, inadvertent computerized transcription errors may be present.        Zoe WHEAT Genaro Osorio MD

## 2022-01-05 LAB
MAGNESIUM: 1.9 MG/DL (ref 1.8–2.4)
PHOSPHORUS: 3.5 MG/DL (ref 2.5–4.9)

## 2022-01-05 PROCEDURE — 6370000000 HC RX 637 (ALT 250 FOR IP): Performed by: NURSE PRACTITIONER

## 2022-01-05 PROCEDURE — 97530 THERAPEUTIC ACTIVITIES: CPT

## 2022-01-05 PROCEDURE — 2500000003 HC RX 250 WO HCPCS: Performed by: HOSPITALIST

## 2022-01-05 PROCEDURE — 36514 APHERESIS PLASMA: CPT

## 2022-01-05 PROCEDURE — 6360000002 HC RX W HCPCS: Performed by: HOSPITALIST

## 2022-01-05 PROCEDURE — 2580000003 HC RX 258: Performed by: HOSPITALIST

## 2022-01-05 PROCEDURE — 94761 N-INVAS EAR/PLS OXIMETRY MLT: CPT

## 2022-01-05 PROCEDURE — 83735 ASSAY OF MAGNESIUM: CPT

## 2022-01-05 PROCEDURE — 6370000000 HC RX 637 (ALT 250 FOR IP): Performed by: INTERNAL MEDICINE

## 2022-01-05 PROCEDURE — 97535 SELF CARE MNGMENT TRAINING: CPT

## 2022-01-05 PROCEDURE — 6360000002 HC RX W HCPCS: Performed by: INTERNAL MEDICINE

## 2022-01-05 PROCEDURE — 36514 APHERESIS PLASMA: CPT | Performed by: HOSPITALIST

## 2022-01-05 PROCEDURE — 2060000000 HC ICU INTERMEDIATE R&B

## 2022-01-05 PROCEDURE — 36415 COLL VENOUS BLD VENIPUNCTURE: CPT

## 2022-01-05 PROCEDURE — 2580000003 HC RX 258: Performed by: INTERNAL MEDICINE

## 2022-01-05 PROCEDURE — P9041 ALBUMIN (HUMAN),5%, 50ML: HCPCS | Performed by: HOSPITALIST

## 2022-01-05 PROCEDURE — 84100 ASSAY OF PHOSPHORUS: CPT

## 2022-01-05 RX ORDER — DOXEPIN HYDROCHLORIDE 10 MG/1
10 CAPSULE ORAL NIGHTLY
Status: DISCONTINUED | OUTPATIENT
Start: 2022-01-05 | End: 2022-01-06 | Stop reason: HOSPADM

## 2022-01-05 RX ORDER — PROMETHAZINE HYDROCHLORIDE 25 MG/ML
12.5 INJECTION, SOLUTION INTRAMUSCULAR; INTRAVENOUS ONCE
Status: DISCONTINUED | OUTPATIENT
Start: 2022-01-05 | End: 2022-01-05

## 2022-01-05 RX ORDER — BUTALBITAL, ACETAMINOPHEN AND CAFFEINE 50; 325; 40 MG/1; MG/1; MG/1
1 TABLET ORAL EVERY 4 HOURS PRN
Status: DISCONTINUED | OUTPATIENT
Start: 2022-01-05 | End: 2022-01-06 | Stop reason: HOSPADM

## 2022-01-05 RX ORDER — FOLIC ACID 1 MG/1
1 TABLET ORAL DAILY
Status: DISCONTINUED | OUTPATIENT
Start: 2022-01-05 | End: 2022-01-06 | Stop reason: HOSPADM

## 2022-01-05 RX ORDER — CITALOPRAM 20 MG/1
40 TABLET ORAL EVERY MORNING
Status: DISCONTINUED | OUTPATIENT
Start: 2022-01-05 | End: 2022-01-06 | Stop reason: HOSPADM

## 2022-01-05 RX ORDER — LEVOTHYROXINE SODIUM 0.07 MG/1
150 TABLET ORAL DAILY
Status: DISCONTINUED | OUTPATIENT
Start: 2022-01-05 | End: 2022-01-06 | Stop reason: HOSPADM

## 2022-01-05 RX ORDER — ALBUMIN, HUMAN INJ 5% 5 %
2500 SOLUTION INTRAVENOUS PRN
Status: DISCONTINUED | OUTPATIENT
Start: 2022-01-05 | End: 2022-01-06 | Stop reason: HOSPADM

## 2022-01-05 RX ORDER — LORAZEPAM 0.5 MG/1
0.5 TABLET ORAL 2 TIMES DAILY PRN
Status: DISCONTINUED | OUTPATIENT
Start: 2022-01-05 | End: 2022-01-05

## 2022-01-05 RX ORDER — ZOLPIDEM TARTRATE 5 MG/1
10 TABLET ORAL NIGHTLY PRN
Status: DISCONTINUED | OUTPATIENT
Start: 2022-01-05 | End: 2022-01-06 | Stop reason: HOSPADM

## 2022-01-05 RX ADMIN — ZOLPIDEM TARTRATE 10 MG: 5 TABLET ORAL at 20:34

## 2022-01-05 RX ADMIN — SODIUM CHLORIDE, PRESERVATIVE FREE 10 ML: 5 INJECTION INTRAVENOUS at 20:35

## 2022-01-05 RX ADMIN — ENOXAPARIN SODIUM 40 MG: 100 INJECTION SUBCUTANEOUS at 08:24

## 2022-01-05 RX ADMIN — ASPIRIN 81 MG: 81 TABLET, COATED ORAL at 08:24

## 2022-01-05 RX ADMIN — PYRIDOSTIGMINE BROMIDE 60 MG: 60 TABLET ORAL at 21:52

## 2022-01-05 RX ADMIN — ANTICOAGULANT CITRATE DEXTROSE SOLUTION FORMULA A: 12.25; 11; 3.65 SOLUTION INTRAVENOUS at 09:17

## 2022-01-05 RX ADMIN — SODIUM CHLORIDE, PRESERVATIVE FREE 10 ML: 5 INJECTION INTRAVENOUS at 08:24

## 2022-01-05 RX ADMIN — FOLIC ACID 1 MG: 1 TABLET ORAL at 16:14

## 2022-01-05 RX ADMIN — DOXEPIN HYDROCHLORIDE 10 MG: 10 CAPSULE ORAL at 20:34

## 2022-01-05 RX ADMIN — ONDANSETRON 4 MG: 2 INJECTION INTRAMUSCULAR; INTRAVENOUS at 17:28

## 2022-01-05 RX ADMIN — PYRIDOSTIGMINE BROMIDE 60 MG: 60 TABLET ORAL at 05:47

## 2022-01-05 RX ADMIN — Medication 12.5 MG: at 14:05

## 2022-01-05 RX ADMIN — LEVOTHYROXINE SODIUM 150 MCG: 75 TABLET ORAL at 12:04

## 2022-01-05 RX ADMIN — CITALOPRAM HYDROBROMIDE 40 MG: 20 TABLET ORAL at 16:14

## 2022-01-05 RX ADMIN — ONDANSETRON 4 MG: 2 INJECTION INTRAMUSCULAR; INTRAVENOUS at 11:35

## 2022-01-05 RX ADMIN — PYRIDOSTIGMINE BROMIDE 60 MG: 60 TABLET ORAL at 16:14

## 2022-01-05 RX ADMIN — CALCIUM GLUCONATE 4000 MG: 98 INJECTION, SOLUTION INTRAVENOUS at 09:18

## 2022-01-05 RX ADMIN — ATORVASTATIN CALCIUM 40 MG: 40 TABLET, FILM COATED ORAL at 20:34

## 2022-01-05 RX ADMIN — ALBUMIN (HUMAN) 2500 ML: 12.5 INJECTION, SOLUTION INTRAVENOUS at 09:18

## 2022-01-05 ASSESSMENT — PAIN SCALES - GENERAL
PAINLEVEL_OUTOF10: 0

## 2022-01-05 NOTE — PROGRESS NOTES
Office: [phone]       Fax: [phone]      Nephrology Progress Note        Patient's Name: Javier Mojica Date: 1/2/2022  Date of Visit: 1/5/2022    Reason for Consult:  Need for TPE      History of Present Illness:      Arlette Ramos is a 46 y.o. female with PMHx of hypothyroidism, myasthenia gravis, migraine, depression  who was hospitalized on 1/2/2022 with complaints of weakness  Pt has been getting pheresis 1-2/wk, last on 12/30  Per pt, she seems to be getting worse  No fever, shortness of breath   No current headache    NSAID use: Denies   IV contrast: None recent   Home meds reviewed     INTERVAL HISTORY    Feels better  Shortness of breath: No   UOP: Good   Creat: stable      Medications:    Allergies:  Immune globulins, Other, Adhesive tape, and Penicillins    Scheduled Meds:   pyridostigmine  60 mg Oral 3 times per day    sodium chloride flush  10 mL IntraVENous 2 times per day    enoxaparin  40 mg SubCUTAneous Daily    aspirin  81 mg Oral Daily    Or    aspirin  300 mg Rectal Daily    atorvastatin  40 mg Oral Nightly     Continuous Infusions:   sodium chloride         Labs:  CBC:   Recent Labs     01/02/22  1503 01/03/22  0458   WBC 7.2 6.1   HGB 12.1 12.1    192     Ca/Mg/Phos:   Recent Labs     01/02/22  1503 01/03/22  0458 01/04/22  0848 01/05/22  0522   CALCIUM 8.2* 8.3  --   --    MG  --   --  1.90 1.90   PHOS  --   --  4.8 3.5     UA:  Recent Labs     01/02/22  1503   COLORU YELLOW   CLARITYU Clear   GLUCOSEU Negative   BILIRUBINUR Negative   KETUA Negative   SPECGRAV >1.030   BLOODU Negative   PHUR 6.0   PROTEINU Negative   UROBILINOGEN 0.2   NITRU Negative   LEUKOCYTESUR Negative   LABMICR Not Indicated   URINETYPE Other      Urine Microscopic: No results for input(s): LABCAST, BACTERIA, COMU, HYALCAST, WBCUA, RBCUA, EPIU in the last 72 hours. Urine Chemistry: No results for input(s): Lina Andrade, PROTEINFRANK, NAUR in the last 72 hours. Objective:     Vitals: /77   Pulse 80   Temp 98.4 °F (36.9 °C) (Axillary)   Resp 16   Ht 5' 2\" (1.575 m)   Wt 157 lb 3 oz (71.3 kg)   SpO2 95%   BMI 28.75 kg/m²    Wt Readings from Last 3 Encounters:   01/05/22 157 lb 3 oz (71.3 kg)   10/15/21 163 lb 9.6 oz (74.2 kg)   10/11/21 168 lb 3.2 oz (76.3 kg)      24HR INTAKE/OUTPUT:      Intake/Output Summary (Last 24 hours) at 1/5/2022 0845  Last data filed at 1/5/2022 0551  Gross per 24 hour   Intake 520.7 ml   Output 850 ml   Net -329.3 ml     Constitutional:  awake, NAD  HEENT:  MMM, No icterus  Neck: no bruits, No JVD  Cardiovascular:  reg rhythm  Respiratory: CTA, no crackles  Abdomen:  +BS, soft, NT, ND  Ext: no lower extremity edema  CNS: alert, no agitation    IMAGING:  MRI brain without contrast   Final Result   1. No acute intracranial abnormality. No acute infarct. 2. Mild chronic microvascular ischemic changes. 3. Scattered mucosal thickening of the paranasal sinuses. XR CHEST (2 VW)   Final Result   No acute process. CTA HEAD NECK W CONTRAST   Final Result   1. Unremarkable CTA of the head and neck. 2. Incomplete bony fusion along the C4-C5 disc space. Residual mobility at   this level cannot be excluded. RECOMMENDATIONS:   Unavailable         CT Head WO Contrast   Final Result   No acute intracranial abnormality. RECOMMENDATIONS:   Unavailable             Assessment :     1. Myasthenia gravis    -Baseline creat: <1   -Volume: Euvolemic   -needs plasmapheresis  -status post thymus removal      Recent Labs     01/02/22  1419 01/02/22  1503 01/03/22  0458   BUN  --  6* 6*   CREATININE 0.5* <0.5* 0.5*     Recent Labs     01/02/22  1503 01/03/22  0458 01/04/22  0848 01/05/22  0522    139  --   --    K 3.0* 3.8  --   --    CO2 23 24  --   --    MG  --   --  1.90 1.90         2. HTN  -Blood pressure at goal    -not on meds    BP Readings from Last 1 Encounters:   01/05/22 125/77       3. Hypothyroidism     4. Depression     Plan:     - seen on Pheresis  - Continue plasmapheresis per neuro recommendations: qod x5 (started 1/3/22)  - Access: RIJ tunneled cath  - Monitor BMP    -Monitor I/O, UOP  -Maintain MAP>65  -Avoid nephrotoxin, if able. -Dose meds to current eGFR    If dc, will arrange for outpt pheresis    Thank you for allowing us to participate in care of Arlette Ramos . We will continue to follow. Feel free to contact me with any questions.       Sonia Crandall MD  1/5/2022    [facility]  [address]  Office : [phone]  Fax :[phone]

## 2022-01-05 NOTE — PROGRESS NOTES
Occupational Therapy  Facility/Department: [address] PROGRESSIVE CARE  Occupational Therapy Reassessment/ Daily Treatment Note  Should patient be discharged prior to another treatment session, this note shall serve as the discharge summary. NAME: Dion Banks  : 1970  MRN: [id number]    Date of Service: 2022    Discharge Recommendations:  Patient would benefit from continued therapy after discharge,2-3 sessions per week,Home with assist PRN,Home with Home health OT       Assessment   Performance deficits / Impairments: Decreased functional mobility ; Decreased strength;Decreased endurance;Decreased ADL status; Decreased safe awareness;Decreased vision/visual deficit; Decreased high-level IADLs;Decreased balance;Decreased ROM  Assessment: Seen in room, agreed to OT. Pt completed mobility s/p infusion with SBA for supine to sit and  CGA for functional transfers and mobility, good walker use. Pt complained of nausea and declined further activity at this time. She states she still feels confident for d/c home with spouse assist.  [place] to increase endurance to increase independence for self care and functional mobility, 2-3x week. Prognosis: Good  Decision Making: Medium Complexity  Assistance / Modification: assist of 1, RW  OT Education: OT Role;Plan of Care;Transfer Training;Energy Conservation  REQUIRES OT FOLLOW UP: Yes  Activity Tolerance  Activity Tolerance: Patient limited by fatigue;Patient Tolerated treatment well  Activity Tolerance: improved endurance but still fatigued  Safety Devices  Safety Devices in place: Yes  Type of devices: Left in chair;Nurse notified;Gait belt;Call light within reach (no chair alarm in room, pt verbalized understanding to call for assist for tx back to bed)         Patient Diagnosis(es): The primary encounter diagnosis was Left-sided weakness.  Diagnoses of Dysarthria, Myasthenia gravis ([address] Hypokalemia, and Myasthenia gravis with exacerbation ([address] were also pertinent to this visit. has a past medical history of Anxiety, Arthritis, Cancer ([address] GERD (gastroesophageal reflux disease), Hyperlipidemia, Insomnia, Migraines, Myasthenia gravis with exacerbation ([address] Seizures ([address] and Thyroid disease. has a past surgical history that includes Thyroidectomy; tumor removal; Hysterectomy; shoulder surgery; Appendectomy; Cholecystectomy; Upper gastrointestinal endoscopy (N/A, 12/17/2019); Colonoscopy (N/A, 12/17/2019); Upper gastrointestinal endoscopy (N/A, 12/17/2019); esophageal motility study (N/A, 02/11/2020); Upper gastrointestinal endoscopy (02/28/2020); Upper gastrointestinal endoscopy (02/28/2020); Capsule endoscopy (N/A, 02/28/2020); Dialysis Catheter Insertion (Right, 10/15/2021); other surgical history (Right, 10/15/2021); IR TUNNELED CVC PLACE WO SQ PORT/PUMP > 5 YEARS (10/15/2021); and IR BIOPSY LIVER PERCUTANEOUS (10/15/2021). Restrictions  Restrictions/Precautions  Restrictions/Precautions: Fall Risk  Position Activity Restriction  Other position/activity restrictions: gets infusions for myasthenia gravis  Subjective   General  Chart Reviewed: Yes  Patient assessed for rehabilitation services?: Yes  Additional Pertinent Hx: 46 y.o. female with hx of myasthenia gravis, seizures, migraines, insomnia, thyroid cancer s/p thyroidectomy, HLD who presented to the ED on 1/2/21 for evaluation of multiple complaints including: weakness-left greater than right, difficulty swallowing, double vision. 46 y.o. female with hx of myasthenia gravis, seizures, migraines, insomnia, thyroid cancer s/p thyroidectomy, HLD who presented to the ED on 1/2/21 for evaluation of multiple complaints including: weakness-left greater than right, difficulty swallowing, double vision, she reports similar to her hx of myasthenic crisis. She has not tolerated IVIG and receives PLEX as outpatient. CT head negative for acute findings.  MRI pending  Family / Caregiver Present: No  Referring Practitioner: Dr. Adonis Oropeza  Diagnosis: MG flare up vs CVA  Subjective  Subjective: Seen in room, agreed to OT. No complaints of pain and states she feels endurance has improved from yesterday  General Comment  Comments: Per RN ok for therapy. Orientation  Orientation  Overall Orientation Status: Within Functional Limits  Objective             Functional Mobility  Functional - Mobility Device: Rolling Walker  Activity: Other  Assist Level: Contact guard assistance  Functional Mobility Comments: No LOB, good walker safety observed. Complained of nausea after short walk so deferred further ambulation  Bed mobility  Supine to Sit: Stand by assistance (bed flat, bed rails up)  Sit to Supine: Unable to assess (left up in chair at end of session)  Transfers  Sit to stand: Contact guard assistance  Stand to sit: Contact guard assistance                                                                 Plan   Plan  Times per week: 3-5  Current Treatment Recommendations: Strengthening,Functional Mobility Training,Gait Training,Endurance Training,Balance Training,Self-Care / ADL,ROM       OutComes Score    Berenice Castro scored a 17/24 on the AM-PAC ADL Inpatient form. Current research shows that an AM-PAC score of 18 or greater is typically associated with a discharge to the patient's home setting. Based on the patient's AM-PAC score, and their current ADL deficits, it is recommended that the patient have 2-3 sessions per week of Occupational Therapy at d/c to increase the patient's independence. At this time, this patient demonstrates the endurance and safety to discharge home with home services and a follow up treatment frequency of 2-3x/wk. Please see assessment section for further patient specific details. If patient discharges prior to next session this note will serve as a discharge summary. Please see below for the latest assessment towards goals. AM-PAC Score        AM-PAC Inpatient Daily Activity Raw Score: 17 (01/05/22 1136)  AM-PAC Inpatient ADL T-Scale Score : 37.26 (01/05/22 1136)  ADL Inpatient CMS 0-100% Score: 50.11 (01/05/22 1136)  ADL Inpatient CMS G-Code Modifier : CK (01/05/22 1136)    Goals  Short term goals  Time Frame for Short term goals: Prior to DC:   Short term goal 1: Pt will complete ADL transfers with supervision  Short term goal 2: Pt will complete functional mobility with supervision  Short term goal 3: Pt will tolerate standing > 5 min for functional task with supervision  Short term goal 4: Pt will complete toileting with supervision  Short term goal 5: Pt will complete LB Dressing with supervision  Patient Goals   Patient goals : to get stronger and return home       Therapy Time   Individual Concurrent Group Co-treatment   Time In 1110         Time Out 1136         Minutes 26         Timed Code Treatment Minutes: Glenn Quinteros, OT

## 2022-01-05 NOTE — PROGRESS NOTES
TPE ( Therapeutic Plasma Exchange)  1  Volume of TPV exchange, with 100 % replacement. Use 5% Albumin:  2511   Number of Treatment : 2 of 5 total ( Started on 1/3/21, QOD)    Pt tolerated well with TPE,   Fluid balance: + 162 ml ( including ACD-A, Ca gluconate, saline rinse blood back to pt)    Initial setting:  AC 4.0;  inlet 40;  plasma removal 26.2 , Replacement 20.8, ratio 15 ,      Final values:   ml;  inlet 5801 ml ;   plasma removal 2846 ml , Replacement  2249 ml;  duration 112 minutes  Started time: 9460  End time: 1027    Medication:  5% Albumin with TPE, started @ 0835  ACD-A ( per protocol via machine) to pt: 114 ml total during whole TPE  Ca: gluconate : 4 gm IVPB with whole TPE, started @ 9693      Next TPE scheduled on Friday 1/7/21    Placed TPE flow sheets in the chart for EMR scan       01/05/22 0835 01/05/22 1027   Vital Signs   /76 121/73   Temp 98.2 °F (36.8 °C) 98.9 °F (37.2 °C)   Pulse 83 77   Resp 16 16   Pre-Treatment Checklist   Pre-Treatment Checklist Informed consent verified/signed;Machine primed with NS/RBCs; Alarm test done/passed; No air in system;Universal precautions observed;IVP pre-meds/IVPB given via return line; Connections secured  --    Prime Diverted  --    Apheresis Lot Numbers & Expiration Dates   Equipment spectra optia  --    Last PM Date 09/17/21  --    Lot# [id number]  --    Exp Date 03/31/22  --    Filter termobct  --    Lot# [id number]  --    Exp Date 11/01/23  --    Run Parameters   Blood flow rate (ml/min) 50 ml/min  --    Blood Wm Temp 98.6 °F (37 °C)  --    AC flow/volume 4.0/24  --    Inlet flow/volume 50/240  --    Plasma flow/volume 26.2/108  --    Replace flow/volume 20.8/76  --    ACD-A ratio/rpm 15  --    Comments tx started per policy  --    Final Values   Apheresis Intake(ml)  --  3013 ml   Apheresis Output(ml)  --  2851 ml   Net Balance  --  162   AC-AC Bag  --  114

## 2022-01-05 NOTE — PLAN OF CARE
Problem: Falls - Risk of:  Goal: Will remain free from falls  1/5/2022 0755 by Giuliana Webster RN  Outcome: Ongoing     Problem: Falls - Risk of:  Goal: Absence of physical injury  1/5/2022 0755 by Giuliana Webster RN  Outcome: Ongoing     Problem: HEMODYNAMIC STATUS  Goal: Patient has stable vital signs and fluid balance  1/5/2022 0755 by Giuliana Webster RN  Outcome: Ongoing     Problem: ACTIVITY INTOLERANCE/IMPAIRED MOBILITY  Goal: Mobility/activity is maintained at optimum level for patient  1/5/2022 0755 by Giuliana Webtser RN  Outcome: Ongoing     Problem: Pain:  Goal: Pain level will decrease  1/5/2022 0755 by Giuliana Webster RN  Outcome: Ongoing     Problem: Pain:  Goal: Control of acute pain  1/5/2022 0755 by Giuliana Webster RN  Outcome: Ongoing     Problem: Pain:  Goal: Control of chronic pain  1/5/2022 0755 by Giuliana Webster RN  Outcome: Ongoing     Problem: Skin Integrity:  Goal: Will show no infection signs and symptoms  1/5/2022 0755 by Giuliana Webster RN  Outcome: Ongoing     Problem: Skin Integrity:  Goal: Absence of new skin breakdown  1/5/2022 0755 by Giuliana Webster RN  Outcome: Ongoing

## 2022-01-05 NOTE — PROGRESS NOTES
Progress Note  Admit Date: 1/2/2022      PCP: London Darnell MD     CC: F/U for weakness    Days in hospital:  3    SUBJECTIVE / Interval History:  Pt feels very nauseated. A lot of dry heaving. Weakness improving    Vital capacity improved and NIF improved      Allergies  Immune globulins, Other, Adhesive tape, and Penicillins    Medications    Scheduled Meds:   pyridostigmine  60 mg Oral 3 times per day    sodium chloride flush  10 mL IntraVENous 2 times per day    enoxaparin  40 mg SubCUTAneous Daily    aspirin  81 mg Oral Daily    Or    aspirin  300 mg Rectal Daily    atorvastatin  40 mg Oral Nightly     Continuous Infusions:   sodium chloride         PRN Meds:  albumin human, acetaminophen, calcium gluconate IVPB, citrate dextrose, LORazepam, sodium chloride flush, sodium chloride, ondansetron **OR** ondansetron, perflutren lipid microspheres    Vitals    /79   Pulse 87   Temp 97.9 °F (36.6 °C) (Oral)   Resp 16   Ht 5' 2\" (1.575 m)   Wt 157 lb 3 oz (71.3 kg)   SpO2 98%   BMI 28.75 kg/m²     Exam:    Gen: No distress. Eyes: PERRL. No sclera icterus. No conjunctival injection. ENT: No discharge. Pharynx clear. External appearance of ears and nose normal.  Neck: Trachea midline. No obvious mass. Resp: No accessory muscle use. No crackles. No wheezes. No rhonchi. No dullness on percussion. CV: Regular rate. Regular rhythm. No murmur or rub. No edema. GI: Non-tender. Non-distended. No hernia. Skin: Warm, dry, normal texture and turgor. No nodule on exposed extremities. Lymph: No cervical LAD. No supraclavicular LAD. M/S: No cyanosis. No clubbing. No joint deformity. Neuro: Moves all four extremities. Generalized weakness improving. CN 2-12 tested, no defect noted. Psych: Oriented x 3. No anxiety. Awake. Alert. Intact judgement and insight.     Data    LABS  CBC:   Recent Labs     01/02/22  1503 01/03/22  0458   WBC 7.2 6.1   HGB 12.1 12.1   HCT 36.5 36.3   MCV 95.1 94.8  192     BMP:   Recent Labs     01/02/22  1419 01/02/22  1503 01/03/22  0458 01/04/22  0848 01/05/22  0522   NA  --  138 139  --   --    K  --  3.0* 3.8  --   --    CL  --  101 102  --   --    CO2  --  23 24  --   --    PHOS  --   --   --  4.8 3.5   BUN  --  6* 6*  --   --    CREATININE 0.5* <0.5* 0.5*  --   --    GLUCOSE  --  115* 91  --   --      POC GLUCOSE:  No results for input(s): POCGLU in the last 72 hours. LIVER PROFILE: No results for input(s): AST, ALT, LIPASE, AMYLASE, LABALBU, BILIDIR, BILITOT, ALKPHOS in the last 72 hours. PT/INR:   Recent Labs     01/03/22  0458   PROTIME 11.2   INR 0.99     APTT:   Recent Labs     01/03/22  1401   APTT 44.2*     UA:  Recent Labs     01/02/22  1503   COLORU YELLOW   PHUR 6.0   CLARITYU Clear   SPECGRAV >1.030   LEUKOCYTESUR Negative   UROBILINOGEN 0.2   BILIRUBINUR Negative   BLOODU Negative   GLUCOSEU Negative   KETUA Negative     Microbiology:  Wound Culture: No results for input(s): WNDABS, ORG in the last 72 hours. Invalid input(s):  LABGRAM  Nasal Culture: No results for input(s): ORG, MRSAPCR in the last 72 hours. Blood Culture: No results for input(s): BC, BLOODCULT2 in the last 72 hours. Fungal Culture:   No results for input(s): FUNGSM in the last 72 hours. No results for input(s): FUNCXBLD in the last 72 hours. CSF Culture:  No results for input(s): COLORCSF, APPEARCSF, CFTUBE, CLOTCSF, WBCCSF, RBCCSF, NEUTCSF, NUMCELLSCSF, LYMPHSCSF, MONOCSF, GLUCCSF, VOLCSF in the last 72 hours. Respiratory Culture:  No results for input(s): Carlynn Prier in the last 72 hours. AFB:No results for input(s): AFBSMEAR in the last 72 hours. Urine Culture  No results for input(s): LABURIN in the last 72 hours. RADIOLOGY:    MRI brain without contrast   Final Result   1. No acute intracranial abnormality. No acute infarct. 2. Mild chronic microvascular ischemic changes. 3. Scattered mucosal thickening of the paranasal sinuses.          XR CHEST (2 VW)   Final Result   No acute process. CTA HEAD NECK W CONTRAST   Final Result   1. Unremarkable CTA of the head and neck. 2. Incomplete bony fusion along the C4-C5 disc space. Residual mobility at   this level cannot be excluded. RECOMMENDATIONS:   Unavailable         CT Head WO Contrast   Final Result   No acute intracranial abnormality. RECOMMENDATIONS:   Unavailable             CONSULTS:    IP CONSULT TO NEUROLOGY  IP CONSULT TO NEUROLOGY  IP CONSULT TO CASE MANAGEMENT  IP CONSULT TO NEPHROLOGY    ASSESSMENT AND PLAN:      Active Problems:    Left-sided weakness  Resolved Problems:    * No resolved hospital problems. *    Patient is a 60-year-old female with a past medical history of myasthenia gravis who presented to the hospital with double vision difficulty walking and difficulty swallowing. She complains of worsening left-sided weakness. Patient was on 5 understatement 4 times a day and on Plex treatment which was being weaned down. Her symptoms started about a week prior to admission and got progressively worse hence came to the ER. She was admitted with myasthenia gravis with acute flare. MRI was unremarkable. Neurology was consulted and patient was restarted on her pyridostigmine and nephrology was consulted for Plex    Myasthenia gravis with possible acute flare  -Continue pyridostigmine.  -Started on Plex treatment. Continue aggressive treatment ( every other day for 5 days)  -Monitor vital capacity and nif   -MRI okay    Nausea vomiting  -Phenergan  -Zofran as needed      Hypothyroidism  -On Synthyroid restarted as swallowing is okay    Anxiety  -On Ativan and Celexa, restarted    History of psoriasis  -Methotrexate. Hold for now      Diet: ADULT DIET;  Dysphagia - Soft and Bite Sized  Code Status: Full Code    PT/OT Eval Status: Ordered    Discharge plan -close monitoring    The patient and / or the family were informed of the results of any tests, a time was given to answer questions, a plan was proposed and they agreed with plan. Discussed with consulting physicians, nursing and social work     The note was completed using EMR. Every effort was made to ensure accuracy; however, inadvertent computerized transcription errors may be present.        Panfilo Lezama MD

## 2022-01-05 NOTE — PLAN OF CARE
Problem: Falls - Risk of:  Goal: Will remain free from falls  Description: Will remain free from falls  Outcome: Ongoing  Goal: Absence of physical injury  Description: Absence of physical injury  Outcome: Ongoing     Problem: HEMODYNAMIC STATUS  Goal: Patient has stable vital signs and fluid balance  Outcome: Ongoing     Problem: ACTIVITY INTOLERANCE/IMPAIRED MOBILITY  Goal: Mobility/activity is maintained at optimum level for patient  Outcome: Ongoing     Problem: Pain:  Description: Pain management should include both nonpharmacologic and pharmacologic interventions.   Goal: Pain level will decrease  Description: Pain level will decrease  Outcome: Ongoing  Goal: Control of acute pain  Description: Control of acute pain  Outcome: Ongoing  Goal: Control of chronic pain  Description: Control of chronic pain  Outcome: Ongoing     Problem: Skin Integrity:  Goal: Will show no infection signs and symptoms  Description: Will show no infection signs and symptoms  Outcome: Ongoing  Goal: Absence of new skin breakdown  Description: Absence of new skin breakdown  Outcome: Ongoing

## 2022-01-05 NOTE — PROGRESS NOTES
Progress Note    Updates  Feeling stronger. Felt like she was having some sort of reaction after PLEX. This was transient.       Active Ambulatory Problems     Diagnosis Date Noted    Myasthenia gravis ([facility] 10/13/2019    Myasthenia gravis with (acute) exacerbation ([address] 11/04/2019    Appendicitis 11/10/2019    Neck stiffness 11/10/2019    Generalized weakness 12/10/2019    Myasthenia gravis with exacerbation, adult form ([address] 12/11/2019    Hyperlipidemia 06/30/2020    Seizure ([address] 06/30/2020    GERD (gastroesophageal reflux disease) 06/30/2020    Migraine 06/30/2020    Anxiety 06/30/2020    Insomnia 06/30/2020    Myasthenia gravis with exacerbation ([address] 06/30/2020    Bradycardia 08/05/2020    Sinus arrhythmia 08/05/2020    Postablative hypothyroidism 08/05/2020    'light-for-dates' infant with signs of fetal malnutrition 10/13/2021     Past Medical History:   Diagnosis Date    Arthritis     Cancer ([facility] [address]     Migraines     Seizures ([facility] [address]     Thyroid disease      Current Facility-Administered Medications:     albumin human 5 % IV solution 2,500 mL, 2,500 mL, IntraVENous, PRN, Bouchra Sexton MD, Last Rate: 10,000 mL/hr at 01/05/22 0918, 2,500 mL at 01/05/22 0918    acetaminophen (TYLENOL) tablet 650 mg, 650 mg, Oral, Q4H PRN, Kalina Mata MD, 650 mg at 01/04/22 2038    calcium gluconate 4,000 mg in dextrose 5 % 100 mL IVPB, 4,000 mg, IntraVENous, PRN, Bouchra Sexton MD, Last Rate: 100 mL/hr at 01/05/22 0918, 4,000 mg at 01/05/22 0918    pyridostigmine (MESTINON) tablet 60 mg, 60 mg, Oral, 3 times per day, [facility], APRN - CNP, 60 mg at 01/05/22 0547    citrate dextrose (ACD Formula A) 0.73-2.45-2.2 GM/100ML solution, , IntraCATHeter, PRN, Bouchra Sexton MD, Given at 01/05/22 0293    LORazepam (ATIVAN) tablet 0.5 mg, 0.5 mg, Oral, BID PRN, Buck Graham MD, 0.5 mg at 01/04/22 2430    sodium chloride flush 0.9 % injection 10 mL, 10 mL, IntraVENous, 2 times per day, [facility] Marco, 10 mL at 01/05/22 0824    sodium chloride flush 0.9 % injection 10 mL, 10 mL, IntraVENous, PRN, Blane Schuster MD    0.9 % sodium chloride infusion, 25 mL, IntraVENous, PRN, Blane Schuster MD    ondansetron (ZOFRAN-ODT) disintegrating tablet 4 mg, 4 mg, Oral, Q8H PRN **OR** ondansetron (ZOFRAN) injection 4 mg, 4 mg, IntraVENous, Q6H PRN, Blane Schuster MD, 4 mg at 01/03/22 1950    enoxaparin (LOVENOX) injection 40 mg, 40 mg, SubCUTAneous, Daily, Blane Schuster MD, 40 mg at 01/05/22 0824    aspirin EC tablet 81 mg, 81 mg, Oral, Daily, 81 mg at 01/05/22 0824 **OR** aspirin suppository 300 mg, 300 mg, Rectal, Daily, Blane Schuster MD    atorvastatin (LIPITOR) tablet 40 mg, 40 mg, Oral, Nightly, Leena Lara MD, 40 mg at 01/04/22 2038    perflutren lipid microspheres (DEFINITY) injection 1.65 mg, 1.5 mL, IntraVENous, ONCE PRN, Blane Schuster MD    Exam  Blood pressure 120/76, pulse 83, temperature 98.2 °F (36.8 °C), resp. rate 16, height 5' 2\" (1.575 m), weight 157 lb 3 oz (71.3 kg), SpO2 100 %. Constitutional    Vital signs: BP, HR, and RR reviewed   General alert, no distress  Eyes: unable to visualize the fundi  Cardiovascular: no peripheral edema. Psychiatric: cooperative with examination, no psychotic behavior noted. Neurologic  Mental status:   orientation to person, place, time   Attention intact as able to attend well to the exam     Language fluent in conversation   Comprehension intact; follows simple commands  Cranial nerves:   CN2: visual fields full   CN 3,4,6: extraocular muscles intact. No fatigability. CN7: face symmetric without dysarthria  CN8: hearing grossly intact  CN12: tongue midline with protrusion  Strength: good strength in all 4 extremities   Sensory: light touch intact in all 4 extremities. Cerebellar/coordination: finger nose finger normal without ataxia  Tone: normal in all 4 extremities  Gait: deferred at this time for safety.       ROS  Constitutional- No weight loss or fevers  Eyes- No diplopia. No photophobia. Ears/nose/throat- No dysphagia. No Dysarthria  Cardiovascular- No palpitations. No chest pain  Respiratory- No dyspnea. No Cough  Gastrointestinal- No Abdominal pain. No Vomiting. Genitourinary- No incontinence. No urinary retention  Musculoskeletal- No myalgia. No arthralgia  Skin- No rash. No easy bruising. Psychiatric- No depression. No anxiety  Endocrine- No diabetes. No thyroid issues. Hematologic- No bleeding difficulty. No fatigue  Neurologic- + general weakness. No Headache. Labs  Glucose 91  Na 139  K 3.8  Mg 1.90  Phos 3.5  BUN 6  Cr 0.5    TSH 10.03  T4 Free 0.9    WBC 6.1K  Hg 12.1  Platelets 012    Flu negative  COVID negative  UA negative    Studies  MRI brain w/o 1/3/22, independently reviewed  1. No acute intracranial abnormality.  No acute infarct. 2. Mild chronic microvascular ischemic changes. 3. Scattered mucosal thickening of the paranasal sinuses. NIF -22, -40, -40   mL, 1.2 L, 1.8 L    Impression  1. Progressive weakness w/ possible MG exacerbation. She is improving s/p initiation of PLEX. Recommendations  1. Continue PLEX as scheduled. 2.  Continue mestinon. 3.  Continue to monitor respiratory status. 4.  Continue rehabilitation efforts. 5.  She will need to follow up w/ Dr. Anastasiya Butler outpatient.       Dory Hazel NP  [address] [place] [address] [id number] Neurology    A copy of this note was provided for Dr Srini Eller MD

## 2022-01-06 VITALS
DIASTOLIC BLOOD PRESSURE: 71 MMHG | HEART RATE: 72 BPM | OXYGEN SATURATION: 95 % | HEIGHT: 62 IN | TEMPERATURE: 98.9 F | SYSTOLIC BLOOD PRESSURE: 113 MMHG | RESPIRATION RATE: 18 BRPM | WEIGHT: 159.17 LBS | BODY MASS INDEX: 29.29 KG/M2

## 2022-01-06 LAB
MAGNESIUM: 1.7 MG/DL (ref 1.8–2.4)
PHOSPHORUS: 3.5 MG/DL (ref 2.5–4.9)

## 2022-01-06 PROCEDURE — 2580000003 HC RX 258: Performed by: INTERNAL MEDICINE

## 2022-01-06 PROCEDURE — 97535 SELF CARE MNGMENT TRAINING: CPT

## 2022-01-06 PROCEDURE — 84100 ASSAY OF PHOSPHORUS: CPT

## 2022-01-06 PROCEDURE — 83735 ASSAY OF MAGNESIUM: CPT

## 2022-01-06 PROCEDURE — 99232 SBSQ HOSP IP/OBS MODERATE 35: CPT | Performed by: HOSPITALIST

## 2022-01-06 PROCEDURE — 97116 GAIT TRAINING THERAPY: CPT

## 2022-01-06 PROCEDURE — 6370000000 HC RX 637 (ALT 250 FOR IP): Performed by: INTERNAL MEDICINE

## 2022-01-06 PROCEDURE — 94760 N-INVAS EAR/PLS OXIMETRY 1: CPT

## 2022-01-06 PROCEDURE — 6360000002 HC RX W HCPCS: Performed by: INTERNAL MEDICINE

## 2022-01-06 PROCEDURE — 6370000000 HC RX 637 (ALT 250 FOR IP): Performed by: NURSE PRACTITIONER

## 2022-01-06 PROCEDURE — 36415 COLL VENOUS BLD VENIPUNCTURE: CPT

## 2022-01-06 PROCEDURE — 97530 THERAPEUTIC ACTIVITIES: CPT

## 2022-01-06 PROCEDURE — 94799 UNLISTED PULMONARY SVC/PX: CPT

## 2022-01-06 RX ORDER — MAGNESIUM SULFATE 1 G/100ML
1000 INJECTION INTRAVENOUS ONCE
Status: COMPLETED | OUTPATIENT
Start: 2022-01-06 | End: 2022-01-06

## 2022-01-06 RX ADMIN — PYRIDOSTIGMINE BROMIDE 60 MG: 60 TABLET ORAL at 06:42

## 2022-01-06 RX ADMIN — MAGNESIUM SULFATE HEPTAHYDRATE 1000 MG: 1 INJECTION, SOLUTION INTRAVENOUS at 11:00

## 2022-01-06 RX ADMIN — CITALOPRAM HYDROBROMIDE 40 MG: 20 TABLET ORAL at 10:52

## 2022-01-06 RX ADMIN — LEVOTHYROXINE SODIUM 150 MCG: 75 TABLET ORAL at 06:42

## 2022-01-06 RX ADMIN — SODIUM CHLORIDE, PRESERVATIVE FREE 10 ML: 5 INJECTION INTRAVENOUS at 10:52

## 2022-01-06 RX ADMIN — FOLIC ACID 1 MG: 1 TABLET ORAL at 10:52

## 2022-01-06 RX ADMIN — ENOXAPARIN SODIUM 40 MG: 100 INJECTION SUBCUTANEOUS at 10:52

## 2022-01-06 RX ADMIN — PYRIDOSTIGMINE BROMIDE 60 MG: 60 TABLET ORAL at 14:56

## 2022-01-06 ASSESSMENT — PAIN SCALES - GENERAL: PAINLEVEL_OUTOF10: 0

## 2022-01-06 NOTE — CARE COORDINATION
INITIAL CASE MANAGEMENT ASSESSMENT     Reviewed chart, spoke with patient to assess possible discharge needs. Explained Case Management role/services. Living Situation: Verified demographics. Patient lives in a house with spouse; 3 JAYJAY. ADLs: Independent      DME: walker cane     PT/OT Recs: Home with assist PRN      Active Services: None      Transportation:  to transport at discharge      Medications: [facility] [place]    PCP: Malika Garza MD      HD/PD: n/a    PLAN/COMMENTS: Return home with spouse, no anticipated needs. ACP completed with patient. 1) Follow for outpatient center needs for infusions    SW/CM provided contact information for patient or family to call with any questions. SW/CM will follow and assist as needed.     TRACY Burton, [place], Social Work  [phone]  Electronically signed by TRACY Burton, BHAKTI on 1/6/2022 at 2:25 PM

## 2022-01-06 NOTE — PROGRESS NOTES
Discharge order noted. Also Dr. Israel Pi responded to perfect serve and said that he spoke to his partner at [facility] regarding the plasmapheresis.

## 2022-01-06 NOTE — PLAN OF CARE
Problem: Falls - Risk of:  Goal: Will remain free from falls  Description: Will remain free from falls  Outcome: Ongoing     Problem: Falls - Risk of:  Goal: Absence of physical injury  Description: Absence of physical injury  Outcome: Ongoing     Problem: HEMODYNAMIC STATUS  Goal: Patient has stable vital signs and fluid balance  Outcome: Ongoing     Problem: ACTIVITY INTOLERANCE/IMPAIRED MOBILITY  Goal: Mobility/activity is maintained at optimum level for patient  Outcome: Ongoing     Problem: Pain:  Goal: Pain level will decrease  Description: Pain level will decrease  Outcome: Ongoing     Problem: Pain:  Goal: Control of acute pain  Description: Control of acute pain  Outcome: Ongoing     Problem: Skin Integrity:  Goal: Will show no infection signs and symptoms  Description: Will show no infection signs and symptoms  Outcome: Ongoing     Problem: Skin Integrity:  Goal: Absence of new skin breakdown  Description: Absence of new skin breakdown  Outcome: Ongoing

## 2022-01-06 NOTE — DISCHARGE SUMMARY
Hospital Medicine Discharge Summary      Patient ID: Inga Varela      Patient's PCP: Mahsa Baca MD    Admit Date: 1/2/2022     Discharge Date: 1/6/2022  The patient was seen and examined on day of discharge and this discharge summary is in conjunction with any daily progress note from day of discharge. Admitting Physician: Sly Rene MD    Discharge Physician: Pablo Bernard MD     Admitted for   Chief Complaint   Patient presents with    Fall     fall into wall today, pt states hit head. history of MG with weakness progressive, dysphagia, double vision starting yesterday at 11am     Diplopia       Admitting Diagnosis Hypokalemia [E87.6]  Dysarthria [R47.1]  Myasthenia gravis ([address] [G70.00]  Left-sided weakness [R53.1]    Discharge Diagnoses: Active Hospital Problems    Diagnosis Date Noted    Left-sided weakness [R53.1] 01/02/2022       Follow Up: Primary Care Physician in one week    PCP to Follow up on   Post discharge follow-up needs outpatient plasmapheresis          Hospital Course:   Patient is a 60-year-old female with a past medical history of myasthenia gravis who presented to the hospital with double vision difficulty walking and difficulty swallowing. She complains of worsening left-sided weakness. Patient was on 5 understatement 4 times a day and on Plex treatment which was being weaned down. Her symptoms started about a week prior to admission and got progressively worse hence came to the ER. She was admitted with myasthenia gravis with acute flare. MRI was unremarkable. Neurology was consulted and patient was restarted on her pyridostigmine and nephrology was consulted for Plex. With Plex patient's respiratory status and weakness started improving. Patient will need outpatient plasmapheresis     Myasthenia gravis with possible acute flare  -Continue pyridostigmine.  -Started on Plex treatment.     Continue outpatient plasmapheresis-Monitor vital capacity and 1/10/2022  8:00 AM [facility] BIOPSY ROOM [facility]   1/24/2022  8:00 AM [facility] BIOP[facility] ROOM [facility]       Time Spent on discharge is more than 45 minutes in the examination, evaluation, counseling and review of medications and discharge plan. Signed:  Marisa Harrington MD   1/6/2022    The note was completed using EMR. Every effort was made to ensure accuracy; however, inadvertent computerized transcription errors may be present. Thank you Alvin Drew MD for the opportunity to be involved in this patient's care. If you have any questions or concerns please feel free to contact me at [phone].

## 2022-01-06 NOTE — ACP (ADVANCE CARE PLANNING)
Advance Care Planning     Advance Care Planning Activator (Inpatient)  Conversation Note      Date of ACP Conversation: 1/6/2022     Conversation Conducted with: Patient with Decision Making Capacity    ACP Activator: TRACY Reza, [facility] Decision Maker:     Current Designated Health Care Decision Maker:     Primary Decision Maker: Geovany Morris - [phone]    Secondary Decision Maker: El Duncan - Ankit - [phone]    Today we documented Decision Maker(s) consistent with Legal Next of Kin hierarchy. Care Preferences    Ventilation: \"If you were in your present state of health and suddenly became very ill and were unable to breathe on your own, what would your preference be about the use of a ventilator (breathing machine) if it were available to you? \"      Would the patient desire the use of ventilator (breathing machine)?: yes    \"If your health worsens and it becomes clear that your chance of recovery is unlikely, what would your preference be about the use of a ventilator (breathing machine) if it were available to you? \"     Would the patient desire the use of ventilator (breathing machine)?: Yes      Resuscitation  \"CPR works best to restart the heart when there is a sudden event, like a heart attack, in someone who is otherwise healthy. Unfortunately, CPR does not typically restart the heart for people who have serious health conditions or who are very sick. \"    \"In the event your heart stopped as a result of an underlying serious health condition, would you want attempts to be made to restart your heart (answer \"yes\" for attempt to resuscitate) or would you prefer a natural death (answer \"no\" for do not attempt to resuscitate)? \" yes       [] Yes   [x] No   Educated Patient / Sophy Buchanan regarding differences between Advance Directives and portable DNR orders.     Length of ACP Conversation in minutes:  5    Conversation Outcomes:  [x] ACP discussion completed  [] Existing advance directive reviewed with patient; no changes to patient's previously recorded wishes  [] New Advance Directive completed  [] Portable Do Not Rescitate prepared for Provider review and signature  [] POLST/POST/MOLST/MOST prepared for Provider review and signature      Follow-up plan:    [] Schedule follow-up conversation to continue planning  [] Referred individual to Provider for additional questions/concerns   [] Advised patient/agent/surrogate to review completed ACP document and update if needed with changes in condition, patient preferences or care setting    [x] This note routed to one or more involved healthcare providers     Electronically signed by TRACY Win, NIKOSW on 1/6/2022 at 2:30 PM

## 2022-01-06 NOTE — PROGRESS NOTES
Plan is for pt to be discharged. Patient said she has medications locked up with pharmacy. This nurse called pharmacy to have medications delivered to pt for discharge.

## 2022-01-06 NOTE — PROGRESS NOTES
Office: [phone]       Fax: [phone]      Nephrology Progress Note        Patient's Name: Max Martinez Date: 1/2/2022  Date of Visit: 1/6/2022    Reason for Consult:  Need for TPE      History of Present Illness:      Shilpi Pedro is a 46 y.o. female with PMHx of hypothyroidism, myasthenia gravis, migraine, depression  who was hospitalized on 1/2/2022 with complaints of weakness  Pt has been getting pheresis 1-2/wk, last on 12/30  Per pt, she seems to be getting worse  No fever, shortness of breath   No current headache    NSAID use: Denies   IV contrast: None recent   Home meds reviewed     INTERVAL HISTORY    Feels better  Shortness of breath: No   UOP: Good   Creat: stable  Tolerated pheresis    Medications: Allergies:  Immune globulins, Other, Adhesive tape, and Penicillins    Scheduled Meds:   citalopram  40 mg Oral QAM    doxepin  10 mg Oral Nightly    folic acid  1 mg Oral Daily    levothyroxine  150 mcg Oral Daily    pyridostigmine  60 mg Oral 3 times per day    sodium chloride flush  10 mL IntraVENous 2 times per day    enoxaparin  40 mg SubCUTAneous Daily    aspirin  81 mg Oral Daily    Or    aspirin  300 mg Rectal Daily    atorvastatin  40 mg Oral Nightly     Continuous Infusions:   sodium chloride         Labs:  CBC:   No results for input(s): WBC, HGB, PLT in the last 72 hours. Ca/Mg/Phos:   Recent Labs     01/04/22  0848 01/05/22  0522 01/06/22  0429   MG 1.90 1.90 1.70*   PHOS 4.8 3.5 3.5     UA:  No results for input(s): [place], GLUCOSEU, BILIRUBINUR, KETUA, SPECGRAV, BLOODU, PHUR, PROTEINU, UROBILINOGEN, NITRU, LEUKOCYTESUR, LABMICR, URINETYPE in the last 72 hours. Urine Microscopic: No results for input(s): LABCAST, BACTERIA, COMU, HYALCAST, WBCUA, RBCUA, EPIU in the last 72 hours.   Urine Chemistry: No results for input(s): Pamelia Priestly, PROTEINUR, NAUR in the last 72 hours. Objective:     Vitals: /76   Pulse 78   Temp 98.1 °F (36.7 °C) (Oral)   Resp 18   Ht 5' 2\" (1.575 m)   Wt 159 lb 2.8 oz (72.2 kg)   SpO2 98%   BMI 29.11 kg/m²    Wt Readings from Last 3 Encounters:   01/06/22 159 lb 2.8 oz (72.2 kg)   10/15/21 163 lb 9.6 oz (74.2 kg)   10/11/21 168 lb 3.2 oz (76.3 kg)      24HR INTAKE/OUTPUT:      Intake/Output Summary (Last 24 hours) at 1/6/2022 0944  Last data filed at 1/5/2022 2047  Gross per 24 hour   Intake 3303 ml   Output 3251 ml   Net 52 ml     Constitutional:  awake, NAD  HEENT:  MMM, No icterus  Neck: no bruits, No JVD  Cardiovascular:  reg rhythm  Respiratory: CTA, no crackles  Abdomen:  +BS, soft, NT, ND  Ext: no lower extremity edema  CNS: alert, no agitation    IMAGING:  MRI brain without contrast   Final Result   1. No acute intracranial abnormality. No acute infarct. 2. Mild chronic microvascular ischemic changes. 3. Scattered mucosal thickening of the paranasal sinuses. XR CHEST (2 VW)   Final Result   No acute process. CTA HEAD NECK W CONTRAST   Final Result   1. Unremarkable CTA of the head and neck. 2. Incomplete bony fusion along the C4-C5 disc space. Residual mobility at   this level cannot be excluded. RECOMMENDATIONS:   Unavailable         CT Head WO Contrast   Final Result   No acute intracranial abnormality. RECOMMENDATIONS:   Unavailable             Assessment :     1. Myasthenia gravis    -Baseline creat: <1   -Volume: Euvolemic   -needs plasmapheresis  -status post thymus removal      No results for input(s): BUN, CREATININE in the last 72 hours. Recent Labs     01/04/22  0848 01/05/22  0522 01/06/22  0429   MG 1.90 1.90 1.70*         2. HTN  -Blood pressure at goal    -not on meds    BP Readings from Last 1 Encounters:   01/06/22 115/76       3. Hypothyroidism     4.  Depression     Plan:     - premedicate before pheresis  - Continue plasmapheresis per neuro recommendations: qod x5 (started 1/3/22)  - Access: RIJ tunneled cath  - Monitor BMP    -Monitor I/O, UOP  -Maintain MAP>65  -Avoid nephrotoxin, if able. -Dose meds to current eGFR    If dc, will arrange for outpt pheresis    Thank you for allowing us to participate in care of Hiren Gonzalez . We will continue to follow. Feel free to contact me with any questions.       Estuardo Pratt MD  1/6/2022    [facility] [address] [place] [address] S  Office : [phone]  Fax :[phone]

## 2022-01-06 NOTE — PROGRESS NOTES
Perfect serve message sent to Dr. Mariama Serrano regarding neurology saying it is ok for pt to be discharged and to continue the plasmapheresis as outpt. Perfect serve message sent to Dr. Mindy Johnson regarding the plan for outpt plasmapheresis.

## 2022-01-06 NOTE — PROGRESS NOTES
chloride flush 0.9 % injection 10 mL, 10 mL, IntraVENous, PRN, Jolie Ledbetter MD    0.9 % sodium chloride infusion, 25 mL, IntraVENous, PRN, Jolie Ledbetter MD    ondansetron (ZOFRAN-ODT) disintegrating tablet 4 mg, 4 mg, Oral, Q8H PRN **OR** ondansetron (ZOFRAN) injection 4 mg, 4 mg, IntraVENous, Q6H PRN, Jolie Ledbetter MD, 4 mg at 01/03/22 1950    enoxaparin (LOVENOX) injection 40 mg, 40 mg, SubCUTAneous, Daily, Jolie Ledbetter MD, 40 mg at 01/05/22 0824    aspirin EC tablet 81 mg, 81 mg, Oral, Daily, 81 mg at 01/05/22 0824 **OR** aspirin suppository 300 mg, 300 mg, Rectal, Daily, Jolie Ledbetter MD    atorvastatin (LIPITOR) tablet 40 mg, 40 mg, Oral, Nightly, Leena Lara MD, 40 mg at 01/04/22 2038    perflutren lipid microspheres (DEFINITY) injection 1.65 mg, 1.5 mL, IntraVENous, ONCE PRN, Jolie Ledbetter MD    Exam  Blood pressure 117/72, pulse 82, temperature 98.3 °F (36.8 °C), temperature source Oral, resp. rate 18, height 5' 2\" (1.575 m), weight 159 lb 2.8 oz (72.2 kg), SpO2 97 %. Constitutional    Vital signs: BP, HR, and RR reviewed   General alert, no distress  Eyes: unable to visualize the fundi  Cardiovascular: no peripheral edema. Psychiatric: cooperative with examination, no psychotic behavior noted. Neurologic  Mental status:   orientation to person, place, time   Attention intact as able to attend well to the exam     Language fluent in conversation   Comprehension intact; follows simple commands  Cranial nerves:   CN2: visual fields full   CN 3,4,6: extraocular muscles intact. No fatigability. CN7: face symmetric without dysarthria  CN8: hearing grossly intact  CN12: tongue midline with protrusion  Strength: 5/5 throughout except 4/5 neck flexion  Sensory: light touch intact in all 4 extremities. Cerebellar/coordination: finger nose finger normal without ataxia  Tone: normal in all 4 extremities  Gait: deferred at this time for safety.       ROS  Constitutional- No weight loss or fevers  Eyes- No diplopia. No photophobia. Ears/nose/throat- No dysphagia. No Dysarthria  Cardiovascular- No palpitations. No chest pain  Respiratory- No dyspnea. No Cough  Gastrointestinal- No Abdominal pain. No Vomiting. Genitourinary- No incontinence. No urinary retention  Musculoskeletal- No myalgia. No arthralgia  Skin- No rash. No easy bruising. Psychiatric- No depression. No anxiety  Endocrine- No diabetes. No thyroid issues. Hematologic- No bleeding difficulty. No fatigue  Neurologic- + general weakness. No Headache. Labs  Glucose 91  Na 139  K 3.8  Mg 1.90  Phos 3.5  BUN 6  Cr 0.5    TSH 10.03  T4 Free 0.9    WBC 6.1K  Hg 12.1  Platelets 835    Flu negative  COVID negative  UA negative    Studies  MRI brain w/o 1/3/22, independently reviewed  1. No acute intracranial abnormality.  No acute infarct. 2. Mild chronic microvascular ischemic changes. 3. Scattered mucosal thickening of the paranasal sinuses. NIF -22, -40, -40   mL, 1.2 L, 1.8 L    Impression  1. Progressive weakness w/ possible MG exacerbation. She has now significantly improved and has near full strength. Given her significant improvement I recommended she continue PLEX for a total of 5 sessions. This can be done as an outpatient if she is safe for discharge otherwise from a medical standpoint. I have reached out to her outpatient neurologist Dr. Niurka Car to help coordinate follow-up. Recommendations  1. Continue PLEX would do a total of 5 sessions -- OK to continue this as outpatient, based on my exam she seems back to near normal strength aside from mild continued neck flexion weakness  2.  She should have follow-up with Dr. Jackie Elkins MD  Neurology    A copy of this note was provided for Dr Snehal Bowles MD

## 2022-01-06 NOTE — CARE COORDINATION
CASE MANAGEMENT DISCHARGE SUMMARY:    DISCHARGE DATE: 1/6/22    DISCHARGED TO: Home, independent, no needs    TRANSPORTATION: Spouse    PREFERRED PHARMACY: [facility]     TRACY Garcia, [facility], Social Work/Case Management   [phone]  Electronically signed by TRACY Garcia, W on 1/6/2022 at 4:35 PM

## 2022-01-06 NOTE — PROGRESS NOTES
Occupational Therapy  Facility/Department: [address] PROGRESSIVE CARE  Daily Treatment Note and Discharge Summary    NAME: Hiren Gonzalez  : 1970  MRN: [id number]    Date of Service: 2022    Discharge Recommendations:  Home with assist PRN       Assessment   Performance deficits / Impairments: Decreased functional mobility ; Decreased strength;Decreased endurance;Decreased ADL status; Decreased safe awareness;Decreased vision/visual deficit; Decreased high-level IADLs;Decreased balance;Decreased ROM  Assessment: Seen in room. Pt verbalizing feeling better and closer to baseline. Pt completed transfers to toilet and bed with SBA and no device, no LOB. Pt completed toileting without assist.  No further OT recommended. Safe for d/c home with spouse when medically stable. Prognosis: Good  Decision Making: Medium Complexity  OT Education: OT Role;Plan of Care;Transfer Training;Energy Conservation  REQUIRES OT FOLLOW UP: No  Activity Tolerance  Activity Tolerance: Patient Tolerated treatment well  Safety Devices  Safety Devices in place: Yes  Type of devices: Bed alarm in place; Left in bed;Call light within reach         Patient Diagnosis(es): The primary encounter diagnosis was Left-sided weakness. Diagnoses of Dysarthria, Myasthenia gravis ([address] Hypokalemia, and Myasthenia gravis with exacerbation ([address] were also pertinent to this visit. has a past medical history of Anxiety, Arthritis, Cancer ([address] GERD (gastroesophageal reflux disease), Hyperlipidemia, Insomnia, Migraines, Myasthenia gravis with exacerbation ([address] Seizures ([address] and Thyroid disease. has a past surgical history that includes Thyroidectomy; tumor removal; Hysterectomy; shoulder surgery; Appendectomy; Cholecystectomy; Upper gastrointestinal endoscopy (N/A, 2019); Colonoscopy (N/A, 2019); Upper gastrointestinal endoscopy (N/A, 2019); esophageal motility study (N/A, 2020);  Upper gastrointestinal endoscopy (02/28/2020); Upper gastrointestinal endoscopy (02/28/2020); Capsule endoscopy (N/A, 02/28/2020); Dialysis Catheter Insertion (Right, 10/15/2021); other surgical history (Right, 10/15/2021); IR TUNNELED CVC PLACE WO SQ PORT/PUMP > 5 YEARS (10/15/2021); and IR BIOPSY LIVER PERCUTANEOUS (10/15/2021). Restrictions  Restrictions/Precautions  Restrictions/Precautions: Fall Risk  Position Activity Restriction  Other position/activity restrictions: gets infusions for myasthenia gravis  Subjective   General  Chart Reviewed: Yes  Patient assessed for rehabilitation services?: Yes  Additional Pertinent Hx: 46 y.o. female with hx of myasthenia gravis, seizures, migraines, insomnia, thyroid cancer s/p thyroidectomy, HLD who presented to the ED on 1/2/21 for evaluation of multiple complaints including: weakness-left greater than right, difficulty swallowing, double vision. 46 y.o. female with hx of myasthenia gravis, seizures, migraines, insomnia, thyroid cancer s/p thyroidectomy, HLD who presented to the ED on 1/2/21 for evaluation of multiple complaints including: weakness-left greater than right, difficulty swallowing, double vision, she reports similar to her hx of myasthenic crisis. She has not tolerated IVIG and receives PLEX as outpatient. CT head negative for acute findings. MRI pending  Family / Caregiver Present: No  Referring Practitioner: Dr. Marques Buchanan  Diagnosis: MG flare up vs CVA  Subjective  Subjective: Seen in room, agreed to OT. Requested to use bathroom. General Comment  Comments: Per RN ok for therapy. Orientation  Orientation  Overall Orientation Status: Within Functional Limits  Objective    ADL  LE Dressing: Supervision  Additional Comments: Pt now demonstrating independence with ADLs.   Pt now feeling close to baseline, no further OT required        Balance  Sitting Balance: Independent  Standing Balance: Supervision  Functional Mobility  Functional - Mobility Device: Rolling Walker  Assist Level: Stand by assistance  Functional Mobility Comments: No LOB, no device  Bed mobility  Supine to Sit: Unable to assess (up in chair at end of session)  Sit to Supine: Independent  Transfers  Sit to stand: Supervision  Stand to sit: [address]  Times per week: D/C OT  Current Treatment Recommendations: Strengthening,Functional Mobility Training,Gait Training,Endurance Training,Balance Training,Self-Care / ADL,ROM       OutComes Score    Se Morales scored a 23/24 on the [facility] ADL Inpatient form. At this time, no further OT is recommended upon discharge due to independence. Recommend patient returns to prior setting with prior services. AM-PAC Score        AM-[facility] Inpatient Daily Activity Raw Score: 23 (01/06/22 1345)  -PAC Inpatient ADL T-Scale Score : 51.12 (01/06/22 1345)  ADL Inpatient CMS 0-100% Score: 15.86 (01/06/22 1345)  ADL Inpatient CMS G-Code Modifier : CI (01/06/22 1345)    Goals  Short term goals  Time Frame for Short term goals: Prior to DC:   Short term goal 1: Pt will complete ADL transfers with supervision  Short term goal 2: Pt will complete functional mobility with supervision  Short term goal 3: Pt will tolerate standing > 5 min for functional task with supervision  Short term goal 4: Pt will complete toileting with supervision  Short term goal 5: Pt will complete LB Dressing with supervision  Patient Goals   Patient goals : to get stronger and return home       Therapy Time   Individual Concurrent Group Co-treatment   Time In 1315         Time Out 1330         Minutes 15         Timed Code Treatment Minutes: Chioma BRYANT Arce [address], OT

## 2022-01-06 NOTE — DISCHARGE INSTR - COC
ENDOSCOPY  02/28/2020    EGD DIAGNOSTIC ONLY performed by Madhuri Rousseau MD at [place] [address]  02/28/2020    Esophagogastroduodenoscopy with Bravo Capsule placement performed by Madhuri Rousseau MD at [facility] ENDOSCOPY       Immunization History:   Immunization History   Administered Date(s) Administered    Influenza Virus Vaccine 10/04/2019       Active Problems:  Patient Active Problem List   Diagnosis Code    Myasthenia gravis ([facility] G70.00    Myasthenia gravis with (acute) exacerbation (HCC) G70.01    Appendicitis K37    Neck stiffness M43.6    Generalized weakness R53.1    Myasthenia gravis with exacerbation, adult form ([facility] G70.01    Hyperlipidemia E78.5    Seizure (HCC) R56.9    GERD (gastroesophageal reflux disease) K21.9    Migraine G43.909    Anxiety F41.9    Insomnia G47.00    Myasthenia gravis with exacerbation (HCC) G70.01    Bradycardia R00.1    Sinus arrhythmia I49.8    Postablative hypothyroidism E89.0    'light-for-dates' infant with signs of fetal malnutrition P05.00    Left-sided weakness R53.1       Isolation/Infection:   Isolation            No Isolation          Patient Infection Status       Infection Onset Added Last Indicated Last Indicated By Review Planned Expiration Resolved Resolved By    None active    Resolved    COVID-19 (Rule Out) 01/02/22 01/02/22 01/02/22 COVID-19, Rapid (Ordered)   01/02/22 Rule-Out Test Resulted            Nurse Assessment:  Last Vital Signs: /72   Pulse 82   Temp 98.3 °F (36.8 °C) (Oral)   Resp 18   Ht 5' 2\" (1.575 m)   Wt 159 lb 2.8 oz (72.2 kg)   SpO2 97%   BMI 29.11 kg/m²     Last documented pain score (0-10 scale): Pain Level: 0  Last Weight:   Wt Readings from Last 1 Encounters:   01/06/22 159 lb 2.8 oz (72.2 kg)     Mental Status:  {IP PT MENTAL STATUS:20030}    IV Access:  {[facility] IV ACCESS:622716647}    Nursing Mobility/ADLs:  Walking   {[facility] SZIT:999145663}  Transfer  {[facility] YNHE:899224281}  Bathing {CHP DME UOSX:217059886}  Dressing  {CHP DME RQZA:626550995}  Toileting  {CHP DME UMII:673203748}  Feeding  {CHP DME ECTA:108145699}  Med Admin  {CHP DME EEGU:298627645}  Med Delivery   { [facility] MED Delivery:345227579}    Wound Care Documentation and Therapy:        Elimination:  Continence: Bowel: {YES / WL:10054}  Bladder: {YES / LO:28202}  Urinary Catheter: {Urinary Catheter:100645786}   Colostomy/Ileostomy/Ileal Conduit: {YES / WA:26809}       Date of Last BM: ***    Intake/Output Summary (Last 24 hours) at 2022 1527  Last data filed at 2022 1353  Gross per 24 hour   Intake 530 ml   Output 101 ml   Net 429 ml     I/O last 3 completed shifts:   In: 3478 [P.O.:415; IV Piggyback:50]  Out: 4101 [Urine:1250]    Safety Concerns:     [employer] Safety Concerns:457633148}    Impairments/Disabilities:      [employer] Impairments/Disabilities:994259310}    Nutrition Therapy:  Current Nutrition Therapy:   [employer] Diet List:788883939}    Routes of Feeding: {[facility] DME Other Feedings:790330538}  Liquids: {Slp liquid thickness:94144}  Daily Fluid Restriction: {CHP DME Yes amt example:340040946}  Last Modified Barium Swallow with Video (Video Swallowing Test): {Done Not Done XQSP:344106850}    Treatments at the Time of Hospital Discharge:   Respiratory Treatments: ***  Oxygen Therapy:  {Therapy; copd oxygen:96552}  Ventilator:    { CC Vent SVPH:512161663}    Rehab Therapies: {THERAPEUTIC INTERVENTION:4826025171}  Weight Bearing Status/Restrictions: [employer] Weight Bearin}  Other Medical Equipment (for information only, NOT a DME order):  {EQUIPMENT:314722012}  Other Treatments: ***    Patient's personal belongings (please select all that are sent with patient):  {[facility] DME Belongings:349854128}    RN SIGNATURE:  {Esignature:462725831}    CASE MANAGEMENT/SOCIAL WORK SECTION    Inpatient Status Date: ***    Readmission Risk Assessment Score:  Readmission Risk              Risk of Unplanned Readmission:  12 Discharging to Facility/ Agency   Name:   Address:  Phone:  Fax:    Dialysis Facility (if applicable)   Name:  Address:  Dialysis Schedule:  Phone:  Fax:    / signature: {Esignature:742996317}    PHYSICIAN SECTION    Prognosis: {Prognosis:5940532619}    Condition at Discharge: Amanda Joyce Patient Condition:007957858}    Rehab Potential (if transferring to Rehab): {Prognosis:3760216108}    Recommended Labs or Other Treatments After Discharge: ***    Physician Certification: I certify the above information and transfer of Rob Cervantes  is necessary for the continuing treatment of the diagnosis listed and that she requires {Admit to Appropriate Level of Care:69869} for {GREATER/LESS:818560136} 30 days.      Update Admission H&P: {CHP DME Changes in ATAIA:634004948}    PHYSICIAN SIGNATURE:  {Esignature:930138393}

## 2022-01-06 NOTE — PLAN OF CARE
Plan is for pt to be discharged to home today. She is to continue with outpatient plasmapheresis. Safety maintained. Pt states she feels much better and is much stronger. Appetite is good. She did complain of nausea with her treatment yesterday, she spoke with Dr. Adrian Rincon about that and he was going to order premedications with her next treatment. Pt has offered no complaints of pain this shift.

## 2022-01-06 NOTE — PROGRESS NOTES
Progress Note  Admit Date: 1/2/2022      PCP: Ashley Gomez MD     CC: F/U for weakness    Days in hospital:  4    SUBJECTIVE / Interval History:  Pt feels much better    Vital capacity improved and NIF improved      Allergies  Immune globulins, Other, Adhesive tape, and Penicillins    Medications    Scheduled Meds:   magnesium sulfate  1,000 mg IntraVENous Once    citalopram  40 mg Oral QAM    doxepin  10 mg Oral Nightly    folic acid  1 mg Oral Daily    levothyroxine  150 mcg Oral Daily    pyridostigmine  60 mg Oral 3 times per day    sodium chloride flush  10 mL IntraVENous 2 times per day    enoxaparin  40 mg SubCUTAneous Daily    atorvastatin  40 mg Oral Nightly     Continuous Infusions:   sodium chloride         PRN Meds:  albumin human, butalbital-acetaminophen-caffeine, zolpidem, acetaminophen, calcium gluconate IVPB, citrate dextrose, LORazepam, sodium chloride flush, sodium chloride, ondansetron **OR** ondansetron, perflutren lipid microspheres    Vitals    /76   Pulse 78   Temp 98.1 °F (36.7 °C) (Oral)   Resp 18   Ht 5' 2\" (1.575 m)   Wt 159 lb 2.8 oz (72.2 kg)   SpO2 98%   BMI 29.11 kg/m²     Exam:    Gen: No distress. Eyes: PERRL. No sclera icterus. No conjunctival injection. ENT: No discharge. Pharynx clear. External appearance of ears and nose normal.  Neck: Trachea midline. No obvious mass. Resp: No accessory muscle use. No crackles. No wheezes. No rhonchi. No dullness on percussion. CV: Regular rate. Regular rhythm. No murmur or rub. No edema. GI: Non-tender. Non-distended. No hernia. Skin: Warm, dry, normal texture and turgor. No nodule on exposed extremities. Lymph: No cervical LAD. No supraclavicular LAD. M/S: No cyanosis. No clubbing. No joint deformity. Neuro: Moves all four extremities. Generalized weakness improving, patient feels close to baseline. CN 2-12 tested, no defect noted. Psych: Oriented x 3. No anxiety. Awake. Alert.  Intact judgement and insight. Data    LABS  CBC:   No results for input(s): WBC, HGB, HCT, MCV, PLT in the last 72 hours. BMP:   Recent Labs     01/04/22  0848 01/05/22  0522 01/06/22  0429   PHOS 4.8 3.5 3.5     POC GLUCOSE:  No results for input(s): POCGLU in the last 72 hours. LIVER PROFILE: No results for input(s): AST, ALT, LIPASE, AMYLASE, LABALBU, BILIDIR, BILITOT, ALKPHOS in the last 72 hours. PT/INR:   No results for input(s): PROTIME, INR in the last 72 hours. APTT:   Recent Labs     01/03/22  1401   APTT 44.2*     UA:  No results for input(s): NITRITE, COLORU, PHUR, LABCAST, WBCUA, RBCUA, MUCUS, TRICHOMONAS, YEAST, BACTERIA, CLARITYU, SPECGRAV, LEUKOCYTESUR, UROBILINOGEN, BILIRUBINUR, BLOODU, GLUCOSEU, KETUA, AMORPHOUS in the last 72 hours. Microbiology:  Wound Culture: No results for input(s): WNDABS, ORG in the last 72 hours. Invalid input(s):  LABGRAM  Nasal Culture: No results for input(s): ORG, MRSAPCR in the last 72 hours. Blood Culture: No results for input(s): BC, BLOODCULT2 in the last 72 hours. Fungal Culture:   No results for input(s): FUNGSM in the last 72 hours. No results for input(s): FUNCXBLD in the last 72 hours. CSF Culture:  No results for input(s): COLORCSF, APPEARCSF, CFTUBE, CLOTCSF, WBCCSF, RBCCSF, NEUTCSF, NUMCELLSCSF, LYMPHSCSF, MONOCSF, GLUCCSF, VOLCSF in the last 72 hours. Respiratory Culture:  No results for input(s): Scottie Caves in the last 72 hours. AFB:No results for input(s): AFBSMEAR in the last 72 hours. Urine Culture  No results for input(s): LABURIN in the last 72 hours. RADIOLOGY:    MRI brain without contrast   Final Result   1. No acute intracranial abnormality. No acute infarct. 2. Mild chronic microvascular ischemic changes. 3. Scattered mucosal thickening of the paranasal sinuses. XR CHEST (2 VW)   Final Result   No acute process. CTA HEAD NECK W CONTRAST   Final Result   1. Unremarkable CTA of the head and neck.    2. Incomplete bony fusion along the C4-C5 disc space. Residual mobility at   this level cannot be excluded. RECOMMENDATIONS:   Unavailable         CT Head WO Contrast   Final Result   No acute intracranial abnormality. RECOMMENDATIONS:   Unavailable             CONSULTS:    IP CONSULT TO NEUROLOGY  IP CONSULT TO NEUROLOGY  IP CONSULT TO CASE MANAGEMENT  IP CONSULT TO NEPHROLOGY    ASSESSMENT AND PLAN:      Active Problems:    Left-sided weakness  Resolved Problems:    * No resolved hospital problems. *    Patient is a 45-year-old female with a past medical history of myasthenia gravis who presented to the hospital with double vision difficulty walking and difficulty swallowing. She complains of worsening left-sided weakness. Patient was on 5 understatement 4 times a day and on Plex treatment which was being weaned down. Her symptoms started about a week prior to admission and got progressively worse hence came to the ER. She was admitted with myasthenia gravis with acute flare. MRI was unremarkable. Neurology was consulted and patient was restarted on her pyridostigmine and nephrology was consulted for Plex. With Plex patient's respiratory status and weakness started improving    Myasthenia gravis with possible acute flare  -Continue pyridostigmine.  -Started on Plex treatment. Continue aggressive treatment ( every other day for 5 days)  -Monitor vital capacity and nif , improved  -MRI okay    Nausea vomiting-improved  -Phenergan  -Zofran as needed      Hypothyroidism  -On Synthyroid    Anxiety  -On Ativan and Celexa    History of psoriasis  -Methotrexate. Hold for now      Diet: ADULT DIET;  Dysphagia - Soft and Bite Sized  Code Status: Full Code    PT/OT Eval Status: Ordered    Discharge plan -after patient finishes her Plex treatment( will differ to neurology if she needs IP treatment)    The patient and / or the family were informed of the results of any tests, a time was given to answer questions, a plan was

## 2022-01-06 NOTE — PROGRESS NOTES
Physical Therapy  Facility/Department: [facility]  Daily Treatment Note  NAME: Quintin Salazar  : 1970  MRN: [id number]    Date of Service: 2022    Discharge Recommendations:  Home with assist PRN   PT Equipment Recommendations  Equipment Needed: No     Quintin Salazar scored a 20/24 on the AM-PAC short mobility form. At this time, no further PT is recommended upon discharge due to being close to functional baseline. Recommend patient returns to prior setting with prior services. This note serves as D/C summary if patient is discharged prior to next treatment session. Assessment   Body structures, Functions, Activity limitations: Decreased functional mobility ; Decreased endurance;Decreased strength  Assessment: Today, pt improved with functional mobility and activity tolerance and able to ambulate 180' in hallway without an AD and SBA. Anticipate that pt will be safe to d/c home with assist PRN and no further PT upon d/c. Will continue to see while hospitalized to progress mobility. Prognosis: Good  PT Education: PT Role;General Safety;Home Exercise Program  REQUIRES PT FOLLOW UP: Yes  Activity Tolerance  Activity Tolerance: Patient limited by fatigue;Patient Tolerated treatment well     Patient Diagnosis(es): The primary encounter diagnosis was Left-sided weakness. Diagnoses of Dysarthria, Myasthenia gravis ([address] Hypokalemia, and Myasthenia gravis with exacerbation ([address] were also pertinent to this visit. has a past medical history of Anxiety, Arthritis, Cancer ([address] GERD (gastroesophageal reflux disease), Hyperlipidemia, Insomnia, Migraines, Myasthenia gravis with exacerbation ([address] Seizures ([address] and Thyroid disease. has a past surgical history that includes Thyroidectomy; tumor removal; Hysterectomy; shoulder surgery; Appendectomy; Cholecystectomy; Upper gastrointestinal endoscopy (N/A, 2019); Colonoscopy (N/A, 2019);  Upper gastrointestinal endoscopy (N/A, 12/17/2019); esophageal motility study (N/A, 02/11/2020); Upper gastrointestinal endoscopy (02/28/2020); Upper gastrointestinal endoscopy (02/28/2020); Capsule endoscopy (N/A, 02/28/2020); Dialysis Catheter Insertion (Right, 10/15/2021); other surgical history (Right, 10/15/2021); IR TUNNELED CVC PLACE WO SQ PORT/PUMP > 5 YEARS (10/15/2021); and IR BIOPSY LIVER PERCUTANEOUS (10/15/2021). Restrictions  Restrictions/Precautions  Restrictions/Precautions: Fall Risk  Position Activity Restriction  Other position/activity restrictions: gets infusions for myasthenia gravis     Social/Functional History  Lives With: Spouse  Type of Home: House  Home Layout: One level  Home Access: Stairs to enter with rails  Entrance Stairs - Number of Steps: 3 steps with one rail  Bathroom Shower/Tub: Walk-in shower  Bathroom Toilet: Standard  Bathroom Equipment: Shower chair  Home Equipment: Cane,Rolling walker,4 wheeled walker  ADL Assistance: Independent  Homemaking Responsibilities: Yes (shares with )  Ambulation Assistance: Independent  Transfer Assistance: Independent  Active : Yes  Occupation: Full time employment  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 According to the patient she has not been able to swallow. She also has difficulty walking and feels her left side is weak than the right side. According to the patient whenever se tries to eat it feels like her food is stuck in her mouth and she is not able to swallow it down. Patient also mentions she has difficulty walking which is unusual for her myasthenia gravis flareups.   Neurology was consulted from the emergency department, recommended admission for stroke work-up plus myasthenia gravis. \" Pt adm to R/O CVA vs myasthenia gravis exacerbation  Response To Previous Treatment: Patient with no complaints from previous session. Family / Caregiver Present: No  Subjective  Subjective: Pt in bed upon arrival.  Pleasant and agreeble to PT treatment. Reports she is feeling much better and denies pain.                 Objective   Bed mobility  Supine to Sit: Modified independent (HOB fully elevated)  Sit to Supine: Unable to assess (up in chair at the end of the session)     Transfers  Sit to Stand: Stand by assistance;Supervision  Stand to sit: Stand by assistance;Supervision     Ambulation  Ambulation?: Yes  More Ambulation?: Yes  Ambulation 1  Surface: level tile  Device: Rolling Walker  Assistance: Supervision  Quality of Gait: steady with RW, continues with small step length  Gait Deviations: Decreased step length;Decreased step height;Slow Charlee  Distance: 10' to toilet  Comments: little reliance on RW  Ambulation 2  Surface - 2: level tile  Device 2: No device  Assistance 2: Stand by assistance  Quality of Gait 2: steady without an AD; one standing rest break against wall in hallway  Gait Deviations: Slow Charlee;Decreased step length;Decreased step height  Distance: approx 180' with multiple turns and 25'x2  Stairs/Curb  Stairs?: No     Balance  Posture: Good  Sitting - Static: Good  Sitting - Dynamic: Good  Standing - Static: Good  Standing - Dynamic: Good     Exercises  Hip Flexion: x15 alternating marches  Knee Long Arc Quad: x15 alternating  Ankle Pumps: x15 quincy  Comments: ther ex seated in recliner         Other Activities: Other (see comment)  Comment: pt used restroom twice during session- first time urinated and was indep with pericare and donned her underwear and pants seated on toilet indep; 2nd time had BM and indep with pericare and clothing management; supervision for standing balance washing hands at sink           AM-PAC Score  AM-PAC Inpatient Mobility Raw Score : 20 (01/06/22 1014)  AM-PAC Inpatient T-Scale Score : 47.67 (01/06/22 1014)  Mobility Inpatient CMS 0-100% Score: 35.83 (01/06/22 1014)  Mobility Inpatient CMS G-Code Modifier : CJ (01/06/22 1014)          Goals  Short term goals  Time Frame for Short term goals: by discharge (goals ongoing as of 1/6)  Short term goal 1: bed mob MI- met for sup to sit  Short term goal 2: transfers MI  Short term goal 3: amb 48' with LRAD sup  Short term goal 4: negotiate stairs when able  Patient Goals   Patient goals : to get better    Plan    Plan  Times per week: 3-5  Current Treatment Recommendations: Functional Mobility Training,Transfer Training,Gait Training,Endurance Training,Balance Training,Strengthening  Safety Devices  Type of devices:  All fall risk precautions in place,Call light within reach,Gait belt,Nurse notified,Left in chair (ESTELITA Galeana notified)  Restraints  Initially in place: No     Therapy Time   Individual Concurrent Group Co-treatment   Time In 0943         Time Out 1016         Minutes 33         Timed Code Treatment Minutes: 33 Minutes         Electronically signed by Maximo Rivera on 1/6/2022 at 10:18 AM

## 2022-01-06 NOTE — PROGRESS NOTES
Discharge instructions reviewed with pt. All questions answered. Reviewed follow up appointments. Nurse ambulated with pt to her private vehicle.

## 2022-01-06 NOTE — PROGRESS NOTES
Perfect serve message sent to Dr. Graham Gaytan regarding pt's magnesium level of 1.7, asked if she wants to replace it.

## 2022-01-07 ENCOUNTER — HOSPITAL ENCOUNTER (OUTPATIENT)
Dept: ONCOLOGY | Age: 52
Setting detail: INFUSION SERIES
Discharge: HOME | End: 2022-01-07
Payer: COMMERCIAL

## 2022-01-07 DIAGNOSIS — G70.01 MYASTHENIA GRAVIS WITH EXACERBATION (HCC): Primary | ICD-10-CM

## 2022-01-07 LAB
A/G RATIO: 2.4 (ref 1.1–2.2)
ALBUMIN SERPL-MCNC: 4 G/DL (ref 3.4–5)
ALP BLD-CCNC: 52 U/L (ref 40–129)
ALT SERPL-CCNC: 12 U/L (ref 10–40)
ANION GAP SERPL CALCULATED.3IONS-SCNC: 10 MMOL/L (ref 3–16)
AST SERPL-CCNC: 19 U/L (ref 15–37)
BILIRUB SERPL-MCNC: <0.2 MG/DL (ref 0–1)
BUN BLDV-MCNC: 11 MG/DL (ref 7–20)
CALCIUM SERPL-MCNC: 8 MG/DL (ref 8.3–10.6)
CHLORIDE BLD-SCNC: 105 MMOL/L (ref 99–110)
CO2: 25 MMOL/L (ref 21–32)
CREAT SERPL-MCNC: <0.5 MG/DL (ref 0.6–1.1)
GFR AFRICAN AMERICAN: >60
GFR NON-AFRICAN AMERICAN: >60
GLUCOSE BLD-MCNC: 112 MG/DL (ref 70–99)
HCT VFR BLD CALC: 37.4 % (ref 36–48)
HEMOGLOBIN: 12.8 G/DL (ref 12–16)
MAGNESIUM: 1.7 MG/DL (ref 1.8–2.4)
MCH RBC QN AUTO: 32.2 PG (ref 26–34)
MCHC RBC AUTO-ENTMCNC: 34.2 G/DL (ref 31–36)
MCV RBC AUTO: 93.9 FL (ref 80–100)
PDW BLD-RTO: 14.2 % (ref 12.4–15.4)
PLATELET # BLD: 160 K/UL (ref 135–450)
PMV BLD AUTO: 7.7 FL (ref 5–10.5)
POTASSIUM REFLEX MAGNESIUM: 3.2 MMOL/L (ref 3.5–5.1)
RBC # BLD: 3.98 M/UL (ref 4–5.2)
SODIUM BLD-SCNC: 140 MMOL/L (ref 136–145)
TOTAL PROTEIN: 5.7 G/DL (ref 6.4–8.2)
WBC # BLD: 5.5 K/UL (ref 4–11)

## 2022-01-07 PROCEDURE — 36591 DRAW BLOOD OFF VENOUS DEVICE: CPT

## 2022-01-07 PROCEDURE — P9041 ALBUMIN (HUMAN),5%, 50ML: HCPCS | Performed by: INTERNAL MEDICINE

## 2022-01-07 PROCEDURE — 96365 THER/PROPH/DIAG IV INF INIT: CPT

## 2022-01-07 PROCEDURE — 2580000003 HC RX 258: Performed by: INTERNAL MEDICINE

## 2022-01-07 PROCEDURE — 96367 TX/PROPH/DG ADDL SEQ IV INF: CPT

## 2022-01-07 PROCEDURE — 85027 COMPLETE CBC AUTOMATED: CPT

## 2022-01-07 PROCEDURE — 96366 THER/PROPH/DIAG IV INF ADDON: CPT

## 2022-01-07 PROCEDURE — 80053 COMPREHEN METABOLIC PANEL: CPT

## 2022-01-07 PROCEDURE — 6360000002 HC RX W HCPCS: Performed by: INTERNAL MEDICINE

## 2022-01-07 PROCEDURE — 6370000000 HC RX 637 (ALT 250 FOR IP): Performed by: INTERNAL MEDICINE

## 2022-01-07 PROCEDURE — 83735 ASSAY OF MAGNESIUM: CPT

## 2022-01-07 RX ORDER — ONDANSETRON 4 MG/1
4 TABLET, FILM COATED ORAL PRN
Status: DISCONTINUED | OUTPATIENT
Start: 2022-01-07 | End: 2022-01-08 | Stop reason: HOSPADM

## 2022-01-07 RX ORDER — ALBUMIN, HUMAN INJ 5% 5 %
2500 SOLUTION INTRAVENOUS ONCE
Status: CANCELLED
Start: 2022-01-17 | End: 2022-01-17

## 2022-01-07 RX ORDER — ALBUMIN, HUMAN INJ 5% 5 %
2500 SOLUTION INTRAVENOUS ONCE
Status: COMPLETED | OUTPATIENT
Start: 2022-01-07 | End: 2022-01-07

## 2022-01-07 RX ORDER — ONDANSETRON 2 MG/ML
4 INJECTION INTRAMUSCULAR; INTRAVENOUS PRN
Status: CANCELLED
Start: 2022-01-17

## 2022-01-07 RX ORDER — ONDANSETRON 2 MG/ML
4 INJECTION INTRAMUSCULAR; INTRAVENOUS PRN
Status: DISCONTINUED | OUTPATIENT
Start: 2022-01-07 | End: 2022-01-08 | Stop reason: HOSPADM

## 2022-01-07 RX ORDER — ONDANSETRON 4 MG/1
4 TABLET, FILM COATED ORAL PRN
Status: CANCELLED
Start: 2022-01-17

## 2022-01-07 RX ADMIN — ALBUMIN (HUMAN) 2500 ML: 12.5 INJECTION, SOLUTION INTRAVENOUS at 09:00

## 2022-01-07 RX ADMIN — ONDANSETRON HYDROCHLORIDE 4 MG: 4 TABLET, FILM COATED ORAL at 08:59

## 2022-01-07 RX ADMIN — CALCIUM GLUCONATE 4000 MG: 98 INJECTION, SOLUTION INTRAVENOUS at 09:00

## 2022-01-10 ENCOUNTER — HOSPITAL ENCOUNTER (OUTPATIENT)
Dept: ONCOLOGY | Age: 52
Setting detail: INFUSION SERIES
Discharge: HOME | End: 2022-01-10
Payer: COMMERCIAL

## 2022-01-10 DIAGNOSIS — G70.01 MYASTHENIA GRAVIS WITH EXACERBATION (HCC): Primary | ICD-10-CM

## 2022-01-10 LAB
FIBRINOGEN: 222 MG/DL (ref 200–397)
HCT VFR BLD CALC: 32.9 % (ref 36–48)
HEMOGLOBIN: 11.3 G/DL (ref 12–16)
MCH RBC QN AUTO: 32.4 PG (ref 26–34)
MCHC RBC AUTO-ENTMCNC: 34.3 G/DL (ref 31–36)
MCV RBC AUTO: 94.6 FL (ref 80–100)
PDW BLD-RTO: 14 % (ref 12.4–15.4)
PLATELET # BLD: 158 K/UL (ref 135–450)
PMV BLD AUTO: 8 FL (ref 5–10.5)
RBC # BLD: 3.48 M/UL (ref 4–5.2)
WBC # BLD: 4.6 K/UL (ref 4–11)

## 2022-01-10 PROCEDURE — P9041 ALBUMIN (HUMAN),5%, 50ML: HCPCS | Performed by: INTERNAL MEDICINE

## 2022-01-10 PROCEDURE — 6360000002 HC RX W HCPCS: Performed by: INTERNAL MEDICINE

## 2022-01-10 PROCEDURE — 96365 THER/PROPH/DIAG IV INF INIT: CPT

## 2022-01-10 PROCEDURE — 85027 COMPLETE CBC AUTOMATED: CPT

## 2022-01-10 PROCEDURE — 85384 FIBRINOGEN ACTIVITY: CPT

## 2022-01-10 PROCEDURE — 36514 APHERESIS PLASMA: CPT

## 2022-01-10 PROCEDURE — 96367 TX/PROPH/DG ADDL SEQ IV INF: CPT

## 2022-01-10 PROCEDURE — 96366 THER/PROPH/DIAG IV INF ADDON: CPT

## 2022-01-10 PROCEDURE — 36592 COLLECT BLOOD FROM PICC: CPT

## 2022-01-10 RX ORDER — ALBUMIN, HUMAN INJ 5% 5 %
2500 SOLUTION INTRAVENOUS ONCE
Status: CANCELLED
Start: 2022-01-17 | End: 2022-01-17

## 2022-01-10 RX ORDER — CALCIUM GLUCONATE 20 MG/ML
2000 INJECTION, SOLUTION INTRAVENOUS
Status: COMPLETED | OUTPATIENT
Start: 2022-01-10 | End: 2022-01-10

## 2022-01-10 RX ORDER — ALBUMIN, HUMAN INJ 5% 5 %
2500 SOLUTION INTRAVENOUS ONCE
Status: COMPLETED | OUTPATIENT
Start: 2022-01-10 | End: 2022-01-10

## 2022-01-10 RX ORDER — ONDANSETRON 4 MG/1
4 TABLET, FILM COATED ORAL PRN
Status: CANCELLED
Start: 2022-01-17

## 2022-01-10 RX ORDER — ONDANSETRON 2 MG/ML
4 INJECTION INTRAMUSCULAR; INTRAVENOUS PRN
Status: CANCELLED
Start: 2022-01-17

## 2022-01-10 RX ADMIN — CALCIUM GLUCONATE 2000 MG: 20 INJECTION, SOLUTION INTRAVENOUS at 09:50

## 2022-01-10 RX ADMIN — CALCIUM GLUCONATE 2000 MG: 20 INJECTION, SOLUTION INTRAVENOUS at 08:50

## 2022-01-10 RX ADMIN — ALBUMIN (HUMAN) 2500 ML: 12.5 INJECTION, SOLUTION INTRAVENOUS at 08:50

## 2022-01-10 NOTE — PROGRESS NOTES
Pt arrived to [facility] today for scheduled plasmapheresis procedure. Apheresis, line care, education, and lab draws all completed by Kimani CAPONE, [address]. Review Kimani documentation for details.   Myrla Skiff, RN

## 2022-01-12 ENCOUNTER — HOSPITAL ENCOUNTER (OUTPATIENT)
Dept: ONCOLOGY | Age: 52
Setting detail: INFUSION SERIES
Discharge: HOME | End: 2022-01-12
Payer: COMMERCIAL

## 2022-01-12 VITALS
HEART RATE: 64 BPM | OXYGEN SATURATION: 100 % | TEMPERATURE: 98.3 F | SYSTOLIC BLOOD PRESSURE: 119 MMHG | RESPIRATION RATE: 16 BRPM | DIASTOLIC BLOOD PRESSURE: 63 MMHG

## 2022-01-12 DIAGNOSIS — G70.01 MYASTHENIA GRAVIS WITH EXACERBATION (HCC): Primary | ICD-10-CM

## 2022-01-12 PROCEDURE — 6360000002 HC RX W HCPCS: Performed by: INTERNAL MEDICINE

## 2022-01-12 PROCEDURE — 96365 THER/PROPH/DIAG IV INF INIT: CPT

## 2022-01-12 PROCEDURE — P9045 ALBUMIN (HUMAN), 5%, 250 ML: HCPCS | Performed by: INTERNAL MEDICINE

## 2022-01-12 PROCEDURE — 36514 APHERESIS PLASMA: CPT

## 2022-01-12 PROCEDURE — 36592 COLLECT BLOOD FROM PICC: CPT

## 2022-01-12 PROCEDURE — 96367 TX/PROPH/DG ADDL SEQ IV INF: CPT

## 2022-01-12 PROCEDURE — 96366 THER/PROPH/DIAG IV INF ADDON: CPT

## 2022-01-12 RX ORDER — ALBUMIN, HUMAN INJ 5% 5 %
2500 SOLUTION INTRAVENOUS ONCE
Status: COMPLETED | OUTPATIENT
Start: 2022-01-12 | End: 2022-01-12

## 2022-01-12 RX ORDER — CALCIUM GLUCONATE 20 MG/ML
2000 INJECTION, SOLUTION INTRAVENOUS
Status: COMPLETED | OUTPATIENT
Start: 2022-01-12 | End: 2022-01-12

## 2022-01-12 RX ORDER — ONDANSETRON 4 MG/1
4 TABLET, FILM COATED ORAL PRN
Status: CANCELLED
Start: 2022-01-24

## 2022-01-12 RX ORDER — ONDANSETRON 2 MG/ML
4 INJECTION INTRAMUSCULAR; INTRAVENOUS PRN
Status: DISCONTINUED | OUTPATIENT
Start: 2022-01-12 | End: 2022-01-13 | Stop reason: HOSPADM

## 2022-01-12 RX ORDER — ONDANSETRON 2 MG/ML
4 INJECTION INTRAMUSCULAR; INTRAVENOUS PRN
Status: CANCELLED
Start: 2022-01-24

## 2022-01-12 RX ORDER — ALBUMIN, HUMAN INJ 5% 5 %
2500 SOLUTION INTRAVENOUS ONCE
Status: CANCELLED
Start: 2022-01-24 | End: 2022-01-24

## 2022-01-12 RX ORDER — ONDANSETRON 4 MG/1
4 TABLET, FILM COATED ORAL PRN
Status: DISCONTINUED | OUTPATIENT
Start: 2022-01-12 | End: 2022-01-13 | Stop reason: HOSPADM

## 2022-01-12 RX ADMIN — CALCIUM GLUCONATE 2000 MG: 20 INJECTION, SOLUTION INTRAVENOUS at 14:00

## 2022-01-12 RX ADMIN — CALCIUM GLUCONATE 2000 MG: 20 INJECTION, SOLUTION INTRAVENOUS at 13:00

## 2022-01-12 RX ADMIN — ALBUMIN (HUMAN) 2500 ML: 12.5 INJECTION, SOLUTION INTRAVENOUS at 13:00

## 2022-01-21 ENCOUNTER — APPOINTMENT (OUTPATIENT)
Dept: ONCOLOGY | Age: 52
End: 2022-01-21
Payer: COMMERCIAL

## 2022-01-21 ENCOUNTER — HOSPITAL ENCOUNTER (OUTPATIENT)
Dept: ONCOLOGY | Age: 52
Setting detail: INFUSION SERIES
Discharge: HOME | End: 2022-01-21
Payer: COMMERCIAL

## 2022-01-21 VITALS
OXYGEN SATURATION: 97 % | SYSTOLIC BLOOD PRESSURE: 117 MMHG | DIASTOLIC BLOOD PRESSURE: 65 MMHG | RESPIRATION RATE: 16 BRPM | TEMPERATURE: 98.5 F | HEART RATE: 72 BPM

## 2022-01-21 DIAGNOSIS — G70.01 MYASTHENIA GRAVIS WITH EXACERBATION (HCC): Primary | ICD-10-CM

## 2022-01-21 LAB
FIBRINOGEN: 443 MG/DL (ref 200–397)
HCT VFR BLD CALC: 31 % (ref 36–48)
HEMOGLOBIN: 10.9 G/DL (ref 12–16)
MCH RBC QN AUTO: 33.1 PG (ref 26–34)
MCHC RBC AUTO-ENTMCNC: 35.1 G/DL (ref 31–36)
MCV RBC AUTO: 94.1 FL (ref 80–100)
PDW BLD-RTO: 13.7 % (ref 12.4–15.4)
PLATELET # BLD: 235 K/UL (ref 135–450)
PMV BLD AUTO: 6.9 FL (ref 5–10.5)
RBC # BLD: 3.3 M/UL (ref 4–5.2)
WBC # BLD: 10.9 K/UL (ref 4–11)

## 2022-01-21 PROCEDURE — 6360000002 HC RX W HCPCS: Performed by: INTERNAL MEDICINE

## 2022-01-21 PROCEDURE — 85027 COMPLETE CBC AUTOMATED: CPT

## 2022-01-21 PROCEDURE — 36514 APHERESIS PLASMA: CPT | Performed by: INTERNAL MEDICINE

## 2022-01-21 PROCEDURE — 96367 TX/PROPH/DG ADDL SEQ IV INF: CPT

## 2022-01-21 PROCEDURE — 85384 FIBRINOGEN ACTIVITY: CPT

## 2022-01-21 PROCEDURE — 96365 THER/PROPH/DIAG IV INF INIT: CPT

## 2022-01-21 PROCEDURE — 36592 COLLECT BLOOD FROM PICC: CPT

## 2022-01-21 PROCEDURE — 36522 PHOTOPHERESIS: CPT

## 2022-01-21 PROCEDURE — P9041 ALBUMIN (HUMAN),5%, 50ML: HCPCS | Performed by: INTERNAL MEDICINE

## 2022-01-21 RX ORDER — CALCIUM GLUCONATE 20 MG/ML
4000 INJECTION, SOLUTION INTRAVENOUS ONCE
Status: COMPLETED | OUTPATIENT
Start: 2022-01-21 | End: 2022-01-21

## 2022-01-21 RX ORDER — ALBUMIN, HUMAN INJ 5% 5 %
2500 SOLUTION INTRAVENOUS ONCE
Status: COMPLETED | OUTPATIENT
Start: 2022-01-21 | End: 2022-01-21

## 2022-01-21 RX ORDER — ONDANSETRON 2 MG/ML
4 INJECTION INTRAMUSCULAR; INTRAVENOUS PRN
Status: CANCELLED
Start: 2022-01-24

## 2022-01-21 RX ORDER — ONDANSETRON 4 MG/1
4 TABLET, FILM COATED ORAL PRN
Status: CANCELLED
Start: 2022-01-24

## 2022-01-21 RX ORDER — ALBUMIN, HUMAN INJ 5% 5 %
2500 SOLUTION INTRAVENOUS ONCE
Status: CANCELLED
Start: 2022-01-24 | End: 2022-01-24

## 2022-01-21 RX ADMIN — ALBUMIN (HUMAN) 2500 ML: 12.5 INJECTION, SOLUTION INTRAVENOUS at 14:30

## 2022-01-21 RX ADMIN — CALCIUM GLUCONATE 4000 MG: 20 INJECTION, SOLUTION INTRAVENOUS at 13:44

## 2022-01-21 NOTE — PROGRESS NOTES
Pt arrived to [facility] today for scheduled plasmapheresis procedure. Apheresis, line care, education, and lab draws all completed by Kimani CAPONE, Roro Stacy. Review Kimani documentation for details.   Jocy Duarte RN

## 2022-01-24 ENCOUNTER — HOSPITAL ENCOUNTER (OUTPATIENT)
Dept: ONCOLOGY | Age: 52
Setting detail: INFUSION SERIES
End: 2022-01-24

## 2022-01-27 ENCOUNTER — HOSPITAL ENCOUNTER (OUTPATIENT)
Dept: ONCOLOGY | Age: 52
Setting detail: INFUSION SERIES
Discharge: HOME | End: 2022-01-27
Payer: COMMERCIAL

## 2022-01-27 VITALS
HEIGHT: 63 IN | HEART RATE: 81 BPM | TEMPERATURE: 98 F | DIASTOLIC BLOOD PRESSURE: 63 MMHG | BODY MASS INDEX: 29.61 KG/M2 | WEIGHT: 167.11 LBS | SYSTOLIC BLOOD PRESSURE: 111 MMHG | RESPIRATION RATE: 16 BRPM | OXYGEN SATURATION: 97 %

## 2022-01-27 DIAGNOSIS — G70.01 MYASTHENIA GRAVIS WITH EXACERBATION (HCC): Primary | ICD-10-CM

## 2022-01-27 LAB
FIBRINOGEN: 473 MG/DL (ref 200–397)
HCT VFR BLD CALC: 33.6 % (ref 36–48)
HEMOGLOBIN: 11.2 G/DL (ref 12–16)
MCH RBC QN AUTO: 32.1 PG (ref 26–34)
MCHC RBC AUTO-ENTMCNC: 33.4 G/DL (ref 31–36)
MCV RBC AUTO: 96 FL (ref 80–100)
PDW BLD-RTO: 14.2 % (ref 12.4–15.4)
PLATELET # BLD: 262 K/UL (ref 135–450)
PMV BLD AUTO: 7.4 FL (ref 5–10.5)
RBC # BLD: 3.5 M/UL (ref 4–5.2)
WBC # BLD: 8.3 K/UL (ref 4–11)

## 2022-01-27 PROCEDURE — 85384 FIBRINOGEN ACTIVITY: CPT

## 2022-01-27 PROCEDURE — 6360000002 HC RX W HCPCS: Performed by: INTERNAL MEDICINE

## 2022-01-27 PROCEDURE — 85027 COMPLETE CBC AUTOMATED: CPT

## 2022-01-27 PROCEDURE — P9041 ALBUMIN (HUMAN),5%, 50ML: HCPCS | Performed by: INTERNAL MEDICINE

## 2022-01-27 PROCEDURE — 36514 APHERESIS PLASMA: CPT

## 2022-01-27 RX ORDER — CALCIUM GLUCONATE 20 MG/ML
2000 INJECTION, SOLUTION INTRAVENOUS
Status: DISPENSED | OUTPATIENT
Start: 2022-01-27 | End: 2022-01-27

## 2022-01-27 RX ORDER — ONDANSETRON 2 MG/ML
4 INJECTION INTRAMUSCULAR; INTRAVENOUS PRN
Status: CANCELLED
Start: 2022-01-31

## 2022-01-27 RX ORDER — ALBUMIN, HUMAN INJ 5% 5 %
2500 SOLUTION INTRAVENOUS ONCE
Status: CANCELLED
Start: 2022-01-31 | End: 2022-01-31

## 2022-01-27 RX ORDER — ALBUMIN, HUMAN INJ 5% 5 %
2500 SOLUTION INTRAVENOUS ONCE
Status: COMPLETED | OUTPATIENT
Start: 2022-01-27 | End: 2022-01-27

## 2022-01-27 RX ORDER — ONDANSETRON 4 MG/1
4 TABLET, FILM COATED ORAL PRN
Status: CANCELLED
Start: 2022-01-31

## 2022-01-27 RX ADMIN — ALBUMIN (HUMAN) 2500 ML: 12.5 INJECTION, SOLUTION INTRAVENOUS at 14:10

## 2022-01-27 RX ADMIN — CALCIUM GLUCONATE 2000 MG: 20 INJECTION, SOLUTION INTRAVENOUS at 13:10

## 2022-01-27 ASSESSMENT — PAIN SCALES - GENERAL: PAINLEVEL_OUTOF10: 0

## 2022-01-31 ENCOUNTER — HOSPITAL ENCOUNTER (OUTPATIENT)
Dept: ONCOLOGY | Age: 52
Setting detail: INFUSION SERIES
Discharge: HOME | End: 2022-01-31
Payer: COMMERCIAL

## 2022-01-31 VITALS
SYSTOLIC BLOOD PRESSURE: 120 MMHG | DIASTOLIC BLOOD PRESSURE: 65 MMHG | OXYGEN SATURATION: 100 % | HEART RATE: 68 BPM | RESPIRATION RATE: 18 BRPM | TEMPERATURE: 97.6 F

## 2022-01-31 DIAGNOSIS — G70.01 MYASTHENIA GRAVIS WITH EXACERBATION (HCC): Primary | ICD-10-CM

## 2022-01-31 LAB
FIBRINOGEN: 343 MG/DL (ref 200–397)
HCT VFR BLD CALC: 32.6 % (ref 36–48)
HEMOGLOBIN: 10.8 G/DL (ref 12–16)
MCH RBC QN AUTO: 32.1 PG (ref 26–34)
MCHC RBC AUTO-ENTMCNC: 33 G/DL (ref 31–36)
MCV RBC AUTO: 97 FL (ref 80–100)
PDW BLD-RTO: 14.7 % (ref 12.4–15.4)
PLATELET # BLD: 269 K/UL (ref 135–450)
PMV BLD AUTO: 7.1 FL (ref 5–10.5)
RBC # BLD: 3.36 M/UL (ref 4–5.2)
WBC # BLD: 9.3 K/UL (ref 4–11)

## 2022-01-31 PROCEDURE — 36514 APHERESIS PLASMA: CPT

## 2022-01-31 PROCEDURE — 36592 COLLECT BLOOD FROM PICC: CPT

## 2022-01-31 PROCEDURE — 85384 FIBRINOGEN ACTIVITY: CPT

## 2022-01-31 PROCEDURE — 85027 COMPLETE CBC AUTOMATED: CPT

## 2022-01-31 PROCEDURE — 36514 APHERESIS PLASMA: CPT | Performed by: INTERNAL MEDICINE

## 2022-01-31 PROCEDURE — 6360000002 HC RX W HCPCS: Performed by: INTERNAL MEDICINE

## 2022-01-31 PROCEDURE — P9041 ALBUMIN (HUMAN),5%, 50ML: HCPCS | Performed by: INTERNAL MEDICINE

## 2022-01-31 PROCEDURE — 96367 TX/PROPH/DG ADDL SEQ IV INF: CPT

## 2022-01-31 PROCEDURE — 96365 THER/PROPH/DIAG IV INF INIT: CPT

## 2022-01-31 RX ORDER — CALCIUM GLUCONATE 20 MG/ML
4000 INJECTION, SOLUTION INTRAVENOUS ONCE
Status: COMPLETED | OUTPATIENT
Start: 2022-01-31 | End: 2022-01-31

## 2022-01-31 RX ORDER — ONDANSETRON 4 MG/1
4 TABLET, FILM COATED ORAL PRN
Status: CANCELLED
Start: 2022-02-07

## 2022-01-31 RX ORDER — ONDANSETRON 2 MG/ML
4 INJECTION INTRAMUSCULAR; INTRAVENOUS PRN
Status: CANCELLED
Start: 2022-02-07

## 2022-01-31 RX ORDER — ALBUMIN, HUMAN INJ 5% 5 %
2500 SOLUTION INTRAVENOUS ONCE
Status: COMPLETED | OUTPATIENT
Start: 2022-01-31 | End: 2022-01-31

## 2022-01-31 RX ORDER — ALBUMIN, HUMAN INJ 5% 5 %
2500 SOLUTION INTRAVENOUS ONCE
Status: CANCELLED
Start: 2022-02-07 | End: 2022-02-07

## 2022-01-31 RX ADMIN — ALBUMIN (HUMAN) 2500 ML: 12.5 INJECTION, SOLUTION INTRAVENOUS at 13:28

## 2022-01-31 RX ADMIN — CALCIUM GLUCONATE 4000 MG: 20 INJECTION, SOLUTION INTRAVENOUS at 13:28

## 2022-01-31 NOTE — PROGRESS NOTES
Pt arrived to [facility] today for scheduled plasmapheresis procedure. Apheresis, line care, education, & lab draws all completed by Den Harman RN. Review Kimani documentation for details.      Electronically signed by Gabby Nugent RN on 1/31/2022 at 1:59 PM

## 2022-02-11 ENCOUNTER — HOSPITAL ENCOUNTER (OUTPATIENT)
Dept: ONCOLOGY | Age: 52
Setting detail: INFUSION SERIES
Discharge: HOME | End: 2022-02-11
Payer: COMMERCIAL

## 2022-02-11 VITALS
RESPIRATION RATE: 16 BRPM | SYSTOLIC BLOOD PRESSURE: 121 MMHG | OXYGEN SATURATION: 98 % | HEART RATE: 61 BPM | TEMPERATURE: 97.6 F | DIASTOLIC BLOOD PRESSURE: 66 MMHG

## 2022-02-11 DIAGNOSIS — G70.01 MYASTHENIA GRAVIS WITH EXACERBATION (HCC): Primary | ICD-10-CM

## 2022-02-11 LAB
FIBRINOGEN: 360 MG/DL (ref 200–397)
HCT VFR BLD CALC: 31.5 % (ref 36–48)
HEMOGLOBIN: 10.7 G/DL (ref 12–16)
MCH RBC QN AUTO: 32.1 PG (ref 26–34)
MCHC RBC AUTO-ENTMCNC: 34 G/DL (ref 31–36)
MCV RBC AUTO: 94.4 FL (ref 80–100)
PDW BLD-RTO: 13.4 % (ref 12.4–15.4)
PLATELET # BLD: 277 K/UL (ref 135–450)
PMV BLD AUTO: 7.5 FL (ref 5–10.5)
RBC # BLD: 3.34 M/UL (ref 4–5.2)
WBC # BLD: 8.5 K/UL (ref 4–11)

## 2022-02-11 PROCEDURE — 96365 THER/PROPH/DIAG IV INF INIT: CPT

## 2022-02-11 PROCEDURE — 36592 COLLECT BLOOD FROM PICC: CPT

## 2022-02-11 PROCEDURE — 36522 PHOTOPHERESIS: CPT

## 2022-02-11 PROCEDURE — 85027 COMPLETE CBC AUTOMATED: CPT

## 2022-02-11 PROCEDURE — 85384 FIBRINOGEN ACTIVITY: CPT

## 2022-02-11 PROCEDURE — 96367 TX/PROPH/DG ADDL SEQ IV INF: CPT

## 2022-02-11 RX ORDER — ALBUMIN, HUMAN INJ 5% 5 %
2500 SOLUTION INTRAVENOUS ONCE
Status: DISCONTINUED | OUTPATIENT
Start: 2022-02-11 | End: 2022-02-12 | Stop reason: HOSPADM

## 2022-02-11 RX ORDER — ONDANSETRON 2 MG/ML
4 INJECTION INTRAMUSCULAR; INTRAVENOUS PRN
Status: CANCELLED
Start: 2022-02-18

## 2022-02-11 RX ORDER — ALBUMIN, HUMAN INJ 5% 5 %
2500 SOLUTION INTRAVENOUS ONCE
Status: CANCELLED
Start: 2022-02-18 | End: 2022-02-18

## 2022-02-11 RX ORDER — ONDANSETRON 4 MG/1
4 TABLET, FILM COATED ORAL PRN
Status: CANCELLED
Start: 2022-02-18

## 2022-02-11 RX ORDER — CALCIUM GLUCONATE 20 MG/ML
2000 INJECTION, SOLUTION INTRAVENOUS
Status: DISPENSED | OUTPATIENT
Start: 2022-02-11 | End: 2022-02-11

## 2022-02-11 NOTE — PROGRESS NOTES
Pt arrived to [facility] today for scheduled plasmapheresis procedure. Apheresis, line care, education, & lab draws all completed by Kimani CAPONE, Dhiraj Siddiqui. Review Kimani documentation for details.    Michael Rm RN

## 2022-02-16 ENCOUNTER — HOSPITAL ENCOUNTER (OUTPATIENT)
Dept: ONCOLOGY | Age: 52
Setting detail: INFUSION SERIES
Discharge: HOME | End: 2022-02-16
Payer: COMMERCIAL

## 2022-02-16 DIAGNOSIS — G70.01 MYASTHENIA GRAVIS WITH EXACERBATION (HCC): Primary | ICD-10-CM

## 2022-02-16 LAB
FIBRINOGEN: 348 MG/DL (ref 200–397)
HCT VFR BLD CALC: 33 % (ref 36–48)
HEMOGLOBIN: 11.2 G/DL (ref 12–16)
MCH RBC QN AUTO: 32.7 PG (ref 26–34)
MCHC RBC AUTO-ENTMCNC: 34.1 G/DL (ref 31–36)
MCV RBC AUTO: 96 FL (ref 80–100)
PDW BLD-RTO: 13.7 % (ref 12.4–15.4)
PLATELET # BLD: 265 K/UL (ref 135–450)
PMV BLD AUTO: 7.5 FL (ref 5–10.5)
RBC # BLD: 3.44 M/UL (ref 4–5.2)
WBC # BLD: 8 K/UL (ref 4–11)

## 2022-02-16 PROCEDURE — 85027 COMPLETE CBC AUTOMATED: CPT

## 2022-02-16 PROCEDURE — 85384 FIBRINOGEN ACTIVITY: CPT

## 2022-02-16 RX ORDER — ONDANSETRON 4 MG/1
4 TABLET, FILM COATED ORAL PRN
Status: CANCELLED
Start: 2022-02-18

## 2022-02-16 RX ORDER — ALBUMIN, HUMAN INJ 5% 5 %
2500 SOLUTION INTRAVENOUS ONCE
Status: CANCELLED
Start: 2022-02-18 | End: 2022-02-18

## 2022-02-16 RX ORDER — ALBUMIN, HUMAN INJ 5% 5 %
2500 SOLUTION INTRAVENOUS ONCE
Status: DISCONTINUED | OUTPATIENT
Start: 2022-02-16 | End: 2022-02-17 | Stop reason: HOSPADM

## 2022-02-16 RX ORDER — CALCIUM GLUCONATE 20 MG/ML
4000 INJECTION, SOLUTION INTRAVENOUS ONCE
Status: DISCONTINUED | OUTPATIENT
Start: 2022-02-16 | End: 2022-02-17 | Stop reason: HOSPADM

## 2022-02-16 RX ORDER — ONDANSETRON 2 MG/ML
4 INJECTION INTRAMUSCULAR; INTRAVENOUS PRN
Status: CANCELLED
Start: 2022-02-18

## 2022-02-17 VITALS
TEMPERATURE: 97.8 F | DIASTOLIC BLOOD PRESSURE: 81 MMHG | SYSTOLIC BLOOD PRESSURE: 139 MMHG | HEART RATE: 74 BPM | OXYGEN SATURATION: 100 % | RESPIRATION RATE: 18 BRPM

## 2022-02-23 ENCOUNTER — HOSPITAL ENCOUNTER (OUTPATIENT)
Dept: ONCOLOGY | Age: 52
Setting detail: INFUSION SERIES
End: 2022-02-23

## 2022-03-07 ENCOUNTER — HOSPITAL ENCOUNTER (OUTPATIENT)
Dept: ONCOLOGY | Age: 52
Setting detail: INFUSION SERIES
Discharge: HOME | End: 2022-03-07
Payer: COMMERCIAL

## 2022-03-07 VITALS
DIASTOLIC BLOOD PRESSURE: 73 MMHG | OXYGEN SATURATION: 98 % | SYSTOLIC BLOOD PRESSURE: 125 MMHG | HEART RATE: 63 BPM | TEMPERATURE: 97.8 F | RESPIRATION RATE: 16 BRPM

## 2022-03-07 DIAGNOSIS — G70.01 MYASTHENIA GRAVIS WITH EXACERBATION (HCC): Primary | ICD-10-CM

## 2022-03-07 LAB
FIBRINOGEN: 583 MG/DL (ref 200–397)
HCT VFR BLD CALC: 35.2 % (ref 36–48)
HEMOGLOBIN: 11.9 G/DL (ref 12–16)
MCH RBC QN AUTO: 32 PG (ref 26–34)
MCHC RBC AUTO-ENTMCNC: 33.9 G/DL (ref 31–36)
MCV RBC AUTO: 94.4 FL (ref 80–100)
PDW BLD-RTO: 13.7 % (ref 12.4–15.4)
PLATELET # BLD: 298 K/UL (ref 135–450)
PMV BLD AUTO: 7.1 FL (ref 5–10.5)
RBC # BLD: 3.73 M/UL (ref 4–5.2)
WBC # BLD: 9.7 K/UL (ref 4–11)

## 2022-03-07 PROCEDURE — 96367 TX/PROPH/DG ADDL SEQ IV INF: CPT

## 2022-03-07 PROCEDURE — 6360000002 HC RX W HCPCS: Performed by: INTERNAL MEDICINE

## 2022-03-07 PROCEDURE — 36514 APHERESIS PLASMA: CPT

## 2022-03-07 PROCEDURE — 96365 THER/PROPH/DIAG IV INF INIT: CPT

## 2022-03-07 PROCEDURE — 85384 FIBRINOGEN ACTIVITY: CPT

## 2022-03-07 PROCEDURE — 36592 COLLECT BLOOD FROM PICC: CPT

## 2022-03-07 PROCEDURE — 85027 COMPLETE CBC AUTOMATED: CPT

## 2022-03-07 PROCEDURE — P9041 ALBUMIN (HUMAN),5%, 50ML: HCPCS | Performed by: INTERNAL MEDICINE

## 2022-03-07 RX ORDER — ALBUMIN, HUMAN INJ 5% 5 %
2500 SOLUTION INTRAVENOUS ONCE
Status: COMPLETED | OUTPATIENT
Start: 2022-03-07 | End: 2022-03-07

## 2022-03-07 RX ORDER — ALBUMIN, HUMAN INJ 5% 5 %
2500 SOLUTION INTRAVENOUS ONCE
Status: CANCELLED
Start: 2022-03-14 | End: 2022-03-14

## 2022-03-07 RX ORDER — ONDANSETRON 2 MG/ML
4 INJECTION INTRAMUSCULAR; INTRAVENOUS PRN
Status: CANCELLED
Start: 2022-03-14

## 2022-03-07 RX ORDER — CALCIUM GLUCONATE 20 MG/ML
4000 INJECTION, SOLUTION INTRAVENOUS ONCE
Status: COMPLETED | OUTPATIENT
Start: 2022-03-07 | End: 2022-03-07

## 2022-03-07 RX ORDER — ONDANSETRON 4 MG/1
4 TABLET, FILM COATED ORAL PRN
Status: CANCELLED
Start: 2022-03-14

## 2022-03-07 RX ADMIN — ALBUMIN (HUMAN) 2500 ML: 12.5 INJECTION, SOLUTION INTRAVENOUS at 08:31

## 2022-03-07 RX ADMIN — CALCIUM GLUCONATE 4000 MG: 20 INJECTION, SOLUTION INTRAVENOUS at 08:30

## 2022-03-07 NOTE — PROGRESS NOTES
Pt arrived to [facility] today for scheduled plasmapheresis procedure. Apheresis, line care, education, & lab draws all completed by Kimani CAPONE, Chema Guan. Review Kimani documentation for details.    Therisa Brunner, RN

## 2022-03-14 ENCOUNTER — HOSPITAL ENCOUNTER (OUTPATIENT)
Dept: ONCOLOGY | Age: 52
Setting detail: INFUSION SERIES
Discharge: HOME | End: 2022-03-14
Payer: COMMERCIAL

## 2022-03-14 VITALS
OXYGEN SATURATION: 98 % | SYSTOLIC BLOOD PRESSURE: 131 MMHG | HEART RATE: 60 BPM | TEMPERATURE: 98.1 F | RESPIRATION RATE: 18 BRPM | DIASTOLIC BLOOD PRESSURE: 76 MMHG

## 2022-03-14 DIAGNOSIS — G70.01 MYASTHENIA GRAVIS WITH EXACERBATION (HCC): Primary | ICD-10-CM

## 2022-03-14 LAB
FIBRINOGEN: 355 MG/DL (ref 200–397)
HCT VFR BLD CALC: 34.1 % (ref 36–48)
HEMOGLOBIN: 11.4 G/DL (ref 12–16)
MCH RBC QN AUTO: 31.6 PG (ref 26–34)
MCHC RBC AUTO-ENTMCNC: 33.5 G/DL (ref 31–36)
MCV RBC AUTO: 94.3 FL (ref 80–100)
PDW BLD-RTO: 13.8 % (ref 12.4–15.4)
PLATELET # BLD: 272 K/UL (ref 135–450)
PMV BLD AUTO: 7.2 FL (ref 5–10.5)
RBC # BLD: 3.62 M/UL (ref 4–5.2)
WBC # BLD: 8.6 K/UL (ref 4–11)

## 2022-03-14 PROCEDURE — 36514 APHERESIS PLASMA: CPT

## 2022-03-14 PROCEDURE — 6360000002 HC RX W HCPCS: Performed by: INTERNAL MEDICINE

## 2022-03-14 PROCEDURE — P9041 ALBUMIN (HUMAN),5%, 50ML: HCPCS | Performed by: INTERNAL MEDICINE

## 2022-03-14 PROCEDURE — 85384 FIBRINOGEN ACTIVITY: CPT

## 2022-03-14 PROCEDURE — 96367 TX/PROPH/DG ADDL SEQ IV INF: CPT

## 2022-03-14 PROCEDURE — 36592 COLLECT BLOOD FROM PICC: CPT

## 2022-03-14 PROCEDURE — 85027 COMPLETE CBC AUTOMATED: CPT

## 2022-03-14 PROCEDURE — 96366 THER/PROPH/DIAG IV INF ADDON: CPT

## 2022-03-14 PROCEDURE — 96365 THER/PROPH/DIAG IV INF INIT: CPT

## 2022-03-14 RX ORDER — ONDANSETRON 4 MG/1
4 TABLET, FILM COATED ORAL PRN
Status: CANCELLED
Start: 2022-03-21

## 2022-03-14 RX ORDER — CALCIUM GLUCONATE 20 MG/ML
4000 INJECTION, SOLUTION INTRAVENOUS ONCE
Status: COMPLETED | OUTPATIENT
Start: 2022-03-14 | End: 2022-03-14

## 2022-03-14 RX ORDER — ONDANSETRON 2 MG/ML
4 INJECTION INTRAMUSCULAR; INTRAVENOUS PRN
Status: DISCONTINUED | OUTPATIENT
Start: 2022-03-14 | End: 2022-03-15 | Stop reason: HOSPADM

## 2022-03-14 RX ORDER — ALBUMIN, HUMAN INJ 5% 5 %
2500 SOLUTION INTRAVENOUS ONCE
Status: COMPLETED | OUTPATIENT
Start: 2022-03-14 | End: 2022-03-14

## 2022-03-14 RX ORDER — ONDANSETRON 2 MG/ML
4 INJECTION INTRAMUSCULAR; INTRAVENOUS PRN
Status: CANCELLED
Start: 2022-03-21

## 2022-03-14 RX ORDER — ALBUMIN, HUMAN INJ 5% 5 %
2500 SOLUTION INTRAVENOUS ONCE
Status: CANCELLED
Start: 2022-03-21 | End: 2022-03-21

## 2022-03-14 RX ORDER — ONDANSETRON 4 MG/1
4 TABLET, FILM COATED ORAL PRN
Status: DISCONTINUED | OUTPATIENT
Start: 2022-03-14 | End: 2022-03-15 | Stop reason: HOSPADM

## 2022-03-14 RX ADMIN — CALCIUM GLUCONATE 4000 MG: 20 INJECTION, SOLUTION INTRAVENOUS at 13:00

## 2022-03-14 RX ADMIN — ALBUMIN (HUMAN) 2500 ML: 12.5 INJECTION, SOLUTION INTRAVENOUS at 13:00

## 2022-03-14 NOTE — PROGRESS NOTES
Pt arrived to [facility] today for scheduled plasmapheresis procedure. Apheresis, line care, education, & lab draws all completed by Kimani CAPONE, Jayme Hand. Review Kimani documentation for details.    Lima Wilson RN

## 2022-03-28 ENCOUNTER — HOSPITAL ENCOUNTER (OUTPATIENT)
Dept: ONCOLOGY | Age: 52
Setting detail: INFUSION SERIES
Discharge: HOME | End: 2022-03-28
Payer: COMMERCIAL

## 2022-03-28 DIAGNOSIS — G70.01 MYASTHENIA GRAVIS WITH EXACERBATION (HCC): Primary | ICD-10-CM

## 2022-03-28 LAB
FIBRINOGEN: 419 MG/DL (ref 200–397)
HCT VFR BLD CALC: 34.7 % (ref 36–48)
HEMOGLOBIN: 11.6 G/DL (ref 12–16)
MCH RBC QN AUTO: 31.2 PG (ref 26–34)
MCHC RBC AUTO-ENTMCNC: 33.5 G/DL (ref 31–36)
MCV RBC AUTO: 93.1 FL (ref 80–100)
PDW BLD-RTO: 13.7 % (ref 12.4–15.4)
PLATELET # BLD: 275 K/UL (ref 135–450)
PMV BLD AUTO: 6.9 FL (ref 5–10.5)
RBC # BLD: 3.73 M/UL (ref 4–5.2)
WBC # BLD: 6.9 K/UL (ref 4–11)

## 2022-03-28 PROCEDURE — 96366 THER/PROPH/DIAG IV INF ADDON: CPT

## 2022-03-28 PROCEDURE — 96365 THER/PROPH/DIAG IV INF INIT: CPT

## 2022-03-28 PROCEDURE — 36592 COLLECT BLOOD FROM PICC: CPT

## 2022-03-28 PROCEDURE — 85384 FIBRINOGEN ACTIVITY: CPT

## 2022-03-28 PROCEDURE — P9041 ALBUMIN (HUMAN),5%, 50ML: HCPCS

## 2022-03-28 PROCEDURE — 85027 COMPLETE CBC AUTOMATED: CPT

## 2022-03-28 PROCEDURE — 6360000002 HC RX W HCPCS

## 2022-03-28 PROCEDURE — 96367 TX/PROPH/DG ADDL SEQ IV INF: CPT

## 2022-03-28 PROCEDURE — 36514 APHERESIS PLASMA: CPT

## 2022-03-28 RX ORDER — ONDANSETRON 4 MG/1
4 TABLET, FILM COATED ORAL PRN
Start: 2022-04-04

## 2022-03-28 RX ORDER — ALBUMIN, HUMAN INJ 5% 5 %
2500 SOLUTION INTRAVENOUS ONCE
Status: DISCONTINUED | OUTPATIENT
Start: 2022-03-28 | End: 2022-03-29 | Stop reason: HOSPADM

## 2022-03-28 RX ORDER — ONDANSETRON 2 MG/ML
4 INJECTION INTRAMUSCULAR; INTRAVENOUS PRN
Start: 2022-04-04

## 2022-03-28 RX ORDER — CALCIUM GLUCONATE 20 MG/ML
2000 INJECTION, SOLUTION INTRAVENOUS
Status: DISPENSED | OUTPATIENT
Start: 2022-03-28 | End: 2022-03-28

## 2022-03-28 RX ORDER — ALBUMIN, HUMAN INJ 5% 5 %
2500 SOLUTION INTRAVENOUS ONCE
Status: CANCELLED
Start: 2022-04-04 | End: 2022-04-04

## 2022-04-14 ENCOUNTER — HOSPITAL ENCOUNTER (OUTPATIENT)
Dept: ONCOLOGY | Age: 52
Setting detail: INFUSION SERIES
Discharge: HOME | End: 2022-04-14
Payer: COMMERCIAL

## 2022-04-14 VITALS
TEMPERATURE: 97.7 F | HEART RATE: 72 BPM | RESPIRATION RATE: 18 BRPM | SYSTOLIC BLOOD PRESSURE: 146 MMHG | DIASTOLIC BLOOD PRESSURE: 86 MMHG | OXYGEN SATURATION: 96 %

## 2022-04-14 DIAGNOSIS — G70.01 MYASTHENIA GRAVIS WITH EXACERBATION (HCC): Primary | ICD-10-CM

## 2022-04-14 LAB
FIBRINOGEN: 487 MG/DL (ref 200–397)
HCT VFR BLD CALC: 32.5 % (ref 36–48)
HEMOGLOBIN: 11.1 G/DL (ref 12–16)
MCH RBC QN AUTO: 32 PG (ref 26–34)
MCHC RBC AUTO-ENTMCNC: 34 G/DL (ref 31–36)
MCV RBC AUTO: 94.1 FL (ref 80–100)
PDW BLD-RTO: 14.5 % (ref 12.4–15.4)
PLATELET # BLD: 270 K/UL (ref 135–450)
PMV BLD AUTO: 7.5 FL (ref 5–10.5)
RBC # BLD: 3.46 M/UL (ref 4–5.2)
WBC # BLD: 9.2 K/UL (ref 4–11)

## 2022-04-14 PROCEDURE — 96367 TX/PROPH/DG ADDL SEQ IV INF: CPT

## 2022-04-14 PROCEDURE — 96366 THER/PROPH/DIAG IV INF ADDON: CPT

## 2022-04-14 PROCEDURE — 36514 APHERESIS PLASMA: CPT

## 2022-04-14 PROCEDURE — 85384 FIBRINOGEN ACTIVITY: CPT

## 2022-04-14 PROCEDURE — 85027 COMPLETE CBC AUTOMATED: CPT

## 2022-04-14 PROCEDURE — 36592 COLLECT BLOOD FROM PICC: CPT

## 2022-04-14 PROCEDURE — 6360000002 HC RX W HCPCS: Performed by: INTERNAL MEDICINE

## 2022-04-14 PROCEDURE — 96365 THER/PROPH/DIAG IV INF INIT: CPT

## 2022-04-14 RX ORDER — ONDANSETRON 4 MG/1
4 TABLET, FILM COATED ORAL PRN
Start: 2022-04-18

## 2022-04-14 RX ORDER — ALBUMIN, HUMAN INJ 5% 5 %
2500 SOLUTION INTRAVENOUS ONCE
Status: DISCONTINUED | OUTPATIENT
Start: 2022-04-14 | End: 2022-04-15 | Stop reason: HOSPADM

## 2022-04-14 RX ORDER — ONDANSETRON 2 MG/ML
4 INJECTION INTRAMUSCULAR; INTRAVENOUS PRN
Start: 2022-04-18

## 2022-04-14 RX ORDER — ALBUMIN, HUMAN INJ 5% 5 %
2500 SOLUTION INTRAVENOUS ONCE
Start: 2022-04-18 | End: 2022-04-18

## 2022-04-14 RX ORDER — CALCIUM GLUCONATE 20 MG/ML
4000 INJECTION, SOLUTION INTRAVENOUS ONCE
Status: DISCONTINUED | OUTPATIENT
Start: 2022-04-14 | End: 2022-04-15 | Stop reason: HOSPADM

## 2022-04-14 RX ADMIN — CALCIUM GLUCONATE 4000 MG: 20 INJECTION, SOLUTION INTRAVENOUS at 13:30

## 2022-04-14 RX ADMIN — ALBUMIN, HUMAN INJ 5% 2500 ML: 5 SOLUTION at 13:30

## 2022-04-14 NOTE — PROGRESS NOTES
Pt arrived to [facility] today for scheduled plasmapheresis procedure.  Apheresis, line care, education, & lab draws all completed by Kimani CAPONE, [address] documentation for details.    Mercedes Martines RN

## 2022-04-18 ENCOUNTER — HOSPITAL ENCOUNTER (OUTPATIENT)
Dept: ONCOLOGY | Age: 52
Setting detail: INFUSION SERIES
Discharge: HOME | End: 2022-04-18

## 2022-05-09 ENCOUNTER — HOSPITAL ENCOUNTER (OUTPATIENT)
Dept: INTERVENTIONAL RADIOLOGY/VASCULAR | Age: 52
Discharge: HOME | End: 2022-05-09
Payer: COMMERCIAL

## 2022-05-09 DIAGNOSIS — Z86.69 H/O MYASTHENIA GRAVIS: ICD-10-CM

## 2022-05-09 PROCEDURE — 77001 FLUOROGUIDE FOR VEIN DEVICE: CPT

## 2022-05-09 PROCEDURE — 36589 REMOVAL TUNNELED CV CATH: CPT

## 2022-05-09 PROCEDURE — 2709999900

## 2022-05-09 NOTE — BRIEF OP NOTE
Brief Postoperative Note    Luis Alfredo Oh  YOB: 1970  [id number]    Pre-operative Diagnosis: myasthenia gravis    Post-operative Diagnosis: Same    Procedure: Removal of tunneled HD catheter    Anesthesia: Local    Surgeons: Pattie Cartagena MD    Estimated Blood Loss: Less than 5 mL    Complications: None    Specimens: Was Not Obtained    Findings: Successful removal of the right IJ tunneled HD catheter.     Electronically signed by Pattie Cartagena MD on 5/9/2022 at 2:42 PM

## 2022-08-23 ENCOUNTER — HOSPITAL ENCOUNTER (OUTPATIENT)
Dept: CT IMAGING | Age: 52
Discharge: HOME | End: 2022-08-23
Payer: COMMERCIAL

## 2022-08-23 DIAGNOSIS — U07.1 DYSPNEA DUE TO COVID-19: ICD-10-CM

## 2022-08-23 DIAGNOSIS — R06.00 DYSPNEA DUE TO COVID-19: ICD-10-CM

## 2022-08-23 DIAGNOSIS — R06.00 DYSPNEA, UNSPECIFIED TYPE: ICD-10-CM

## 2022-08-23 DIAGNOSIS — R42 DIZZINESS AND GIDDINESS: ICD-10-CM

## 2022-08-23 DIAGNOSIS — E86.0 DEHYDRATION: ICD-10-CM

## 2022-08-23 DIAGNOSIS — G70.00 MYASTHENIA GRAVIS WITHOUT EXACERBATION (HCC): ICD-10-CM

## 2022-08-23 PROCEDURE — 6360000004 HC RX CONTRAST MEDICATION: Performed by: NURSE PRACTITIONER

## 2022-08-23 PROCEDURE — 71260 CT THORAX DX C+: CPT | Performed by: NURSE PRACTITIONER

## 2022-08-23 RX ADMIN — IOPAMIDOL 100 ML: 755 INJECTION, SOLUTION INTRAVENOUS at 10:58

## 2023-03-03 ENCOUNTER — HOSPITAL ENCOUNTER (EMERGENCY)
Age: 53
Discharge: HOME | End: 2023-03-03
Payer: COMMERCIAL

## 2023-03-03 ENCOUNTER — APPOINTMENT (OUTPATIENT)
Dept: GENERAL RADIOLOGY | Age: 53
End: 2023-03-03
Payer: COMMERCIAL

## 2023-03-03 VITALS
WEIGHT: 168.65 LBS | HEIGHT: 62 IN | BODY MASS INDEX: 31.04 KG/M2 | RESPIRATION RATE: 16 BRPM | TEMPERATURE: 97.2 F | SYSTOLIC BLOOD PRESSURE: 142 MMHG | OXYGEN SATURATION: 98 % | HEART RATE: 72 BPM | DIASTOLIC BLOOD PRESSURE: 81 MMHG

## 2023-03-03 DIAGNOSIS — R49.9 CHANGE IN VOICE: Primary | ICD-10-CM

## 2023-03-03 DIAGNOSIS — R53.83 OTHER FATIGUE: ICD-10-CM

## 2023-03-03 DIAGNOSIS — Z86.69 HISTORY OF MYASTHENIA GRAVIS: ICD-10-CM

## 2023-03-03 LAB
A/G RATIO: 0.9 (ref 1.1–2.2)
ALBUMIN SERPL-MCNC: 3.4 G/DL (ref 3.4–5)
ALP BLD-CCNC: 151 U/L (ref 40–129)
ALT SERPL-CCNC: 11 U/L (ref 10–40)
ANION GAP SERPL CALCULATED.3IONS-SCNC: 12 MMOL/L (ref 3–16)
AST SERPL-CCNC: 15 U/L (ref 15–37)
BASOPHILS ABSOLUTE: 0.1 K/UL (ref 0–0.2)
BASOPHILS RELATIVE PERCENT: 1 %
BILIRUB SERPL-MCNC: <0.2 MG/DL (ref 0–1)
BUN BLDV-MCNC: 12 MG/DL (ref 7–20)
CALCIUM SERPL-MCNC: 8.8 MG/DL (ref 8.3–10.6)
CHLORIDE BLD-SCNC: 104 MMOL/L (ref 99–110)
CO2: 24 MMOL/L (ref 21–32)
CREAT SERPL-MCNC: 0.6 MG/DL (ref 0.6–1.1)
EOSINOPHILS ABSOLUTE: 0.1 K/UL (ref 0–0.6)
EOSINOPHILS RELATIVE PERCENT: 1.9 %
GFR SERPL CREATININE-BSD FRML MDRD: >60 ML/MIN/{1.73_M2}
GLUCOSE BLD-MCNC: 86 MG/DL (ref 70–99)
HCT VFR BLD CALC: 40.7 % (ref 36–48)
HEMOGLOBIN: 13 G/DL (ref 12–16)
LYMPHOCYTES ABSOLUTE: 2.1 K/UL (ref 1–5.1)
LYMPHOCYTES RELATIVE PERCENT: 26.6 %
MCH RBC QN AUTO: 28.1 PG (ref 26–34)
MCHC RBC AUTO-ENTMCNC: 32 G/DL (ref 31–36)
MCV RBC AUTO: 87.8 FL (ref 80–100)
MONOCYTES ABSOLUTE: 0.6 K/UL (ref 0–1.3)
MONOCYTES RELATIVE PERCENT: 7.9 %
NEUTROPHILS ABSOLUTE: 4.9 K/UL (ref 1.7–7.7)
NEUTROPHILS RELATIVE PERCENT: 62.6 %
PDW BLD-RTO: 14 % (ref 12.4–15.4)
PLATELET # BLD: 296 K/UL (ref 135–450)
PMV BLD AUTO: 7.2 FL (ref 5–10.5)
POTASSIUM REFLEX MAGNESIUM: 4.1 MMOL/L (ref 3.5–5.1)
RBC # BLD: 4.63 M/UL (ref 4–5.2)
SODIUM BLD-SCNC: 140 MMOL/L (ref 136–145)
TOTAL PROTEIN: 7.2 G/DL (ref 6.4–8.2)
WBC # BLD: 7.8 K/UL (ref 4–11)

## 2023-03-03 PROCEDURE — 71046 X-RAY EXAM CHEST 2 VIEWS: CPT

## 2023-03-03 PROCEDURE — 99284 EMERGENCY DEPT VISIT MOD MDM: CPT

## 2023-03-03 PROCEDURE — 85025 COMPLETE CBC W/AUTO DIFF WBC: CPT

## 2023-03-03 PROCEDURE — 80053 COMPREHEN METABOLIC PANEL: CPT

## 2023-03-03 ASSESSMENT — PAIN - FUNCTIONAL ASSESSMENT: PAIN_FUNCTIONAL_ASSESSMENT: NONE - DENIES PAIN

## 2023-03-04 NOTE — ED PROVIDER NOTES
**ADVANCED PRACTICE PROVIDER, I HAVE EVALUATED THIS PATIENT**        [address]      Pt Name: Charity Bhagat  BEF:[id number]  [place] 1970  Date of evaluation: 3/3/2023  Provider: Nicole Becerril PA-C  Note Started: 8:05 PM EST 3/3/2023        Chief Complaint:    Chief Complaint   Patient presents with    Fatigue     Pt arrives with c/o her voice \"going in and out\" as well as extreme fatigue and difficulty swallowing food. Pt reports hx. Of Myasthenia gravis and states her neurologist told her to come to ED         Nursing Notes, Past Medical Hx, Past Surgical Hx, Social Hx, Allergies, and Family Hx were all reviewed and agreed with or any disagreements were addressed in the HPI.    HPI: (Location, Duration, Timing, Severity, Quality, Assoc Sx, Context, Modifying factors)    History From: Patient      Chief Complaint of stating that she feels like she is in a myasthenia gravis acute exacerbation    This is a  46 y.o. female who presents stating that she was post have a regular follow-up with her neurologist yesterday but was caught in traffic. She states that she really was not having any exacerbation at that time. However since then feels like she has gotten significantly worse with fatigue through the evening, did not choke on anything but felt like it was more difficult to swallow last evening. Has been tolerating fluids okay. Has noticed some voice instability. She indicates these are all signs of her myasthenia gravis getting acutely worse which she thinks are brought on by a bunch of stress that she has been undergoing. She states that she talked to her neurologist who recommended that she come to the ER for further care and treatment.     PastMedical/Surgical History:      Diagnosis Date    Anxiety     Arthritis     Cancer ([facility] [address]     thyroid    GERD (gastroesophageal reflux disease)     Hyperlipidemia     Insomnia     Migraines Myasthenia gravis with exacerbation ([facility] [address]     Seizures ([facility] [address]     Thyroid disease          Procedure Laterality Date    APPENDECTOMY      CAPSULE ENDOSCOPY N/A 02/28/2020    ESOPHAGEAL CAPSULE ENDOSCOPY performed by Анна Dickens MD at [address] 12/17/2019    COLONOSCOPY performed by Nannette Blackwood MD at [address] Right 10/15/2021    19cm tunneled dual lumen dialysis catheter inserted by Dr. Amanda Hermosillo 02/11/2020    ESOPHAGEAL MANOMETRY performed by Анна Dickens MD at [address] ([address])      IR BIOPSY LIVER PERCUTANEOUS  10/15/2021    IR BIOPSY LIVER PERCUTANEOUS 10/15/2021 [facility]    IR TUNNELED CATHETER PLACEMENT GREATER THAN 5 YEARS  10/15/2021    IR TUNNELED CATHETER PLACEMENT GREATER THAN 5 YEARS 10/15/2021 [facility]    OTHER SURGICAL HISTORY Right 10/15/2021    Transjugular liver Biopsy    SHOULDER SURGERY      rotator cuff repair- bilateral    THYROIDECTOMY      TUMOR REMOVAL      UPPER GASTROINTESTINAL ENDOSCOPY N/A 12/17/2019    EGD BIOPSY performed by Nannette Blackwood MD at [facility] 12/17/2019    EGD DILATION SAVORY performed [facility]  02/28/2020    EGD DIAGNOSTIC ONLY performed by Анна Dickens MD at [facility]  02/28/2020    Esophagogastroduodenoscopy with Bravo Capsule placement performed by Анна Dickens MD at [facility]       Medications:  Previous Medications    BUTALBITAL-ACETAMINOPHEN-CAFFEINE (FIORICET, ESGIC) -40 MG PER TABLET    Take 1 tablet by mouth every 4 hours as needed for Migraine    CITALOPRAM (CELEXA) 40 MG TABLET    Take 40 mg by mouth every morning     DOXEPIN (SINEQUAN) 10 MG CAPSULE    Take 10 mg by mouth nightly    ESTRADIOL (ESTRACE) 2 MG TABLET    Take 2 mg by mouth nightly    FOLIC ACID (FOLVITE) 1 MG TABLET    Take 1 mg by mouth daily    LEVOTHYROXINE (SYNTHROID) 150 MCG TABLET    Take 150 mcg by mouth Daily    LORAZEPAM (ATIVAN) 0.5 MG TABLET    Take 0.5 mg by mouth 2 times daily as needed. METHOTREXATE (RHEUMATREX) 2.5 MG CHEMO TABLET    Take 10 mg by mouth once a week Take 4 tablets together every Monday. PYRIDOSTIGMINE (MESTINON) 60 MG TABLET    Take 60 mg by mouth 3 times daily    ZOLPIDEM (AMBIEN) 10 MG TABLET    Take 10 mg by mouth nightly. Review of Systems:  (1 systems needed)  Review of Systems  No recent fevers or chills cough congestion headache vision change neck or stiffness shortness of breath or chest pain or palpitations. No dizziness confusion syncope or near syncope. No abdominal pain or vomiting. No acute urine or stool change. No extremity acute loss range of motion or strength or coordination. Positive for generalized malaise and fatigue and symptoms as above. \"Positives and Pertinent negatives as per HPI\"    Physical Exam:  Physical Exam  Vitals and nursing note reviewed. Constitutional:       General: She is not in acute distress. Appearance: Normal appearance. She is not ill-appearing, toxic-appearing or diaphoretic. Comments: No hot potato voice, no stridor, no difficulty handling own secretions, voice full and patient speaks easily in normal length sentences. HENT:      Head: Normocephalic and atraumatic. Comments: No hot potato voice, no stridor, no difficulty handling own secretions, voice full and patient speaks easily in normal length sentences. Right Ear: External ear normal.      Left Ear: External ear normal.      Nose: Nose normal.      Mouth/Throat:      Mouth: Mucous membranes are moist.      Pharynx: No posterior oropharyngeal erythema. Eyes:      General:         Right eye: No discharge. Left eye: No discharge.       Conjunctiva/sclera: Conjunctivae normal. Cardiovascular:      Rate and Rhythm: Normal rate and regular rhythm. Pulses: Normal pulses. Heart sounds: Normal heart sounds. Pulmonary:      Effort: Pulmonary effort is normal. No respiratory distress. Abdominal:      Palpations: Abdomen is soft. Tenderness: There is no abdominal tenderness. Musculoskeletal:         General: Normal range of motion. Cervical back: Normal range of motion and neck supple. No rigidity or tenderness. Right lower leg: No edema. Left lower leg: No edema. Skin:     General: Skin is warm and dry. Capillary Refill: Capillary refill takes less than 2 seconds. Findings: No rash. Neurological:      Mental Status: She is alert and oriented to person, place, and time. Mental status is at baseline. Sensory: No sensory deficit. Motor: No weakness. Coordination: Coordination normal.      Gait: Gait normal.   Psychiatric:         Mood and Affect: Mood normal.         Behavior: Behavior normal.       MEDICAL DECISION MAKING    Vitals:    Vitals:    03/03/23 1556   BP: (!) 142/81   Pulse: 72   Resp: 16   Temp: 97.2 °F (36.2 °C)   TempSrc: Temporal   SpO2: 98%   Weight: 168 lb 10.4 oz (76.5 kg)   Height: 5' 2\" (1.575 m)       LABS:  Labs Reviewed   COMPREHENSIVE METABOLIC PANEL W/ REFLEX TO MG FOR LOW K - Abnormal; Notable for the following components:       Result Value    Albumin/Globulin Ratio 0.9 (*)     Alkaline Phosphatase 151 (*)     All other components within normal limits   CBC WITH AUTO DIFFERENTIAL   URINALYSIS WITH REFLEX TO CULTURE   POCT GLUCOSE        Remainder of labs reviewed and were negative at this time or not returned at the time of this note.     RADIOLOGY:   Non-plain film images such as CT, Ultrasound and MRI are read by the radiologist. Ludivina Arambula PA-C have directly visualized the radiologic plain film image(s) with the below findings:      Interpretation per the Radiologist below, if available at the time of this note:    XR CHEST (2 VW)   Final Result   No acute process. XR CHEST (2 VW)    Result Date: 3/3/2023  EXAMINATION: TWO XRAY VIEWS OF THE CHEST 3/3/2023 5:28 pm COMPARISON: None. HISTORY: ORDERING SYSTEM PROVIDED HISTORY: other TECHNOLOGIST PROVIDED HISTORY: Reason for exam:->other Reason for Exam: fatigue FINDINGS: Sternotomy changes are noted. The lungs and pleural spaces are without acute focal process. The cardiomediastinal silhouette is without acute process. There is no evidence of pneumothorax. The osseous structures are without acute process. There is a cervical fusion. No acute process. No results found. MEDICAL DECISION MAKING / ED COURSE:      PROCEDURES:   Procedures    None    Patient was given:  Medications - No data to display    CONSULTS: (Who and What was discussed)  None        Chronic Conditions affecting care:    has a past medical history of Anxiety, Arthritis, Cancer ([address] GERD (gastroesophageal reflux disease), Hyperlipidemia, Insomnia, Migraines, Myasthenia gravis with exacerbation ([address] Seizures ([address] and Thyroid disease. Records Reviewed (External and Source)     CC/HPI Summary, DDx, ED Course, and Reassessment: This patient presents as above and evaluation and treatment is begun. There is some significant delay with no provider assigned for the first 2 to 3 hours patient was waiting. I did apologize to the patient for this and we began care and treatment. Labs ordered including urinalysis and chest x-ray. Apparently there was some significant delay in patient being asked for urinalysis. Disposition Considerations (Tests not ordered but considered, Shared Decision Making, Pt Expectation of Test or Tx.):   Nursing comes back to me indicating patient is very upset that we are now asking for urinalysis. I expressed my apologies to the patient and that before coming to see her in the room that order has been placed.   She indicates that she no longer wishes to be admitted and treated through the ER even though she indicates that she really does need IV immunoglobulin treatment. She is unwilling to give a urinalysis to help rule out urinary tract infection as she thinks it has nothing to do with why she is fatigued and having changes in her voice and feels like she may be having myasthenia gravis exacerbation. Patient indicates that she feels she is an appropriate risk for home discharge. Vital signs are totally stable. No neurologic deficits evident at this time. Therefore patient will be discharged as follows. Follow-up at next available appointment with your neurologist.  In the meantime drink small amounts of liquid frequently to stay hydrated. Return to the emergency department for any emergency worsening or desire for further emergent care and treatment. The patient tolerated their visit well. I evaluated the patient. The physician was available for consultation as needed. The patient and / or the family were informed of the results of any tests, a time was given to answer questions, a plan was proposed and they agreed with plan. I am the Primary Clinician of Record. CLINICAL IMPRESSION:  1. Change in voice    2. Other fatigue    3.  History of myasthenia gravis        DISPOSITION Decision To Discharge 03/03/2023 08:02:19 PM      PATIENT REFERRED TO:  Rocco Villa - Neurology  [facility] [phone]  Schedule an appointment as soon as possible for a visit       DISCHARGE MEDICATIONS:  New Prescriptions    No medications on file       DISCONTINUED MEDICATIONS:  Discontinued Medications    No medications on file              (Please note the MDM and HPI sections of this note were completed with a voice recognition program.  Efforts were made to edit the dictations but occasionally words are mis-transcribed.)    Electronically signed, Reid aCrr PA-C,           Reid Carr PA-C  03/03/23 2011       Ricky Patel PA-C  03/03/23 2011       Ricky Patel PA-C  03/03/23 2029

## 2023-03-04 NOTE — DISCHARGE INSTRUCTIONS
Follow-up at next available appointment with your neurologist.  In the meantime drink small amounts of liquid frequently to stay hydrated. Return to the emergency department for any emergency worsening or desire for further emergent care and treatment.

## 2023-03-05 ENCOUNTER — HOSPITAL ENCOUNTER (INPATIENT)
Age: 53
LOS: 12 days | Discharge: HOME | End: 2023-03-17
Attending: INTERNAL MEDICINE | Admitting: INTERNAL MEDICINE
Payer: COMMERCIAL

## 2023-03-05 DIAGNOSIS — G70.01 MYASTHENIA GRAVIS WITH ACUTE EXACERBATION (HCC): Primary | ICD-10-CM

## 2023-03-05 DIAGNOSIS — J96.90 RESPIRATORY FAILURE REQUIRING INTUBATION (HCC): ICD-10-CM

## 2023-03-05 PROCEDURE — 99222 1ST HOSP IP/OBS MODERATE 55: CPT

## 2023-03-05 PROCEDURE — 94799 UNLISTED PULMONARY SVC/PX: CPT

## 2023-03-05 PROCEDURE — 2580000003 HC RX 258: Performed by: INTERNAL MEDICINE

## 2023-03-05 PROCEDURE — 2000000000 HC ICU R&B

## 2023-03-05 PROCEDURE — 6A550Z3 PHERESIS OF PLASMA, SINGLE: ICD-10-PCS | Performed by: INTERNAL MEDICINE

## 2023-03-05 PROCEDURE — 6370000000 HC RX 637 (ALT 250 FOR IP): Performed by: INTERNAL MEDICINE

## 2023-03-05 PROCEDURE — 6360000002 HC RX W HCPCS: Performed by: INTERNAL MEDICINE

## 2023-03-05 RX ORDER — SODIUM CHLORIDE 0.9 % (FLUSH) 0.9 %
5-40 SYRINGE (ML) INJECTION EVERY 12 HOURS SCHEDULED
Status: DISCONTINUED | OUTPATIENT
Start: 2023-03-05 | End: 2023-03-17 | Stop reason: HOSPADM

## 2023-03-05 RX ORDER — DOXEPIN HYDROCHLORIDE 10 MG/1
10 CAPSULE ORAL NIGHTLY
Status: DISCONTINUED | OUTPATIENT
Start: 2023-03-05 | End: 2023-03-07

## 2023-03-05 RX ORDER — BUTALBITAL, ACETAMINOPHEN AND CAFFEINE 50; 325; 40 MG/1; MG/1; MG/1
1 TABLET ORAL EVERY 4 HOURS PRN
Status: DISCONTINUED | OUTPATIENT
Start: 2023-03-05 | End: 2023-03-11

## 2023-03-05 RX ORDER — ZOLPIDEM TARTRATE 5 MG/1
10 TABLET ORAL NIGHTLY
Status: DISCONTINUED | OUTPATIENT
Start: 2023-03-05 | End: 2023-03-06

## 2023-03-05 RX ORDER — LORAZEPAM 0.5 MG/1
0.5 TABLET ORAL 2 TIMES DAILY PRN
Status: DISCONTINUED | OUTPATIENT
Start: 2023-03-05 | End: 2023-03-17 | Stop reason: HOSPADM

## 2023-03-05 RX ORDER — ENOXAPARIN SODIUM 100 MG/ML
40 INJECTION SUBCUTANEOUS NIGHTLY
Status: DISCONTINUED | OUTPATIENT
Start: 2023-03-05 | End: 2023-03-17 | Stop reason: HOSPADM

## 2023-03-05 RX ORDER — PYRIDOSTIGMINE BROMIDE 60 MG/1
60 TABLET ORAL 3 TIMES DAILY
Status: DISCONTINUED | OUTPATIENT
Start: 2023-03-05 | End: 2023-03-06

## 2023-03-05 RX ORDER — CITALOPRAM 40 MG/1
40 TABLET ORAL EVERY MORNING
Status: DISCONTINUED | OUTPATIENT
Start: 2023-03-06 | End: 2023-03-07

## 2023-03-05 RX ORDER — LEVOTHYROXINE SODIUM 0.15 MG/1
150 TABLET ORAL DAILY
Status: DISCONTINUED | OUTPATIENT
Start: 2023-03-05 | End: 2023-03-07

## 2023-03-05 RX ORDER — ONDANSETRON 2 MG/ML
4 INJECTION INTRAMUSCULAR; INTRAVENOUS EVERY 6 HOURS PRN
Status: DISCONTINUED | OUTPATIENT
Start: 2023-03-05 | End: 2023-03-17 | Stop reason: HOSPADM

## 2023-03-05 RX ORDER — POLYETHYLENE GLYCOL 3350 17 G/17G
17 POWDER, FOR SOLUTION ORAL DAILY PRN
Status: DISCONTINUED | OUTPATIENT
Start: 2023-03-05 | End: 2023-03-12

## 2023-03-05 RX ORDER — SODIUM CHLORIDE 0.9 % (FLUSH) 0.9 %
5-40 SYRINGE (ML) INJECTION PRN
Status: DISCONTINUED | OUTPATIENT
Start: 2023-03-05 | End: 2023-03-17 | Stop reason: HOSPADM

## 2023-03-05 RX ORDER — ESTRADIOL 0.5 MG/1
2 TABLET ORAL NIGHTLY
Status: DISCONTINUED | OUTPATIENT
Start: 2023-03-05 | End: 2023-03-07

## 2023-03-05 RX ORDER — ONDANSETRON 4 MG/1
4 TABLET, ORALLY DISINTEGRATING ORAL EVERY 8 HOURS PRN
Status: DISCONTINUED | OUTPATIENT
Start: 2023-03-05 | End: 2023-03-17 | Stop reason: HOSPADM

## 2023-03-05 RX ORDER — ACETAMINOPHEN 650 MG/1
650 SUPPOSITORY RECTAL EVERY 6 HOURS PRN
Status: DISCONTINUED | OUTPATIENT
Start: 2023-03-05 | End: 2023-03-09

## 2023-03-05 RX ORDER — ACETAMINOPHEN 325 MG/1
650 TABLET ORAL EVERY 6 HOURS PRN
Status: DISCONTINUED | OUTPATIENT
Start: 2023-03-05 | End: 2023-03-09

## 2023-03-05 RX ORDER — SODIUM CHLORIDE 9 MG/ML
INJECTION, SOLUTION INTRAVENOUS PRN
Status: DISCONTINUED | OUTPATIENT
Start: 2023-03-05 | End: 2023-03-17 | Stop reason: HOSPADM

## 2023-03-05 RX ORDER — FOLIC ACID 1 MG/1
1 TABLET ORAL DAILY
Status: DISCONTINUED | OUTPATIENT
Start: 2023-03-05 | End: 2023-03-07

## 2023-03-05 RX ADMIN — SODIUM CHLORIDE, PRESERVATIVE FREE 10 ML: 5 INJECTION INTRAVENOUS at 21:37

## 2023-03-05 RX ADMIN — ENOXAPARIN SODIUM 40 MG: 100 INJECTION SUBCUTANEOUS at 21:35

## 2023-03-05 RX ADMIN — PYRIDOSTIGMINE BROMIDE 60 MG: 60 TABLET ORAL at 21:36

## 2023-03-05 RX ADMIN — IMMUNE GLOBULIN (HUMAN) 50 G: 10 INJECTION INTRAVENOUS; SUBCUTANEOUS at 22:39

## 2023-03-05 RX ADMIN — DOXEPIN HYDROCHLORIDE 10 MG: 10 CAPSULE ORAL at 21:35

## 2023-03-05 RX ADMIN — FOLIC ACID 1 MG: 1 TABLET ORAL at 18:54

## 2023-03-05 RX ADMIN — ESTRADIOL 2 MG: 0.5 TABLET ORAL at 21:35

## 2023-03-05 RX ADMIN — LEVOTHYROXINE SODIUM 150 MCG: 150 TABLET ORAL at 18:54

## 2023-03-05 RX ADMIN — ZOLPIDEM TARTRATE 10 MG: 5 TABLET ORAL at 21:35

## 2023-03-05 NOTE — CONSULTS
Neurology / Imtiaz Sheikh Note      Gay Gutierres MD is requesting this consult. Reason for Consult: myasthenia gravis exacerbation  Admission Chief Complaint: fatigue, hoarse voice    History of Present Illness     Joaquin Whalen is a 46 y.o. y/o female with history significant for anxiety, HLD, migraines, MG, seizures and thyroid disease. Per my interview with the patient she noticed some difficulty chewing and a new rasp to her voice on 3/2/23. Patient attempted to correct this by taking smaller bites, but then began to develop generalized fatigue and came in to the hospital.  Patient states this is similar to previous MG exacerbations in the past.  She denies any recent illnesses. Patient admitted to [address] for further evaluation and treatment. Of note: patient is currently on mestinon 60mg TID at home and follows with [address] [place] [address] [address] Neurology. History provided by:  Patient   Chart review      REVIEW OF SYSTEMS:   Constitutional- No weight loss or fevers   Neurologic- No headache. No vision changes. No focal motor/sensory changes. C/o fatigue, hoarse voice.      Past Medical, Surgical, Family, and Social History   PAST MEDICAL HISTORY:  Past Medical History:   Diagnosis Date    Anxiety     Arthritis     Cancer ([facility] [address]     thyroid    GERD (gastroesophageal reflux disease)     Hyperlipidemia     Insomnia     Migraines     Myasthenia gravis with exacerbation (HCC)     Seizures ([facility] [address]     Thyroid disease      SURGICAL HISTORY:  Past Surgical History:   Procedure Laterality Date    APPENDECTOMY      CAPSULE ENDOSCOPY N/A 02/28/2020    ESOPHAGEAL CAPSULE ENDOSCOPY performed by Kalyn Ovalle MD at [address] 12/17/2019    COLONOSCOPY performed by Coco Thayer MD at [address] Right 10/15/2021    19cm tunneled dual lumen dialysis catheter inserted by Dr. Matilde Knight N/A 02/11/2020 ESOPHAGEAL MANOMETRY performed by Sandeep Morfin MD at [address] [place], [place] [address]is (CERVIX STATUS UNKNOWN)      IR BIOPSY LIVER PERCUTANEOUS  10/15/2021    IR BIOPSY LIVER PERCUTANEOUS 10/15/2021 [facility]    IR TUNNELED CATHETER PLACEMENT GREATER THAN 5 YEARS  10/15/2021    IR TUNNELED CATHETER PLACEMENT GREATER THAN 5 YEARS 10/15/2021 [facility]    OTHER SURGICAL HISTORY Right 10/15/2021    Transjugular liver Biopsy    SHOULDER SURGERY      rotator cuff repair- bilateral    THYROIDECTOMY      TUMOR REMOVAL      UPPER GASTROINTESTINAL ENDOSCOPY N/A 12/17/2019    EGD BIOPSY performed by Shiva Jacobo MD at [facility] N/A 12/17/2019    EGD DILATION SAVORY performed by Shiva Jacobo MD at [facility]  02/28/2020    EGD DIAGNOSTIC ONLY performed by Sandeep Morfin MD at [facility]  02/28/2020    Esophagogastroduodenoscopy with Bravo Capsule placement performed by Sandeep Morfin MD at [place] [id number].:  Family history non-contributory  Family History   Problem Relation Age of Onset    Heart Disease Father     High Cholesterol Father     High Blood Pressure Father      Social History     Tobacco Use    Smoking status: Never    Smokeless tobacco: Never   Vaping Use    Vaping Use: Never used   Substance Use Topics    Alcohol use: Never    Drug use: Never         Allergies & Outpatient Medications   ALLERGIES:  Allergies   Allergen Reactions    Immune Globulins Other (See Comments)     FLEBOGAMMA - ASEPTIC MENINGITIS    Other      Steroid injection to joints    Adhesive Tape Rash and Other (See Comments)     Paper Tape  Paper Tape    Skin gets reddened under tape; not allergic to latex    Penicillins Itching and Rash     Rash  Rash       HOME MEDICATIONS:  Current Discharge Medication List        CONTINUE these medications which have NOT CHANGED    Details   pyridostigmine (MESTINON) 60 MG tablet Take 60 mg by mouth 3 times daily      levothyroxine (SYNTHROID) 150 MCG tablet Take 150 mcg by mouth Daily      butalbital-acetaminophen-caffeine (FIORICET, ESGIC) -40 MG per tablet Take 1 tablet by mouth every 4 hours as needed for Migraine      LORazepam (ATIVAN) 0.5 MG tablet Take 0.5 mg by mouth 2 times daily as needed. methotrexate (RHEUMATREX) 2.5 MG chemo tablet Take 10 mg by mouth once a week Take 4 tablets together every Monday. doxepin (SINEQUAN) 10 MG capsule Take 10 mg by mouth nightly      estradiol (ESTRACE) 2 MG tablet Take 2 mg by mouth nightly      folic acid (FOLVITE) 1 MG tablet Take 1 mg by mouth daily      zolpidem (AMBIEN) 10 MG tablet Take 10 mg by mouth nightly.       citalopram (CELEXA) 40 MG tablet Take 40 mg by mouth every morning                Physical Exam   PHYSICAL EXAM:  Vitals:    03/05/23 1806   BP: 128/81   Pulse: 66   Resp: 16   Temp: 97.9 °F (36.6 °C)   TempSrc: Oral   SpO2: 95%         General: Alert, no distress, well-nourished  Neurologic  Mental status:   Orientation: to person, place, time, situation   Attention: intact as able to attend well to the exam     Language: fluent in conversation   Comprehension intact; follows simple commands    Cranial nerves:   CN2: Visual fields full w/o extinction on confrontational testing   CN 3,4,6: Pupils equal and reactive to light, extraocular muscles intact  CN5: Facial sensation symmetric   CN7: Face symmetric  CN8: Hearing symmetric to spoken voice  CN9: Palate elevated symmetrically  CN11: Traps full strength on shoulder shrug  CN12: Tongue midline with protrusion    Motor Exam:    Neck Flexion: 4/5, full ROM  Neck Extension: 4/5, full ROM   R  L    Deltoid 4  4   Biceps 4 4   Triceps 4 4   Wrist extension  4 4   Interossei 4 4      R  L    Hip flexion  4  4   Hip extension  4 4   Knee flexion  4 4   Knee extension  4 4   Ankle dorsiflexion  4 4   Ankle plantar flexion  4 4     Deep tendon reflexes:    R  L    Biceps  2  2   Brachioradialis  2 2   Patellar  2 2     Sensory: light touch intact and symmetric in all 4 extremities. No sensory extinction on bilateral simultaneous stimulation  Cerebellar/coordination: finger nose finger normal without ataxia  Tone: normal in all 4 extremities  Gait: held 2/2 patient safety    Count Test: able to count to 16 on exhale. No SOB or difficulty with secretions. Diagnostic Testing Results   IMAGES:  Images personally reviewed and agree w/ radiology interpretation. No pertinent imaging at this time    LABS:  All results below personally reviewed. Pertinent positives & negatives are addressed in Impression & Recommendations below. LABS   Metabolic Panel Recent Labs     03/03/23  1751      K 4.1      CO2 24   BUN 12   CREATININE 0.6   GLUCOSE 86   CALCIUM 8.8   LABALBU 3.4   ALKPHOS 151*   ALT 11   AST 15      CBC / Coags Recent Labs     03/03/23  1751   WBC 7.8   RBC 4.63   HGB 13.0   HCT 40.7         Other No results for input(s): LABA1C, LDLCALC, TRIG, TSH, SZOECRIE79, FOLATE, LABSALI, COVID19 in the last 72 hours. No results for input(s): PHENYTOIN, KEPPRA, LACOSA, LAMO, VALPROATE, LACTSEPSIS, LACTA in the last 72 hours. CURRENT SCHEDULED MEDICATIONS   Inpatient Medications     [START ON 3/6/2023] citalopram, 40 mg, Oral, QAM    doxepin, 10 mg, Oral, Nightly    estradiol, 2 mg, Oral, Nightly    folic acid, 1 mg, Oral, Daily    levothyroxine, 150 mcg, Oral, Daily    [START ON 3/6/2023] methotrexate, 10 mg, Oral, Weekly    pyridostigmine, 60 mg, Oral, TID    zolpidem, 10 mg, Oral, Nightly    sodium chloride flush, 5-40 mL, IntraVENous, 2 times per day    enoxaparin, 40 mg, SubCUTAneous, Nightly    immune globulin, 50 g, IntraVENous, Daily   Infusions    sodium chloride        Antibiotics   Recent Abx Admin        No antibiotic orders with administrations found. IMPRESSION & RECOMMENDATIONS     IMPRESSION:  Sivakumar Mckinney is a 45 yo female with a known history of Myasthenia Gravis with previous exacerbations. Patient presented with trouble chewing, hoarseness of voice, and fatigue. On exam, patient states this is how she felt during previous exacerbations. Patient able to hold head up under own power, and is able to maintain control of her secretions. Muscular strength 4/5 throughout.   Concern for myasthenia gravis exacerbation, does not appear to be a crisis at time of exam     RECOMMENDATIONS:   -q4h neuro checks  -As able, please avoid the following medications as able as they can exacerbate myasthenia gravis:   Anesthetic agents: neuromuscular blocking agents  Cardiac Drugs: beta-blockers, procainamide, quinidine                Antibiotics: aminoglycosides, fluoroquinolines  -Monitor neuromuscular respiratory parameters Q Shift: FVC and NIF measurements if FVC 15ml/kg or less (normal >= 60ml/kg)  and NIF 20  cm H2O or less (normal >=70cm H2O) notify Neurology Immediately  -For myasthenic crisis, treat with IVIG (2grams/kg/total dose divided over 2 or 5 days, over 5 days if cannot tolerate high volume) or PLEX  -Ok to continue home mestinon dose, hold if patient has trouble clearing secretions    -Aspiration precautions                -Up for meals                -Frequent oral care      Management and plan discussed with:   Bedside nurse  Patient    BETH Bell - CNP   Neurology & Neurocritical Care   3/5/2023 8:29 PM    ICU Patients:   Neurocritical Care Line: [phone]  [facility]: [facility] Neurocritical Care    Floor / PCU Patients:  Neurology Line: [phone]  [facility]: [facility] Neurology

## 2023-03-05 NOTE — PROGRESS NOTES
Pt transferred to room 5526. Pt A&Ox4, VSS. Vitals:    03/05/23 1806   BP: 128/81   Pulse: 66   Resp: 16   Temp: 97.9 °F (36.6 °C)   SpO2: 95%     Hand  and plantar-dorsiflexions weak, all four extremities. Pt denies numbness, tingling. Call light within reach, fall precautions in place.

## 2023-03-05 NOTE — PROGRESS NOTES
4 Eyes Admission Assessment     I agree as the admission nurse that 2 RN's have performed a thorough Head to Toe Skin Assessment on the patient. ALL assessment sites listed below have been assessed on admission. Areas assessed by both nurses:   [x]   Head, Face, and Ears   [x]   Shoulders, Back, and Chest  [x]   Arms, Elbows, and Hands   [x]   Coccyx, Sacrum, and Ischium  [x]   Legs, Feet, and Heels        Does the Patient have Skin Breakdown?   No         Adam Prevention initiated:  No   Wound Care Orders initiated:  No      [facility] nurse consulted for Pressure Injury (Stage 3,4, Unstageable, DTI, NWPT, and Complex wounds) or Adam score 18 or lower:  No      Nurse 1 eSignature: Electronically signed by Alex Zhu RN on 3/5/23 at 6:44 PM EST    **SHARE this note so that the co-signing nurse is able to place an eSignature**    Nurse 2 eSignature: Electronically signed by Carolin Delong RN on 3/5/23 at 7:21 PM EST

## 2023-03-06 ENCOUNTER — APPOINTMENT (OUTPATIENT)
Dept: GENERAL RADIOLOGY | Age: 53
End: 2023-03-06
Attending: INTERNAL MEDICINE
Payer: COMMERCIAL

## 2023-03-06 PROBLEM — J96.90 RESPIRATORY FAILURE REQUIRING INTUBATION (HCC): Status: ACTIVE | Noted: 2023-03-06

## 2023-03-06 LAB
ALBUMIN SERPL-MCNC: 3.3 G/DL (ref 3.4–5)
ALP BLD-CCNC: 150 U/L (ref 40–129)
ALT SERPL-CCNC: 9 U/L (ref 10–40)
ANION GAP SERPL CALCULATED.3IONS-SCNC: 10 MMOL/L (ref 3–16)
AST SERPL-CCNC: 16 U/L (ref 15–37)
BASE EXCESS ARTERIAL: 2.7 MMOL/L (ref -3–3)
BASOPHILS ABSOLUTE: 0 K/UL (ref 0–0.2)
BASOPHILS RELATIVE PERCENT: 0.9 %
BILIRUB SERPL-MCNC: 0.3 MG/DL (ref 0–1)
BILIRUBIN DIRECT: <0.2 MG/DL (ref 0–0.3)
BILIRUBIN, INDIRECT: ABNORMAL MG/DL (ref 0–1)
BUN BLDV-MCNC: 11 MG/DL (ref 7–20)
CALCIUM SERPL-MCNC: 8.8 MG/DL (ref 8.3–10.6)
CARBOXYHEMOGLOBIN ARTERIAL: 0.6 % (ref 0–1.5)
CHLORIDE BLD-SCNC: 100 MMOL/L (ref 99–110)
CO2: 26 MMOL/L (ref 21–32)
CREAT SERPL-MCNC: 0.6 MG/DL (ref 0.6–1.1)
EOSINOPHILS ABSOLUTE: 0.1 K/UL (ref 0–0.6)
EOSINOPHILS RELATIVE PERCENT: 2.9 %
GFR SERPL CREATININE-BSD FRML MDRD: >60 ML/MIN/{1.73_M2}
GLUCOSE BLD-MCNC: 90 MG/DL (ref 70–99)
HCO3 ARTERIAL: 28 MMOL/L (ref 21–29)
HCT VFR BLD CALC: 38.7 % (ref 36–48)
HEMOGLOBIN, ART, EXTENDED: 13.1 G/DL
HEMOGLOBIN: 12.7 G/DL (ref 12–16)
LYMPHOCYTES ABSOLUTE: 0.7 K/UL (ref 1–5.1)
LYMPHOCYTES RELATIVE PERCENT: 15 %
MCH RBC QN AUTO: 28.2 PG (ref 26–34)
MCHC RBC AUTO-ENTMCNC: 32.9 G/DL (ref 31–36)
MCV RBC AUTO: 85.8 FL (ref 80–100)
METHEMOGLOBIN ARTERIAL: 0.1 % (ref 0–1.4)
MONOCYTES ABSOLUTE: 0.3 K/UL (ref 0–1.3)
MONOCYTES RELATIVE PERCENT: 5.5 %
NEUTROPHILS ABSOLUTE: 3.5 K/UL (ref 1.7–7.7)
NEUTROPHILS RELATIVE PERCENT: 75.7 %
O2 SAT, ARTERIAL: 100 % (ref 93–100)
PCO2 ARTERIAL: 43.9 MMHG (ref 35–45)
PDW BLD-RTO: 13.4 % (ref 12.4–15.4)
PH ARTERIAL: 7.41 (ref 7.35–7.45)
PLATELET # BLD: 264 K/UL (ref 135–450)
PMV BLD AUTO: 7.1 FL (ref 5–10.5)
PO2 ARTERIAL: 202 MMHG (ref 75–108)
POTASSIUM REFLEX MAGNESIUM: 4.1 MMOL/L (ref 3.5–5.1)
RBC # BLD: 4.51 M/UL (ref 4–5.2)
SODIUM BLD-SCNC: 136 MMOL/L (ref 136–145)
TCO2 ARTERIAL: 29 MMOL/L
TOTAL PROTEIN: 8.4 G/DL (ref 6.4–8.2)
WBC # BLD: 4.6 K/UL (ref 4–11)

## 2023-03-06 PROCEDURE — 6360000002 HC RX W HCPCS: Performed by: STUDENT IN AN ORGANIZED HEALTH CARE EDUCATION/TRAINING PROGRAM

## 2023-03-06 PROCEDURE — 6360000002 HC RX W HCPCS

## 2023-03-06 PROCEDURE — 94761 N-INVAS EAR/PLS OXIMETRY MLT: CPT

## 2023-03-06 PROCEDURE — 0BH17EZ INSERTION OF ENDOTRACHEAL AIRWAY INTO TRACHEA, VIA NATURAL OR ARTIFICIAL OPENING: ICD-10-PCS | Performed by: INTERNAL MEDICINE

## 2023-03-06 PROCEDURE — 31500 INSERT EMERGENCY AIRWAY: CPT

## 2023-03-06 PROCEDURE — 6370000000 HC RX 637 (ALT 250 FOR IP): Performed by: INTERNAL MEDICINE

## 2023-03-06 PROCEDURE — 2580000003 HC RX 258

## 2023-03-06 PROCEDURE — 2500000003 HC RX 250 WO HCPCS: Performed by: STUDENT IN AN ORGANIZED HEALTH CARE EDUCATION/TRAINING PROGRAM

## 2023-03-06 PROCEDURE — 80048 BASIC METABOLIC PNL TOTAL CA: CPT

## 2023-03-06 PROCEDURE — 82803 BLOOD GASES ANY COMBINATION: CPT

## 2023-03-06 PROCEDURE — 2580000003 HC RX 258: Performed by: INTERNAL MEDICINE

## 2023-03-06 PROCEDURE — 99233 SBSQ HOSP IP/OBS HIGH 50: CPT | Performed by: PSYCHIATRY & NEUROLOGY

## 2023-03-06 PROCEDURE — 80076 HEPATIC FUNCTION PANEL: CPT

## 2023-03-06 PROCEDURE — 5A1955Z RESPIRATORY VENTILATION, GREATER THAN 96 CONSECUTIVE HOURS: ICD-10-PCS | Performed by: INTERNAL MEDICINE

## 2023-03-06 PROCEDURE — 99223 1ST HOSP IP/OBS HIGH 75: CPT | Performed by: INTERNAL MEDICINE

## 2023-03-06 PROCEDURE — 85025 COMPLETE CBC W/AUTO DIFF WBC: CPT

## 2023-03-06 PROCEDURE — 94799 UNLISTED PULMONARY SVC/PX: CPT

## 2023-03-06 PROCEDURE — 36415 COLL VENOUS BLD VENIPUNCTURE: CPT

## 2023-03-06 PROCEDURE — 71045 X-RAY EXAM CHEST 1 VIEW: CPT

## 2023-03-06 PROCEDURE — 31500 INSERT EMERGENCY AIRWAY: CPT | Performed by: INTERNAL MEDICINE

## 2023-03-06 PROCEDURE — 6370000000 HC RX 637 (ALT 250 FOR IP)

## 2023-03-06 PROCEDURE — 2000000000 HC ICU R&B

## 2023-03-06 PROCEDURE — 2700000000 HC OXYGEN THERAPY PER DAY

## 2023-03-06 PROCEDURE — 94002 VENT MGMT INPAT INIT DAY: CPT

## 2023-03-06 PROCEDURE — 6360000002 HC RX W HCPCS: Performed by: INTERNAL MEDICINE

## 2023-03-06 RX ORDER — FENTANYL CITRATE-0.9 % NACL/PF 10 MCG/ML
25-200 PLASTIC BAG, INJECTION (ML) INTRAVENOUS CONTINUOUS
Status: DISCONTINUED | OUTPATIENT
Start: 2023-03-06 | End: 2023-03-08

## 2023-03-06 RX ORDER — HYDRALAZINE HYDROCHLORIDE 20 MG/ML
5 INJECTION INTRAMUSCULAR; INTRAVENOUS EVERY 4 HOURS PRN
Status: DISCONTINUED | OUTPATIENT
Start: 2023-03-06 | End: 2023-03-17 | Stop reason: HOSPADM

## 2023-03-06 RX ORDER — PROPOFOL 10 MG/ML
INJECTION, EMULSION INTRAVENOUS
Status: DISPENSED
Start: 2023-03-06 | End: 2023-03-07

## 2023-03-06 RX ORDER — PROPOFOL 10 MG/ML
5-50 INJECTION, EMULSION INTRAVENOUS CONTINUOUS
Status: DISCONTINUED | OUTPATIENT
Start: 2023-03-06 | End: 2023-03-15

## 2023-03-06 RX ORDER — PANTOPRAZOLE SODIUM 40 MG/10ML
40 INJECTION, POWDER, LYOPHILIZED, FOR SOLUTION INTRAVENOUS DAILY
Status: DISCONTINUED | OUTPATIENT
Start: 2023-03-07 | End: 2023-03-13 | Stop reason: SDUPTHER

## 2023-03-06 RX ADMIN — SODIUM CHLORIDE, PRESERVATIVE FREE 10 ML: 5 INJECTION INTRAVENOUS at 08:50

## 2023-03-06 RX ADMIN — CITALOPRAM 40 MG: 40 TABLET, FILM COATED ORAL at 08:44

## 2023-03-06 RX ADMIN — IMMUNE GLOBULIN (HUMAN) 50 G: 10 INJECTION INTRAVENOUS; SUBCUTANEOUS at 11:44

## 2023-03-06 RX ADMIN — METHOTREXATE 10 MG: 2.5 TABLET ORAL at 09:11

## 2023-03-06 RX ADMIN — SODIUM CHLORIDE, PRESERVATIVE FREE 5 ML: 5 INJECTION INTRAVENOUS at 21:05

## 2023-03-06 RX ADMIN — PROPOFOL 20 MCG/KG/MIN: 10 INJECTION, EMULSION INTRAVENOUS at 12:43

## 2023-03-06 RX ADMIN — Medication 50 MCG/HR: at 14:09

## 2023-03-06 RX ADMIN — FOLIC ACID 1 MG: 1 TABLET ORAL at 08:44

## 2023-03-06 RX ADMIN — ESTRADIOL 2 MG: 0.5 TABLET ORAL at 21:45

## 2023-03-06 RX ADMIN — HYDRALAZINE HYDROCHLORIDE 5 MG: 20 INJECTION INTRAMUSCULAR; INTRAVENOUS at 18:05

## 2023-03-06 RX ADMIN — ENOXAPARIN SODIUM 40 MG: 100 INJECTION SUBCUTANEOUS at 21:04

## 2023-03-06 RX ADMIN — PYRIDOSTIGMINE BROMIDE 60 MG: 60 TABLET ORAL at 08:45

## 2023-03-06 RX ADMIN — ONDANSETRON 4 MG: 2 INJECTION INTRAMUSCULAR; INTRAVENOUS at 12:15

## 2023-03-06 RX ADMIN — PROPOFOL 30 MCG/KG/MIN: 10 INJECTION, EMULSION INTRAVENOUS at 16:51

## 2023-03-06 RX ADMIN — LEVOTHYROXINE SODIUM 150 MCG: 150 TABLET ORAL at 06:05

## 2023-03-06 RX ADMIN — DOXEPIN HYDROCHLORIDE 10 MG: 10 CAPSULE ORAL at 21:44

## 2023-03-06 ASSESSMENT — PULMONARY FUNCTION TESTS
PIF_VALUE: 30
PIF_VALUE: 21
PIF_VALUE: 21

## 2023-03-06 ASSESSMENT — PAIN SCALES - GENERAL
PAINLEVEL_OUTOF10: 0

## 2023-03-06 NOTE — PROGRESS NOTES
SBP sustaining above 160. ICU residents aware, PRN hydralazine ordered and given. ABG drawn, see below for results. FiO2 decreased from 50 to 35%. ICU residents and RT aware. , Praveena Espitia, updated again on patient's status.      Latest Reference Range & Units 3/6/23 16:58   Hemoglobin, Art, Extended g/dL 13.10   pH, Arterial 7.350 - 7.450  7.411   pCO2, Arterial 35.0 - 45.0 mmHg 43.9   pO2, Arterial 75.0 - 108.0 mmHg 202.0 (H)   HCO3, Arterial 21 - 29 mmol/L 28   TCO2 (calc), Art mmol/L 29   Base Excess, Arterial -3.0 - 3.0 mmol/L 2.7   O2 Sat, Arterial 93 - 100 % 100   Methemoglobin, Arterial 0.0 - 1.4 % 0.1   Carboxyhgb, Arterial 0.0 - 1.5 % 0.6

## 2023-03-06 NOTE — PROGRESS NOTES
Per Dr. Virgie Naranjo not appropriate at this time. Patient states she is feeling weaker and has previously been intubated. Will continue to monitor closely. RN at bedside for update.

## 2023-03-06 NOTE — PROCEDURES
Indication myasthenia gravis with acute crisis. Progressive muscle weakness, inability to maintain adequate ventilation  Sedation: Propofol, 200 mg IVP  After adequate sedation with IV bolus propofol, upper airway was readily visualized with glide scope, using #4 blade. 8.0 mm ETT was placed easily between vocal cords into the trachea, positioned at 26 cm at the lip with Ambu bag support, ET CO2 curve consistent with placement into the lower airways. Follow-up chest x-ray showed ETT in RMB, with opacification of left lung. ETT repositioned out 3 cm per RT. With repeat x-ray, ETT still at douglas. Repositioned to 22 cm at the lip, with chest x-ray showing good position above the main douglas.

## 2023-03-06 NOTE — PROGRESS NOTES
Went to assess patient in room on 5 tower. Current care team is concerned about patient's increasing respiratory distress. Patient is reporting weakness with swallowing muscles. She says she has had a Myasthenia crisis in the past that required intubation. I spoke to the patient about the potential need for intubation this hospital visit and she is agreeable to be intubated if needed. She will be transferred to the ICU for close monitoring of her respiratory status. She will be placed on BiPAP and reassessed frequently.      Drea Gilbert MD, PGY-1  Internal Medicine   3/6/2023

## 2023-03-06 NOTE — PROGRESS NOTES
Pt states she is \"smothing\" and \"suffocating. \" Work of breathing has increased, remains on 1L NC. Dr. Alexandre Solitario aware and at bedside. Dr. Alexandre Solitario and RT intubated patient at [phone]. ETT secured at 25 from the teeth. CXR ordered. Propofol given for sedation and propofol gtt started per order. NG placed per order. KUB ordered.

## 2023-03-06 NOTE — PROGRESS NOTES
NEUROLOGY / NEUROCRITICAL CARE PROGRESS NOTE       Patient Name: Young Arredondo YOB: 1970   Sex: Female Age: 46 yrs     CC / Reason for Consult: myasthenic crisis     Interval Hx / Changes over last 24 hours:     Called by RT this morning for change in neuromuscular respiratory parameters. NIF: -16, FVC: 780ml      ROS: \"feels like my throat is sticking\", +SOB    HISTORY   Admission HPI:   Young Arredondo is a 46 y.o. y/o female with history significant for anxiety, HLD, migraines, MG, seizures and thyroid disease. Per my interview with the patient she noticed some difficulty chewing and a new rasp to her voice on 3/2/23. Patient attempted to correct this by taking smaller bites, but then began to develop generalized fatigue and came in to the hospital.  Patient states this is similar to previous MG exacerbations in the past.  She denies any recent illnesses. Patient admitted to [address] for further evaluation and treatment. Of note: patient is currently on mestinon 60mg TID at home and follows with [address] [place] [address] [address] Neurology. [facility] Past Medical History:   Diagnosis Date    Anxiety     Arthritis     Cancer ([facility] [address]     thyroid    GERD (gastroesophageal reflux disease)     Hyperlipidemia     Insomnia     Migraines     Myasthenia gravis with exacerbation (HCC)     Seizures (HCC)     Thyroid disease       Allergies Allergies   Allergen Reactions    Immune Globulins Other (See Comments)     FLEBOGAMMA - ASEPTIC MENINGITIS    Other      Steroid injection to joints    Adhesive Tape Rash and Other (See Comments)     Paper Tape  Paper Tape    Skin gets reddened under tape; not allergic to latex    Penicillins Itching and Rash     Rash  Rash        Diet ADULT DIET;  Regular; Low Fat/Low Chol/High Fiber/2 gm Na   Isolation No active isolations     CURRENT SCHEDULED MEDICATIONS   Inpatient Medications     citalopram, 40 mg, Oral, QAM    doxepin, 10 mg, Oral, Nightly    estradiol, 2 mg, Oral, Nightly    folic acid, 1 mg, Oral, Daily    levothyroxine, 150 mcg, Oral, Daily    methotrexate, 10 mg, Oral, Weekly    pyridostigmine, 60 mg, Oral, TID    zolpidem, 10 mg, Oral, Nightly    sodium chloride flush, 5-40 mL, IntraVENous, 2 times per day    enoxaparin, 40 mg, SubCUTAneous, Nightly    immune globulin, 50 g, IntraVENous, Daily   Infusions    sodium chloride        Antibiotics   Recent Abx Admin        No antibiotic orders with administrations found. LABS   Metabolic Panel Recent Labs     03/03/23  1751 03/06/23  0726    136   K 4.1 4.1    100   CO2 24 26   BUN 12 11   CREATININE 0.6 0.6   GLUCOSE 86 90   CALCIUM 8.8 8.8   LABALBU 3.4 3.3*   ALKPHOS 151* 150*   ALT 11 9*   AST 15 16      CBC / Coags Recent Labs     03/03/23 1751 03/06/23  0726   WBC 7.8 4.6   RBC 4.63 4.51   HGB 13.0 12.7   HCT 40.7 38.7    264      Other No results for input(s): LABA1C, LDLCALC, TRIG, TSH, ISVLYYRL09, FOLATE, LABSALI, COVID19 in the last 72 hours. No results for input(s): PHENYTOIN, KEPPRA, LACOSA, LAMO, VALPROATE, LACTSEPSIS, LACTA in the last 72 hours. DIAGNOSTICS   IMAGES:  Images personally reviewed and agree w/ radiology interpretation.     No Imaging to Review     PHYSICAL EXAMINATION     PHYSICAL EXAM:  Vitals:    03/05/23 2230 03/06/23 0300 03/06/23 0645 03/06/23 0853   BP: 134/82 (!) 159/92 120/73    Pulse: 67 67 66    Resp: 16 16 16    Temp: 97.7 °F (36.5 °C) 98 °F (36.7 °C) 97.9 °F (36.6 °C)    TempSrc: Oral Oral Oral    SpO2: 96% 95% 97% 98%         General: Alert, no distress, well-nourished  Neurologic  Mental status:   Orientation: to person, place, time, situation              Attention: intact as able to attend well to the exam                Language: fluent in conversation              Comprehension intact; follows simple commands     Cranial nerves:   CN2: Visual fields full w/o extinction on confrontational testing   CN 3,4,6: Pupils equal and reactive to light, extraocular muscles intact  CN5: Facial sensation symmetric   CN7: Face symmetric  CN8: Hearing symmetric to spoken voice  CN9: Palate elevated symmetrically  CN11: Traps full strength on shoulder shrug  CN12: Tongue midline with protrusion     Motor Exam:     Neck Flexion: 3/5, full ROM  Neck Extension: 4/5, full ROM    R  L    Deltoid 4  4   Biceps 4 4   Triceps 4 4   Wrist extension  4 4   Interossei 4 4        R  L    Hip flexion  4  4   Hip extension  4 4   Knee flexion  4 4   Knee extension  4 4   Ankle dorsiflexion  4 4   Ankle plantar flexion  4 4      Deep tendon reflexes:     R  L    Biceps  2  2   Brachioradialis  2 2   Patellar  2 2           Count Test: able to count to 14 on exhale. ASSESSMENT & RECOMMENDATIONS   Assessment:Radha gibbons is a 45 yo female with a known history of Myasthenia Gravis with previous exacerbations. Patient presented with trouble chewing, hoarseness of voice, and fatigue. Patient states this is how she felt during previous exacerbations. Given her worsened respiratory status, I am concern for an impending type II respiratory failure. Discussed with Dr. Lisa Gutierrez and we will move her to the ICU for closer monitoring.        Plan:  -Transfer to ICU  -Start BiPap, low threshold for elective intubation  -Monitor neuromuscular respiratory parameters Q4H: FVC and NIF measurements if FVC 15ml/kg or less   -Hold mestinon for now  -Aspiration precautions                -Up for meals                -Frequent oral care  -Lovenox for DVT prophylaxis          BETH ADAMS - CNP   Neurology & Neurocritical Care   3/6/2023 9:08 AM      ICU Patients:   [address] [phone]  [facility]: [address]

## 2023-03-06 NOTE — RT PROTOCOL NOTE
Making changes to check patient Q4 hours for VC and NIF due to moderate change in numbers. Patient stated that she is feeling a lot more weak today.

## 2023-03-06 NOTE — PROGRESS NOTES
Pt admitted to ICU room 4523. A/Ox4, following commands. Full sensation/ profoundly weak in all extremities. Tongue midline. Endorses more trouble to breath/ swallow, worsening from last night. Soft spoken/ raspy voice. Pupils equal and reactive. NSR, HR 70s. SBP 130s. Clear breath sounds. Bowel sounds present. Purewick placed. Call light and personal belongings within reach.

## 2023-03-06 NOTE — PLAN OF CARE
Problem: Safety - Medical Restraint  Goal: Remains free of injury from restraints (Restraint for Interference with Medical Device)  Description: INTERVENTIONS:  1. Determine that other, less restrictive measures have been tried or would not be effective before applying the restraint  2. Evaluate the patient's condition at the time of restraint application  3. Inform patient/family regarding the reason for restraint  4.  Q2H: Monitor safety, psychosocial status, comfort, nutrition and hydration  Outcome: Progressing     Problem: ABCDS Injury Assessment  Goal: Absence of physical injury  Outcome: Progressing     Problem: Pain  Goal: Verbalizes/displays adequate comfort level or baseline comfort level  Outcome: Progressing     Problem: Safety - Adult  Goal: Free from fall injury  3/6/2023 1502 by Jeff Black RN  Outcome: Progressing  3/6/2023 0213 by Deonte Maravilla RN  Outcome: Progressing     Problem: Discharge Planning  Goal: Discharge to home or other facility with appropriate resources  3/6/2023 0213 by Deonte Maravilla RN  Outcome: Progressing

## 2023-03-06 NOTE — PROGRESS NOTES
Hospitalist Progress Note      PCP: Karen Barnett MD    Date of Admission: 3/5/2023    Chief Complaint: Difficulty swallowing        History Of Present Illness:       46 y.o. female who presented to [facility] with complaint of difficulty swallowing and raspy voice. According to patient she started experiencing difficulty swallowing for last few days. Patient felt like food was getting stuck in her throat. Patient also complained of difficulty with her speech. Patient felt weakness in her lower extremity. Symptoms were similar to her past episodes of myasthenia gravis exacerbation. Patient has experienced 5 exacerbations since 2018 and myasthenia crisis. Patient was first diagnosed with myasthenia gravis in 2005. Patient has history of thymectomy. Last exacerbation approximately 1 year ago. At that time patient was hospitalized at [facility].       Subjective:     Patient complains of shortness of breath this morning. Patient also had difficulty with swallowing. Patient felt like food was getting stuck in her throat. Patient also complains of worsening lower extremity weakness. Patient is transferred to ICU for close cardiorespiratory monitoring.       Medications:  Reviewed    Infusion Medications    propofol      fentaNYL      sodium chloride       Scheduled Medications    propofol        citalopram  40 mg Oral QAM    doxepin  10 mg Oral Nightly    estradiol  2 mg Oral Nightly    folic acid  1 mg Oral Daily    levothyroxine  150 mcg Oral Daily    methotrexate  10 mg Oral Weekly    sodium chloride flush  5-40 mL IntraVENous 2 times per day    enoxaparin  40 mg SubCUTAneous Nightly     PRN Meds: butalbital-acetaminophen-caffeine, LORazepam, sodium chloride flush, sodium chloride, ondansetron **OR** ondansetron, polyethylene glycol, acetaminophen **OR** acetaminophen      Intake/Output Summary (Last 24 hours) at 3/6/2023 1324  Last data filed at 3/5/2023 1930  Gross per 24 hour   Intake 240 ml   Output --   Net 240 ml       Physical Exam Performed:    /70   Pulse 81   Temp 98.6 °F (37 °C) (Oral)   Resp 24   SpO2 98%     General appearance: No apparent distress, appears stated age and cooperative. HEENT: Pupils equal, round, and reactive to light. Conjunctivae/corneas clear. Neck: Supple, with full range of motion. No jugular venous distention. Trachea midline. Respiratory:  Normal respiratory effort. Clear to auscultation, bilaterally without Rales/Wheezes/Rhonchi. Cardiovascular: Regular rate and rhythm with normal S1/S2 without murmurs, rubs or gallops. Abdomen: Soft, non-tender, non-distended with normal bowel sounds. Musculoskeletal: No clubbing, cyanosis or edema bilaterally. Full range of motion without deformity. Skin: Skin color, texture, turgor normal.  No rashes or lesions. Neurologic:  Neurovascularly intact without any focal sensory/motor deficits. Cranial nerves: II-XII intact, grossly non-focal.  Psychiatric: Alert and oriented, thought content appropriate, normal insight  Capillary Refill: Brisk, 3 seconds, normal   Peripheral Pulses: +2 palpable, equal bilaterally       Labs:   Recent Labs     03/03/23  1751 03/06/23  0726   WBC 7.8 4.6   HGB 13.0 12.7   HCT 40.7 38.7    264     Recent Labs     03/03/23  1751 03/06/23  0726    136   K 4.1 4.1    100   CO2 24 26   BUN 12 11   CREATININE 0.6 0.6   CALCIUM 8.8 8.8     Recent Labs     03/03/23  1751 03/06/23  0726   AST 15 16   ALT 11 9*   BILIDIR  --  <0.2   BILITOT <0.2 0.3   ALKPHOS 151* 150*     No results for input(s): INR in the last 72 hours. No results for input(s): [place] in the last 72 hours.     Urinalysis:      Lab Results   Component Value Date/Time    NITRU Negative 01/02/2022 03:03 PM    WBCUA 1 06/30/2020 06:20 PM    BACTERIA RARE 06/30/2020 06:20 PM    RBCUA 1 06/30/2020 06:20 PM    BLOODU Negative 01/02/2022 03:03 PM    SPECGRAV >1.030 01/02/2022 03:03 PM    GLUCOSEU Negative 01/02/2022 03:03 PM       Radiology:  XR CHEST 1 VIEW    (Results Pending)   XR ABDOMEN (KUB) (SINGLE AP VIEW)    (Results Pending)       IP CONSULT TO NEUROLOGY    Assessment/Plan:    Active Hospital Problems    Diagnosis     Myasthenia gravis with acute exacerbation ([facility] [address] [G70.01]      Priority: Medium    Myasthenia gravis with (acute) exacerbation ([facility] [address] [G70.01]      -Myasthenia crisis  Previous exacerbation-myasthenia gravis exacerbation: Patient presented with difficulty with swallowing and change in voice. Patient also complained of lower extremity weakness.  -Hypothyroidism  - GERD  - Dyslipidemia        PLAN:     Admit patient to MedSurg unit  Monitor swallowing  Bedside swallow screen  Respiratory monitoring  IVIG administration  Neurology consultation  Continue pyridostigmine  Continue home meds     DVT Prophylaxis: Lovenox  Diet: ADULT DIET;  Regular; Low Fat/Low Chol/High Fiber/2 gm Na  Code Status: Full Code     PT/OT Eval Status:      Dispo - Pending clinical improvement       Juan Medina MD

## 2023-03-06 NOTE — CONSULTS
Critical Care Consult Note       Hospital Day: 2  ICU Day: 1                                                         Code:Full Code  Admit Date: 3/5/2023  PCP: Gavi Del Rosario MD                                  CC: Difficulty swallowing     HISTORY OF PRESENT ILLNESS:   Ms. Roselia Lawson is a 46year old female with a PMH of  myasthenia gravis, seizure, bradycardia, HLD, GERD, R hip replacement, anxiety, insomnia, psoriasis, migraines and post-ablative hypothyroidism who presented to the ED due to generalized weakness. She was noting a rasp in her voice and difficulty chewing on 3/2/23. She was on her way to her neurologist when she became more globally weak and was advised to present to the [facility] ED on 3/3/23 by her neurologist. She states these changes felt a lot like prior myasthenic exacerbations. She was transferred to [facility], and while in the hospital, she developed worsening NIFs and VCs which prompted her transfer to the ICU. BiPAP was considered however due to the patient's weakness, it was determined she would not be able to take off the BiPAP mask in case of vomiting, so this was held off on. She was awake, alert and oriented although fatigued in the morning. Muscle strength was 4/5 in the bilateral upper and lower extremities. However, later in the day around noon, she developed more labored breathing and was intubated for impending respiratory failure. Her ET tube was initially in the right mainstem bronchus per CXR, and developed atelectasis of the left lung due to this. The ETT was retracted 3cm to 23cm at the teeth.        PAST HISTORY:     Past Medical History:   Diagnosis Date    Anxiety     Arthritis     Cancer ([address]     thyroid    GERD (gastroesophageal reflux disease)     Hyperlipidemia     Insomnia     Migraines     Myasthenia gravis with exacerbation (HCC)     Seizures ([address]     Thyroid disease        Past Surgical History:   Procedure Laterality Date    APPENDECTOMY      CAPSULE ENDOSCOPY N/A 02/28/2020    ESOPHAGEAL CAPSULE ENDOSCOPY performed by Feliciano Mccoy MD at [address] 12/17/2019    COLONOSCOPY performed by Gerry Daniel MD at [address] Right 10/15/2021    19cm tunneled dual lumen dialysis catheter inserted by Dr. Jun Fry 02/11/2020    ESOPHAGEAL MANOMETRY performed by Feliciano Mccoy MD at [address] ([address])      IR BIOPSY LIVER PERCUTANEOUS  10/15/2021    IR BIOPSY LIVER PERCUTANEOUS 10/15/2021 [facility]    IR TUNNELED CATHETER PLACEMENT GREATER THAN 5 YEARS  10/15/2021    IR TUNNELED CATHETER PLACEMENT GREATER THAN 5 YEARS 10/15/2021 [facility]    OTHER SURGICAL HISTORY Right 10/15/2021    Transjugular liver Biopsy    SHOULDER SURGERY      rotator cuff repair- bilateral    THYROIDECTOMY      TUMOR REMOVAL      UPPER GASTROINTESTINAL ENDOSCOPY N/A 12/17/2019    EGD BIOPSY performed by Gerry Daniel MD at [address] 12/17/2019    EGD DILATION SAVORY performed by Gerry Daniel MD at [place] [address]  02/28/2020    EGD DIAGNOSTIC ONLY performed by Feliciano Mccoy MD at [place] [address]  02/28/2020    Esophagogastroduodenoscopy with Bravo Capsule placement performed by Feliciano Mccoy MD at [address]:   The patient lives at    Alcohol:  Illicit drugs: no use  Tobacco:      Family History:  Family History   Problem Relation Age of Onset    Heart Disease Father     High Cholesterol Father     High Blood Pressure Father        MEDICATIONS:     No current facility-administered medications on file prior to encounter.      Current Outpatient Medications on File Prior to Encounter   Medication Sig Dispense Refill    pyridostigmine (MESTINON) 60 MG tablet Take 60 mg by mouth 3 times daily      levothyroxine (SYNTHROID) 150 MCG tablet Take 150 mcg by mouth Daily      butalbital-acetaminophen-caffeine (FIORICET, ESGIC) -40 MG per tablet Take 1 tablet by mouth every 4 hours as needed for Migraine      LORazepam (ATIVAN) 0.5 MG tablet Take 0.5 mg by mouth 2 times daily as needed. methotrexate (RHEUMATREX) 2.5 MG chemo tablet Take 10 mg by mouth once a week Take 4 tablets together every Monday. doxepin (SINEQUAN) 10 MG capsule Take 10 mg by mouth nightly      estradiol (ESTRACE) 2 MG tablet Take 2 mg by mouth nightly      folic acid (FOLVITE) 1 MG tablet Take 1 mg by mouth daily      zolpidem (AMBIEN) 10 MG tablet Take 10 mg by mouth nightly. citalopram (CELEXA) 40 MG tablet Take 40 mg by mouth every morning            Scheduled Meds:   propofol        citalopram  40 mg Oral QAM    doxepin  10 mg Oral Nightly    estradiol  2 mg Oral Nightly    folic acid  1 mg Oral Daily    levothyroxine  150 mcg Oral Daily    methotrexate  10 mg Oral Weekly    sodium chloride flush  5-40 mL IntraVENous 2 times per day    enoxaparin  40 mg SubCUTAneous Nightly      Continuous Infusions:   propofol      fentaNYL 50 mcg/hr (03/06/23 1409)    sodium chloride       PRN Meds:butalbital-acetaminophen-caffeine, LORazepam, sodium chloride flush, sodium chloride, ondansetron **OR** ondansetron, polyethylene glycol, acetaminophen **OR** acetaminophen    Allergies: Allergies   Allergen Reactions    Immune Globulins Other (See Comments)     FLEBOGAMMA - ASEPTIC MENINGITIS    Other      Steroid injection to joints    Adhesive Tape Rash and Other (See Comments)     Paper Tape  Paper Tape    Skin gets reddened under tape; not allergic to latex    Penicillins Itching and Rash     Rash  Rash         REVIEW OF SYSTEMS:       History obtained from the patient. Review of Systems - A 10 point review of systems was conducted and significant findings noted in HPI.     PHYSICAL EXAM:       Vitals: /70 Pulse 81   Temp 98.6 °F (37 °C) (Oral)   Resp 24   SpO2 98%     I/O:    Intake/Output Summary (Last 24 hours) at 3/6/2023 1420  Last data filed at 3/5/2023 1930  Gross per 24 hour   Intake 240 ml   Output --   Net 240 ml       No intake/output data recorded. I/O last 3 completed shifts: In: 240 [P.O.:240]  Out: -       Physical Exam  HENT:      Head: Normocephalic and atraumatic. Mouth/Throat:      Pharynx: Oropharynx is clear. Eyes:      Extraocular Movements: Extraocular movements intact. Pupils: Pupils are equal, round, and reactive to light. Cardiovascular:      Rate and Rhythm: Normal rate and regular rhythm. Abdominal:      General: Bowel sounds are normal.      Palpations: Abdomen is soft. Tenderness: There is abdominal tenderness. Musculoskeletal:      Right lower leg: Edema (trace) present. Left lower leg: Edema (trace) present. Skin:     General: Skin is warm and dry. Neurological:      Mental Status: She is alert and oriented to person, place, and time. Psychiatric:         Behavior: Behavior normal.       Access:                            -Peripheral Access Day#:1                             NGT Day#: 1                                            ETT Day#:1  Vent Settings: Vent Mode: AC/PRVC Resp Rate (Set): 14 bmp/ / /FiO2 : 100 %      DATA:       Labs:  CBC:   Recent Labs     03/03/23  1751 03/06/23  0726   WBC 7.8 4.6   HGB 13.0 12.7   HCT 40.7 38.7    264         BMP:   Recent Labs     03/03/23  1751 03/06/23  0726    136   K 4.1 4.1    100   CO2 24 26   BUN 12 11   CREATININE 0.6 0.6   GLUCOSE 86 90       LFT's:   Recent Labs     03/03/23  1751 03/06/23  0726   AST 15 16   ALT 11 9*   BILITOT <0.2 0.3   ALKPHOS 151* 150*       Radiology:    XR CHEST 1 VIEW   Final Result   Impression: Interval intubation. The endotracheal tube terminates within the right mainstem bronchus and should be retracted by at least 3 cm.  Findings were discussed with ESTELITA MENDOZA [facility] at the time of report. XR ABDOMEN (KUB) (SINGLE AP VIEW)    (Results Pending)   XR CHEST PORTABLE    (Results Pending)     EKG (1/2/22): Normal sinus rhythm. Minimal voltage criteria for LVH, may be normal variant. Nonspecific ST and T wave abnormality. Abnormal ECG. When compared with ECG of 10-NOV-2019 11:41. QRS duration has increased. T wave inversion now evident in Anterior leads. Echo (1/4/22): Summary   Normal left ventricle size, wall thickness, and systolic function with an   estimated ejection fraction of 55-60%. No regional wall motion abnormalities   are seen. Normal right ventricular size and function. No significant valve abnormalities noted. ASSESSMENT AND PLAN:   Phong Marcum is a 46 y.o. female, who was admitted on 3/5/23 due to weakness and difficulty swallowing & phonating, found to be in myasthenic crisis, and was transferred to the ICU on 3/6/23 and intubated due to impending respiratory failure. Impending respiratory failure  Secondary to myasthenic crisis  Per patient, her experience leading up to and including her hospitalization are consistent with prior episodes of myasthenic crisis. Symptoms include generalized fatigue & weakness, dysphagia and dysphonia.    - Neurology consulted, recs appreciated  - Q4H neuro checks  - As able, please avoid the following medications as able as they can exacerbate myasthenia gravis:   - Anesthetic agents: neuromuscular blocking agents  - Cardiac Drugs: beta-blockers, procainamide, quinidine              - Antibiotics: aminoglycosides, fluoroquinolines  - Monitor neuromuscular respiratory parameters Q Shift: FVC and NIF measurements if FVC 15ml/kg or less (normal >= 60ml/kg)  and NIF 20  cm H2O or less (normal >=70cm H2O) notify Neurology Immediately  - S/p IVIG (2 g/kg total dose divided over 2 days)  - Holding home mestinon dose, as patient has increased difficulty swallowing and this medication will increase secretions  - NG tube placed  - Patient intubated with ETT at 23cm at the teeth, had to be retracted 3cm  - Repeat CXR to confirm ETT placement  - Mechanical ventilation at 100%/14/450/5  - Sedation with propofol and fentanyl, RASS goal -1 to 0  - Check ABG one hour post-intubation    Other chronic conditions:  Psoriasis - Home methotrexate, folate  Hypothyroidism - Home Synthroid  Anxiety - Home Celexa      Code Status:Full Code  FEN: Diet NPO  PPX: Lovenox  DISPO: ICU      This patient has been staffed and discussed with attending intensivist Dr. Delphine Mueller MD.     -----------------------------    Signed,    Sallie Litten, MD, MS, PGY-2  Internal Medicine, [facility] [employer]  03/06/23 2:52 PM    Patient examined, findings as discussed with Dr. Jude Myers. Agree with assessment and plan. Myasthenia gravis with acute crisis is causing progressive muscle weakness, including proximal airway musculature and respiratory musculature. With declining ability to generate ventilation, she required intubation for ventilatory support. See additional note for this procedure. With minute volume 7-8 L, she has acid-base equilibrium, PCO2 44. Gas exchange is very good, with PO2/FiO2 ratio 400. She requires sedation with propofol and fentanyl infusions to allow comfort. Anticipate weaning the dose of sedation. May consider dexmedetomidine to allow better neurologic assessment. She has received IVIG. Plasma exchange considered, but will allow time for IVIG to reduce benefit. Discussed with .

## 2023-03-06 NOTE — PROGRESS NOTES
4 Eyes Admission Assessment     I agree as the admission nurse that 2 RN's have performed a thorough Head to Toe Skin Assessment on the patient. ALL assessment sites listed below have been assessed on admission. Areas assessed by both nurses:   [x]   Head, Face, and Ears   [x]   Shoulders, Back, and Chest  [x]   Arms, Elbows, and Hands   [x]   Coccyx, Sacrum, and Ischium  [x]   Legs, Feet, and Heels        Does the Patient have Skin Breakdown?   No         Adam Prevention initiated:  Yes   Wound Care Orders initiated:  No      [facility] nurse consulted for Pressure Injury (Stage 3,4, Unstageable, DTI, NWPT, and Complex wounds) or Adam score 18 or lower:  No      Nurse 1 eSignature: Electronically signed by Adam Daniel RN on 3/6/23 at 9:37 AM EST    Nurse 2 eSignature: Electronically signed by Alessandra Hardy RN on 3/6/23 at 7:26 PM EST

## 2023-03-06 NOTE — PROGRESS NOTES
CXR complete. Radiology called to notify that ETT is terminating within the right mainstem bronchus. ICU residents and RT at bedside. RT moved ETT to 23 at the teeth. ICU resident to place CXR orders in 15 minutes. , Denia Kyree, updated via phone. All questions answered at this time.

## 2023-03-06 NOTE — H&P
Hospital Medicine History & Physical      PCP: Claudene Mustache, MD    Date of Admission: 3/5/2023    Chief Complaint: Difficulty swallowing      History Of Present Illness:      46 y.o. female who presented to [facility] with complaint of difficulty swallowing and raspy voice. According to patient she started experiencing difficulty swallowing for last few days. Patient felt like food was getting stuck in her throat. Patient also complained of difficulty with her speech. Patient felt weakness in her lower extremity. Symptoms were similar to her past episodes of myasthenia gravis exacerbation. Patient has experienced 5 exacerbations since 2018 and myasthenia crisis. Patient was first diagnosed with myasthenia gravis in 2005. Patient has history of thymectomy. Last exacerbation approximately 1 year ago.   At that time patient was hospitalized at [facility].    Past Medical History:          Diagnosis Date    Anxiety     Arthritis     Cancer ([address]     thyroid    GERD (gastroesophageal reflux disease)     Hyperlipidemia     Insomnia     Migraines     Myasthenia gravis with exacerbation (HCC)     Seizures (HCC)     Thyroid disease        Past Surgical History:          Procedure Laterality Date    APPENDECTOMY      CAPSULE ENDOSCOPY N/A 02/28/2020    ESOPHAGEAL CAPSULE ENDOSCOPY performed by Devin Ledesma MD at [address] 12/17/2019    COLONOSCOPY performed by Lionel Miguel MD at [address] Right 10/15/2021    19cm tunneled dual lumen dialysis catheter inserted by Dr. Landon Fritz 02/11/2020    [facility] (CERVIX STATUS UNKNOWN)      IR BIOPSY LIVER PERCUTANEOUS  10/15/2021    IR BIOPSY LIVER PERCUTANEOUS 10/15/2021 [facility]    IR TUNNELED CATHETER PLACEMENT GREATER THAN 5 YEARS  10/15/2021    IR TUNNELED CATHETER PLACEMENT GREATER THAN 5 YEARS 10/15/2021 [facility]    OTHER SURGICAL HISTORY Right 10/15/2021    Transjugular liver Biopsy    SHOULDER SURGERY      rotator cuff repair- bilateral    THYROIDECTOMY      TUMOR REMOVAL      UPPER GASTROINTESTINAL ENDOSCOPY N/A 12/17/2019    EGD BIOPSY performed by Gaston Jha MD at [address] 12/17/2019    EGD DILATION SAVORY performed by Gaston Jha MD at [address] [place] [address]  02/28/2020    EGD DIAGNOSTIC ONLY performed by Gloria Castle MD at [address] [place] [address]  02/28/2020    Esophagogastroduodenoscopy with Bravo Capsule placement performed by Gloria Castle MD at [address] [facility]       Medications Prior to Admission:      Prior to Admission medications    Medication Sig Start Date End Date Taking? Authorizing Provider   pyridostigmine (MESTINON) 60 MG tablet Take 60 mg by mouth 3 times daily    Historical Provider, MD   levothyroxine (SYNTHROID) 150 MCG tablet Take 150 mcg by mouth Daily    Historical Provider, MD   butalbital-acetaminophen-caffeine (FIORICET, ESGIC) -40 MG per tablet Take 1 tablet by mouth every 4 hours as needed for Migraine    Historical Provider, MD   LORazepam (ATIVAN) 0.5 MG tablet Take 0.5 mg by mouth 2 times daily as needed. Historical Provider, MD   methotrexate (RHEUMATREX) 2.5 MG chemo tablet Take 10 mg by mouth once a week Take 4 tablets together every Monday. Historical Provider, MD   doxepin (SINEQUAN) 10 MG capsule Take 10 mg by mouth nightly    Historical Provider, MD   estradiol (ESTRACE) 2 MG tablet Take 2 mg by mouth nightly    Historical Provider, MD   folic acid (FOLVITE) 1 MG tablet Take 1 mg by mouth daily    Historical Provider, MD   zolpidem (AMBIEN) 10 MG tablet Take 10 mg by mouth nightly.     Historical Provider, MD   citalopram (CELEXA) 40 MG tablet Take 40 mg by mouth every morning Historical Provider, MD       Allergies:  Immune globulins, Other, Adhesive tape, and Penicillins    Social History:      TOBACCO:   reports that she has never smoked. She has never used smokeless tobacco.  ETOH:   reports no history of alcohol use. E-cigarette/Vaping       Questions Responses    E-cigarette/Vaping Use Never User    Start Date     Passive Exposure     Quit Date     Counseling Given     Comments               Family History:     Reviewed and negative in regards to presenting illness/complaint. Problem Relation Age of Onset    Heart Disease Father     High Cholesterol Father     High Blood Pressure Father        REVIEW OF SYSTEMS COMPLETED:   Pertinent positives as noted in the HPI. All other systems reviewed and negative. PHYSICAL EXAM PERFORMED:    /81   Pulse 66   Temp 97.9 °F (36.6 °C) (Oral)   Resp 16   SpO2 96%     General appearance:  No apparent distress, appears stated age and cooperative. HEENT:  Normal cephalic, atraumatic without obvious deformity. Pupils equal, round, and reactive to light. Extra ocular muscles intact. Conjunctivae/corneas clear. Neck: Supple, with full range of motion. No jugular venous distention. Trachea midline. Respiratory:  Normal respiratory effort. Clear to auscultation, bilaterally without Rales/Wheezes/Rhonchi. Cardiovascular:  Regular rate and rhythm with normal S1/S2 without murmurs, rubs or gallops. Abdomen: Soft, non-tender, non-distended with normal bowel sounds. Musculoskeletal:  No clubbing, cyanosis or edema bilaterally. Full range of motion without deformity. Skin: Skin color, texture, turgor normal.  No rashes or lesions. Neurologic:  Neurovascularly intact without any focal sensory/motor deficits.  Cranial nerves: II-XII intact, grossly non-focal.  Psychiatric:  Alert and oriented, thought content appropriate, normal insight  Capillary Refill: Brisk,3 seconds, normal  Peripheral Pulses: +2 palpable, equal bilaterally Labs:     Recent Labs     03/03/23  1751   WBC 7.8   HGB 13.0   HCT 40.7        Recent Labs     03/03/23  1751      K 4.1      CO2 24   BUN 12   CREATININE 0.6   CALCIUM 8.8     Recent Labs     03/03/23  1751   AST 15   ALT 11   BILITOT <0.2   ALKPHOS 151*     No results for input(s): INR in the last 72 hours. No results for input(s): [place] Tyesha in the last 72 hours. Urinalysis:      Lab Results   Component Value Date/Time    NITRU Negative 01/02/2022 03:03 PM    WBCUA 1 06/30/2020 06:20 PM    BACTERIA RARE 06/30/2020 06:20 PM    RBCUA 1 06/30/2020 06:20 PM    BLOODU Negative 01/02/2022 03:03 PM    SPECGRAV >1.030 01/02/2022 03:03 PM    GLUCOSEU Negative 01/02/2022 03:03 PM       Radiology:     CXR: I have reviewed the CXR   EKG:  I have reviewed the EKG     No orders to display       Consults:    IP CONSULT TO NEUROLOGY    ASSESSMENT:    Active Hospital Problems    Diagnosis Date Noted    Myasthenia gravis with acute exacerbation ([facility] [G70.01] 03/05/2023     Priority: Medium    Myasthenia gravis with (acute) exacerbation ([facility] [G70.01] 11/04/2019     -Myasthenia crisis  Previous exacerbation-myasthenia gravis exacerbation: Patient presented with difficulty with swallowing and change in voice. Patient also complained of lower extremity weakness.  -Hypothyroidism  - GERD  - Dyslipidemia      PLAN:    Admit patient to MedSurg unit  Monitor swallowing  Bedside swallow screen  Respiratory monitoring  IVIG administration  Neurology consultation  Continue pyridostigmine  Continue home meds    DVT Prophylaxis: Lovenox  Diet: ADULT DIET; Regular; Low Fat/Low Chol/High Fiber/2 gm Na  Code Status: Full Code    PT/OT Eval Status:     Dispo - Pending clinical improvement        Jordy Polo MD    Thank you Juan Shah MD for the opportunity to be involved in this patient's care. If you have any questions or concerns please feel free to contact me at [phone].

## 2023-03-07 LAB
A/G RATIO: 0.5 (ref 1.1–2.2)
ALBUMIN SERPL-MCNC: 3.2 G/DL (ref 3.4–5)
ALP BLD-CCNC: 159 U/L (ref 40–129)
ALT SERPL-CCNC: 12 U/L (ref 10–40)
ANION GAP SERPL CALCULATED.3IONS-SCNC: 8 MMOL/L (ref 3–16)
AST SERPL-CCNC: 20 U/L (ref 15–37)
BASOPHILS ABSOLUTE: 0 K/UL (ref 0–0.2)
BASOPHILS RELATIVE PERCENT: 0.6 %
BILIRUB SERPL-MCNC: 0.4 MG/DL (ref 0–1)
BUN BLDV-MCNC: 11 MG/DL (ref 7–20)
CALCIUM SERPL-MCNC: 8.5 MG/DL (ref 8.3–10.6)
CHLORIDE BLD-SCNC: 98 MMOL/L (ref 99–110)
CO2: 28 MMOL/L (ref 21–32)
CREAT SERPL-MCNC: 0.6 MG/DL (ref 0.6–1.1)
EOSINOPHILS ABSOLUTE: 0.2 K/UL (ref 0–0.6)
EOSINOPHILS RELATIVE PERCENT: 3.1 %
GFR SERPL CREATININE-BSD FRML MDRD: >60 ML/MIN/{1.73_M2}
GLUCOSE BLD-MCNC: 84 MG/DL (ref 70–99)
HCT VFR BLD CALC: 37.6 % (ref 36–48)
HEMOGLOBIN: 12.5 G/DL (ref 12–16)
LYMPHOCYTES ABSOLUTE: 1 K/UL (ref 1–5.1)
LYMPHOCYTES RELATIVE PERCENT: 15.8 %
MAGNESIUM: 1.8 MG/DL (ref 1.8–2.4)
MCH RBC QN AUTO: 28.9 PG (ref 26–34)
MCHC RBC AUTO-ENTMCNC: 33.3 G/DL (ref 31–36)
MCV RBC AUTO: 86.8 FL (ref 80–100)
MONOCYTES ABSOLUTE: 0.4 K/UL (ref 0–1.3)
MONOCYTES RELATIVE PERCENT: 5.8 %
NEUTROPHILS ABSOLUTE: 4.7 K/UL (ref 1.7–7.7)
NEUTROPHILS RELATIVE PERCENT: 74.7 %
PDW BLD-RTO: 13.6 % (ref 12.4–15.4)
PHOSPHORUS: 3.5 MG/DL (ref 2.5–4.9)
PLATELET # BLD: 262 K/UL (ref 135–450)
PMV BLD AUTO: 7.1 FL (ref 5–10.5)
POTASSIUM REFLEX MAGNESIUM: 3.4 MMOL/L (ref 3.5–5.1)
RBC # BLD: 4.33 M/UL (ref 4–5.2)
SODIUM BLD-SCNC: 134 MMOL/L (ref 136–145)
TOTAL PROTEIN: 9.3 G/DL (ref 6.4–8.2)
WBC # BLD: 6.3 K/UL (ref 4–11)

## 2023-03-07 PROCEDURE — 84100 ASSAY OF PHOSPHORUS: CPT

## 2023-03-07 PROCEDURE — 83735 ASSAY OF MAGNESIUM: CPT

## 2023-03-07 PROCEDURE — 6360000002 HC RX W HCPCS: Performed by: STUDENT IN AN ORGANIZED HEALTH CARE EDUCATION/TRAINING PROGRAM

## 2023-03-07 PROCEDURE — 6360000002 HC RX W HCPCS

## 2023-03-07 PROCEDURE — 6370000000 HC RX 637 (ALT 250 FOR IP): Performed by: STUDENT IN AN ORGANIZED HEALTH CARE EDUCATION/TRAINING PROGRAM

## 2023-03-07 PROCEDURE — 80053 COMPREHEN METABOLIC PANEL: CPT

## 2023-03-07 PROCEDURE — 99291 CRITICAL CARE FIRST HOUR: CPT

## 2023-03-07 PROCEDURE — 99223 1ST HOSP IP/OBS HIGH 75: CPT | Performed by: INTERNAL MEDICINE

## 2023-03-07 PROCEDURE — 2500000003 HC RX 250 WO HCPCS: Performed by: STUDENT IN AN ORGANIZED HEALTH CARE EDUCATION/TRAINING PROGRAM

## 2023-03-07 PROCEDURE — 6370000000 HC RX 637 (ALT 250 FOR IP)

## 2023-03-07 PROCEDURE — C9113 INJ PANTOPRAZOLE SODIUM, VIA: HCPCS

## 2023-03-07 PROCEDURE — 36415 COLL VENOUS BLD VENIPUNCTURE: CPT

## 2023-03-07 PROCEDURE — 2700000000 HC OXYGEN THERAPY PER DAY

## 2023-03-07 PROCEDURE — 2000000000 HC ICU R&B

## 2023-03-07 PROCEDURE — 51701 INSERT BLADDER CATHETER: CPT

## 2023-03-07 PROCEDURE — 94003 VENT MGMT INPAT SUBQ DAY: CPT

## 2023-03-07 PROCEDURE — 85025 COMPLETE CBC W/AUTO DIFF WBC: CPT

## 2023-03-07 PROCEDURE — 51798 US URINE CAPACITY MEASURE: CPT

## 2023-03-07 PROCEDURE — 2580000003 HC RX 258

## 2023-03-07 PROCEDURE — 94761 N-INVAS EAR/PLS OXIMETRY MLT: CPT

## 2023-03-07 RX ORDER — FOLIC ACID 1 MG/1
1 TABLET ORAL DAILY
Status: DISCONTINUED | OUTPATIENT
Start: 2023-03-08 | End: 2023-03-17 | Stop reason: HOSPADM

## 2023-03-07 RX ORDER — METOCLOPRAMIDE HYDROCHLORIDE 5 MG/ML
10 INJECTION INTRAMUSCULAR; INTRAVENOUS EVERY 6 HOURS PRN
Status: DISCONTINUED | OUTPATIENT
Start: 2023-03-07 | End: 2023-03-17 | Stop reason: HOSPADM

## 2023-03-07 RX ORDER — ESTRADIOL 0.5 MG/1
2 TABLET ORAL NIGHTLY
Status: DISCONTINUED | OUTPATIENT
Start: 2023-03-07 | End: 2023-03-17 | Stop reason: HOSPADM

## 2023-03-07 RX ORDER — LEVOTHYROXINE SODIUM 0.15 MG/1
150 TABLET ORAL DAILY
Status: DISCONTINUED | OUTPATIENT
Start: 2023-03-08 | End: 2023-03-17 | Stop reason: HOSPADM

## 2023-03-07 RX ORDER — DOXEPIN HYDROCHLORIDE 10 MG/1
10 CAPSULE ORAL NIGHTLY
Status: DISCONTINUED | OUTPATIENT
Start: 2023-03-07 | End: 2023-03-17 | Stop reason: HOSPADM

## 2023-03-07 RX ORDER — CITALOPRAM 40 MG/1
40 TABLET ORAL EVERY MORNING
Status: DISCONTINUED | OUTPATIENT
Start: 2023-03-08 | End: 2023-03-17 | Stop reason: HOSPADM

## 2023-03-07 RX ORDER — MAGNESIUM SULFATE 1 G/100ML
1000 INJECTION INTRAVENOUS ONCE
Status: COMPLETED | OUTPATIENT
Start: 2023-03-07 | End: 2023-03-07

## 2023-03-07 RX ADMIN — METOCLOPRAMIDE HYDROCHLORIDE 10 MG: 5 INJECTION INTRAMUSCULAR; INTRAVENOUS at 16:09

## 2023-03-07 RX ADMIN — HYDROMORPHONE HYDROCHLORIDE 0.25 MG: 1 INJECTION, SOLUTION INTRAMUSCULAR; INTRAVENOUS; SUBCUTANEOUS at 11:07

## 2023-03-07 RX ADMIN — DOXEPIN HYDROCHLORIDE 10 MG: 10 CAPSULE ORAL at 21:07

## 2023-03-07 RX ADMIN — ENOXAPARIN SODIUM 40 MG: 100 INJECTION SUBCUTANEOUS at 21:06

## 2023-03-07 RX ADMIN — ESTRADIOL 2 MG: 0.5 TABLET ORAL at 21:07

## 2023-03-07 RX ADMIN — LEVOTHYROXINE SODIUM 150 MCG: 150 TABLET ORAL at 06:44

## 2023-03-07 RX ADMIN — PANTOPRAZOLE SODIUM 40 MG: 40 INJECTION, POWDER, FOR SOLUTION INTRAVENOUS at 08:09

## 2023-03-07 RX ADMIN — CITALOPRAM 40 MG: 40 TABLET, FILM COATED ORAL at 08:07

## 2023-03-07 RX ADMIN — SODIUM CHLORIDE, PRESERVATIVE FREE 10 ML: 5 INJECTION INTRAVENOUS at 08:50

## 2023-03-07 RX ADMIN — PROPOFOL 30 MCG/KG/MIN: 10 INJECTION, EMULSION INTRAVENOUS at 00:40

## 2023-03-07 RX ADMIN — Medication 50 MCG/HR: at 18:00

## 2023-03-07 RX ADMIN — ONDANSETRON 4 MG: 4 TABLET, ORALLY DISINTEGRATING ORAL at 10:50

## 2023-03-07 RX ADMIN — ACETAMINOPHEN 650 MG: 325 TABLET ORAL at 05:18

## 2023-03-07 RX ADMIN — MAGNESIUM SULFATE HEPTAHYDRATE 1000 MG: 1 INJECTION, SOLUTION INTRAVENOUS at 08:49

## 2023-03-07 RX ADMIN — SODIUM CHLORIDE, PRESERVATIVE FREE 10 ML: 5 INJECTION INTRAVENOUS at 19:46

## 2023-03-07 RX ADMIN — Medication 50 MCG/HR: at 00:40

## 2023-03-07 RX ADMIN — POTASSIUM BICARBONATE 40 MEQ: 782 TABLET, EFFERVESCENT ORAL at 08:06

## 2023-03-07 RX ADMIN — ONDANSETRON 4 MG: 2 INJECTION INTRAMUSCULAR; INTRAVENOUS at 19:44

## 2023-03-07 RX ADMIN — FOLIC ACID 1 MG: 1 TABLET ORAL at 08:08

## 2023-03-07 RX ADMIN — PROPOFOL 20 MCG/KG/MIN: 10 INJECTION, EMULSION INTRAVENOUS at 18:04

## 2023-03-07 RX ADMIN — PROPOFOL 20 MCG/KG/MIN: 10 INJECTION, EMULSION INTRAVENOUS at 06:56

## 2023-03-07 ASSESSMENT — PULMONARY FUNCTION TESTS
PIF_VALUE: 19
PIF_VALUE: 19
PIF_VALUE: 21
PIF_VALUE: 19
PIF_VALUE: 21
PIF_VALUE: 20
PIF_VALUE: 23
PIF_VALUE: 19
PIF_VALUE: 20
PIF_VALUE: 22
PIF_VALUE: 20
PIF_VALUE: 19
PIF_VALUE: 20
PIF_VALUE: 18
PIF_VALUE: 20
PIF_VALUE: 21
PIF_VALUE: 21
PIF_VALUE: 20
PIF_VALUE: 16
PIF_VALUE: 20
PIF_VALUE: 19
PIF_VALUE: 20
PIF_VALUE: 19
PIF_VALUE: 23
PIF_VALUE: 18
PIF_VALUE: 19
PIF_VALUE: 21
PIF_VALUE: 21
PIF_VALUE: 20
PIF_VALUE: 21
PIF_VALUE: 20
PIF_VALUE: 21
PIF_VALUE: 20
PIF_VALUE: 20
PIF_VALUE: 21
PIF_VALUE: 19
PIF_VALUE: 20
PIF_VALUE: 21
PIF_VALUE: 20
PIF_VALUE: 19
PIF_VALUE: 20
PIF_VALUE: 19
PIF_VALUE: 19
PIF_VALUE: 20
PIF_VALUE: 22

## 2023-03-07 ASSESSMENT — PAIN SCALES - GENERAL
PAINLEVEL_OUTOF10: 0
PAINLEVEL_OUTOF10: 0

## 2023-03-07 NOTE — PLAN OF CARE
Problem: Safety - Adult  Goal: Free from fall injury  3/7/2023 1825 by Binta Mesa RN  Outcome: Progressing     Problem: Safety - Medical Restraint  Goal: Remains free of injury from restraints (Restraint for Interference with Medical Device)  Description: INTERVENTIONS:  1. Determine that other, less restrictive measures have been tried or would not be effective before applying the restraint  2. Evaluate the patient's condition at the time of restraint application  3. Inform patient/family regarding the reason for restraint  4. Q2H: Monitor safety, psychosocial status, comfort, nutrition and hydration  3/7/2023 1825 by Binta Mesa RN  Outcome: Progressing     Problem: Skin/Tissue Integrity  Goal: Absence of new skin breakdown  Description: 1. Monitor for areas of redness and/or skin breakdown  2. Assess vascular access sites hourly  3. Every 4-6 hours minimum:  Change oxygen saturation probe site  4. Every 4-6 hours:  If on nasal continuous positive airway pressure, respiratory therapy assess nares and determine need for appliance change or resting period.   3/7/2023 1825 by Binta Mesa RN  Outcome: Progressing     Problem: Neurosensory - Adult  Goal: Achieves stable or improved neurological status  3/7/2023 1825 by Binta Mesa RN  Outcome: Progressing     Problem: Neurosensory - Adult  Goal: Absence of seizures  3/7/2023 1825 by Binta Mesa RN  Outcome: Progressing     Problem: Neurosensory - Adult  Goal: Remains free of injury related to seizures activity  3/7/2023 1825 by Binta Mesa RN  Outcome: Progressing     Problem: Neurosensory - Adult  Goal: Achieves maximal functionality and self care  3/7/2023 1825 by Binta Mesa RN  Outcome: Progressing     Problem: Respiratory - Adult  Goal: Achieves optimal ventilation and oxygenation  3/7/2023 1825 by Binta Mesa RN  Outcome: Progressing

## 2023-03-07 NOTE — CONSULTS
Comprehensive Nutrition Assessment    RECOMMENDATIONS:  Initiate Vital HP (High protein peptide formula) at 15 mL/hr and as tolerated, increase by 10 mL/hr q 4 hours until goal of 45 mL/hr is met via OG   Recommend 30 mL H20 flush q 4 hours. Monitor IVF infusion, Na labs and need for adjustments in water flush  1 packet Prosource, packet can be administered via Enfit tube or follow instructions on back of packet  Consider prophylactic prokinetic agent (such as reglan, erythromycin) to promote EN tolerance as appropriate   Consider daily micronutrient supplementation: 2000 IU Vitamin D3, MVM, + Probiotic   Monitor TF tolerance (abd distention, bowel habits, N/V, cramping)  Check TG while on diprivan infusion  Monitor nutrition adequacy, pertinent labs, bowel habits, wt changes, and clinical progress  Nutrition Education: Education not appropriate     NUTRITION ASSESSMENT:   Nutritional summary & status: Consult for TF ordering and management: Pt intubated after airway protection needed d/t exacerbation for myasthenia gravis. No pressor requirements, sedated fentanyl and propofol. Propofol providing 243 kcal/day. K low, no recent Phos lab. Ordered new Phos, K repletion ordered. No recent wt loss, pt consumed 100% of meals prior to intubation. OGT in place, will continue to monitor. Admission/PMH: Myasthenia gravis w/ acute exacerbation, respiratory failure    MALNUTRITION ASSESSMENT  Context of Malnutrition: Acute Illness   Malnutrition Status:  At risk for malnutrition (Comment)  Findings of the 6 clinical characteristics of malnutrition (Minimum of 2 out of 6 clinical characteristics is required to make the diagnosis of moderate or severe Protein Calorie Malnutrition based on AND/ASPEN Guidelines):  Energy Intake:  Mild decrease in energy intake (Comment)  Weight Loss:  No significant weight loss     Body Fat Loss:  No significant body fat loss     Muscle Mass Loss:  No significant muscle mass loss    Fluid Accumulation:  No significant fluid accumulation      NUTRITION DIAGNOSIS   Inadequate oral intake related to impaired respiratory function as evidenced by intubation, NPO or clear liquid status due to medical condition    Nutrition Monitoring and Evaluation:   Food/Nutrient Intake Outcomes:  Enteral Nutrition Intake/Tolerance  Physical Signs/Symptoms Outcomes:  Biochemical Data, Weight, Nutrition Focused Physical Findings, GI Status     OBJECTIVE DATA: Significant to nutrition assessment  Nutrition Related Findings: Na 134. K 3.4. No BM since admission. Wounds: None  Nutrition Goals: Initiate nutrition support     CURRENT NUTRITION THERAPIES  Current Tube Feeding (TF) Orders:  Feeding Route: Orogastric  Formula: Peptide Based High Protein  Schedule: Continuous  Additives/Modulars: Protein (1 packet Prosource)  Goal TF & Flush Orders Provides: Vital HP @ 45 mL/hr to provide: 1080 mL TV, 1080 kcal, 94 g/pro and 903 mL FW + FW flushes of 30 mL q4 + 1 packet Prosource to provide an additional 20 g/pro and 80 kcal  PO Intake: NPO   PO Supplement Intake:NPO  Additional Sources of Calories/IVF:243 kcal via propofol     ANTHROPOMETRICS  Current Height: 5' 2.01\" (157.5 cm)  Current Weight: 170 lb 3.1 oz (77.2 kg)    Admission weight: 167 lb 12.3 oz (76.1 kg)  Ideal Body Weight (IBW): 110 lbs  (50 kg)    BMI: 0    COMPARATIVE STANDARDS  Energy (kcal):  3656-7286 (15-18 kcal/kg CBW)     Protein (g):  100-110 (2.0-2.2 g/kg IBW)       Fluid (mL/day):  1 mL/kcal or per MD    The patient will be monitored per nutrition standards of care. Consult dietitian if additional nutrition interventions are needed prior to RD reassessment.      Lucille Casas, [address] [place] [address], [address] [place] [address] Drive:  [phone]  Office:  [phone]

## 2023-03-07 NOTE — PROGRESS NOTES
ICU Progress Note    Admit Date: 3/5/2023  Hospital day: 3  ICU day: 2  Vent Day: 2  IV Access:Peripheral  IV Fluids:None  Vasopressors:None                Antibiotics: None  Diet: Diet NPO    CC: difficulty swallowing and generalized weakness     Interval history: Patient remained hemodynamically stable overnight on the vent. Able to communicate with hand movements. Follows commands. Patient able to give a thumbs up in response to pain in her head and right hip. The pain continues even after tylenol given early this morning. Of note, patient had a right hip revision surgery this past fall. Medications:     Scheduled Meds:   pantoprazole  40 mg IntraVENous Daily    citalopram  40 mg Oral QAM    doxepin  10 mg Oral Nightly    estradiol  2 mg Oral Nightly    folic acid  1 mg Oral Daily    levothyroxine  150 mcg Oral Daily    methotrexate  10 mg Oral Weekly    sodium chloride flush  5-40 mL IntraVENous 2 times per day    enoxaparin  40 mg SubCUTAneous Nightly     Continuous Infusions:   propofol 30 mcg/kg/min (03/07/23 0040)    fentaNYL 50 mcg/hr (03/07/23 0040)    sodium chloride       PRN Meds:hydrALAZINE, butalbital-acetaminophen-caffeine, LORazepam, sodium chloride flush, sodium chloride, ondansetron **OR** ondansetron, polyethylene glycol, acetaminophen **OR** acetaminophen    Objective:   Vitals:   T-max:  Patient Vitals for the past 8 hrs:   Temp Temp src Pulse Resp SpO2 Weight   03/07/23 0532 -- -- -- -- -- 170 lb 3.1 oz (77.2 kg)   03/07/23 0512 -- -- 74 14 99 % --   03/07/23 0143 -- -- 70 14 100 % --   03/07/23 0140 -- -- 69 14 100 % --   03/07/23 0000 98.5 °F (36.9 °C) Temporal -- -- 98 % --       Intake/Output Summary (Last 24 hours) at 3/7/2023 0650  Last data filed at 3/7/2023 0532  Gross per 24 hour   Intake 641.49 ml   Output 450 ml   Net 191.49 ml       Review of Systems - per interval history. Physical Exam  Constitutional:       General: She is not in acute distress.      Interventions: She is intubated. HENT:      Head: Normocephalic and atraumatic. Eyes:      Conjunctiva/sclera: Conjunctivae normal.      Pupils: Pupils are equal, round, and reactive to light. Cardiovascular:      Rate and Rhythm: Normal rate and regular rhythm. Pulmonary:      Effort: She is intubated. Comments: Mechanical breath sound bilaterally. Abdominal:      General: Bowel sounds are normal.      Palpations: Abdomen is soft. Musculoskeletal:      Right lower leg: No edema. Left lower leg: No edema. Skin:     General: Skin is warm and dry. Neurological:      Motor: Weakness (generalized) present. LABS:    CBC:   Recent Labs     03/06/23 0726 03/07/23 0523   WBC 4.6 6.3   HGB 12.7 12.5   HCT 38.7 37.6    262   MCV 85.8 86.8     Renal:    Recent Labs     03/06/23 0726 03/07/23 0523    134*   K 4.1 3.4*    98*   CO2 26 28   BUN 11 11   CREATININE 0.6 0.6   GLUCOSE 90 84   CALCIUM 8.8 8.5   MG  --  1.80   ANIONGAP 10 8     Hepatic:   Recent Labs     03/06/23  0726 03/07/23 0523   AST 16 20   ALT 9* 12   BILITOT 0.3 0.4   BILIDIR <0.2  --    PROT 8.4* 9.3*   LABALBU 3.3* 3.2*   ALKPHOS 150* 159*     Troponin: No results for input(s): TROPONINI in the last 72 hours. BNP: No results for input(s): BNP in the last 72 hours. Lipids: No results for input(s): CHOL, HDL in the last 72 hours. Invalid input(s): LDLCALCU, TRIGLYCERIDE  ABGs:    Recent Labs     03/06/23  1658   PHART 7.411   IQL6SXD 43.9   PO2ART 202.0*   JTJ5LLU 28   BEART 2.7   E2ZCYKKQ 100   OBE4GQZ 29       INR: No results for input(s): INR in the last 72 hours. Lactate: No results for input(s): LACTATE in the last 72 hours. Cultures:  -----------------------------------------------------------------  RAD:   XR CHEST PORTABLE   Final Result      1. Repositioning of ET tube with tip now lying approximately 1.6 cm above the douglas.       2.  Near complete reexpansion of the left lung with persistent mild left basilar atelectasis. 3.  Further advancement of feeding tube with tip projecting over the distal gastric body. XR CHEST 1 VIEW   Final Result   Impression: Interval intubation. The endotracheal tube terminates within the right mainstem bronchus and should be retracted by at least 3 cm. Findings were discussed with ESTELITA Ray at the time of report. Assessment/Plan:   Debby Robertson is a 46 y.o. female, who was admitted on 3/5/23 due to weakness and difficulty swallowing & phonating, found to be in myasthenic crisis, and was transferred to the ICU on 3/6/23 and intubated due to impending respiratory failure. Acute respiratory failure 2/2 Myasthenic crisis  Per patient, her experience leading up to and including her hospitalization are consistent with prior episodes of myasthenic crisis. Symptoms include generalized fatigue & weakness, dysphagia, and dysphonia.    - Neurology consulted, recs appreciated  - Q4H neuro checks  - As able, please avoid the following medications as able as they can exacerbate myasthenia gravis:   - Anesthetic agents: neuromuscular blocking agents  - Cardiac Drugs: beta-blockers, procainamide, quinidine              - Antibiotics: aminoglycosides, fluoroquinolines  - Monitor neuromuscular respiratory parameters Q Shift: FVC and NIF measurements if FVC 15ml/kg or less (normal >= 60ml/kg)  and NIF 20  cm H2O or less (normal >=70cm H2O) notify Neurology Immediately  - S/p IVIG (2 g/kg total dose divided over 2 days)  - Holding home mestinon dose, as patient has increased difficulty swallowing and this medication will increase secretions  - NG tube placed  - Sedation with propofol and fentanyl, RASS goal -1 to 0  Respiratory:   SpO2 100% on MV  Vent Settings: Vent Mode: AC/PRVC Resp Rate (Set): 14 bmp/Vt (Set, mL): 450 mL/ /FiO2 : 35 % PEEP 5        Other chronic conditions:  Psoriasis - Home methotrexate, folate  Hypothyroidism - Continuing home Synthroid  Anxiety - Continuing home Celexa        Code Status: Full Code  FEN: Diet NPO  ADULT TUBE FEEDING; Orogastric; Peptide Based High Protein; Continuous; 15; Yes; 10; Q 4 hours; 45; 30; Q 4 hours; Protein; 1 packet Prosource, hook packet to ENfit tube or follow instructions on pack of packet  PPX: Lovenox, Protonix  DISPO: ICU    Earl Argueta MD, PGY-1  03/07/23  6:50 AM    This patient has been staffed and discussed with attending physician Karla Gonzalez MD.    Patient examined, findings as discussed with Dr Yordan Billy. Agree with assessment and plan. Respiratory failure has developed due to myasthenia crisis, exxam prior to intubation revealed very weak respiratory effort,and subjectively \"smothering\". On ventilator support she is able to indicate comfort with breathing. With full vent support as above ventilation is normal, oxygenation compensated with low FiO2. Muscular strength is quite weak overall. Hemodynamically intact, renal fxn normal.  No evident infection. Nutrition maintained with enteral feeding. Discussed with neurology service. Suspending treatment with IVIG because of concern for aseptic meningitis. May need PLEX.

## 2023-03-07 NOTE — PROGRESS NOTES
Hospitalist Progress Note      PCP: Nevin Triplett MD    Date of Admission: 3/5/2023    Chief Complaint: Difficulty swallowing        History Of Present Illness:       46 y.o. female who presented to [facility] with complaint of difficulty swallowing and raspy voice. According to patient she started experiencing difficulty swallowing for last few days. Patient felt like food was getting stuck in her throat. Patient also complained of difficulty with her speech. Patient felt weakness in her lower extremity. Symptoms were similar to her past episodes of myasthenia gravis exacerbation. Patient has experienced 5 exacerbations since 2018 and myasthenia crisis. Patient was first diagnosed with myasthenia gravis in 2005. Patient has history of thymectomy. Last exacerbation approximately 1 year ago. At that time patient was hospitalized at [facility].       Subjective:     Pt intubated yesterday. Remains on ventilator. FiO2 35%, PEEP 5.        Medications:  Reviewed    Infusion Medications    propofol 20 mcg/kg/min (03/07/23 0656)    fentaNYL 50 mcg/hr (03/07/23 0655)    sodium chloride       Scheduled Medications    [START ON 3/8/2023] citalopram  40 mg Orogastric QAM    doxepin  10 mg Orogastric Nightly    estradiol  2 mg Orogastric Nightly    [START ON 7/1/2128] folic acid  1 mg Orogastric Daily    [START ON 3/8/2023] levothyroxine  150 mcg Orogastric Daily    pantoprazole  40 mg IntraVENous Daily    sodium chloride flush  5-40 mL IntraVENous 2 times per day    enoxaparin  40 mg SubCUTAneous Nightly     PRN Meds: hydrALAZINE, butalbital-acetaminophen-caffeine, LORazepam, sodium chloride flush, sodium chloride, ondansetron **OR** ondansetron, polyethylene glycol, acetaminophen **OR** acetaminophen      Intake/Output Summary (Last 24 hours) at 3/7/2023 1304  Last data filed at 3/7/2023 0655  Gross per 24 hour   Intake 641.49 ml   Output 800 ml   Net -158.51 ml       Physical Exam Performed:    BP 133/66   Pulse 81   Temp 98 °F (36.7 °C) (Temporal)   Resp 14   Ht 5' 2.01\" (1.575 m)   Wt 170 lb 3.1 oz (77.2 kg)   SpO2 100%   BMI 31.12 kg/m²     General appearance: intubated   HEENT: Pupils equal, round, and reactive to light. Conjunctivae/corneas clear. Neck: Supple, with full range of motion. No jugular venous distention. Trachea midline. Respiratory:  Normal respiratory effort. Clear to auscultation, bilaterally without Rales/Wheezes/Rhonchi. Cardiovascular: Regular rate and rhythm with normal S1/S2 without murmurs, rubs or gallops. Abdomen: Soft, non-tender, non-distended with normal bowel sounds. Musculoskeletal: No clubbing, cyanosis or edema bilaterally. Full range of motion without deformity. Skin: Skin color, texture, turgor normal.  No rashes or lesions. Neurologic:  Neurovascularly intact without any focal sensory/motor deficits. Cranial nerves: II-XII intact, grossly non-focal.  Psychiatric: Alert and oriented, thought content appropriate, normal insight  Capillary Refill: Brisk, 3 seconds, normal   Peripheral Pulses: +2 palpable, equal bilaterally       Labs:   Recent Labs     03/06/23  0726 03/07/23  0523   WBC 4.6 6.3   HGB 12.7 12.5   HCT 38.7 37.6    262     Recent Labs     03/06/23  0726 03/07/23  0523    134*   K 4.1 3.4*    98*   CO2 26 28   BUN 11 11   CREATININE 0.6 0.6   CALCIUM 8.8 8.5   PHOS  --  3.5     Recent Labs     03/06/23  0726 03/07/23  0523   AST 16 20   ALT 9* 12   BILIDIR <0.2  --    BILITOT 0.3 0.4   ALKPHOS 150* 159*     No results for input(s): INR in the last 72 hours. No results for input(s): Alexis Beets in the last 72 hours.     Urinalysis:      Lab Results   Component Value Date/Time    NITRU Negative 01/02/2022 03:03 PM    WBCUA 1 06/30/2020 06:20 PM    BACTERIA RARE 06/30/2020 06:20 PM    RBCUA 1 06/30/2020 06:20 PM    BLOODU Negative 01/02/2022 03:03 PM    SPECGRAV >1.030 01/02/2022 03:03 PM    GLUCOSEU Negative 01/02/2022 03:03 PM       Radiology:  XR CHEST PORTABLE   Final Result      1. Repositioning of ET tube with tip now lying approximately 1.6 cm above the douglas. 2.  Near complete reexpansion of the left lung with persistent mild left basilar atelectasis. 3.  Further advancement of feeding tube with tip projecting over the distal gastric body. XR CHEST 1 VIEW   Final Result   Impression: Interval intubation. The endotracheal tube terminates within the right mainstem bronchus and should be retracted by at least 3 cm. Findings were discussed with ESTELITA Brown at the time of report. IP CONSULT TO NEUROLOGY  IP CONSULT TO DIETITIAN    Assessment/Plan:    Active Hospital Problems    Diagnosis     Respiratory failure requiring intubation ([facility] [address] [J96.90]      Priority: Medium    Myasthenia gravis with acute exacerbation ([address] [G70.01]      Priority: Medium    Myasthenia gravis with (acute) exacerbation ([address] [G70.01]      -Myasthenia crisis: pt presented with difficulty swallowing, speech difficulty and lower extremity weakness. Intubated on 3/6/23 due to worsening respiration.   -Hypothyroidism  - GERD  - Dyslipidemia        PLAN:     Respiratory monitoring  Ventilator management as per ICU team   IVIG administration  Neurology consultation  Continue pyridostigmine  Continue home meds     DVT Prophylaxis: Lovenox  Diet: ADULT DIET;  Regular; Low Fat/Low Chol/High Fiber/2 gm Na  Code Status: Full Code     PT/OT Eval Status:      Dispo - Pending clinical improvement       Daniel Patel MD

## 2023-03-07 NOTE — PROGRESS NOTES
NEUROLOGY / NEUROCRITICAL CARE PROGRESS NOTE       Patient Name: Debby Robertson YOB: 1970   Sex: Female Age: 46 yrs     CC / Reason for Consult: myasthenic crisis     Interval Hx / Changes over last 24 hours:   Complaining of some nausea this morning and a headache  On full support on the vent  S/p 2 days of IVIG, received 100 g total  Her headache is more severe than her typical headache, started overnight. She reports neck stiffness with neck flexion. This afternoon she did have a temperature of 100.4. ROS: +Nausea, headache    HISTORY   Admission HPI:   Debby Robertson is a 46 y.o. y/o female with history significant for anxiety, HLD, migraines, MG, seizures and thyroid disease. Per my interview with the patient she noticed some difficulty chewing and a new rasp to her voice on 3/2/23. Patient attempted to correct this by taking smaller bites, but then began to develop generalized fatigue and came in to the hospital.  Patient states this is similar to previous MG exacerbations in the past.  She denies any recent illnesses. Patient admitted to [address] for further evaluation and treatment. Of note: patient is currently on mestinon 60mg TID at home and follows with [address] [place] [address] [id number] Neurology.             [facility] Past Medical History:   Diagnosis Date    Anxiety     Arthritis     Cancer ([facility] [address]     thyroid    GERD (gastroesophageal reflux disease)     Hyperlipidemia     Insomnia     Migraines     Myasthenia gravis with exacerbation (HCC)     Seizures (HCC)     Thyroid disease       Allergies Allergies   Allergen Reactions    Immune Globulins Other (See Comments)     FLEBOGAMMA - ASEPTIC MENINGITIS    Other      Steroid injection to joints    Adhesive Tape Rash and Other (See Comments)     Paper Tape  Paper Tape    Skin gets reddened under tape; not allergic to latex    Penicillins Itching and Rash     Rash  Rash        Diet Diet NPO  ADULT TUBE FEEDING; Orogastric; Peptide Based High Protein; Continuous; 15; Yes; 10; Q 4 hours; 45; 30; Q 4 hours; Protein; 1 packet Prosource, hook packet to ENfit tube or follow instructions on pack of packet   Isolation No active isolations     CURRENT SCHEDULED MEDICATIONS   Inpatient Medications     HYDROmorphone, 0.25 mg, IntraVENous, Once    pantoprazole, 40 mg, IntraVENous, Daily    citalopram, 40 mg, Oral, QAM    doxepin, 10 mg, Oral, Nightly    estradiol, 2 mg, Oral, Nightly    folic acid, 1 mg, Oral, Daily    levothyroxine, 150 mcg, Oral, Daily    methotrexate, 10 mg, Oral, Weekly    sodium chloride flush, 5-40 mL, IntraVENous, 2 times per day    enoxaparin, 40 mg, SubCUTAneous, Nightly   Infusions    propofol 20 mcg/kg/min (03/07/23 0656)    fentaNYL 50 mcg/hr (03/07/23 0655)    sodium chloride        Antibiotics   Recent Abx Admin        No antibiotic orders with administrations found. LABS   Metabolic Panel Recent Labs     03/06/23 0726 03/07/23 0523    134*   K 4.1 3.4*    98*   CO2 26 28   BUN 11 11   CREATININE 0.6 0.6   GLUCOSE 90 84   CALCIUM 8.8 8.5   LABALBU 3.3* 3.2*   PHOS  --  3.5   MG  --  1.80   ALKPHOS 150* 159*   ALT 9* 12   AST 16 20        CBC / Coags Recent Labs     03/06/23 0726 03/07/23 0523   WBC 4.6 6.3   RBC 4.51 4.33   HGB 12.7 12.5   HCT 38.7 37.6    262        Other No results for input(s): LABA1C, LDLCALC, TRIG, TSH, [id number], FOLATE, LABSALI, COVID19 in the last 72 hours. No results for input(s): PHENYTOIN, KEPPRA, LACOSA, LAMO, VALPROATE, LACTSEPSIS, LACTA in the last 72 hours. DIAGNOSTICS   IMAGES:  Images personally reviewed and agree w/ radiology interpretation.     No Imaging to Review     PHYSICAL EXAMINATION     PHYSICAL EXAM:  Vitals:    03/07/23 0512 03/07/23 0532 03/07/23 0759 03/07/23 0814   BP:       Pulse: 74   73   Resp: 14   13   Temp:       TempSrc:       SpO2: 99%   100%   Weight:  170 lb 3.1 oz (77.2 kg)     Height:   5' 2.01\" (1.575 m)          General: Alert, no distress, well-nourished  Neurologic  Mental status:   Alert, intubated, writing appropriate questions and requests on clip board      Cranial nerves:   CN2: Visual fields full w/o extinction on confrontational testing, complains of some double vision that improves when she closes one eye vs the other  CN 3,4,6: Pupils equal and reactive to light, extraocular muscles intact  CN5: Facial sensation symmetric   CN7: Face symmetric but exam limited by ETT  CN8-12: Exam limited by ETT, but shoulder shrug equal bilaterally and turns head towards examiner speaking, suggesting hearing is intact    Motor Exam:  Somewhat limited due to patient participation, reporting that she is nauseous and requesting zofran, when asked to lift legs she requests zofran    On reexamination she can bend her knees/move her legs with 3/5 strength  In her BUEs, her , push and pull are at a 4/5 strength. When asked if she feels like her weakness has improved she confirms (through head nodding) that she feels a little improved but not significantly     Deep tendon reflexes:     R  L    Biceps  2  2   Brachioradialis  2 2   Patellar  2 2            ASSESSMENT & RECOMMENDATIONS   Assessment:Radha gibbons is a 47 yo female with a known history of Myasthenia Gravis with previous exacerbations. Patient presented with trouble chewing, hoarseness of voice, and fatigue. Patient states this is how she felt during previous exacerbations. Intubated on 3/6 for worsening respiratory status. Overnight she developed a headache and today she has headache, neck stiffness, and a low grade fever. Given her history of aseptic meningitis after IVIG in the past, I am concerned that it is occurring again.        Plan:  -Continue ventilator support, managed by ICU team  - Document FVC and NIF in the chart  - Given her reported headache and neck stiffness today, no further IVIG treatment should be given (She received 2 days of 50g, which is short of her total overall dosing after discussion with pharmacy)  - Will plan to monitor her exam over the next day or so, and if she is not improving will plan for PLEX, as she has received this in the past with exacerbations (appears she received with past exacerbation at [facility] about 1 year ago, she sees Dr. Suzy Oglesby as an outpatient and she tells me she has been off PLEX for about 6 months)  - Q4 hour neuro checks  - If she begins to have new or lateralizing symptoms, please notify Neurology and will consider head CT at that time  - Will continue to follow exam closely    Case discussed with  Dr. Yessica Phillips, Dr. Deepa Florian, and bedside RN. Family and patient updated on plan of care. BETH Perez - CNP   Neurology & Neurocritical Care   3/7/2023 10:42 AM      ICU Patients:   Neurocritical Care Line: [phone]  [employer]: [facility] Neurocritical Care    Critical Care:  Due to the immediate potential for life-threatening deterioration due to neurological failure, I spent 45 minutes providing critical care. This time excludes time spent performing procedures but includes time spent on direct patient care, history retrieval, review of the chart, and discussions with patient, family, and consultant(s).

## 2023-03-07 NOTE — PLAN OF CARE
Problem: Safety - Adult  Goal: Free from fall injury  3/7/2023 0449 by Lynne Magana RN  Outcome: Progressing    Problem: Safety - Medical Restraint  Goal: Remains free of injury from restraints (Restraint for Interference with Medical Device)  Description: INTERVENTIONS:  1. Determine that other, less restrictive measures have been tried or would not be effective before applying the restraint  2. Evaluate the patient's condition at the time of restraint application  3. Inform patient/family regarding the reason for restraint  4. Q2H: Monitor safety, psychosocial status, comfort, nutrition and hydration  3/7/2023 0449 by Lynne Magana RN  Outcome: Progressing    Problem: Skin/Tissue Integrity  Goal: Absence of new skin breakdown  Description: 1. Monitor for areas of redness and/or skin breakdown  2. Assess vascular access sites hourly  3. Every 4-6 hours minimum:  Change oxygen saturation probe site  4. Every 4-6 hours:  If on nasal continuous positive airway pressure, respiratory therapy assess nares and determine need for appliance change or resting period.   3/7/2023 0449 by Lynne Magana RN  Outcome: Progressing    Problem: Neurosensory - Adult  Goal: Achieves stable or improved neurological status  Outcome: Progressing     Problem: Neurosensory - Adult  Goal: Absence of seizures  Outcome: Progressing     Problem: Neurosensory - Adult  Goal: Remains free of injury related to seizures activity  Outcome: Progressing     Problem: Neurosensory - Adult  Goal: Achieves maximal functionality and self care  Outcome: Progressing     Problem: Respiratory - Adult  Goal: Achieves optimal ventilation and oxygenation  Outcome: Progressing

## 2023-03-07 NOTE — PROGRESS NOTES
Patient assessed. On minimal sedation; comfortable. Intubated but communicates through cues. Neuro intact. Needs attended; will continue to monitor pt.

## 2023-03-08 LAB
ANION GAP SERPL CALCULATED.3IONS-SCNC: 9 MMOL/L (ref 3–16)
BASOPHILS ABSOLUTE: 0 K/UL (ref 0–0.2)
BASOPHILS RELATIVE PERCENT: 0.6 %
BILIRUBIN URINE: NEGATIVE
BLOOD, URINE: NEGATIVE
BUN BLDV-MCNC: 14 MG/DL (ref 7–20)
CALCIUM SERPL-MCNC: 8.5 MG/DL (ref 8.3–10.6)
CHLORIDE BLD-SCNC: 96 MMOL/L (ref 99–110)
CLARITY: CLEAR
CO2: 26 MMOL/L (ref 21–32)
COLOR: YELLOW
CREAT SERPL-MCNC: 0.6 MG/DL (ref 0.6–1.1)
EOSINOPHILS ABSOLUTE: 0.1 K/UL (ref 0–0.6)
EOSINOPHILS RELATIVE PERCENT: 0.8 %
GFR SERPL CREATININE-BSD FRML MDRD: >60 ML/MIN/{1.73_M2}
GLUCOSE BLD-MCNC: 94 MG/DL (ref 70–99)
GLUCOSE URINE: NEGATIVE MG/DL
HCT VFR BLD CALC: 35.1 % (ref 36–48)
HEMOGLOBIN: 11.7 G/DL (ref 12–16)
KETONES, URINE: 15 MG/DL
LEUKOCYTE ESTERASE, URINE: NEGATIVE
LYMPHOCYTES ABSOLUTE: 1.4 K/UL (ref 1–5.1)
LYMPHOCYTES RELATIVE PERCENT: 19.1 %
MAGNESIUM: 1.9 MG/DL (ref 1.8–2.4)
MCH RBC QN AUTO: 28.7 PG (ref 26–34)
MCHC RBC AUTO-ENTMCNC: 33.3 G/DL (ref 31–36)
MCV RBC AUTO: 86.3 FL (ref 80–100)
MICROSCOPIC EXAMINATION: ABNORMAL
MONOCYTES ABSOLUTE: 0.6 K/UL (ref 0–1.3)
MONOCYTES RELATIVE PERCENT: 8.3 %
NEUTROPHILS ABSOLUTE: 5.3 K/UL (ref 1.7–7.7)
NEUTROPHILS RELATIVE PERCENT: 71.2 %
NITRITE, URINE: NEGATIVE
PDW BLD-RTO: 13.8 % (ref 12.4–15.4)
PH UA: 6 (ref 5–8)
PLATELET # BLD: 261 K/UL (ref 135–450)
PMV BLD AUTO: 7.2 FL (ref 5–10.5)
POTASSIUM SERPL-SCNC: 3.5 MMOL/L (ref 3.5–5.1)
PROTEIN UA: NEGATIVE MG/DL
RBC # BLD: 4.07 M/UL (ref 4–5.2)
SODIUM BLD-SCNC: 131 MMOL/L (ref 136–145)
SPECIFIC GRAVITY UA: 1.02 (ref 1–1.03)
TRIGL SERPL-MCNC: 169 MG/DL (ref 0–150)
URINE REFLEX TO CULTURE: ABNORMAL
URINE TYPE: ABNORMAL
UROBILINOGEN, URINE: 0.2 E.U./DL
WBC # BLD: 7.4 K/UL (ref 4–11)

## 2023-03-08 PROCEDURE — 94799 UNLISTED PULMONARY SVC/PX: CPT

## 2023-03-08 PROCEDURE — 83735 ASSAY OF MAGNESIUM: CPT

## 2023-03-08 PROCEDURE — 2700000000 HC OXYGEN THERAPY PER DAY

## 2023-03-08 PROCEDURE — 85025 COMPLETE CBC W/AUTO DIFF WBC: CPT

## 2023-03-08 PROCEDURE — 84478 ASSAY OF TRIGLYCERIDES: CPT

## 2023-03-08 PROCEDURE — 80048 BASIC METABOLIC PNL TOTAL CA: CPT

## 2023-03-08 PROCEDURE — 94761 N-INVAS EAR/PLS OXIMETRY MLT: CPT

## 2023-03-08 PROCEDURE — 2580000003 HC RX 258

## 2023-03-08 PROCEDURE — 6360000002 HC RX W HCPCS: Performed by: STUDENT IN AN ORGANIZED HEALTH CARE EDUCATION/TRAINING PROGRAM

## 2023-03-08 PROCEDURE — 6360000002 HC RX W HCPCS

## 2023-03-08 PROCEDURE — 2500000003 HC RX 250 WO HCPCS: Performed by: STUDENT IN AN ORGANIZED HEALTH CARE EDUCATION/TRAINING PROGRAM

## 2023-03-08 PROCEDURE — 81003 URINALYSIS AUTO W/O SCOPE: CPT

## 2023-03-08 PROCEDURE — 99291 CRITICAL CARE FIRST HOUR: CPT | Performed by: NURSE PRACTITIONER

## 2023-03-08 PROCEDURE — 99291 CRITICAL CARE FIRST HOUR: CPT | Performed by: INTERNAL MEDICINE

## 2023-03-08 PROCEDURE — 94003 VENT MGMT INPAT SUBQ DAY: CPT

## 2023-03-08 PROCEDURE — 6370000000 HC RX 637 (ALT 250 FOR IP)

## 2023-03-08 PROCEDURE — 2000000000 HC ICU R&B

## 2023-03-08 PROCEDURE — 6360000002 HC RX W HCPCS: Performed by: NURSE PRACTITIONER

## 2023-03-08 PROCEDURE — 51798 US URINE CAPACITY MEASURE: CPT

## 2023-03-08 PROCEDURE — 2580000003 HC RX 258: Performed by: STUDENT IN AN ORGANIZED HEALTH CARE EDUCATION/TRAINING PROGRAM

## 2023-03-08 PROCEDURE — C9113 INJ PANTOPRAZOLE SODIUM, VIA: HCPCS

## 2023-03-08 PROCEDURE — 51701 INSERT BLADDER CATHETER: CPT

## 2023-03-08 PROCEDURE — 6370000000 HC RX 637 (ALT 250 FOR IP): Performed by: STUDENT IN AN ORGANIZED HEALTH CARE EDUCATION/TRAINING PROGRAM

## 2023-03-08 RX ORDER — FENTANYL CITRATE-0.9 % NACL/PF 10 MCG/ML
25-200 PLASTIC BAG, INJECTION (ML) INTRAVENOUS CONTINUOUS
Status: DISCONTINUED | OUTPATIENT
Start: 2023-03-08 | End: 2023-03-15

## 2023-03-08 RX ORDER — MAGNESIUM SULFATE 1 G/100ML
1000 INJECTION INTRAVENOUS ONCE
Status: COMPLETED | OUTPATIENT
Start: 2023-03-08 | End: 2023-03-08

## 2023-03-08 RX ADMIN — FENTANYL CITRATE 125 MCG/HR: 50 INJECTION, SOLUTION INTRAMUSCULAR; INTRAVENOUS at 23:28

## 2023-03-08 RX ADMIN — PROPOFOL 20 MCG/KG/MIN: 10 INJECTION, EMULSION INTRAVENOUS at 00:48

## 2023-03-08 RX ADMIN — LEVOTHYROXINE SODIUM 150 MCG: 0.15 TABLET ORAL at 06:21

## 2023-03-08 RX ADMIN — PANTOPRAZOLE SODIUM 40 MG: 40 INJECTION, POWDER, FOR SOLUTION INTRAVENOUS at 07:33

## 2023-03-08 RX ADMIN — BUTALBITAL, ACETAMINOPHEN, AND CAFFEINE 1 TABLET: 50; 325; 40 TABLET ORAL at 14:30

## 2023-03-08 RX ADMIN — SODIUM CHLORIDE, PRESERVATIVE FREE 10 ML: 5 INJECTION INTRAVENOUS at 21:08

## 2023-03-08 RX ADMIN — SODIUM CHLORIDE, PRESERVATIVE FREE 10 ML: 5 INJECTION INTRAVENOUS at 07:35

## 2023-03-08 RX ADMIN — PROPOFOL 20 MCG/KG/MIN: 10 INJECTION, EMULSION INTRAVENOUS at 18:22

## 2023-03-08 RX ADMIN — PROPOFOL 20 MCG/KG/MIN: 10 INJECTION, EMULSION INTRAVENOUS at 08:51

## 2023-03-08 RX ADMIN — ENOXAPARIN SODIUM 40 MG: 100 INJECTION SUBCUTANEOUS at 21:08

## 2023-03-08 RX ADMIN — MAGNESIUM SULFATE HEPTAHYDRATE 1000 MG: 1 INJECTION, SOLUTION INTRAVENOUS at 17:55

## 2023-03-08 RX ADMIN — ESTRADIOL 2 MG: 0.5 TABLET ORAL at 21:07

## 2023-03-08 RX ADMIN — FOLIC ACID 1 MG: 1 TABLET ORAL at 07:33

## 2023-03-08 RX ADMIN — CITALOPRAM 40 MG: 40 TABLET, FILM COATED ORAL at 07:33

## 2023-03-08 RX ADMIN — DOXEPIN HYDROCHLORIDE 10 MG: 10 CAPSULE ORAL at 21:08

## 2023-03-08 RX ADMIN — POTASSIUM BICARBONATE 40 MEQ: 782 TABLET, EFFERVESCENT ORAL at 11:24

## 2023-03-08 RX ADMIN — SODIUM CHLORIDE: 9 INJECTION, SOLUTION INTRAVENOUS at 07:56

## 2023-03-08 RX ADMIN — HYDROMORPHONE HYDROCHLORIDE 0.25 MG: 1 INJECTION, SOLUTION INTRAMUSCULAR; INTRAVENOUS; SUBCUTANEOUS at 12:20

## 2023-03-08 RX ADMIN — Medication 50 MCG/HR: at 08:58

## 2023-03-08 ASSESSMENT — PULMONARY FUNCTION TESTS
PIF_VALUE: 26
PIF_VALUE: 28
PIF_VALUE: 22
PIF_VALUE: 20
PIF_VALUE: 24
PIF_VALUE: 25
PIF_VALUE: 20
PIF_VALUE: 26
PIF_VALUE: 19
PIF_VALUE: 22
PIF_VALUE: 20
PIF_VALUE: 26
PIF_VALUE: 21
PIF_VALUE: 26
PIF_VALUE: 19
PIF_VALUE: 19
PIF_VALUE: 20
PIF_VALUE: 22
PIF_VALUE: 22
PIF_VALUE: 28
PIF_VALUE: 19
PIF_VALUE: 22
PIF_VALUE: 23
PIF_VALUE: 26
PIF_VALUE: 26
PIF_VALUE: 20
PIF_VALUE: 20
PIF_VALUE: 24
PIF_VALUE: 20
PIF_VALUE: 28
PIF_VALUE: 20
PIF_VALUE: 26
PIF_VALUE: 25
PIF_VALUE: 20
PIF_VALUE: 24
PIF_VALUE: 19
PIF_VALUE: 20
PIF_VALUE: 25
PIF_VALUE: 20
PIF_VALUE: 19
PIF_VALUE: 28
PIF_VALUE: 24
PIF_VALUE: 20
PIF_VALUE: 25
PIF_VALUE: 20
PIF_VALUE: 24
PIF_VALUE: 20
PIF_VALUE: 25
PIF_VALUE: 25
PIF_VALUE: 21
PIF_VALUE: 24
PIF_VALUE: 25
PIF_VALUE: 19
PIF_VALUE: 23
PIF_VALUE: 20
PIF_VALUE: 20
PIF_VALUE: 24
PIF_VALUE: 20
PIF_VALUE: 26
PIF_VALUE: 20
PIF_VALUE: 19
PIF_VALUE: 24
PIF_VALUE: 24
PIF_VALUE: 22
PIF_VALUE: 20
PIF_VALUE: 24
PIF_VALUE: 20
PIF_VALUE: 22
PIF_VALUE: 24
PIF_VALUE: 25
PIF_VALUE: 27
PIF_VALUE: 19
PIF_VALUE: 23
PIF_VALUE: 24
PIF_VALUE: 19
PIF_VALUE: 8
PIF_VALUE: 25
PIF_VALUE: 22
PIF_VALUE: 20
PIF_VALUE: 20
PIF_VALUE: 26
PIF_VALUE: 23
PIF_VALUE: 24
PIF_VALUE: 20
PIF_VALUE: 22
PIF_VALUE: 20
PIF_VALUE: 21
PIF_VALUE: 21
PIF_VALUE: 20
PIF_VALUE: 19
PIF_VALUE: 24
PIF_VALUE: 20
PIF_VALUE: 20
PIF_VALUE: 26
PIF_VALUE: 23
PIF_VALUE: 23
PIF_VALUE: 20

## 2023-03-08 ASSESSMENT — PAIN SCALES - GENERAL
PAINLEVEL_OUTOF10: 4
PAINLEVEL_OUTOF10: 0
PAINLEVEL_OUTOF10: 0

## 2023-03-08 ASSESSMENT — PAIN SCALES - WONG BAKER: WONGBAKER_NUMERICALRESPONSE: 0

## 2023-03-08 NOTE — PLAN OF CARE
Problem: Discharge Planning  Goal: Discharge to home or other facility with appropriate resources  3/8/2023 1312 by Nish Doss RN  Outcome: Progressing     Problem: Safety - Adult  Goal: Free from fall injury  3/8/2023 1312 by Nish Doss RN  Outcome: Progressing     Problem: Pain  Goal: Verbalizes/displays adequate comfort level or baseline comfort level  3/8/2023 1312 by Nish Doss RN  Outcome: Progressing    Problem: ABCDS Injury Assessment  Goal: Absence of physical injury  3/8/2023 1312 by Nish Doss RN  Outcome: Progressing     Problem: Safety - Medical Restraint  Goal: Remains free of injury from restraints (Restraint for Interference with Medical Device)  Description: INTERVENTIONS:  1. Determine that other, less restrictive measures have been tried or would not be effective before applying the restraint  2. Evaluate the patient's condition at the time of restraint application  3. Inform patient/family regarding the reason for restraint  4. Q2H: Monitor safety, psychosocial status, comfort, nutrition and hydration  3/8/2023 1312 by Nish Doss RN  Outcome: Progressing  Flowsheets  Taken 3/8/2023 1200 by Nish Doss RN  Remains free of injury from restraints (restraint for interference with medical device):   Determine that other, less restrictive measures have been tried or would not be effective before applying the restraint   Evaluate the patient's condition at the time of restraint application   Inform patient/family regarding the reason for restraint   Every 2 hours: Monitor safety, psychosocial status, comfort, nutrition and hydration    Problem: Skin/Tissue Integrity  Goal: Absence of new skin breakdown  Description: 1. Monitor for areas of redness and/or skin breakdown  2. Assess vascular access sites hourly  3. Every 4-6 hours minimum:  Change oxygen saturation probe site  4.   Every 4-6 hours:  If on nasal continuous positive airway pressure, respiratory therapy assess nares and determine need for appliance change or resting period.   3/8/2023 1312 by Raman Arevalo RN  Outcome: Progressing     Problem: Neurosensory - Adult  Goal: Achieves stable or improved neurological status  3/8/2023 1312 by Raman Arevalo RN  Outcome: Progressing     Problem: Neurosensory - Adult  Goal: Absence of seizures  3/8/2023 1312 by Raman Arevalo RN  Outcome: Progressing     Problem: Neurosensory - Adult  Goal: Remains free of injury related to seizures activity  3/8/2023 1312 by Raman Arevalo RN  Outcome: Progressing     Problem: Neurosensory - Adult  Goal: Achieves maximal functionality and self care  3/8/2023 1312 by Raman Arevalo RN  Outcome: Progressing     Problem: Respiratory - Adult  Goal: Achieves optimal ventilation and oxygenation  3/8/2023 1312 by Raman Arevalo RN  Outcome: Progressing     Problem: Nutrition Deficit:  Goal: Optimize nutritional status  3/8/2023 1312 by Raman Arevalo RN  Outcome: Progressing

## 2023-03-08 NOTE — PROGRESS NOTES
Patient experienced severe nausea. Zofran given. Nausea returned and Reglan given which worked better per patient.

## 2023-03-08 NOTE — PROGRESS NOTES
Hospitalist Progress Note      PCP: Sarah Noland MD    Date of Admission: 3/5/2023    Chief Complaint: Difficulty swallowing        History Of Present Illness:       46 y.o. female who presented to [facility] with complaint of difficulty swallowing and raspy voice. According to patient she started experiencing difficulty swallowing for last few days. Patient felt like food was getting stuck in her throat. Patient also complained of difficulty with her speech. Patient felt weakness in her lower extremity. Symptoms were similar to her past episodes of myasthenia gravis exacerbation. Patient has experienced 5 exacerbations since 2018 and myasthenia crisis. Patient was first diagnosed with myasthenia gravis in 2005. Patient has history of thymectomy. Last exacerbation approximately 1 year ago. At that time patient was hospitalized at [facility].     Pt intubated on 3/6/23 due to worsening respiratory failure. S/p IVIG for 2 days. Subjective:     Remains on ventilator. FiO2 30%, PEEP 5.          Medications:  Reviewed    Infusion Medications    propofol 20 mcg/kg/min (03/08/23 0838)    fentaNYL 50 mcg/hr (03/08/23 0858)    sodium chloride 5 mL/hr at 03/08/23 0756     Scheduled Medications    citalopram  40 mg Orogastric QAM    doxepin  10 mg Orogastric Nightly    estradiol  2 mg Orogastric Nightly    folic acid  1 mg Orogastric Daily    levothyroxine  150 mcg Orogastric Daily    pantoprazole  40 mg IntraVENous Daily    sodium chloride flush  5-40 mL IntraVENous 2 times per day    enoxaparin  40 mg SubCUTAneous Nightly     PRN Meds: metoclopramide, hydrALAZINE, butalbital-acetaminophen-caffeine, LORazepam, sodium chloride flush, sodium chloride, ondansetron **OR** ondansetron, polyethylene glycol, acetaminophen **OR** acetaminophen      Intake/Output Summary (Last 24 hours) at 3/8/2023 1233  Last data filed at 3/8/2023 1030  Gross per 24 hour   Intake 1220 ml   Output 600 ml   Net 620 ml Physical Exam Performed:    /70   Pulse 78   Temp 98.3 °F (36.8 °C) (Axillary)   Resp 14   Ht 5' 2.01\" (1.575 m)   Wt 170 lb 3.1 oz (77.2 kg)   SpO2 98%   BMI 31.12 kg/m²     General appearance: intubated   HEENT: Pupils equal, round, and reactive to light. Conjunctivae/corneas clear. Neck: Supple, with full range of motion. No jugular venous distention. Trachea midline. Respiratory:  Normal respiratory effort. Clear to auscultation, bilaterally without Rales/Wheezes/Rhonchi. Cardiovascular: Regular rate and rhythm with normal S1/S2 without murmurs, rubs or gallops. Abdomen: Soft, non-tender, non-distended with normal bowel sounds. Musculoskeletal: No clubbing, cyanosis or edema bilaterally. Full range of motion without deformity. Skin: Skin color, texture, turgor normal.  No rashes or lesions. Neurologic:  Neurovascularly intact without any focal sensory/motor deficits. Cranial nerves: II-XII intact, grossly non-focal.  Psychiatric: Alert and oriented, thought content appropriate, normal insight  Capillary Refill: Brisk, 3 seconds, normal   Peripheral Pulses: +2 palpable, equal bilaterally       Labs:   Recent Labs     03/06/23  0726 03/07/23  0523 03/08/23  0402   WBC 4.6 6.3 7.4   HGB 12.7 12.5 11.7*   HCT 38.7 37.6 35.1*    262 261     Recent Labs     03/06/23  0726 03/07/23  0523 03/08/23  0402    134* 131*   K 4.1 3.4* 3.5    98* 96*   CO2 26 28 26   BUN 11 11 14   CREATININE 0.6 0.6 0.6   CALCIUM 8.8 8.5 8.5   PHOS  --  3.5  --      Recent Labs     03/06/23  0726 03/07/23  0523   AST 16 20   ALT 9* 12   BILIDIR <0.2  --    BILITOT 0.3 0.4   ALKPHOS 150* 159*     No results for input(s): INR in the last 72 hours. No results for input(s): Antonio Bigness in the last 72 hours.     Urinalysis:      Lab Results   Component Value Date/Time    NITRU Negative 01/02/2022 03:03 PM    WBCUA 1 06/30/2020 06:20 PM    BACTERIA RARE 06/30/2020 06:20 PM    RBCUA 1 06/30/2020 06:20 PM    BLOODU Negative 01/02/2022 03:03 PM    SPECGRAV >1.030 01/02/2022 03:03 PM    GLUCOSEU Negative 01/02/2022 03:03 PM       Radiology:  XR CHEST PORTABLE   Final Result      1. Repositioning of ET tube with tip now lying approximately 1.6 cm above the douglas. 2.  Near complete reexpansion of the left lung with persistent mild left basilar atelectasis. 3.  Further advancement of feeding tube with tip projecting over the distal gastric body. XR CHEST 1 VIEW   Final Result   Impression: Interval intubation. The endotracheal tube terminates within the right mainstem bronchus and should be retracted by at least 3 cm. Findings were discussed with ESTELITA Conrad at the time of report. IP CONSULT TO NEUROLOGY  IP CONSULT TO DIETITIAN    Assessment/Plan:    Active Hospital Problems    Diagnosis     Respiratory failure requiring intubation ([address] [J96.90]      Priority: Medium    Myasthenia gravis with acute exacerbation ([address] [G70.01]      Priority: Medium    Myasthenia gravis with (acute) exacerbation ([address] [G70.01]      -Myasthenia crisis: pt presented with difficulty swallowing, speech difficulty and lower extremity weakness. Intubated on 3/6/23 due to worsening respiration.   -Hypothyroidism  - GERD  - Dyslipidemia        PLAN:     Respiratory monitoring  Ventilator management as per ICU team   IVIG administration  Neurology consultation  Continue pyridostigmine  Continue home meds     DVT Prophylaxis: Lovenox  Diet: ADULT DIET;  Regular; Low Fat/Low Chol/High Fiber/2 gm Na  Code Status: Full Code     PT/OT Eval Status:      Dispo - Pending clinical improvement       John Galeas MD

## 2023-03-08 NOTE — PROGRESS NOTES
ICU Progress Note    Admit Date: 3/5/2023  Hospital day: 4  ICU day: 3  Vent Day: 3  IV Access:Peripheral  IV Fluids:None  Vasopressors:None                Antibiotics: None  Diet: Diet NPO  ADULT TUBE FEEDING; Orogastric; Peptide Based High Protein; Continuous; 15; Yes; 10; Q 4 hours; 45; 30; Q 4 hours; Protein; 1 packet Prosource, hook packet to ENfit tube or follow instructions on pack of packet    CC: difficulty swallowing and generalized weakness     Interval history: Patient has continued headaches and right hip pain. Neurology is concerned the persistent headache with some neck stiff might be due to an aseptic meningitis after IVIG. She has had that reaction in the past. IVIG was added to her allergies list in the EMR. Remains hemodynamically stable on the vent. Propofol 20, fentanyl 50.      Medications:     Scheduled Meds:   potassium bicarb-citric acid  40 mEq Oral Once    citalopram  40 mg Orogastric QAM    doxepin  10 mg Orogastric Nightly    estradiol  2 mg Orogastric Nightly    folic acid  1 mg Orogastric Daily    levothyroxine  150 mcg Orogastric Daily    pantoprazole  40 mg IntraVENous Daily    sodium chloride flush  5-40 mL IntraVENous 2 times per day    enoxaparin  40 mg SubCUTAneous Nightly     Continuous Infusions:   propofol 20 mcg/kg/min (03/08/23 0851)    fentaNYL 50 mcg/hr (03/08/23 0858)    sodium chloride 5 mL/hr at 03/08/23 0756     PRN Meds:metoclopramide, hydrALAZINE, butalbital-acetaminophen-caffeine, LORazepam, sodium chloride flush, sodium chloride, ondansetron **OR** ondansetron, polyethylene glycol, acetaminophen **OR** acetaminophen    Objective:   Vitals:   T-max:  Patient Vitals for the past 8 hrs:   BP Temp Temp src Pulse Resp SpO2   03/08/23 1200 136/70 98.3 °F (36.8 °C) Axillary 78 14 98 %   03/08/23 1133 -- -- -- 74 12 99 %   03/08/23 1100 109/66 -- -- 74 14 98 %   03/08/23 1000 107/65 -- -- 76 14 99 %   03/08/23 0900 111/62 -- -- 80 14 100 %   03/08/23 0800 117/68 -- -- 79 14 100 %   03/08/23 0700 109/66 -- -- 80 14 100 %   03/08/23 0630 113/66 -- -- 78 14 98 %   03/08/23 0600 109/77 -- -- 78 14 98 %       Intake/Output Summary (Last 24 hours) at 3/8/2023 1343  Last data filed at 3/8/2023 1030  Gross per 24 hour   Intake 1220 ml   Output 600 ml   Net 620 ml       Review of Systems - per interval history. Physical Exam  Constitutional:       General: She is not in acute distress. Interventions: She is intubated. HENT:      Head: Normocephalic and atraumatic. Eyes:      Conjunctiva/sclera: Conjunctivae normal.      Pupils: Pupils are equal, round, and reactive to light. Cardiovascular:      Rate and Rhythm: Normal rate and regular rhythm. Pulmonary:      Effort: She is intubated. Comments: Mechanical breath sound bilaterally. Abdominal:      General: Bowel sounds are normal.      Palpations: Abdomen is soft. Musculoskeletal:      Right lower leg: No edema. Left lower leg: No edema. Skin:     General: Skin is warm and dry. Neurological:      Motor: Weakness (Generalized. 2/5 upper extremities. 1/5 lower extremities. ) present. LABS:    CBC:   Recent Labs     03/06/23 0726 03/07/23 0523 03/08/23  0402   WBC 4.6 6.3 7.4   HGB 12.7 12.5 11.7*   HCT 38.7 37.6 35.1*    262 261   MCV 85.8 86.8 86.3     Renal:    Recent Labs     03/06/23 0726 03/07/23 0523 03/08/23  0402    134* 131*   K 4.1 3.4* 3.5    98* 96*   CO2 26 28 26   BUN 11 11 14   CREATININE 0.6 0.6 0.6   GLUCOSE 90 84 94   CALCIUM 8.8 8.5 8.5   MG  --  1.80 1.90   PHOS  --  3.5  --    ANIONGAP 10 8 9     Hepatic:   Recent Labs     03/06/23 0726 03/07/23 0523   AST 16 20   ALT 9* 12   BILITOT 0.3 0.4   BILIDIR <0.2  --    PROT 8.4* 9.3*   LABALBU 3.3* 3.2*   ALKPHOS 150* 159*     Troponin: No results for input(s): TROPONINI in the last 72 hours. BNP: No results for input(s): BNP in the last 72 hours.   Lipids: No results for input(s): CHOL, HDL in the last 72 hours.    Invalid input(s): LDLCALCU, TRIGLYCERIDE  ABGs:    Recent Labs     03/06/23  1658   PHART 7.411   CFH2PWX 43.9   PO2ART 202.0*   JHN8LQP 28   BEART 2.7   [id number] 100   ZVJ0EUJ 29       INR: No results for input(s): INR in the last 72 hours. Lactate: No results for input(s): LACTATE in the last 72 hours. Cultures:  -----------------------------------------------------------------  RAD:   XR CHEST PORTABLE   Final Result      1. Repositioning of ET tube with tip now lying approximately 1.6 cm above the douglas. 2.  Near complete reexpansion of the left lung with persistent mild left basilar atelectasis. 3.  Further advancement of feeding tube with tip projecting over the distal gastric body. XR CHEST 1 VIEW   Final Result   Impression: Interval intubation. The endotracheal tube terminates within the right mainstem bronchus and should be retracted by at least 3 cm. Findings were discussed with ESTELITA Osullivan at the time of report. Assessment/Plan:   Delia Hsieh is a 46 y.o. female, who was admitted on 3/5/23 due to weakness and difficulty swallowing & phonating, found to be in myasthenic crisis, and was transferred to the ICU on 3/6/23 and intubated due to impending respiratory failure. Acute respiratory failure 2/2 Myasthenic crisis  Per patient, her experience leading up to and including her hospitalization are consistent with prior episodes of myasthenic crisis. Symptoms include generalized fatigue & weakness, dysphagia, and dysphonia.    - Neurology consulted and are managing myasthenic medications   - Q4H neuro checks  - As able, please avoid the following medications as able as they can exacerbate myasthenia gravis:   - Anesthetic agents: neuromuscular blocking agents  - Cardiac Drugs: beta-blockers, procainamide, quinidine              - Antibiotics: aminoglycosides, fluoroquinolines  - Monitor neuromuscular respiratory parameters Q Shift: FVC and NIF measurements if FVC 15ml/kg or less (normal >= 60ml/kg)  and NIF 20  cm H2O or less (normal >=70cm H2O) notify Neurology Immediately  - S/p IVIG (2 g/kg total dose divided over 2 days). Patient is having a potential aseptic meningitis reaction to the IVIG so it has now been added to her allergy list in the EMR. She has a stiff neck and a headache for the past few days.   - Holding home mestinon dose, as patient has increased difficulty swallowing and this medication will increase secretions  - NG tube placed  - Sedation with propofol and fentanyl, RASS goal -1 to 0  Respiratory:   SpO2 100% on MV  Vent Settings: Vent Mode: AC/PRVC Resp Rate (Set): 14 bmp/Vt (Set, mL): 450 mL/ /FiO2 : 30 % PEEP 5  This morning: NIF -18, . Other chronic conditions:  Psoriasis - Home methotrexate weekly, folate  Hypothyroidism - Continuing home Synthroid  Anxiety - Continuing home Celexa, Doxepin         Code Status: Full Code  FEN: Diet NPO  ADULT TUBE FEEDING; Orogastric; Peptide Based High Protein; Continuous; 15; Yes; 10; Q 4 hours; 45; 30; Q 4 hours; Protein; 1 packet Prosource, hook packet to ENfit tube or follow instructions on pack of packet  PPX: Lovenox, Protonix  DISPO: ICU    Cole Hernandez MD, PGY-1  03/08/23  10:43 AM    This patient has been staffed and discussed with attending physician Jenniffer Torres MD.     Patient examined, findings as discussed with Dr. Danie Finch. Agree with assessment and plan. Management of critical illness required for respiratory failure, associated with myasthenia crisis. Maintained with minute ventilation assist control ventilation, minute ventilation requirement 7-8 L, FiO2 30%, PEEP 5 cm. Oxygenation is excellent. No indication of acute pulmonary pathology. She exhibits significant muscle weakness yet, and did not expect respiratory muscle strength to be improving to the level that may allow liberation from mechanical ventilation.   Considering treatment with PLE X if not improving in the next 24 to 48 hours. Providing enteral nutrition via OGT  Discussed with Dr. Iam Carrillo.   Time spent in critical illness 40 min

## 2023-03-08 NOTE — PROGRESS NOTES
Patient received intubated and sedated. Patient is able to follow commands in all extremities and is able to nod/gesture appropriately to answer questions. Patients double vision has improved and temp has lowered. Patient is hemodynamically stable at this time. Will continue to monitor.

## 2023-03-08 NOTE — PROGRESS NOTES
NEUROLOGY / NEUROCRITICAL CARE PROGRESS NOTE       Patient Name: Jena Martines YOB: 1970   Sex: Female Age: 46 yrs     CC / Reason for Consult: myasthenic crisis     Interval Hx / Changes over last 24 hours:   Complaining headache this AM  On mechanical ventilation: Tv 450, peep 5, 30% FIO2  S/p 2 days of IVIG, received 100 g total      ROS: +Nausea, headache    HISTORY   Admission HPI:   Jena Martines is a 46 y.o. y/o female with history significant for anxiety, HLD, migraines, MG, seizures and thyroid disease. Per chart review: patient she noticed some difficulty chewing and a new rasp to her voice on 3/2/23. Patient attempted to correct this by taking smaller bites, but then began to develop generalized fatigue and came in to the hospital.  Patient states this is similar to previous MG exacerbations in the past.  She denies any recent illnesses. Patient admitted to [address] for further evaluation and treatment. Of note: patient is currently on mestinon 60mg TID at home and follows with [address] [place] [address] [address] Neurology. [facility] Past Medical History:   Diagnosis Date    Anxiety     Arthritis     Cancer ([facility] [address]     thyroid    GERD (gastroesophageal reflux disease)     Hyperlipidemia     Insomnia     Migraines     Myasthenia gravis with exacerbation (HCC)     Seizures (HCC)     Thyroid disease       Allergies Allergies   Allergen Reactions    Immune Globulins Other (See Comments)     Patient developed Aseptic meningitis after Flebogamma IVIG in the past.  Developed aseptic meningitis within 48h after Gamunex-C in 3/2023.      Other      Steroid injection to joints    Adhesive Tape Rash and Other (See Comments)     Paper Tape  Paper Tape    Skin gets reddened under tape; not allergic to latex    Penicillins Itching and Rash     Rash  Rash        Diet Diet NPO  ADULT TUBE FEEDING; Orogastric; Peptide Based High Protein; Continuous; 15; Yes; 10; Q 4 hours; 45; 30; Q 4 hours; Protein; 1 packet porter Cooley packet to ENfit tube or follow instructions on pack of packet   Isolation No active isolations     CURRENT SCHEDULED MEDICATIONS   Inpatient Medications     citalopram, 40 mg, Orogastric, QAM    doxepin, 10 mg, Orogastric, Nightly    estradiol, 2 mg, Orogastric, Nightly    folic acid, 1 mg, Orogastric, Daily    levothyroxine, 150 mcg, Orogastric, Daily    pantoprazole, 40 mg, IntraVENous, Daily    sodium chloride flush, 5-40 mL, IntraVENous, 2 times per day    enoxaparin, 40 mg, SubCUTAneous, Nightly   Infusions    propofol 20 mcg/kg/min (03/08/23 0851)    fentaNYL 50 mcg/hr (03/08/23 0858)    sodium chloride 5 mL/hr at 03/08/23 0756      Antibiotics   Recent Abx Admin        No antibiotic orders with administrations found. LABS   Metabolic Panel Recent Labs     03/06/23  0726 03/07/23  0523 03/08/23  0402    134* 131*   K 4.1 3.4* 3.5    98* 96*   CO2 26 28 26   BUN 11 11 14   CREATININE 0.6 0.6 0.6   GLUCOSE 90 84 94   CALCIUM 8.8 8.5 8.5   LABALBU 3.3* 3.2*  --    PHOS  --  3.5  --    MG  --  1.80 1.90   ALKPHOS 150* 159*  --    ALT 9* 12  --    AST 16 20  --         CBC / Coags Recent Labs     03/06/23  0726 03/07/23  0523 03/08/23  0402   WBC 4.6 6.3 7.4   RBC 4.51 4.33 4.07   HGB 12.7 12.5 11.7*   HCT 38.7 37.6 35.1*    262 261        Other No results for input(s): LABA1C, LDLCALC, TRIG, TSH, KIYCTDBV82, FOLATE, LABSALI, COVID19 in the last 72 hours. No results for input(s): PHENYTOIN, KEPPRA, LACOSA, LAMO, VALPROATE, LACTSEPSIS, LACTA in the last 72 hours. DIAGNOSTICS   IMAGES:  Images personally reviewed and agree w/ radiology interpretation.     No Imaging to Review     PHYSICAL EXAMINATION     PHYSICAL EXAM:  Vitals:    03/08/23 1200 03/08/23 1300 03/08/23 1400 03/08/23 1430   BP: 136/70 111/66 108/65 113/74   Pulse: 78 75 76 76   Resp: 14 14 14 14   Temp: 98.3 °F (36.8 °C)      TempSrc: Axillary      SpO2: 98%      Weight:       Height: General: patient is intubated, she is sleepy but does open eyes and nod head to answer question  Neurologic  Mental status:   Alert, intubated, answers questions by nodding head     Cranial nerves:   CN2: blinks to threat   CN 3,4,6: Pupils equal and reactive to light, extraocular muscles intact  CN5: Facial sensation symmetric   CN7: Face symmetric but exam limited by ETT  CN8-12: Exam limited by ETT, but shoulder shrug equal bilaterally and turns head towards examiner speaking    Motor Exam:  She will squeeze hands bilaterally, wiggle toes. Attempts to left legs off the bed. UE: 2/5   L/E: 1/5     Deep tendon reflexes:     R  L    Biceps  2  2   Brachioradialis  2 2   Patellar  2 2            ASSESSMENT & RECOMMENDATIONS   Assessment:Radha gibbons is a 45 yo female with a known history of Myasthenia Gravis with previous exacerbations. Patient presented with trouble chewing, hoarseness of voice, and fatigue. Patient states this is how she felt during previous exacerbations. Intubated on 3/6 for worsening respiratory status. She received IVIG x 2 days. Continues to have a headache today. Her daughter is at the bedside and states she has a history of migraines. She takes Fioricet at home. NIF -19 and VC =374    Plan:  - Q4 hour neuro checks  -Continue ventilator support, managed by ICU team  - Continue to monitor FVC and NIF.   Will check later in the shift  - will give 1 gm Mg for headache today  - Will plan to monitor her exam over the next day or so, and if she is not improving will plan for PLEX, as she has received this in the past with exacerbations (appears she received with past exacerbation at [facility] about 1 year ago, she sees Dr. Lyla Hathaway as an outpatient and she tells me she has been off PLEX for about 6 months)  -If she begins to have new or lateralizing symptoms, please notify Neurology and will consider head CT at that time  - Will continue to follow exam closely    I discussed plan of care with attending MD with  Dr. Gurvinder Doll who agreed with the plan of care. BETH Forman - CNP   Neurology & Neurocritical Care   3/8/2023 2:45 PM      ICU Patients:   Neurocritical Care Line: [phone]  [facility]: [facility] Neurocritical Care    Critical Care:  Due to the immediate potential for life-threatening deterioration due to neurological failure, I spent 40 minutes providing critical care. This time excludes time spent performing procedures but includes time spent on direct patient care, history retrieval, review of the chart, and discussions with patient, family, and consultant(s).

## 2023-03-08 NOTE — PROGRESS NOTES
Pt retaining >999 urine on bladder scan. ICU team notified. Order for insertion of dahl placed. Pt has been straight cath multiple times in 24hrs and has hx of retention.  Dahl inserted using sterile technique with 2 Rns. UA w/ reflex sent

## 2023-03-08 NOTE — PLAN OF CARE
Problem: Discharge Planning  Goal: Discharge to home or other facility with appropriate resources  3/8/2023 0309 by Maxime Renee RN  Outcome: Progressing  Flowsheets (Taken 3/7/2023 1931)  Discharge to home or other facility with appropriate resources:   Identify barriers to discharge with patient and caregiver   Arrange for needed discharge resources and transportation as appropriate  3/7/2023 1825 by Cyndi Norris RN  Outcome: Progressing     Problem: Safety - Adult  Goal: Free from fall injury  3/8/2023 0309 by Maxime Renee RN  Outcome: Progressing  3/7/2023 1825 by Cynid Norris RN  Outcome: Progressing     Problem: Pain  Goal: Verbalizes/displays adequate comfort level or baseline comfort level  3/8/2023 0309 by Maxime Renee RN  Outcome: Progressing  Flowsheets (Taken 3/7/2023 1931)  Verbalizes/displays adequate comfort level or baseline comfort level:   Encourage patient to monitor pain and request assistance   Assess pain using appropriate pain scale   Administer analgesics based on type and severity of pain and evaluate response   Implement non-pharmacological measures as appropriate and evaluate response   Consider cultural and social influences on pain and pain management   Notify Licensed Independent Practitioner if interventions unsuccessful or patient reports new pain  3/7/2023 1825 by Cyndi Norris RN  Outcome: Progressing     Problem: ABCDS Injury Assessment  Goal: Absence of physical injury  3/8/2023 0309 by Maxime Renee RN  Outcome: Progressing  3/7/2023 1825 by Cyndi Norris RN  Outcome: Progressing     Problem: Safety - Medical Restraint  Goal: Remains free of injury from restraints (Restraint for Interference with Medical Device)  Description: INTERVENTIONS:  1. Determine that other, less restrictive measures have been tried or would not be effective before applying the restraint  2. Evaluate the patient's condition at the time of restraint application  3.  Inform patient/family regarding the reason for restraint  4. Q2H: Monitor safety, psychosocial status, comfort, nutrition and hydration  3/8/2023 0309 by Steph Matson RN  Outcome: Progressing  Flowsheets  Taken 3/8/2023 0200  Remains free of injury from restraints (restraint for interference with medical device):   Determine that other, less restrictive measures have been tried or would not be effective before applying the restraint   Evaluate the patient's condition at the time of restraint application   Inform patient/family regarding the reason for restraint   Every 2 hours: Monitor safety, psychosocial status, comfort, nutrition and hydration  Taken 3/8/2023 0025  Remains free of injury from restraints (restraint for interference with medical device):   Determine that other, less restrictive measures have been tried or would not be effective before applying the restraint   Evaluate the patient's condition at the time of restraint application   Inform patient/family regarding the reason for restraint   Every 2 hours: Monitor safety, psychosocial status, comfort, nutrition and hydration  Taken 3/8/2023 0000  Remains free of injury from restraints (restraint for interference with medical device):   Determine that other, less restrictive measures have been tried or would not be effective before applying the restraint   Evaluate the patient's condition at the time of restraint application   Inform patient/family regarding the reason for restraint   Every 2 hours: Monitor safety, psychosocial status, comfort, nutrition and hydration  Taken 3/7/2023 2200  Remains free of injury from restraints (restraint for interference with medical device):   Determine that other, less restrictive measures have been tried or would not be effective before applying the restraint   Evaluate the patient's condition at the time of restraint application   Inform patient/family regarding the reason for restraint   Every 2 hours: Monitor safety, psychosocial  status, comfort, nutrition and hydration  Taken 3/7/2023 2000  Remains free of injury from restraints (restraint for interference with medical device):   Determine that other, less restrictive measures have been tried or would not be effective before applying the restraint   Evaluate the patient's condition at the time of restraint application   Inform patient/family regarding the reason for restraint   Every 2 hours: Monitor safety, psychosocial status, comfort, nutrition and hydration  3/7/2023 1825 by Rosangela Sahu RN  Outcome: Progressing  Flowsheets (Taken 3/7/2023 1800)  Remains free of injury from restraints (restraint for interference with medical device): Inform patient/family regarding the reason for restraint   Evaluate the patient's condition at the time of restraint application   Every 2 hours: Monitor safety, psychosocial status, comfort, nutrition and hydration     Problem: Skin/Tissue Integrity  Goal: Absence of new skin breakdown  Description: 1. Monitor for areas of redness and/or skin breakdown  2. Assess vascular access sites hourly  3. Every 4-6 hours minimum:  Change oxygen saturation probe site  4. Every 4-6 hours:  If on nasal continuous positive airway pressure, respiratory therapy assess nares and determine need for appliance change or resting period. 3/8/2023 0309 by Sophia Maloney RN  Outcome: Progressing  3/7/2023 1825 by Rosangela Sahu RN  Outcome: Progressing     Problem: Neurosensory - Adult  Goal: Achieves stable or improved neurological status  3/8/2023 0309 by Sophia Maloney RN  Outcome: Progressing  Flowsheets (Taken 3/7/2023 1931)  Achieves stable or improved neurological status: Assess for and report changes in neurological status  3/7/2023 1825 by Rosangela Sahu RN  Outcome: Progressing  Goal: Absence of seizures  3/8/2023 0309 by Sophia Maloney RN  Outcome: Progressing  Flowsheets (Taken 3/7/2023 1931)  Absence of seizures: Monitor for seizure activity.   If seizure occurs, document type and location of movements and any associated apnea  3/7/2023 1825 by Ira Perez RN  Outcome: Progressing  Goal: Remains free of injury related to seizures activity  3/8/2023 0309 by Shauna Meredith RN  Outcome: Progressing  Flowsheets (Taken 3/7/2023 1931)  Remains free of injury related to seizure activity: Maintain airway, patient safety  and administer oxygen as ordered  3/7/2023 1825 by Ira Perez RN  Outcome: Progressing  Goal: Achieves maximal functionality and self care  3/8/2023 0309 by Shauna Meredith RN  Outcome: Progressing  Flowsheets (Taken 3/7/2023 1931)  Achieves maximal functionality and self care: Monitor swallowing and airway patency with patient fatigue and changes in neurological status  3/7/2023 1825 by Ira Perez RN  Outcome: Progressing     Problem: Respiratory - Adult  Goal: Achieves optimal ventilation and oxygenation  3/8/2023 0309 by Shauna Meredith RN  Outcome: Progressing  Flowsheets (Taken 3/7/2023 1931)  Achieves optimal ventilation and oxygenation: Assess for changes in respiratory status  3/7/2023 1825 by Ira Perez RN  Outcome: Progressing     Problem: Nutrition Deficit:  Goal: Optimize nutritional status  3/8/2023 0309 by Shauna Meredith RN  Outcome: Progressing  3/7/2023 1825 by Ira Perez RN  Outcome: Progressing

## 2023-03-09 ENCOUNTER — APPOINTMENT (OUTPATIENT)
Dept: GENERAL RADIOLOGY | Age: 53
End: 2023-03-09
Attending: INTERNAL MEDICINE
Payer: COMMERCIAL

## 2023-03-09 LAB
ALBUMIN SERPL-MCNC: 3.1 G/DL (ref 3.4–5)
ALP BLD-CCNC: 161 U/L (ref 40–129)
ALT SERPL-CCNC: 13 U/L (ref 10–40)
ANION GAP SERPL CALCULATED.3IONS-SCNC: 10 MMOL/L (ref 3–16)
APTT: 35 SEC (ref 23–34.3)
AST SERPL-CCNC: 24 U/L (ref 15–37)
BASOPHILS ABSOLUTE: 0.1 K/UL (ref 0–0.2)
BASOPHILS RELATIVE PERCENT: 0.8 %
BILIRUB SERPL-MCNC: 0.3 MG/DL (ref 0–1)
BILIRUBIN DIRECT: <0.2 MG/DL (ref 0–0.3)
BILIRUBIN, INDIRECT: ABNORMAL MG/DL (ref 0–1)
BUN BLDV-MCNC: 17 MG/DL (ref 7–20)
CALCIUM SERPL-MCNC: 8.5 MG/DL (ref 8.3–10.6)
CHLORIDE BLD-SCNC: 96 MMOL/L (ref 99–110)
CO2: 27 MMOL/L (ref 21–32)
CREAT SERPL-MCNC: <0.5 MG/DL (ref 0.6–1.1)
EOSINOPHILS ABSOLUTE: 0.3 K/UL (ref 0–0.6)
EOSINOPHILS RELATIVE PERCENT: 4.1 %
FIBRINOGEN: 692 MG/DL (ref 207–509)
GFR SERPL CREATININE-BSD FRML MDRD: >60 ML/MIN/{1.73_M2}
GLUCOSE BLD-MCNC: 105 MG/DL (ref 70–99)
HCT VFR BLD CALC: 32.9 % (ref 36–48)
HEMOGLOBIN: 11 G/DL (ref 12–16)
INR BLD: 0.99 (ref 0.87–1.14)
LYMPHOCYTES ABSOLUTE: 1.4 K/UL (ref 1–5.1)
LYMPHOCYTES RELATIVE PERCENT: 20 %
MAGNESIUM: 1.8 MG/DL (ref 1.8–2.4)
MCH RBC QN AUTO: 28.6 PG (ref 26–34)
MCHC RBC AUTO-ENTMCNC: 33.3 G/DL (ref 31–36)
MCV RBC AUTO: 86 FL (ref 80–100)
MONOCYTES ABSOLUTE: 0.6 K/UL (ref 0–1.3)
MONOCYTES RELATIVE PERCENT: 8 %
NEUTROPHILS ABSOLUTE: 4.6 K/UL (ref 1.7–7.7)
NEUTROPHILS RELATIVE PERCENT: 67.1 %
PDW BLD-RTO: 13.7 % (ref 12.4–15.4)
PLATELET # BLD: 231 K/UL (ref 135–450)
PMV BLD AUTO: 7.1 FL (ref 5–10.5)
POTASSIUM SERPL-SCNC: 3.6 MMOL/L (ref 3.5–5.1)
PROTHROMBIN TIME: 13 SEC (ref 11.7–14.5)
RBC # BLD: 3.83 M/UL (ref 4–5.2)
SODIUM BLD-SCNC: 133 MMOL/L (ref 136–145)
TOTAL PROTEIN: 8 G/DL (ref 6.4–8.2)
WBC # BLD: 6.9 K/UL (ref 4–11)

## 2023-03-09 PROCEDURE — 85730 THROMBOPLASTIN TIME PARTIAL: CPT

## 2023-03-09 PROCEDURE — 6370000000 HC RX 637 (ALT 250 FOR IP): Performed by: STUDENT IN AN ORGANIZED HEALTH CARE EDUCATION/TRAINING PROGRAM

## 2023-03-09 PROCEDURE — 85384 FIBRINOGEN ACTIVITY: CPT

## 2023-03-09 PROCEDURE — 6370000000 HC RX 637 (ALT 250 FOR IP)

## 2023-03-09 PROCEDURE — 94799 UNLISTED PULMONARY SVC/PX: CPT

## 2023-03-09 PROCEDURE — P9045 ALBUMIN (HUMAN), 5%, 250 ML: HCPCS | Performed by: STUDENT IN AN ORGANIZED HEALTH CARE EDUCATION/TRAINING PROGRAM

## 2023-03-09 PROCEDURE — 2580000003 HC RX 258

## 2023-03-09 PROCEDURE — 80048 BASIC METABOLIC PNL TOTAL CA: CPT

## 2023-03-09 PROCEDURE — 99291 CRITICAL CARE FIRST HOUR: CPT

## 2023-03-09 PROCEDURE — 85610 PROTHROMBIN TIME: CPT

## 2023-03-09 PROCEDURE — 6360000002 HC RX W HCPCS

## 2023-03-09 PROCEDURE — 71045 X-RAY EXAM CHEST 1 VIEW: CPT

## 2023-03-09 PROCEDURE — 94761 N-INVAS EAR/PLS OXIMETRY MLT: CPT

## 2023-03-09 PROCEDURE — 6360000002 HC RX W HCPCS: Performed by: STUDENT IN AN ORGANIZED HEALTH CARE EDUCATION/TRAINING PROGRAM

## 2023-03-09 PROCEDURE — 36556 INSERT NON-TUNNEL CV CATH: CPT

## 2023-03-09 PROCEDURE — 85025 COMPLETE CBC W/AUTO DIFF WBC: CPT

## 2023-03-09 PROCEDURE — 2700000000 HC OXYGEN THERAPY PER DAY

## 2023-03-09 PROCEDURE — 2580000003 HC RX 258: Performed by: STUDENT IN AN ORGANIZED HEALTH CARE EDUCATION/TRAINING PROGRAM

## 2023-03-09 PROCEDURE — C9113 INJ PANTOPRAZOLE SODIUM, VIA: HCPCS

## 2023-03-09 PROCEDURE — 80076 HEPATIC FUNCTION PANEL: CPT

## 2023-03-09 PROCEDURE — 99291 CRITICAL CARE FIRST HOUR: CPT | Performed by: INTERNAL MEDICINE

## 2023-03-09 PROCEDURE — 36415 COLL VENOUS BLD VENIPUNCTURE: CPT

## 2023-03-09 PROCEDURE — 94003 VENT MGMT INPAT SUBQ DAY: CPT

## 2023-03-09 PROCEDURE — 83735 ASSAY OF MAGNESIUM: CPT

## 2023-03-09 PROCEDURE — 02HV33Z INSERTION OF INFUSION DEVICE INTO SUPERIOR VENA CAVA, PERCUTANEOUS APPROACH: ICD-10-PCS | Performed by: SURGERY

## 2023-03-09 PROCEDURE — 2000000000 HC ICU R&B

## 2023-03-09 RX ORDER — ALBUMIN, HUMAN INJ 5% 5 %
2800 SOLUTION INTRAVENOUS
Status: DISPENSED | OUTPATIENT
Start: 2023-03-10 | End: 2023-03-11

## 2023-03-09 RX ORDER — ACETAMINOPHEN 650 MG/1
650 SUPPOSITORY RECTAL EVERY 6 HOURS PRN
Status: DISCONTINUED | OUTPATIENT
Start: 2023-03-09 | End: 2023-03-17 | Stop reason: HOSPADM

## 2023-03-09 RX ORDER — SUMATRIPTAN 50 MG/1
50 TABLET, FILM COATED ORAL
Status: COMPLETED | OUTPATIENT
Start: 2023-03-09 | End: 2023-03-09

## 2023-03-09 RX ORDER — HEPARIN SODIUM 1000 [USP'U]/ML
10000 INJECTION, SOLUTION INTRAVENOUS; SUBCUTANEOUS ONCE
Status: COMPLETED | OUTPATIENT
Start: 2023-03-09 | End: 2023-03-09

## 2023-03-09 RX ORDER — ACETAMINOPHEN 500 MG
1000 TABLET ORAL EVERY 6 HOURS PRN
Status: DISCONTINUED | OUTPATIENT
Start: 2023-03-09 | End: 2023-03-17 | Stop reason: HOSPADM

## 2023-03-09 RX ORDER — ALBUMIN, HUMAN INJ 5% 5 %
2800 SOLUTION INTRAVENOUS ONCE
Status: COMPLETED | OUTPATIENT
Start: 2023-03-09 | End: 2023-03-09

## 2023-03-09 RX ORDER — MAGNESIUM SULFATE 1 G/100ML
1000 INJECTION INTRAVENOUS ONCE
Status: DISCONTINUED | OUTPATIENT
Start: 2023-03-09 | End: 2023-03-09

## 2023-03-09 RX ADMIN — SODIUM CHLORIDE, PRESERVATIVE FREE 10 ML: 5 INJECTION INTRAVENOUS at 21:02

## 2023-03-09 RX ADMIN — PANTOPRAZOLE SODIUM 40 MG: 40 INJECTION, POWDER, FOR SOLUTION INTRAVENOUS at 07:48

## 2023-03-09 RX ADMIN — ALBUMIN (HUMAN) 2800 ML: 12.5 INJECTION, SOLUTION INTRAVENOUS at 20:10

## 2023-03-09 RX ADMIN — SODIUM CHLORIDE, PRESERVATIVE FREE 10 ML: 5 INJECTION INTRAVENOUS at 07:48

## 2023-03-09 RX ADMIN — ESTRADIOL 2 MG: 0.5 TABLET ORAL at 21:02

## 2023-03-09 RX ADMIN — POTASSIUM BICARBONATE 40 MEQ: 782 TABLET, EFFERVESCENT ORAL at 07:48

## 2023-03-09 RX ADMIN — CITALOPRAM 40 MG: 40 TABLET, FILM COATED ORAL at 07:48

## 2023-03-09 RX ADMIN — MAGNESIUM SULFATE HEPTAHYDRATE 1000 MG: 1 INJECTION, SOLUTION INTRAVENOUS at 07:49

## 2023-03-09 RX ADMIN — HEPARIN SODIUM 10000 UNITS: 1000 INJECTION INTRAVENOUS; SUBCUTANEOUS at 18:21

## 2023-03-09 RX ADMIN — LEVOTHYROXINE SODIUM 150 MCG: 0.15 TABLET ORAL at 06:05

## 2023-03-09 RX ADMIN — ENOXAPARIN SODIUM 40 MG: 100 INJECTION SUBCUTANEOUS at 21:03

## 2023-03-09 RX ADMIN — FENTANYL CITRATE 150 MCG/HR: 50 INJECTION, SOLUTION INTRAMUSCULAR; INTRAVENOUS at 06:56

## 2023-03-09 RX ADMIN — FOLIC ACID 1 MG: 1 TABLET ORAL at 07:48

## 2023-03-09 RX ADMIN — CALCIUM GLUCONATE 5000 MG: 98 INJECTION, SOLUTION INTRAVENOUS at 20:10

## 2023-03-09 RX ADMIN — SUMATRIPTAN SUCCINATE 50 MG: 50 TABLET ORAL at 11:28

## 2023-03-09 RX ADMIN — DOXEPIN HYDROCHLORIDE 10 MG: 10 CAPSULE ORAL at 21:02

## 2023-03-09 ASSESSMENT — PULMONARY FUNCTION TESTS
PIF_VALUE: 30
PIF_VALUE: 27
PIF_VALUE: 28
PIF_VALUE: 27
PIF_VALUE: 25
PIF_VALUE: 27
PIF_VALUE: 30
PIF_VALUE: 25
PIF_VALUE: 25
PIF_VALUE: 28
PIF_VALUE: 25
PIF_VALUE: 25
PIF_VALUE: 29
PIF_VALUE: 28
PIF_VALUE: 29
PIF_VALUE: 29
PIF_VALUE: 27
PIF_VALUE: 26
PIF_VALUE: 27
PIF_VALUE: 26
PIF_VALUE: 23
PIF_VALUE: 28
PIF_VALUE: 28
PIF_VALUE: 27
PIF_VALUE: 26
PIF_VALUE: 26
PIF_VALUE: 25
PIF_VALUE: 27
PIF_VALUE: 25
PIF_VALUE: 29
PIF_VALUE: 27
PIF_VALUE: 25
PIF_VALUE: 23
PIF_VALUE: 27
PIF_VALUE: 28
PIF_VALUE: 25
PIF_VALUE: 28
PIF_VALUE: 27
PIF_VALUE: 26
PIF_VALUE: 25
PIF_VALUE: 26
PIF_VALUE: 27
PIF_VALUE: 26
PIF_VALUE: 26
PIF_VALUE: 27
PIF_VALUE: 25
PIF_VALUE: 26
PIF_VALUE: 27
PIF_VALUE: 25
PIF_VALUE: 24
PIF_VALUE: 32
PIF_VALUE: 26
PIF_VALUE: 29
PIF_VALUE: 28
PIF_VALUE: 27

## 2023-03-09 ASSESSMENT — PAIN SCALES - GENERAL
PAINLEVEL_OUTOF10: 0

## 2023-03-09 ASSESSMENT — PAIN SCALES - WONG BAKER
WONGBAKER_NUMERICALRESPONSE: 0

## 2023-03-09 ASSESSMENT — PAIN DESCRIPTION - LOCATION: LOCATION: HEAD

## 2023-03-09 ASSESSMENT — PAIN DESCRIPTION - DESCRIPTORS: DESCRIPTORS: ACHING

## 2023-03-09 NOTE — PROGRESS NOTES
Pt less interactive than yesterday, she has been sleeping most of the day, neuro assessment unchanged, pt is just more lethargic. Titrated fentanyl down, pt has axillary temp 100.7 at 1200 vitals today. Neuro NP notified.

## 2023-03-09 NOTE — PROCEDURES
Krista Hopkins is a 46 y.o. female patient. No diagnosis found. Past Medical History:   Diagnosis Date    Anxiety     Arthritis     Cancer ([address]     thyroid    GERD (gastroesophageal reflux disease)     Hyperlipidemia     Insomnia     Migraines     Myasthenia gravis with exacerbation (HCC)     Seizures (HCC)     Thyroid disease      Blood pressure (!) 122/58, pulse 78, temperature 99.7 °F (37.6 °C), temperature source Axillary, resp. rate 14, height 5' 2.01\" (1.575 m), weight 170 lb 3.1 oz (77.2 kg), SpO2 99 %. Central Line    Date/Time: 3/9/2023 6:06 PM  Performed by: Aleksandar Cifuentes DO  Authorized by: Rudy Ha MD   Consent: Written consent obtained. Risks and benefits: risks, benefits and alternatives were discussed  Consent given by: Daughter: Cassandra Smith. Patient understanding: patient states understanding of the procedure being performed  Patient consent: the patient's understanding of the procedure matches consent given  Procedure consent: procedure consent matches procedure scheduled  Relevant documents: relevant documents present and verified  Test results: test results available and properly labeled  Imaging studies: imaging studies available  Required items: required blood products, implants, devices, and special equipment available  Patient identity confirmed: arm band and hospital-assigned identification number  Time out: Immediately prior to procedure a \"time out\" was called to verify the correct patient, procedure, equipment, support staff and site/side marked as required. Indications: vascular access  Anesthesia: local infiltration    Anesthesia:  Local Anesthetic: lidocaine 1% with epinephrine    Sedation:  Patient sedated: yes  Sedatives: propofol  Vitals: Vital signs were monitored during sedation.     Preparation: skin prepped with 2% chlorhexidine  Skin prep agent dried: skin prep agent completely dried prior to procedure  Sterile barriers: all five maximum sterile barriers used - cap, mask, sterile gown, sterile gloves, and large sterile sheet  Hand hygiene: hand hygiene performed prior to central venous catheter insertion  Location details: right internal jugular  Patient position: Trendelenburg  Catheter size: 14 Fr  Pre-procedure: landmarks identified  Ultrasound guidance: yes  Sterile ultrasound techniques: sterile gel and sterile probe covers were used  Number of attempts: 1  Successful placement: yes  Post-procedure: line sutured and dressing applied  Assessment: blood return through all ports, free fluid flow, placement verified by x-ray and no pneumothorax on x-ray  Patient tolerance: patient tolerated the procedure well with no immediate complications  Comments: EBL <5cc  Wire placement confirmed using U/S guidance, please see media tab for images. Line placement confirmed using CXR with tip of the catheter located in the proximal to mid SVC.            Gladis Bronson DO   PGY1, General Surgery  03/09/23  6:36 PM  Contact via [employer]

## 2023-03-09 NOTE — PROGRESS NOTES
Assessment unchanged throughout night. Pt still has complaints of headache/migraine. As well as that, she mentioned she was very itchy; in which she communicated through pen/paper. ICU residents are aware and awaiting new order.

## 2023-03-09 NOTE — PROGRESS NOTES
ICU Progress Note    Admit Date: 3/5/2023  Hospital day: 5  ICU day: 4  Vent Day: 4  IV Access:Peripheral  IV Fluids:None  Vasopressors:None                Antibiotics: None  Diet: Diet NPO  ADULT TUBE FEEDING; Orogastric; Peptide Based High Protein; Continuous; 15; Yes; 10; Q 4 hours; 45; 30; Q 4 hours; Protein; 1 packet Prosource, hook packet to ENfit tube or follow instructions on pack of packet    CC: difficulty swallowing and generalized weakness     Interval history: Patient has been having itching overnight on her back and head. Headaches and neck stiff continue to be a problem. Patient was retaining a litre of urine yesterday afternoon. Torres was placed as she has been straight cathed several times already this hospital visit for urinary retention. NIF this morning was -5 and . Remains hemodynamically stable on the vent. Propofol 20, fentanyl 100. Her fentanyl has increased today and she is less interactive this morning.       Medications:     Scheduled Meds:   potassium bicarb-citric acid  40 mEq Per NG tube Once    magnesium sulfate  1,000 mg IntraVENous Once    citalopram  40 mg Orogastric QAM    doxepin  10 mg Orogastric Nightly    estradiol  2 mg Orogastric Nightly    folic acid  1 mg Orogastric Daily    levothyroxine  150 mcg Orogastric Daily    pantoprazole  40 mg IntraVENous Daily    sodium chloride flush  5-40 mL IntraVENous 2 times per day    enoxaparin  40 mg SubCUTAneous Nightly     Continuous Infusions:   fentaNYL 125 mcg/hr (03/09/23 0737)    propofol 20 mcg/kg/min (03/09/23 5191)    sodium chloride 5 mL/hr at 03/08/23 0756     PRN Meds:metoclopramide, hydrALAZINE, butalbital-acetaminophen-caffeine, LORazepam, sodium chloride flush, sodium chloride, ondansetron **OR** ondansetron, polyethylene glycol, acetaminophen **OR** acetaminophen    Objective:   Vitals:   T-max:  Patient Vitals for the past 8 hrs:   BP Temp Temp src Pulse Resp SpO2   03/09/23 0700 (!) 95/56 -- -- 67 14 95 %   03/09/23 0613 133/70 -- -- 76 14 --   03/09/23 0600 102/60 -- -- 71 14 99 %   03/09/23 0530 100/60 -- -- 71 14 96 %   03/09/23 0515 (!) 103/59 -- -- 72 14 96 %   03/09/23 0500 (!) 89/44 -- -- 72 14 96 %   03/09/23 0445 100/61 -- -- 70 14 97 %   03/09/23 0430 105/64 -- -- 71 14 --   03/09/23 0415 105/61 -- -- 69 14 --   03/09/23 0400 101/60 97.3 °F (36.3 °C) Axillary 70 14 98 %   03/09/23 0345 113/62 -- -- 72 14 --   03/09/23 0330 101/61 -- -- 72 14 --   03/09/23 0315 104/61 -- -- 68 14 97 %   03/09/23 0300 97/60 -- -- 68 14 96 %   03/09/23 0245 (!) 105/59 -- -- 71 14 96 %   03/09/23 0230 101/67 -- -- 71 12 97 %   03/09/23 0200 (!) 91/51 -- -- 71 14 97 %   03/09/23 0145 99/61 -- -- 68 14 96 %   03/09/23 0130 92/61 -- -- 66 14 96 %   03/09/23 0115 (!) 93/56 -- -- 66 14 96 %   03/09/23 0100 97/60 -- -- 66 14 95 %   03/09/23 0056 97/60 -- -- 66 14 95 %   03/09/23 0045 (!) 105/58 -- -- 68 14 96 %   03/09/23 0015 102/64 -- -- 68 14 97 %   03/09/23 0000 (!) 101/59 97.4 °F (36.3 °C) Axillary 69 14 96 %   03/08/23 2345 (!) 97/58 -- -- 70 14 96 %         Intake/Output Summary (Last 24 hours) at 3/9/2023 0741  Last data filed at 3/9/2023 7974  Gross per 24 hour   Intake 1612 ml   Output 1325 ml   Net 287 ml         Review of Systems - per interval history. Physical Exam  Constitutional:       General: She is not in acute distress. Interventions: She is intubated. HENT:      Head: Normocephalic and atraumatic. Eyes:      Conjunctiva/sclera: Conjunctivae normal.      Pupils: Pupils are equal, round, and reactive to light. Cardiovascular:      Rate and Rhythm: Normal rate and regular rhythm. Pulmonary:      Effort: She is intubated. Comments: Mechanical breath sound bilaterally. Abdominal:      General: Bowel sounds are normal.      Palpations: Abdomen is soft. Musculoskeletal:      Right lower leg: No edema. Left lower leg: No edema. Skin:     General: Skin is warm and dry.    Neurological: Motor: Weakness (Generalized. 2/5 upper extremities. 1/5 lower extremities. ) present. LABS:    CBC:   Recent Labs     03/07/23 0523 03/08/23 0402 03/09/23 0430   WBC 6.3 7.4 6.9   HGB 12.5 11.7* 11.0*   HCT 37.6 35.1* 32.9*    261 231   MCV 86.8 86.3 86.0       Renal:    Recent Labs     03/07/23 0523 03/08/23 0402 03/09/23 0430   * 131* 133*   K 3.4* 3.5 3.6   CL 98* 96* 96*   CO2 28 26 27   BUN 11 14 17   CREATININE 0.6 0.6 <0.5*   GLUCOSE 84 94 105*   CALCIUM 8.5 8.5 8.5   MG 1.80 1.90 1.80   PHOS 3.5  --   --    ANIONGAP 8 9 10       Hepatic:   Recent Labs     03/07/23 0523   AST 20   ALT 12   BILITOT 0.4   PROT 9.3*   LABALBU 3.2*   ALKPHOS 159*       ABGs:    Recent Labs     03/06/23  1658   PHART 7.411   MEO3UDU 43.9   PO2ART 202.0*   GXB2CEE 28   BEART 2.7   L6JFTOQA 100   DYA2SIP 29         -----------------------------------------------------------------  RAD:   XR CHEST PORTABLE   Final Result      1. Repositioning of ET tube with tip now lying approximately 1.6 cm above the douglas. 2.  Near complete reexpansion of the left lung with persistent mild left basilar atelectasis. 3.  Further advancement of feeding tube with tip projecting over the distal gastric body. XR CHEST 1 VIEW   Final Result   Impression: Interval intubation. The endotracheal tube terminates within the right mainstem bronchus and should be retracted by at least 3 cm. Findings were discussed with ESTELITA Conrad at the time of report. Assessment/Plan:   Rashida Jones is a 46 y.o. female, who was admitted on 3/5/23 due to weakness and difficulty swallowing & phonating, found to be in myasthenic crisis, and was transferred to the ICU on 3/6/23 and intubated due to impending respiratory failure. Acute respiratory failure 2/2 Myasthenic crisis  Per patient, her experience leading up to and including her hospitalization are consistent with prior episodes of myasthenic crisis.  Symptoms include generalized fatigue & weakness, dysphagia, and dysphonia. - Neurology consulted and are managing myasthenic medications   - Q4H neuro checks  - S/p IVIG (2 g/kg total dose divided over 2 days). Patient is having a potential aseptic meningitis reaction to the IVIG so it has now been added to her allergy list in the EMR. She has a stiff neck and a headache for the past few days.   -Patient likely to start PLEX soon. - As able, please avoid the following medications as able as they can exacerbate myasthenia gravis:   - Anesthetic agents: neuromuscular blocking agents  - Cardiac Drugs: beta-blockers, procainamide, quinidine              - Antibiotics: aminoglycosides, fluoroquinolines  - Monitor neuromuscular respiratory parameters Q Shift: FVC and NIF measurements if FVC 15ml/kg or less (normal >= 60ml/kg)  and NIF 20  cm H2O or less (normal >=70cm H2O) notify Neurology Immediately   - Holding home mestinon dose, as patient has increased difficulty swallowing and this medication will increase secretions  - NG tube placed  - Sedation with propofol and fentanyl, RASS goal -1 to 0  Respiratory:   SpO2 100% on MV  Vent Settings: Vent Mode: AC/PRVC Resp Rate (Set): 14 bmp/Vt (Set, mL): 450 mL/ /FiO2 : 30 % PEEP 5  With NIF -5 and  this morning, she is not ready to be extubated yet. Headaches  Potentially from aseptic meningitis 2/2 IVIG. Patient has a history of migraines but is reporting this ongoing headache feels different. -PRN tylenol, fioricet, sumatriptan      Urinary retention  Patient has a history of urinary retention when intubated for past myasthenic crises. She has been straight cathed several times this hospital visit. Yesterday afternoon, she was found to be retaining a litre of urine. -UA sent resulted in no signs of infection. Ketones present.   -Torres placed 3/8.        Other chronic conditions:  Psoriasis - Home methotrexate weekly, folate  Hypothyroidism - Continuing home Synthroid  Anxiety - Continuing home Celexa, Doxepin         Code Status: Full Code  FEN: Diet NPO  ADULT TUBE FEEDING; Orogastric; Peptide Based High Protein; Continuous; 15; Yes; 10; Q 4 hours; 45; 30; Q 4 hours; Protein; 1 packet Prosource, hook packet to ENfit tube or follow instructions on pack of packet  PPX: Lovenox, Protonix  DISPO: ICU    Karyn Ching MD, PGY-1  03/09/23  7:41 AM    This patient has been staffed and discussed with attending physician Susi Day MD.   Patient examined, findings as discussed with Dr. Angelo Garber. Agree with assessment and plan. Myasthenia gravis continues to be very problematic,  appears to be weaker as she attempts to follow directives. Sedation has been removed, and she is attentive. Remains on full ventilator support, adequate ventilation with ventilation 6.6 L and FiO2 30%. No respiratory complications. Suboptimal response to treatment with IVIG. Planning plasma exchange therapy.   Discussed with neurology service  Time spent in critical care 35 minutes

## 2023-03-09 NOTE — PLAN OF CARE
Problem: Discharge Planning  Goal: Discharge to home or other facility with appropriate resources  Outcome: Progressing  Flowsheets (Taken 3/8/2023 2000)  Discharge to home or other facility with appropriate resources:   Identify barriers to discharge with patient and caregiver   Arrange for needed discharge resources and transportation as appropriate   Identify discharge learning needs (meds, wound care, etc)     Problem: Safety - Adult  Goal: Free from fall injury  Outcome: Progressing     Problem: Pain  Goal: Verbalizes/displays adequate comfort level or baseline comfort level  Outcome: Progressing  Flowsheets  Taken 3/9/2023 0000  Verbalizes/displays adequate comfort level or baseline comfort level:   Encourage patient to monitor pain and request assistance   Assess pain using appropriate pain scale   Administer analgesics based on type and severity of pain and evaluate response   Implement non-pharmacological measures as appropriate and evaluate response  Taken 3/8/2023 2100  Verbalizes/displays adequate comfort level or baseline comfort level:   Encourage patient to monitor pain and request assistance   Assess pain using appropriate pain scale   Administer analgesics based on type and severity of pain and evaluate response   Implement non-pharmacological measures as appropriate and evaluate response   Consider cultural and social influences on pain and pain management   Notify Licensed Independent Practitioner if interventions unsuccessful or patient reports new pain     Problem: ABCDS Injury Assessment  Goal: Absence of physical injury  Outcome: Progressing     Problem: Safety - Medical Restraint  Goal: Remains free of injury from restraints (Restraint for Interference with Medical Device)  Description: INTERVENTIONS:  1. Determine that other, less restrictive measures have been tried or would not be effective before applying the restraint  2.  Evaluate the patient's condition at the time of restraint application  3. Inform patient/family regarding the reason for restraint  4. Q2H: Monitor safety, psychosocial status, comfort, nutrition and hydration  Outcome: Progressing  Flowsheets (Taken 3/8/2023 1600 by Lisa Desouza RN)  Remains free of injury from restraints (restraint for interference with medical device):   Determine that other, less restrictive measures have been tried or would not be effective before applying the restraint   Inform patient/family regarding the reason for restraint   Evaluate the patient's condition at the time of restraint application   Every 2 hours: Monitor safety, psychosocial status, comfort, nutrition and hydration     Problem: Skin/Tissue Integrity  Goal: Absence of new skin breakdown  Description: 1. Monitor for areas of redness and/or skin breakdown  2. Assess vascular access sites hourly  3. Every 4-6 hours minimum:  Change oxygen saturation probe site  4. Every 4-6 hours:  If on nasal continuous positive airway pressure, respiratory therapy assess nares and determine need for appliance change or resting period.   Outcome: Progressing     Problem: Neurosensory - Adult  Goal: Achieves stable or improved neurological status  Outcome: Progressing  Flowsheets (Taken 3/8/2023 2000)  Achieves stable or improved neurological status:   Assess for and report changes in neurological status   Initiate measures to prevent increased intracranial pressure  Goal: Absence of seizures  Outcome: Progressing  Flowsheets (Taken 3/8/2023 2000)  Absence of seizures: If seizure occurs, turn head to side and suction secretions as needed  Goal: Remains free of injury related to seizures activity  Outcome: Progressing  Flowsheets (Taken 3/8/2023 2000)  Remains free of injury related to seizure activity: Monitor patient for seizure activity, document and report duration and description of seizure to Licensed Independent Practitioner  Goal: Achieves maximal functionality and self care  Outcome: Progressing  Flowsheets (Taken 3/8/2023 2000)  Achieves maximal functionality and self care: Monitor swallowing and airway patency with patient fatigue and changes in neurological status     Problem: Respiratory - Adult  Goal: Achieves optimal ventilation and oxygenation  Outcome: Progressing  Flowsheets (Taken 3/8/2023 2000)  Achieves optimal ventilation and oxygenation:   Assess for changes in respiratory status   Assess for changes in mentation and behavior     Problem: Nutrition Deficit:  Goal: Optimize nutritional status  Outcome: Progressing

## 2023-03-09 NOTE — PLAN OF CARE
Problem: Safety - Adult  Goal: Free from fall injury  3/9/2023 0844 by Flaco Diane RN  Outcome: Progressing

## 2023-03-09 NOTE — CONSULTS
Department of General Surgery  Surgical Service    Line Consultation    Reason for Consult: Access     [place]:  Michael Liu is a 46 y.o. female recently admitted on 3/5/23 with Myasthenic Crisis now s/p IVIG x2 with likely subsequent aseptic meningitis 2/2 IVIG. Patient requires Vasc cath line placement for plasmapheresis. Therefore general surgery has been consulted for assistance with access. Previous central line attempts this admission: None  Central line attempt prior to this admission: 1 (2021, R IJ tunneled line)  Current Access: no CVC currently  Anticoagulation: Ppx lovenox   Antiplatelet: None  INR: 0.99  Platelet Count : 765  AV Access:No  Pacemaker: No   Port: No    Past Medical History:   has a past medical history of Anxiety, Arthritis, Cancer ([address] GERD (gastroesophageal reflux disease), Hyperlipidemia, Insomnia, Migraines, Myasthenia gravis with exacerbation ([address] Seizures ([address] and Thyroid disease. Past Surgical History:   has a past surgical history that includes Thyroidectomy; tumor removal; Hysterectomy; shoulder surgery; Appendectomy; Cholecystectomy; Upper gastrointestinal endoscopy (N/A, 12/17/2019); Colonoscopy (N/A, 12/17/2019); Upper gastrointestinal endoscopy (N/A, 12/17/2019); esophageal motility study (N/A, 02/11/2020); Upper gastrointestinal endoscopy (02/28/2020); Upper gastrointestinal endoscopy (02/28/2020); Capsule endoscopy (N/A, 02/28/2020); Dialysis Catheter Insertion (Right, 10/15/2021); other surgical history (Right, 10/15/2021); IR TUNNELED CVC PLACE WO SQ PORT/PUMP > 5 YEARS (10/15/2021); and IR BIOPSY LIVER PERCUTANEOUS (10/15/2021). Medications:  Prior to Admission medications    Medication Sig Start Date End Date Taking?  Authorizing Provider   pyridostigmine (MESTINON) 60 MG tablet Take 60 mg by mouth 3 times daily    Historical Provider, MD   levothyroxine (SYNTHROID) 150 MCG tablet Take 150 mcg by mouth Daily    Historical Provider, MD butalbital-acetaminophen-caffeine (FIORICET, ESGIC) -40 MG per tablet Take 1 tablet by mouth every 4 hours as needed for Migraine    Historical Provider, MD   LORazepam (ATIVAN) 0.5 MG tablet Take 0.5 mg by mouth 2 times daily as needed. Historical Provider, MD   methotrexate (RHEUMATREX) 2.5 MG chemo tablet Take 10 mg by mouth once a week Take 4 tablets together every Monday. Historical Provider, MD   doxepin (SINEQUAN) 10 MG capsule Take 10 mg by mouth nightly    Historical Provider, MD   estradiol (ESTRACE) 2 MG tablet Take 2 mg by mouth nightly    Historical Provider, MD   folic acid (FOLVITE) 1 MG tablet Take 1 mg by mouth daily    Historical Provider, MD   zolpidem (AMBIEN) 10 MG tablet Take 10 mg by mouth nightly. Historical Provider, MD   citalopram (CELEXA) 40 MG tablet Take 40 mg by mouth every morning     Historical Provider, MD       Allergies:  Immune globulins, Other, Adhesive tape, and Penicillins    Family History:  family history includes Heart Disease in her father; High Blood Pressure in her father; High Cholesterol in her father. Social History:   reports that she has never smoked. She has never used smokeless tobacco. She reports that she does not drink alcohol and does not use drugs. REVIEW OF SYSTEMS:    A 5 point review of systems was completed     PHYSICAL EXAM:    VITALS:  BP (!) 122/58   Pulse 78   Temp 99.7 °F (37.6 °C) (Axillary)   Resp 14   Ht 5' 2.01\" (1.575 m)   Wt 170 lb 3.1 oz (77.2 kg)   SpO2 99%   BMI 31.12 kg/m²   24HR INTAKE/OUTPUT:    Intake/Output Summary (Last 24 hours) at 3/9/2023 1828  Last data filed at 3/9/2023 1600  Gross per 24 hour   Intake 2337.96 ml   Output 1100 ml   Net 1237.96 ml     URINARY CATHETER OUTPUT (Torres):     TEMPERATURE:  Current - Temp: 99.7 °F (37.6 °C);  Max - Temp  Av.7 °F (37.1 °C)  Min: 97.3 °F (36.3 °C)  Max: 100.1 °F (37.8 °C)  RESPIRATIONS RANGE: Resp  Av.1  Min: 11  Max: 23  PULSE RANGE: Pulse  Avg: 71.4  Min: 61  Max: 84  BLOOD PRESSURE RANGE:  Systolic (31NCI), FI , Min:89 , SRI:127  ; Diastolic (14LKO), OYD:94, Min:44, Max:102   PULSE OXIMETRY RANGE: SpO2  Av.2 %  Min: 94 %  Max: 100 %  VENT SETTINGS:  Vent Information  Ventilator Day(s): 2  Ventilator ID: [id number]  Equipment Changed: HME  Ventilator Initiate: Yes  Vent Mode: AC/PRVC  Additional Respiratory Assessments  Heart Rate: 78  Resp: 14  SpO2: 99 %  End Tidal CO2: 41 (%)  Humidification Source: HME  Circuit Condensation: Not drained    Physical Exam  Constitutional:       General: She is not in acute distress. Appearance: She is not toxic-appearing. HENT:      Head: Normocephalic and atraumatic. Nose: Nose normal.      Comments: Corpak in place     Mouth/Throat:      Comments: ET tube in place  Eyes:      General: No scleral icterus. Extraocular Movements: Extraocular movements intact. Conjunctiva/sclera: Conjunctivae normal.      Pupils: Pupils are equal, round, and reactive to light. Cardiovascular:      Rate and Rhythm: Normal rate and regular rhythm. Pulses: Normal pulses. Pulmonary:      Effort: Pulmonary effort is normal.      Comments: On Mechanical intubation  Musculoskeletal:      Cervical back: Normal range of motion and neck supple. No rigidity or tenderness. Lymphadenopathy:      Cervical: No cervical adenopathy. Skin:     General: Skin is warm and dry. Capillary Refill: Capillary refill takes less than 2 seconds. Neurological:      General: No focal deficit present. Mental Status: She is alert. Motor: Weakness present. DATA:    XR CHEST PORTABLE   Final Result      1. Repositioning of ET tube with tip now lying approximately 1.6 cm above the douglas. 2.  Near complete reexpansion of the left lung with persistent mild left basilar atelectasis. 3.  Further advancement of feeding tube with tip projecting over the distal gastric body.       XR CHEST 1 VIEW   Final Result Impression: Interval intubation. The endotracheal tube terminates within the right mainstem bronchus and should be retracted by at least 3 cm. Findings were discussed with ESTELITA Brown at the time of report. ECHO:      ASSESSMENT AND PLAN:    Rodrigo Hayes is a 46 y.o. female recently admitted on 3/5/23 with Myasthenic Crisis now s/p IVIG x2 with likely subsequent aseptic meningitis 2/2 IVIG. Patient requires Vasc cath line placement for plasmapheresis. Therefore general surgery has been consulted for assistance with access. Plan to attempt line placement at R IJ  Case discussed with Dr. Mariah Maldonado.      Brennan Dakins, DO   PGY1, General Surgery  03/09/23  6:32 PM  Contact via [employer]

## 2023-03-09 NOTE — PROGRESS NOTES
Hospitalist Progress Note      PCP: Alexander Alba MD    Date of Admission: 3/5/2023    Chief Complaint: Difficulty swallowing        History Of Present Illness:       46 y.o. female who presented to [facility] with complaint of difficulty swallowing and raspy voice. According to patient she started experiencing difficulty swallowing for last few days. Patient felt like food was getting stuck in her throat. Patient also complained of difficulty with her speech. Patient felt weakness in her lower extremity. Symptoms were similar to her past episodes of myasthenia gravis exacerbation. Patient has experienced 5 exacerbations since 2018 and myasthenia crisis. Patient was first diagnosed with myasthenia gravis in 2005. Patient has history of thymectomy. Last exacerbation approximately 1 year ago. At that time patient was hospitalized at [facility].     Pt intubated on 3/6/23 due to worsening respiratory failure. S/p IVIG for 2 days. Subjective:     Remains on ventilator. FiO2 30%, PEEP 5.          Medications:  Reviewed    Infusion Medications    fentaNYL 50 mcg/hr (03/09/23 1211)    propofol 20 mcg/kg/min (03/09/23 4211)    sodium chloride 5 mL/hr at 03/08/23 0756     Scheduled Medications    citalopram  40 mg Orogastric QAM    doxepin  10 mg Orogastric Nightly    estradiol  2 mg Orogastric Nightly    folic acid  1 mg Orogastric Daily    levothyroxine  150 mcg Orogastric Daily    pantoprazole  40 mg IntraVENous Daily    sodium chloride flush  5-40 mL IntraVENous 2 times per day    enoxaparin  40 mg SubCUTAneous Nightly     PRN Meds: acetaminophen **OR** acetaminophen, metoclopramide, hydrALAZINE, butalbital-acetaminophen-caffeine, LORazepam, sodium chloride flush, sodium chloride, ondansetron **OR** ondansetron, polyethylene glycol      Intake/Output Summary (Last 24 hours) at 3/9/2023 1244  Last data filed at 3/9/2023 1200  Gross per 24 hour   Intake 2427.96 ml   Output 1775 ml   Net 652.96 ml       Physical Exam Performed:    /64   Pulse 64   Temp 100.1 °F (37.8 °C) (Axillary)   Resp 14   Ht 5' 2.01\" (1.575 m)   Wt 170 lb 3.1 oz (77.2 kg)   SpO2 97%   BMI 31.12 kg/m²     General appearance: intubated   HEENT: Pupils equal, round, and reactive to light. Conjunctivae/corneas clear. Neck: Supple, with full range of motion. No jugular venous distention. Trachea midline. Respiratory:  Normal respiratory effort. Clear to auscultation, bilaterally without Rales/Wheezes/Rhonchi. Cardiovascular: Regular rate and rhythm with normal S1/S2 without murmurs, rubs or gallops. Abdomen: Soft, non-tender, non-distended with normal bowel sounds. Musculoskeletal: No clubbing, cyanosis or edema bilaterally. Full range of motion without deformity. Skin: Skin color, texture, turgor normal.  No rashes or lesions. Neurologic:  Neurovascularly intact without any focal sensory/motor deficits. Cranial nerves: II-XII intact, grossly non-focal.  Psychiatric: Alert and oriented, thought content appropriate, normal insight  Capillary Refill: Brisk, 3 seconds, normal   Peripheral Pulses: +2 palpable, equal bilaterally       Labs:   Recent Labs     03/07/23 0523 03/08/23 0402 03/09/23  0430   WBC 6.3 7.4 6.9   HGB 12.5 11.7* 11.0*   HCT 37.6 35.1* 32.9*    261 231     Recent Labs     03/07/23 0523 03/08/23 0402 03/09/23  0430   * 131* 133*   K 3.4* 3.5 3.6   CL 98* 96* 96*   CO2 28 26 27   BUN 11 14 17   CREATININE 0.6 0.6 <0.5*   CALCIUM 8.5 8.5 8.5   PHOS 3.5  --   --      Recent Labs     03/07/23 0523 03/09/23  0430   AST 20 24   ALT 12 13   BILIDIR  --  <0.2   BILITOT 0.4 0.3   ALKPHOS 159* 161*     No results for input(s): INR in the last 72 hours. No results for input(s): Alexis Beets in the last 72 hours.     Urinalysis:      Lab Results   Component Value Date/Time    NITRU Negative 03/08/2023 04:08 PM    WBCUA 1 06/30/2020 06:20 PM    BACTERIA RARE 06/30/2020 06:20 PM RBCUA 1 06/30/2020 06:20 PM    BLOODU Negative 03/08/2023 04:08 PM    SPECGRAV 1.025 03/08/2023 04:08 PM    GLUCOSEU Negative 03/08/2023 04:08 PM       Radiology:  XR CHEST PORTABLE   Final Result      1. Repositioning of ET tube with tip now lying approximately 1.6 cm above the douglas. 2.  Near complete reexpansion of the left lung with persistent mild left basilar atelectasis. 3.  Further advancement of feeding tube with tip projecting over the distal gastric body. XR CHEST 1 VIEW   Final Result   Impression: Interval intubation. The endotracheal tube terminates within the right mainstem bronchus and should be retracted by at least 3 cm. Findings were discussed with ESTELITA Diane at the time of report. IP CONSULT TO NEUROLOGY  IP CONSULT TO DIETITIAN    Assessment/Plan:    Active Hospital Problems    Diagnosis     Respiratory failure requiring intubation ([facility] [J96.90]      Priority: Medium    Myasthenia gravis with acute exacerbation ([facility] [G70.01]      Priority: Medium    Myasthenia gravis with (acute) exacerbation ([facility]) [G70.01]      -Acute hypoxemic respiratory failure   -Myasthenia crisis: pt presented with difficulty swallowing, speech difficulty and lower extremity weakness. Intubated on 3/6/23 due to worsening respiration.   - Aseptic meningitis due to IVIG  - Urinary retention   -Hypothyroidism  - GERD  - Dyslipidemia        PLAN:     Respiratory monitoring  Ventilator management as per ICU team   IVIG administration x 2 doses. Neurology consultation  Continue pyridostigmine  Continue home meds     DVT Prophylaxis: Lovenox  Diet: ADULT DIET;  Regular; Low Fat/Low Chol/High Fiber/2 gm Na  Code Status: Full Code     PT/OT Eval Status:      Dispo - Pending clinical improvement       Arlette Mooney MD

## 2023-03-09 NOTE — CONSULTS
[facility]    Consult Note    Requesting Physician:  [facility] / Neuro Team    CHIEF COMPLAINT:  Myasthenia Gravis - crisis      HISTORY OF PRESENT ILLNESS:    Ms. Shayne Feliciano  is a 46 y.o. female we are seeing in consultation for myasthenic crisis and respiratory failure. She has a history of migraines myasthenia gravis seizures and thyroid disease. She was admitted for dysphagia which she had received couple days of IVIG of a different brand. She has a history of aseptic meningitis following 1 kind of IVIG in the past.  Per report she had some transient improvement but then developed progressive respiratory failure requiring intubation. I am being consulted for coordination and initiation of Plex therapy at the request of neurology/neuro critical care team.  Per report she has had multiple exacerbations for myasthenia gravis of which she has required plasmapheresis and has readily improved after 5 sessions. She is not on any antihypertensives, no clear evidence of coagulopathy on exam although coags are not available yet. Sedation is off currently and the patient is able to track me but is unable to communicate due to profound weakness of her hands.       Past Medical History:  Past Medical History:   Diagnosis Date    Anxiety     Arthritis     Cancer ([address]     thyroid    GERD (gastroesophageal reflux disease)     Hyperlipidemia     Insomnia     Migraines     Myasthenia gravis with exacerbation (HCC)     Seizures (HCC)     Thyroid disease        Past Surgical History:  Past Surgical History:   Procedure Laterality Date    APPENDECTOMY      CAPSULE ENDOSCOPY N/A 02/28/2020    ESOPHAGEAL CAPSULE ENDOSCOPY performed by Ksenia Valdivia MD at [facility] 12/17/2019    COLONOSCOPY performed by Mitchell Jacob MD at [place] [address] CATHETER INSERTION Right 10/15/2021    19cm tunneled dual lumen dialysis catheter inserted by Dr. Maira Banerjee 02/11/2020    ESOPHAGEAL MANOMETRY performed by Willie Mary MD at [address] [place] [address] ([facility])      IR BIOPSY LIVER PERCUTANEOUS  10/15/2021    IR BIOPSY LIVER PERCUTANEOUS 10/15/2021 [facility]    IR TUNNELED CATHETER PLACEMENT GREATER THAN 5 YEARS  10/15/2021    IR TUNNELED CATHETER PLACEMENT GREATER THAN 5 YEARS 10/15/2021 [facility]    OTHER SURGICAL HISTORY Right 10/15/2021    Transjugular liver Biopsy    SHOULDER SURGERY      rotator cuff repair- bilateral    THYROIDECTOMY      TUMOR REMOVAL      UPPER GASTROINTESTINAL ENDOSCOPY N/A 12/17/2019    EGD BIOPSY performed by Lilly Israel MD at [facility] 12/17/2019    EGD DILATION SAVORY performed by Lilly Israel MD at [facility]  02/28/2020    EGD DIAGNOSTIC ONLY performed by Willie Mary MD at [facility]  02/28/2020    Esophagogastroduodenoscopy with Bravo Capsule placement performed by Willie Mary MD at [address] [place] [address]       Current Medications:  Current Facility-Administered Medications   Medication Dose Route Frequency Provider Last Rate Last Admin    acetaminophen (TYLENOL) tablet 1,000 mg  1,000 mg Oral Q6H PRN Josue Rojas MD        Or    acetaminophen (TYLENOL) suppository 650 mg  650 mg Rectal Q6H PRN Josue Rojas MD        fentaNYL (SUBLIMAZE) 1,000 mcg in sodium chloride 0.9% 100 mL infusion   mcg/hr IntraVENous Continuous Dg Alicia MD   Stopped at 03/09/23 1336    citalopram (CELEXA) tablet 40 mg  40 mg Orogastric QAM Dg Alicia MD   40 mg at 03/09/23 0748    doxepin (SINEQUAN) capsule 10 mg  10 mg Orogastric Nightly Dg Alicia MD   10 mg at 03/08/23 2103    estradiol (ESTRACE) tablet 2 mg  2 mg Orogastric Nightly Hoa Solomon MD   2 mg at 01/64/59 2222    folic acid (FOLVITE) tablet 1 mg  1 mg Orogastric Daily Hoa Solomon MD   1 mg at 03/09/23 0977    levothyroxine (SYNTHROID) tablet 150 mcg  150 mcg Orogastric Daily Hoa Solomon MD   150 mcg at 03/09/23 7609    metoclopramide (REGLAN) injection 10 mg  10 mg IntraVENous Q6H PRN Jules Cnoway MD   10 mg at 03/07/23 1609    propofol injection  5-50 mcg/kg/min IntraVENous Continuous Hoa Solomon MD   Stopped at 03/09/23 1336    hydrALAZINE (APRESOLINE) injection 5 mg  5 mg IntraVENous Q4H PRN Derrick Browning MD   5 mg at 03/06/23 1805    pantoprazole (PROTONIX) injection 40 mg  40 mg IntraVENous Daily Rufina Vega MD   40 mg at 03/09/23 0748    butalbital-acetaminophen-caffeine (FIORICET, ESGIC) per tablet 1 tablet  1 tablet Oral Q4H PRN Wing Shira MD   1 tablet at 03/08/23 1430    LORazepam (ATIVAN) tablet 0.5 mg  0.5 mg Oral BID PRN Wing Shira MD        sodium chloride flush 0.9 % injection 5-40 mL  5-40 mL IntraVENous 2 times per day Wing Shira MD   10 mL at 03/09/23 0748    sodium chloride flush 0.9 % injection 5-40 mL  5-40 mL IntraVENous PRN Wing Shira MD        0.9 % sodium chloride infusion   IntraVENous PRN Wing Shira MD 5 mL/hr at 03/08/23 0756 New Bag at 03/08/23 0756    enoxaparin (LOVENOX) injection 40 mg  40 mg SubCUTAneous Nightly Wing Shira MD   40 mg at 03/08/23 2108    ondansetron (ZOFRAN-ODT) disintegrating tablet 4 mg  4 mg Oral Q8H PRN Wing Shira MD   4 mg at 03/07/23 1050    Or    ondansetron (ZOFRAN) injection 4 mg  4 mg IntraVENous Q6H PRN Wing Shira MD   4 mg at 03/07/23 1944    polyethylene glycol (GLYCOLAX) packet 17 g  17 g Oral Daily PRN Wing Shira MD           Allergies:   Allergies   Allergen Reactions    Immune Globulins Other (See Comments)     Patient developed Aseptic meningitis after Flebogamma IVIG in the past.  Developed aseptic meningitis within 48h after Gamunex-C in 3/2023. Other      Steroid injection to joints    Adhesive Tape Rash and Other (See Comments)     Paper Tape  Paper Tape    Skin gets reddened under tape; not allergic to latex    Penicillins Itching and Rash     Rash  Rash         Social History:  Social History     Socioeconomic History    Marital status:      Spouse name: Not on file    Number of children: Not on file    Years of education: Not on file    Highest education level: Not on file   Occupational History    Not on file   Tobacco Use    Smoking status: Never    Smokeless tobacco: Never   Vaping Use    Vaping Use: Never used   Substance and Sexual Activity    Alcohol use: Never    Drug use: Never    Sexual activity: Not Currently   Other Topics Concern    Not on file   Social History Narrative    Not on file     Social Determinants of Health     Financial Resource Strain: Not on file   Food Insecurity: Not on file   Transportation Needs: Not on file   Physical Activity: Not on file   Stress: Not on file   Social Connections: Not on file   Intimate Partner Violence: Not on file   Housing Stability: Not on file          Family History:  Family History   Problem Relation Age of Onset    Heart Disease Father     High Cholesterol Father     High Blood Pressure Father        REVIEW OF SYSTEMS:      Constitutional: Denies fever, sweats, weight loss  Eyes: No visual changes or diplopia. No scleral icterus. Ent: No headaches, hearing loss or vertigo. No mouth sores or sore throat. Cardiovascular: No chest pain, dyspnea on exertion, palpitations or loss of consciousness. Respiratory: No cough or wheezing, no sputum production. No hemoptysis. Gastrointestinal: No abdominal pain, appetite loss, blood in stools. No change in bowel habits. Genitourinary: No dysuria, trouble voiding, or hematuria. Musculoskeletal: No generalized weakness.   No joint complaints. Integumentary: No rash or pruritus. Neurological: No headache, diplopia. No change in gait, balance, or coordination. No paresthesias. Endocrine: No temperature intolerance. No excessive thirst, fluid intake, urination. Hematologic/lymphatic: No abnormal bruising or ecchymosis, blood clots or swollen lymph nodes. Allergic/immunologic: No nasal congestion or hives.         PHYSICAL EXAM:      Vitals:  Patient Vitals for the past 24 hrs:   BP Temp Temp src Pulse Resp SpO2   03/09/23 1614 -- -- -- 81 14 99 %   03/09/23 1607 -- -- -- 81 14 97 %   03/09/23 1600 118/60 99.7 °F (37.6 °C) Axillary 81 14 96 %   03/09/23 1500 -- -- -- 84 14 98 %   03/09/23 1400 (!) 154/71 -- -- 78 14 99 %   03/09/23 1300 101/61 -- -- 64 14 96 %   03/09/23 1200 104/64 100.1 °F (37.8 °C) Axillary 64 14 97 %   03/09/23 1158 -- -- -- 65 14 97 %   03/09/23 1128 121/67 -- -- 65 14 99 %   03/09/23 1100 (!) 95/58 -- -- 63 14 97 %   03/09/23 1000 (!) 94/57 -- -- 65 14 99 %   03/09/23 0900 (!) 91/56 -- -- 66 14 99 %   03/09/23 0839 -- -- -- 66 14 98 %   03/09/23 0800 116/70 -- -- 75 14 95 %   03/09/23 0754 -- -- -- 71 14 96 %   03/09/23 0752 -- 99.9 °F (37.7 °C) Axillary 69 14 95 %   03/09/23 0700 (!) 95/56 -- -- 67 14 95 %   03/09/23 0613 133/70 -- -- 76 14 --   03/09/23 0600 102/60 -- -- 71 14 99 %   03/09/23 0530 100/60 -- -- 71 14 96 %   03/09/23 0515 (!) 103/59 -- -- 72 14 96 %   03/09/23 0500 (!) 89/44 -- -- 72 14 96 %   03/09/23 0445 100/61 -- -- 70 14 97 %   03/09/23 0430 105/64 -- -- 71 14 --   03/09/23 0415 105/61 -- -- 69 14 --   03/09/23 0400 101/60 97.3 °F (36.3 °C) Axillary 70 14 98 %   03/09/23 0345 113/62 -- -- 72 14 --   03/09/23 0330 101/61 -- -- 72 14 --   03/09/23 0315 104/61 -- -- 68 14 97 %   03/09/23 0300 97/60 -- -- 68 14 96 %   03/09/23 0245 (!) 105/59 -- -- 71 14 96 %   03/09/23 0230 101/67 -- -- 71 12 97 %   03/09/23 0200 (!) 91/51 -- -- 71 14 97 %   03/09/23 0145 99/61 -- -- 68 14 96 %   03/09/23 0130 92/61 -- -- 66 14 96 %   03/09/23 0115 (!) 93/56 -- -- 66 14 96 %   03/09/23 0100 97/60 -- -- 66 14 95 %   03/09/23 0056 97/60 -- -- 66 14 95 %   03/09/23 0045 (!) 105/58 -- -- 68 14 96 %   03/09/23 0015 102/64 -- -- 68 14 97 %   03/09/23 0000 (!) 101/59 97.4 °F (36.3 °C) Axillary 69 14 96 %   03/08/23 2345 (!) 97/58 -- -- 70 14 96 %   03/08/23 2330 (!) 95/56 -- -- 72 14 94 %   03/08/23 2315 (!) 98/57 -- -- 70 14 --   03/08/23 2300 (!) 96/54 -- -- 72 15 --   03/08/23 2245 (!) 98/57 -- -- 70 16 --   03/08/23 2230 (!) 97/57 -- -- 74 14 --   03/08/23 2215 (!) 89/52 -- -- 74 14 --   03/08/23 2200 113/65 -- -- 76 13 --   03/08/23 2145 (!) 100/57 -- -- 76 23 --   03/08/23 2130 119/68 -- -- 74 14 100 %   03/08/23 2115 (!) 117/102 -- -- 77 16 99 %   03/08/23 2100 122/69 98 °F (36.7 °C) Axillary 77 14 97 %   03/08/23 2045 120/66 -- -- 77 13 100 %   03/08/23 2030 106/68 -- -- 75 14 97 %   03/08/23 2015 (!) 115/57 -- -- 74 14 98 %   03/08/23 2000 107/63 -- -- 74 14 98 %   03/08/23 1945 110/65 -- -- 75 14 98 %   03/08/23 1930 109/60 -- -- 72 14 99 %   03/08/23 1915 118/66 -- -- 72 14 100 %   03/08/23 1900 (!) 99/49 -- -- 72 11 100 %   03/08/23 1845 119/60 -- -- 73 14 97 %   03/08/23 1830 133/67 -- -- 71 14 98 %   03/08/23 1815 (!) 110/58 -- -- 75 14 97 %   03/08/23 1800 117/65 -- -- 73 16 97 %   03/08/23 1745 115/71 -- -- 77 13 --   03/08/23 1730 112/63 -- -- 72 13 --   03/08/23 1715 (!) 110/56 -- -- 72 18 100 %   03/08/23 1700 129/66 -- -- 71 14 100 %   03/08/23 1645 121/67 -- -- 71 13 100 %       Date 03/09/23 0000 - 03/09/23 2359   Shift 3454-2204 [id number] 9276-2359 24 Hour Total   INTAKE   P.O.(mL/kg/hr)  0(0)  0   I. V.(mL/kg) 260(3.4) 731(9.5)  991(12.8)   NG/GT(mL/kg) 760(9.8) 235(3) 352(4.6) 1347(17.4)   Shift Total(mL/kg) 6297(03.0) 428(65.3) 352(4.6) 1897(37.1)   OUTPUT   Urine(mL/kg/hr) 450(0.7) 450(0.7) 200 1100   Emesis/NG output(mL/kg) 0(0)   0(0)   Shift Total(mL/kg) 450(5.8) 450(5.8) 200(2.6) 1100(14.2) Weight (kg) 77.2 77.2 77.2 77.2       CONSTITUTIONAL: awake, alert, currently intubated. EYES: pupils equal, round and reactive to light, sclera clear and conjunctiva normal  ENT: Normocephalic, without obvious abnormality, atraumatic  NECK: supple, symmetrical, no jugular venous distension and no carotid bruits   HEMATOLOGIC/LYMPHATIC: no cervical, supraclavicular or axillary lymphadenopathy   LUNGS: no increased work of breathing and clear to auscultation   CARDIOVASCULAR: regular rate and rhythm, normal S1 and S2, no murmur noted  ABDOMEN: normal bowel sounds x 4, soft, non-distended, non-tender, no masses palpated, no hepatosplenomegaly   MUSCULOSKELETAL: Profound weakness 1 out of 5 diffusely. NEUROLOGIC: awake, alert, oriented to name, place and time. Motor skills grossly intact. SKIN: Normal skin color, texture, turgor and no jaundice. appears intact   EXTREMITIES: no LE edema no evidence of bleeding at IV sites or ecchymosis on examination. DATA:    PT/INR:    Recent Labs     03/09/23 0430 03/07/23 0523 03/06/23 0726 03/03/23  1751   PROT 8.0 9.3* 8.4* 7.2     PTT:  No results for input(s): APTT in the last 720 hours.     CMP:    Recent Labs     03/09/23 0430   *   K 3.6   CL 96*   CO2 27   BUN 17   PROT 8.0     Mg:    Recent Labs     03/09/23 0430 03/08/23  0402 03/07/23  0523   MG 1.80 1.90 1.80       Lab Results   Component Value Date    CALCIUM 8.5 03/09/2023    PHOS 3.5 03/07/2023       CBC:    Recent Labs     03/09/23 0430 03/08/23  0402 03/07/23  0523 03/06/23  0726 03/03/23  1751   WBC 6.9 7.4 6.3 4.6 7.8   NEUTROABS 4.6 5.3 4.7 3.5 4.9   LYMPHOPCT 20.0 19.1 15.8 15.0 26.6   RBC 3.83* 4.07 4.33 4.51 4.63   HGB 11.0* 11.7* 12.5 12.7 13.0   HCT 32.9* 35.1* 37.6 38.7 40.7   MCV 86.0 86.3 86.8 85.8 87.8   MCH 28.6 28.7 28.9 28.2 28.1   MCHC 33.3 33.3 33.3 32.9 32.0   RDW 13.7 13.8 13.6 13.4 14.0    261 262 264 296        LDH:No results for input(s): LDH in the last 720 hours. Radiology Review:  XR CHEST PORTABLE  Narrative: PORTABLE CHEST. HISTORY: 3/6/2023    COMPARISON STUDY: ET tube adjustment    FINDINGS: Heart size and mediastinal structures appear within normal limits. There is prior midline sternotomy. There is been retraction of the ET tube now lying approximately 1.6 cm above the douglas with near complete reexpansion of the left lung. Persistent mild left basilar atelectasis. Feeding tube is been advanced with tip projecting over the distal gastric body. Bony structures are intact. Impression: 1. Repositioning of ET tube with tip now lying approximately 1.6 cm above the douglas. 2.  Near complete reexpansion of the left lung with persistent mild left basilar atelectasis. 3.  Further advancement of feeding tube with tip projecting over the distal gastric body. XR CHEST 1 VIEW  Narrative: XR CHEST 1 VIEW    Indication: intubation     COMPARISON: 3/3/2023    Findings: Frontal view of the chest was obtained. Interval intubation. The endotracheal tube terminates within the right mainstem bronchus. There is complete opacification of the left hemithorax, new in the interval.    Enteric tube terminates beneath the left hemidiaphragm, within the region of the gastric bubble. The right lung is clear. No pneumothorax. Impression: Impression: Interval intubation. The endotracheal tube terminates within the right mainstem bronchus and should be retracted by at least 3 cm. Findings were discussed with ESTELITA Madsen at the time of report.       Problem List  Patient Active Problem List   Diagnosis    Myasthenia gravis ([address]    Myasthenia gravis with (acute) exacerbation (HCC)    Appendicitis    Neck stiffness    Generalized weakness    Myasthenia gravis with exacerbation, adult form ([address]    Hyperlipidemia    Seizure (HCC)    GERD (gastroesophageal reflux disease)    Migraine    Anxiety    Insomnia    Myasthenia gravis with exacerbation (HCC)    Bradycardia Sinus arrhythmia    Postablative hypothyroidism    'light-for-dates' infant with signs of fetal malnutrition    Left-sided weakness    Myasthenia gravis with acute exacerbation (HCC)    Respiratory failure requiring intubation ([facility] [address]       IMPRESSION/RECOMMENDATIONS:    This is a 70-year-old female with a history of myasthenia gravis, seizures, thyroid disease, migraines who presented for progressive dysphagia s/p IVIG with worsening neck stiffness and respiratory failure requiring intubation. Per neurology/neuro critical care team she requires plasmapheresis. We will plan for 5 sessions total.    Myasthenic crisis  - Plan for 5 sessions daily  - Replacement with 5% albumin  - Trend fibrinogen, PT, PTT daily to evaluate for possible need of cryoprecipitate or FFP  -Trend CMP daily, calcium gluconate orders for hypocalcemia protocol will be in place. Discussed with patient and neurology team.  We will follow closely given high risk of medical disc compensation. Thank you for asking me to see the patient.        Pedrito Agudelo MD  Please Contact Through [employer]

## 2023-03-09 NOTE — PROGRESS NOTES
NEUROLOGY / NEUROCRITICAL CARE PROGRESS NOTE       Patient Name: Brenda Gonzalez YOB: 1970   Sex: Female Age: 46 yrs     CC / Reason for Consult: myasthenic crisis     Interval Hx / Changes over last 24 hours:   NIF and FVC this morning -5 and 105 respectively, but this was in the setting of being more sedated on 150 of fentanyl overnight. She is difficult to arouse this morning, and will follow simple commands, appears that she is over sedated. RN has been titrating sedation down. Had been reporting a headache overnight, still seems to have neck stiffness    ROS: + headache    HISTORY   Admission HPI:   Brenda Gonzalez is a 46 y.o. y/o female with history significant for anxiety, HLD, migraines, MG, seizures and thyroid disease. Per chart review: patient she noticed some difficulty chewing and a new rasp to her voice on 3/2/23. Patient attempted to correct this by taking smaller bites, but then began to develop generalized fatigue and came in to the hospital.  Patient states this is similar to previous MG exacerbations in the past.  She denies any recent illnesses. Patient admitted to [facility] for further evaluation and treatment. Of note: patient is currently on mestinon 60mg TID at home and follows with [address] [place] [address] [id number] Neurology. [facility] Past Medical History:   Diagnosis Date    Anxiety     Arthritis     Cancer ([facility] [address]     thyroid    GERD (gastroesophageal reflux disease)     Hyperlipidemia     Insomnia     Migraines     Myasthenia gravis with exacerbation (HCC)     Seizures (HCC)     Thyroid disease       Allergies Allergies   Allergen Reactions    Immune Globulins Other (See Comments)     Patient developed Aseptic meningitis after Flebogamma IVIG in the past.  Developed aseptic meningitis within 48h after Gamunex-C in 3/2023.      Other      Steroid injection to joints    Adhesive Tape Rash and Other (See Comments)     Paper Tape  Paper Tape    Skin gets reddened under tape; not allergic to latex    Penicillins Itching and Rash     Rash  Rash        Diet Diet NPO  ADULT TUBE FEEDING; Orogastric; Peptide Based High Protein; Continuous; 15; Yes; 10; Q 4 hours; 45; 30; Q 4 hours; Protein; 1 packet Prosmadan, hook packet to ENfit tube or follow instructions on pack of packet   Isolation No active isolations     CURRENT SCHEDULED MEDICATIONS   Inpatient Medications     citalopram, 40 mg, Orogastric, QAM    doxepin, 10 mg, Orogastric, Nightly    estradiol, 2 mg, Orogastric, Nightly    folic acid, 1 mg, Orogastric, Daily    levothyroxine, 150 mcg, Orogastric, Daily    pantoprazole, 40 mg, IntraVENous, Daily    sodium chloride flush, 5-40 mL, IntraVENous, 2 times per day    enoxaparin, 40 mg, SubCUTAneous, Nightly   Infusions    fentaNYL 125 mcg/hr (03/09/23 0737)    propofol 20 mcg/kg/min (03/09/23 7119)    sodium chloride 5 mL/hr at 03/08/23 0756      Antibiotics   Recent Abx Admin        No antibiotic orders with administrations found. LABS   Metabolic Panel Recent Labs     03/07/23 0523 03/08/23 0402 03/09/23  0430   * 131* 133*   K 3.4* 3.5 3.6   CL 98* 96* 96*   CO2 28 26 27   BUN 11 14 17   CREATININE 0.6 0.6 <0.5*   GLUCOSE 84 94 105*   CALCIUM 8.5 8.5 8.5   LABALBU 3.2*  --  3.1*   PHOS 3.5  --   --    MG 1.80 1.90 1.80   ALKPHOS 159*  --  161*   ALT 12  --  13   AST 20  --  24        CBC / Coags Recent Labs     03/07/23 0523 03/08/23 0402 03/09/23  0430   WBC 6.3 7.4 6.9   RBC 4.33 4.07 3.83*   HGB 12.5 11.7* 11.0*   HCT 37.6 35.1* 32.9*    261 231        Other Recent Labs     03/08/23  0402   TRIG 169*     No results for input(s): PHENYTOIN, KEPPRA, LACOSA, LAMO, VALPROATE, LACTSEPSIS, LACTA in the last 72 hours. DIAGNOSTICS   IMAGES:  Images personally reviewed and agree w/ radiology interpretation.     No Imaging to Review     PHYSICAL EXAMINATION     PHYSICAL EXAM:  Vitals:    03/09/23 0800 03/09/23 0839 03/09/23 0900 03/09/23 1000   BP: 116/70 (!) 91/56 (!) 94/57   Pulse: 75 66 66 65   Resp: 14 14 14 14   Temp:       TempSrc:       SpO2: 95% 98% 99% 99%   Weight:       Height:             General: patient is intubated, she is sleepy but does open eyes and nod head to answer question  Neurologic  Mental status:   Alert, intubated, will occassionally answers questions by nodding head     Cranial nerves:   CN2: blinks to threat   CN 3,4,6: Pupils equal and reactive to light, extraocular muscles intact  CN5: Facial sensation symmetric   CN7: Face symmetric but exam limited by ETT rangel  CN8-12: Exam limited by ETT, but  turns head towards examiner speaking suggesting hearing is intact    Motor Exam:  She will squeeze hands bilaterally, wiggle toes, but does not attempt to follow other commands    Sensation: Nods head yes to feeling light touch in all extremities     Deep tendon reflexes:     R  L    Biceps  2  2   Brachioradialis  2 2   Patellar  2 2            ASSESSMENT & RECOMMENDATIONS   Assessment:Radha gibbons is a 45 yo female with a known history of Myasthenia Gravis with previous exacerbations. Patient presented with trouble chewing, hoarseness of voice, and fatigue. Patient states this is how she felt during previous exacerbations. Intubated on 3/6 for worsening respiratory status. She received IVIG x 2 days. She has been reporting a headache, she does have a history of migraines but had reported that her headache was more severe than her typical headache and she had associated neck stiffness and low grade temperature, concerning for an aseptic meningitis after IVIG (which she has a history of). Plan:  - Q4 hour neuro checks  -Continue ventilator support, managed by ICU team  - Discussed with Neurologist, will plan to initiate plasma exchange for patient today, hematology consulted and general surgery consulted for vas cath placement (consults ordered)  - Check INR level for vascath placement(ordered)  - Continue to monitor FVC and NIF. Please document note with values. - Will plan to monitor her exam over the next day or so, and if she is not improving will plan for PLEX, as she has received this in the past with exacerbations (appears she received with past exacerbation at [facility] about 1 year ago, she sees Dr. Jaci He as an outpatient and she tells me she has been off PLEX for about 6 months)  -If she does not become more arousable despite sedation vacation, may obtain head CT  - Will continue to follow exam closely  - Updated family on plan of care    Plan of care discussed with Dr. Puja Cummings, bedside RN, and ICU team    BETH Branch - CNP   Neurology & Neurocritical Care   3/9/2023 11:01 AM      ICU Patients:   Neurocritical Care Line: [phone]  PerfectServe: [facility] Neurocritical Care    Critical Care:  Due to the immediate potential for life-threatening deterioration due to neurological failure, I spent 35 minutes providing critical care. This time excludes time spent performing procedures but includes time spent on direct patient care, history retrieval, review of the chart, and discussions with patient, family, and consultant(s).

## 2023-03-10 ENCOUNTER — APPOINTMENT (OUTPATIENT)
Dept: GENERAL RADIOLOGY | Age: 53
End: 2023-03-10
Attending: INTERNAL MEDICINE
Payer: COMMERCIAL

## 2023-03-10 ENCOUNTER — APPOINTMENT (OUTPATIENT)
Dept: CT IMAGING | Age: 53
End: 2023-03-10
Attending: INTERNAL MEDICINE
Payer: COMMERCIAL

## 2023-03-10 LAB
ANION GAP SERPL CALCULATED.3IONS-SCNC: 7 MMOL/L (ref 3–16)
APTT: 49.4 SEC (ref 23–34.3)
BASOPHILS ABSOLUTE: 0 K/UL (ref 0–0.2)
BASOPHILS ABSOLUTE: 0.1 K/UL (ref 0–0.2)
BASOPHILS RELATIVE PERCENT: 0.6 %
BASOPHILS RELATIVE PERCENT: 0.9 %
BUN BLDV-MCNC: 14 MG/DL (ref 7–20)
CALCIUM IONIZED: 1.16 MMOL/L (ref 1.12–1.32)
CALCIUM SERPL-MCNC: 8.8 MG/DL (ref 8.3–10.6)
CHLORIDE BLD-SCNC: 100 MMOL/L (ref 99–110)
CO2: 29 MMOL/L (ref 21–32)
CREAT SERPL-MCNC: <0.5 MG/DL (ref 0.6–1.1)
EOSINOPHILS ABSOLUTE: 0.2 K/UL (ref 0–0.6)
EOSINOPHILS ABSOLUTE: 0.3 K/UL (ref 0–0.6)
EOSINOPHILS RELATIVE PERCENT: 2 %
EOSINOPHILS RELATIVE PERCENT: 3.3 %
FIBRINOGEN: 156 MG/DL (ref 207–509)
FIBRINOGEN: 446 MG/DL (ref 207–509)
GFR SERPL CREATININE-BSD FRML MDRD: >60 ML/MIN/{1.73_M2}
GLUCOSE BLD-MCNC: 100 MG/DL (ref 70–99)
HCT VFR BLD CALC: 35.5 % (ref 36–48)
HCT VFR BLD CALC: 36.4 % (ref 36–48)
HEMOGLOBIN: 11.7 G/DL (ref 12–16)
HEMOGLOBIN: 11.9 G/DL (ref 12–16)
LACTATE DEHYDROGENASE: 65 U/L (ref 100–190)
LYMPHOCYTES ABSOLUTE: 1.4 K/UL (ref 1–5.1)
LYMPHOCYTES ABSOLUTE: 1.5 K/UL (ref 1–5.1)
LYMPHOCYTES RELATIVE PERCENT: 15.7 %
LYMPHOCYTES RELATIVE PERCENT: 17.3 %
MAGNESIUM: 1.5 MG/DL (ref 1.8–2.4)
MAGNESIUM: 1.8 MG/DL (ref 1.8–2.4)
MCH RBC QN AUTO: 28.1 PG (ref 26–34)
MCH RBC QN AUTO: 28.1 PG (ref 26–34)
MCHC RBC AUTO-ENTMCNC: 32.6 G/DL (ref 31–36)
MCHC RBC AUTO-ENTMCNC: 33 G/DL (ref 31–36)
MCV RBC AUTO: 85.2 FL (ref 80–100)
MCV RBC AUTO: 86.1 FL (ref 80–100)
MONOCYTES ABSOLUTE: 0.6 K/UL (ref 0–1.3)
MONOCYTES ABSOLUTE: 0.7 K/UL (ref 0–1.3)
MONOCYTES RELATIVE PERCENT: 7.3 %
MONOCYTES RELATIVE PERCENT: 7.8 %
NEUTROPHILS ABSOLUTE: 6 K/UL (ref 1.7–7.7)
NEUTROPHILS ABSOLUTE: 6.9 K/UL (ref 1.7–7.7)
NEUTROPHILS RELATIVE PERCENT: 71.5 %
NEUTROPHILS RELATIVE PERCENT: 73.6 %
PDW BLD-RTO: 13.5 % (ref 12.4–15.4)
PDW BLD-RTO: 13.5 % (ref 12.4–15.4)
PH VENOUS: 7.35 (ref 7.35–7.45)
PHOSPHORUS: 3.3 MG/DL (ref 2.5–4.9)
PLATELET # BLD: 210 K/UL (ref 135–450)
PLATELET # BLD: 220 K/UL (ref 135–450)
PMV BLD AUTO: 7.2 FL (ref 5–10.5)
PMV BLD AUTO: 7.4 FL (ref 5–10.5)
POTASSIUM SERPL-SCNC: 3.9 MMOL/L (ref 3.5–5.1)
RBC # BLD: 4.17 M/UL (ref 4–5.2)
RBC # BLD: 4.23 M/UL (ref 4–5.2)
SODIUM BLD-SCNC: 136 MMOL/L (ref 136–145)
WBC # BLD: 8.3 K/UL (ref 4–11)
WBC # BLD: 9.3 K/UL (ref 4–11)

## 2023-03-10 PROCEDURE — 2580000003 HC RX 258

## 2023-03-10 PROCEDURE — 6360000002 HC RX W HCPCS: Performed by: STUDENT IN AN ORGANIZED HEALTH CARE EDUCATION/TRAINING PROGRAM

## 2023-03-10 PROCEDURE — 94799 UNLISTED PULMONARY SVC/PX: CPT

## 2023-03-10 PROCEDURE — 99291 CRITICAL CARE FIRST HOUR: CPT | Performed by: NURSE PRACTITIONER

## 2023-03-10 PROCEDURE — 83615 LACTATE (LD) (LDH) ENZYME: CPT

## 2023-03-10 PROCEDURE — 6360000002 HC RX W HCPCS

## 2023-03-10 PROCEDURE — 83735 ASSAY OF MAGNESIUM: CPT

## 2023-03-10 PROCEDURE — 36415 COLL VENOUS BLD VENIPUNCTURE: CPT

## 2023-03-10 PROCEDURE — 80048 BASIC METABOLIC PNL TOTAL CA: CPT

## 2023-03-10 PROCEDURE — 70450 CT HEAD/BRAIN W/O DYE: CPT

## 2023-03-10 PROCEDURE — 94003 VENT MGMT INPAT SUBQ DAY: CPT

## 2023-03-10 PROCEDURE — 94761 N-INVAS EAR/PLS OXIMETRY MLT: CPT

## 2023-03-10 PROCEDURE — C9113 INJ PANTOPRAZOLE SODIUM, VIA: HCPCS

## 2023-03-10 PROCEDURE — 84100 ASSAY OF PHOSPHORUS: CPT

## 2023-03-10 PROCEDURE — 2000000000 HC ICU R&B

## 2023-03-10 PROCEDURE — 2700000000 HC OXYGEN THERAPY PER DAY

## 2023-03-10 PROCEDURE — 85025 COMPLETE CBC W/AUTO DIFF WBC: CPT

## 2023-03-10 PROCEDURE — 85730 THROMBOPLASTIN TIME PARTIAL: CPT

## 2023-03-10 PROCEDURE — 74018 RADEX ABDOMEN 1 VIEW: CPT

## 2023-03-10 PROCEDURE — 6370000000 HC RX 637 (ALT 250 FOR IP)

## 2023-03-10 PROCEDURE — 36592 COLLECT BLOOD FROM PICC: CPT

## 2023-03-10 PROCEDURE — 6370000000 HC RX 637 (ALT 250 FOR IP): Performed by: STUDENT IN AN ORGANIZED HEALTH CARE EDUCATION/TRAINING PROGRAM

## 2023-03-10 PROCEDURE — 85384 FIBRINOGEN ACTIVITY: CPT

## 2023-03-10 PROCEDURE — 99291 CRITICAL CARE FIRST HOUR: CPT | Performed by: INTERNAL MEDICINE

## 2023-03-10 RX ORDER — DIAZEPAM 5 MG/ML
2.5 INJECTION, SOLUTION INTRAMUSCULAR; INTRAVENOUS EVERY 4 HOURS PRN
Status: DISCONTINUED | OUTPATIENT
Start: 2023-03-10 | End: 2023-03-13

## 2023-03-10 RX ORDER — LANOLIN ALCOHOL/MO/W.PET/CERES
400 CREAM (GRAM) TOPICAL DAILY
Status: DISCONTINUED | OUTPATIENT
Start: 2023-03-10 | End: 2023-03-12

## 2023-03-10 RX ADMIN — PROPOFOL 25 MCG/KG/MIN: 10 INJECTION, EMULSION INTRAVENOUS at 06:03

## 2023-03-10 RX ADMIN — FOLIC ACID 1 MG: 1 TABLET ORAL at 08:48

## 2023-03-10 RX ADMIN — BUTALBITAL, ACETAMINOPHEN, AND CAFFEINE 1 TABLET: 50; 325; 40 TABLET ORAL at 14:47

## 2023-03-10 RX ADMIN — DOXEPIN HYDROCHLORIDE 10 MG: 10 CAPSULE ORAL at 20:16

## 2023-03-10 RX ADMIN — BUTALBITAL, ACETAMINOPHEN, AND CAFFEINE 1 TABLET: 50; 325; 40 TABLET ORAL at 06:05

## 2023-03-10 RX ADMIN — CITALOPRAM 40 MG: 40 TABLET, FILM COATED ORAL at 08:48

## 2023-03-10 RX ADMIN — BUTALBITAL, ACETAMINOPHEN, AND CAFFEINE 1 TABLET: 50; 325; 40 TABLET ORAL at 00:30

## 2023-03-10 RX ADMIN — Medication 400 MG: at 16:10

## 2023-03-10 RX ADMIN — PROPOFOL 20 MCG/KG/MIN: 10 INJECTION, EMULSION INTRAVENOUS at 13:00

## 2023-03-10 RX ADMIN — ESTRADIOL 2 MG: 0.5 TABLET ORAL at 20:17

## 2023-03-10 RX ADMIN — SODIUM CHLORIDE, PRESERVATIVE FREE 10 ML: 5 INJECTION INTRAVENOUS at 08:48

## 2023-03-10 RX ADMIN — LEVOTHYROXINE SODIUM 150 MCG: 0.15 TABLET ORAL at 06:05

## 2023-03-10 RX ADMIN — BUTALBITAL, ACETAMINOPHEN, AND CAFFEINE 1 TABLET: 50; 325; 40 TABLET ORAL at 18:58

## 2023-03-10 RX ADMIN — PROPOFOL 30 MCG/KG/MIN: 10 INJECTION, EMULSION INTRAVENOUS at 22:59

## 2023-03-10 RX ADMIN — BUTALBITAL, ACETAMINOPHEN, AND CAFFEINE 1 TABLET: 50; 325; 40 TABLET ORAL at 10:11

## 2023-03-10 RX ADMIN — PANTOPRAZOLE SODIUM 40 MG: 40 INJECTION, POWDER, FOR SOLUTION INTRAVENOUS at 08:48

## 2023-03-10 RX ADMIN — ENOXAPARIN SODIUM 40 MG: 100 INJECTION SUBCUTANEOUS at 20:17

## 2023-03-10 RX ADMIN — CALCIUM GLUCONATE 5000 MG: 98 INJECTION, SOLUTION INTRAVENOUS at 11:00

## 2023-03-10 RX ADMIN — SODIUM CHLORIDE, PRESERVATIVE FREE 10 ML: 5 INJECTION INTRAVENOUS at 20:17

## 2023-03-10 RX ADMIN — POLYETHYLENE GLYCOL 3350 17 G: 17 POWDER, FOR SOLUTION ORAL at 14:25

## 2023-03-10 ASSESSMENT — PULMONARY FUNCTION TESTS
PIF_VALUE: 28
PIF_VALUE: 34
PIF_VALUE: 26
PIF_VALUE: 24
PIF_VALUE: 25
PIF_VALUE: 24
PIF_VALUE: 27
PIF_VALUE: 26
PIF_VALUE: 24
PIF_VALUE: 28
PIF_VALUE: 29
PIF_VALUE: 26
PIF_VALUE: 25
PIF_VALUE: 29
PIF_VALUE: 25
PIF_VALUE: 23
PIF_VALUE: 25
PIF_VALUE: 26
PIF_VALUE: 23
PIF_VALUE: 25
PIF_VALUE: 26
PIF_VALUE: 25
PIF_VALUE: 25
PIF_VALUE: 24
PIF_VALUE: 26
PIF_VALUE: 25
PIF_VALUE: 25
PIF_VALUE: 26
PIF_VALUE: 27
PIF_VALUE: 26
PIF_VALUE: 31
PIF_VALUE: 25
PIF_VALUE: 35
PIF_VALUE: 33
PIF_VALUE: 25
PIF_VALUE: 26
PIF_VALUE: 25
PIF_VALUE: 25
PIF_VALUE: 24
PIF_VALUE: 33
PIF_VALUE: 30
PIF_VALUE: 25
PIF_VALUE: 31
PIF_VALUE: 30
PIF_VALUE: 24
PIF_VALUE: 26
PIF_VALUE: 27
PIF_VALUE: 26
PIF_VALUE: 28
PIF_VALUE: 28
PIF_VALUE: 26
PIF_VALUE: 26
PIF_VALUE: 25
PIF_VALUE: 27
PIF_VALUE: 26
PIF_VALUE: 34
PIF_VALUE: 25
PIF_VALUE: 27
PIF_VALUE: 30
PIF_VALUE: 25
PIF_VALUE: 26
PIF_VALUE: 26
PIF_VALUE: 24
PIF_VALUE: 24

## 2023-03-10 ASSESSMENT — PAIN - FUNCTIONAL ASSESSMENT: PAIN_FUNCTIONAL_ASSESSMENT: ACTIVITIES ARE NOT PREVENTED

## 2023-03-10 ASSESSMENT — PAIN SCALES - WONG BAKER
WONGBAKER_NUMERICALRESPONSE: 6

## 2023-03-10 ASSESSMENT — PAIN DESCRIPTION - LOCATION
LOCATION: HEAD

## 2023-03-10 ASSESSMENT — PAIN SCALES - GENERAL
PAINLEVEL_OUTOF10: 9
PAINLEVEL_OUTOF10: 6
PAINLEVEL_OUTOF10: 5

## 2023-03-10 ASSESSMENT — PAIN DESCRIPTION - DESCRIPTORS
DESCRIPTORS: ACHING

## 2023-03-10 NOTE — PROGRESS NOTES
Kimani RN at bedside, PLEX therapy started at 2010 and now complete. Pt tolerated well. See physical chart for nurse's documentation.

## 2023-03-10 NOTE — PROGRESS NOTES
NEUROLOGY / NEUROCRITICAL CARE PROGRESS NOTE       Patient Name: Hasmukh Covarrubias YOB: 1970   Sex: Female Age: 46 yrs     CC / Reason for Consult: myasthenic crisis     Interval Hx / Changes over last 24 hours:   Vascath placed yesterday and PLEX started last night - dose given at 2010  Continues to be mechanically ventilated - Tv 450, peep 5 and FIO2. Vianney Balling and sedated on propofol at 20. ROS: + headache    HISTORY   Admission HPI:   Hasmukh Covarrubias is a 46 y.o. y/o female with history significant for anxiety, HLD, migraines, MG, seizures and thyroid disease. Per chart review: patient she noticed some difficulty chewing and a new rasp to her voice on 3/2/23. Patient attempted to correct this by taking smaller bites, but then began to develop generalized fatigue and came in to the hospital.  Patient states this is similar to previous MG exacerbations in the past.  She denies any recent illnesses. Patient admitted to [address] for further evaluation and treatment. Of note: patient is currently on mestinon 60mg TID at home and follows with [address] [place] [address] [address] Neurology. [facility] Past Medical History:   Diagnosis Date    Anxiety     Arthritis     Cancer ([facility] [address]     thyroid    GERD (gastroesophageal reflux disease)     Hyperlipidemia     Insomnia     Migraines     Myasthenia gravis with exacerbation (HCC)     Seizures (HCC)     Thyroid disease       Allergies Allergies   Allergen Reactions    Immune Globulins Other (See Comments)     Patient developed Aseptic meningitis after Flebogamma IVIG in the past.  Developed aseptic meningitis within 48h after Gamunex-C in 3/2023.      Other      Steroid injection to joints    Adhesive Tape Rash and Other (See Comments)     Paper Tape  Paper Tape    Skin gets reddened under tape; not allergic to latex    Penicillins Itching and Rash     Rash  Rash        Diet Diet NPO  ADULT TUBE FEEDING; Orogastric; Peptide Based High Protein; Continuous; 15; Yes; 10; Q 4 hours; 45; 30; Q 4 hours; Protein; 1 packet Prosource, hook packet to ENfit tube or follow instructions on pack of packet   Isolation No active isolations     CURRENT SCHEDULED MEDICATIONS   Inpatient Medications     calcium gluconate, 5,000 mg, IntraVENous, Once    citalopram, 40 mg, Orogastric, QAM    doxepin, 10 mg, Orogastric, Nightly    estradiol, 2 mg, Orogastric, Nightly    folic acid, 1 mg, Orogastric, Daily    levothyroxine, 150 mcg, Orogastric, Daily    pantoprazole, 40 mg, IntraVENous, Daily    sodium chloride flush, 5-40 mL, IntraVENous, 2 times per day    enoxaparin, 40 mg, SubCUTAneous, Nightly   Infusions    fentaNYL Stopped (03/09/23 1336)    propofol 20 mcg/kg/min (03/10/23 0900)    sodium chloride 5 mL/hr at 03/08/23 0756      Antibiotics   Recent Abx Admin        No antibiotic orders with administrations found. LABS   Metabolic Panel Recent Labs     03/08/23  0402 03/09/23  0430 03/10/23  0619   * 133* 136   K 3.5 3.6 3.9   CL 96* 96* 100   CO2 26 27 29   BUN 14 17 14   CREATININE 0.6 <0.5* <0.5*   GLUCOSE 94 105* 100*   CALCIUM 8.5 8.5 8.8   LABALBU  --  3.1*  --    MG 1.90 1.80 1.80   ALKPHOS  --  161*  --    ALT  --  13  --    AST  --  24  --         CBC / Coags Recent Labs     03/08/23  0402 03/09/23  0430 03/09/23  1508 03/10/23  0619   WBC 7.4 6.9  --  9.3   RBC 4.07 3.83*  --  4.17   HGB 11.7* 11.0*  --  11.7*   HCT 35.1* 32.9*  --  35.5*    231  --  220   INR  --   --  0.99  --         Other Recent Labs     03/08/23  0402   TRIG 169*       No results for input(s): PHENYTOIN, KEPPRA, LACOSA, LAMO, VALPROATE, LACTSEPSIS, LACTA in the last 72 hours. DIAGNOSTICS   IMAGES:  Images personally reviewed and agree w/ radiology interpretation.     No Imaging to Review     PHYSICAL EXAMINATION     PHYSICAL EXAM:  Vitals:    03/10/23 0830 03/10/23 0845 03/10/23 0900 03/10/23 1003   BP: 128/74 126/69 113/72    Pulse:   70 68   Resp:   14 17   Temp:       TempSrc: SpO2:   99% 99%   Weight:       Height:             General: patient is intubated, she is sleepy but does open eyes and nod head to answer question, she will write to communicate  Neurologic  Mental status:   Alert, intubated, will  answers questions by nodding head and will write to ask questions. Cranial nerves:   CN2: blinks to threat,   CN 3,4,6: Pupils equal and reactive to light, extraocular muscles intact  CN5: Facial sensation symmetric   CN7: Face symmetric but exam limited by ETT rangel  CN8-12: Exam limited by ETT, but nods head and writes questions and answers on clipboard    Motor Exam:  She will squeeze hands bilaterally, wiggle toes, She does attempt to move lower extremities, will slightly bend both legs at the knee, she is able to loosely  a pen in left hand and write questions and answers, hand writing at times is difficult to read    Sensation: Nods head yes to feeling light touch in all extremities       ASSESSMENT & RECOMMENDATIONS   Assessment:Radha gibbons is a 47 yo female with a known history of Myasthenia Gravis with previous exacerbations. Patient presented with trouble chewing, hoarseness of voice, and fatigue. Intubated on 3/6 for worsening respiratory status. She received IVIG x 2 days. She has been reporting a headache, she does have a history of migraines but had reported that her headache was more severe than her typical headache and she had associated neck stiffness and low grade temperature, concerning for an aseptic meningitis after IVIG (which she has a history of). Nathanel Oriana was placed yesterday and she was started on PLEX. Plan is for 5 doses.      Plan:  -Q4 hour neuro checks  Continue ventilator support, managed by ICU team  Continue to document FVC and NIF and document in the chart  Valium PRN for muscle relaxation  Vascath was placed yesterday and she was started on PLEX last night at 20:18  Plan per oncology/hematology and Allison France MD recommends changing pheresis schedule and wants to hold treatment this weekend. Given risk of coagulopathy plan to resume treatment on Monday and give MWF   Replacement with 5% albumin, trend fibrinogen > 100, PT and PTT - may need cryoprecipitate or FFP  Trend CMP daily  Monitor for hypocalcemia - hypocalcemia protocol order per hem/onc  - Will continue to follow exam closely    Plan of care discussed with Dr. Arpit Dickson, bedside RN, and ICU team    BETH Nino - CNP   Neurology & Neurocritical Care   3/10/2023 10:18 AM      ICU Patients:   [address] [phone]  Perfect[employer]: [facility] Neurocritical Care    Critical Care:  Due to the immediate potential for life-threatening deterioration due to neurological failure, I spent 40 minutes providing critical care. This time excludes time spent performing procedures but includes time spent on direct patient care, history retrieval, review of the chart, and discussions with patient, family, and consultant(s).

## 2023-03-10 NOTE — PROGRESS NOTES
Comprehensive Nutrition Assessment    RECOMMENDATIONS:  Continue TF of Vital HP @ 45 mL/hr  Continue 1 packet Prosource daily  Nutrition Education: Education not appropriate     NUTRITION ASSESSMENT:   Nutritional summary & status: Follow up: Pt remains intubated. No pressor requirements, sedated on propofol. Propofol providing 243 kcal/day. Continues on TF @ goal rate. No BM since TF initiation, glycolax ordered PRN, RN will administer today. Discussed w/ MD, if glycolax is not sufficient, will add senna. Started on PLEX. Will continue to monitor. Admission/PMH: Myasthenia gravis w/ acute exacerbation, respiratory failure    MALNUTRITION ASSESSMENT  Context of Malnutrition: Acute Illness   Malnutrition Status: At risk for malnutrition (Comment)  Findings of the 6 clinical characteristics of malnutrition (Minimum of 2 out of 6 clinical characteristics is required to make the diagnosis of moderate or severe Protein Calorie Malnutrition based on AND/ASPEN Guidelines):  Energy Intake:  Mild decrease in energy intake (Comment)  Weight Loss:  No significant weight loss     Body Fat Loss:  No significant body fat loss     Muscle Mass Loss:  No significant muscle mass loss    Fluid Accumulation:  No significant fluid accumulation      NUTRITION DIAGNOSIS   Inadequate oral intake related to impaired respiratory function as evidenced by intubation, NPO or clear liquid status due to medical condition    Nutrition Monitoring and Evaluation:   Food/Nutrient Intake Outcomes:  Enteral Nutrition Intake/Tolerance  Physical Signs/Symptoms Outcomes:  Biochemical Data, Weight, Nutrition Focused Physical Findings, GI Status     OBJECTIVE DATA: Significant to nutrition assessment  Nutrition Related Findings: . +1.2L. No BM since admission. Wounds: None  Nutrition Goals:  Tolerate nutrition support at goal rate     CURRENT NUTRITION THERAPIES  Current Tube Feeding (TF) Orders:  Feeding Route: Orogastric  Formula: Peptide Based High Protein  Schedule: Continuous  Additives/Modulars: Protein (1 packet Prosource)  Goal TF & Flush Orders Provides: Vital HP @ 45 mL/hr to provide: 1080 mL TV, 1080 kcal, 94 g/pro and 903 mL FW + FW flushes of 30 mL q4 + 1 packet Prosource to provide an additional 20 g/pro and 80 kcal  PO Intake: NPO   PO Supplement Intake:NPO  Additional Sources of Calories/IVF:243 kcal via propofol     ANTHROPOMETRICS  Current Height: 5' 2.01\" (157.5 cm)  Current Weight: 167 lb 8.8 oz (76 kg)    Admission weight: 167 lb 12.3 oz (76.1 kg)  Ideal Body Weight (IBW): 110 lbs  (50 kg)    BMI: 30.7    COMPARATIVE STANDARDS  Energy (kcal):  2516-6371 (15-18 kcal/kg CBW)     Protein (g):  100-110 (2.0-2.2 g/kg IBW)       Fluid (mL/day):  1 mL/kcal or per MD    The patient will be monitored per nutrition standards of care. Consult dietitian if additional nutrition interventions are needed prior to RD reassessment.      Sherri Walden, 66 [address], [address] [place] [address] Drive:  [phone]  Office:  [phone]

## 2023-03-10 NOTE — PLAN OF CARE
Problem: Safety - Medical Restraint  Goal: Remains free of injury from restraints (Restraint for Interference with Medical Device)  Description: INTERVENTIONS:  1. Determine that other, less restrictive measures have been tried or would not be effective before applying the restraint  2. Evaluate the patient's condition at the time of restraint application  3. Inform patient/family regarding the reason for restraint  4.  Q2H: Monitor safety, psychosocial status, comfort, nutrition and hydration  3/10/2023 0759 by Juve Cortes RN  Outcome: Completed

## 2023-03-10 NOTE — PROGRESS NOTES
ICU Progress Note    Admit Date: 3/5/2023  Hospital day: 6  ICU day: 5  Vent Day: 5  IV Access:Peripheral  IV Fluids:None  Vasopressors:None                Antibiotics: None  Diet: Diet NPO  ADULT TUBE FEEDING; Orogastric; Peptide Based High Protein; Continuous; 15; Yes; 10; Q 4 hours; 45; 30; Q 4 hours; Protein; 1 packet Prosource, hook packet to ENfit tube or follow instructions on pack of packet    CC: difficulty swallowing and generalized weakness     Interval history: Patient continues to have headaches and right hip pain. She was started on PLEX last night around 8pm and tolerated it well. Patient remains globally weak with a slight bit of improvement in hand  noted from first day she was placed on the ventilator. She seems to have less movement in her feet though as she isn't wiggling her toes today.     Medications:     Scheduled Meds:   calcium gluconate  5,000 mg IntraVENous Once    citalopram  40 mg Orogastric QAM    doxepin  10 mg Orogastric Nightly    estradiol  2 mg Orogastric Nightly    folic acid  1 mg Orogastric Daily    levothyroxine  150 mcg Orogastric Daily    pantoprazole  40 mg IntraVENous Daily    sodium chloride flush  5-40 mL IntraVENous 2 times per day    enoxaparin  40 mg SubCUTAneous Nightly     Continuous Infusions:   fentaNYL Stopped (03/09/23 1336)    propofol 25 mcg/kg/min (03/10/23 0603)    sodium chloride 5 mL/hr at 03/08/23 0756     PRN Meds:acetaminophen **OR** acetaminophen, albumin human, metoclopramide, hydrALAZINE, butalbital-acetaminophen-caffeine, LORazepam, sodium chloride flush, sodium chloride, ondansetron **OR** ondansetron, polyethylene glycol    Objective:   Vitals:   T-max:  Patient Vitals for the past 8 hrs:   BP Temp Temp src Pulse Resp SpO2 Weight   03/10/23 0800 123/67 -- Axillary 69 14 100 % --   03/10/23 0645 132/65 -- -- 64 14 -- --   03/10/23 0630 137/77 -- -- 71 17 -- --   03/10/23 0615 112/66 -- -- 65 15 -- --   03/10/23 0600 (!) 120/50 -- -- 68 14 -- 167 lb 8.8 oz (76 kg)   03/10/23 0545 117/65 -- -- 66 14 -- --   03/10/23 0530 114/74 -- -- 68 15 97 % --   03/10/23 0515 (!) 104/59 -- -- 64 14 95 % --   03/10/23 0500 108/62 -- -- 64 14 98 % --   03/10/23 0445 114/60 -- -- 67 13 99 % --   03/10/23 0430 125/68 -- -- 68 13 99 % --   03/10/23 0415 (!) 118/59 -- -- 68 13 99 % --   03/10/23 0400 128/63 98.1 °F (36.7 °C) Axillary 69 14 99 % --   03/10/23 0350 -- -- -- 64 14 98 % --   03/10/23 0345 (!) 106/59 -- -- 64 15 98 % --   03/10/23 0330 104/60 -- -- 64 14 98 % --   03/10/23 0315 (!) 100/54 -- -- 65 14 99 % --   03/10/23 0300 (!) 98/55 -- -- 64 14 99 % --   03/10/23 0245 (!) 96/54 -- -- 66 12 96 % --   03/10/23 0230 111/64 -- -- 68 14 96 % --   03/10/23 0215 -- -- -- 68 13 98 % --   03/10/23 0200 (!) 103/57 -- -- 67 14 97 % --   03/10/23 0145 (!) 100/56 -- -- 67 14 98 % --   03/10/23 0130 (!) 98/57 -- -- 67 14 98 % --   03/10/23 0115 (!) 97/55 -- -- 64 14 97 % --   03/10/23 0100 (!) 96/52 -- -- 64 14 97 % --         Intake/Output Summary (Last 24 hours) at 3/10/2023 0848  Last data filed at 3/10/2023 0600  Gross per 24 hour   Intake 2188.96 ml   Output 1810 ml   Net 378.96 ml         Review of Systems - per interval history. Physical Exam  Constitutional:       General: She is not in acute distress. Interventions: She is intubated. HENT:      Head: Normocephalic and atraumatic. Eyes:      Conjunctiva/sclera: Conjunctivae normal.      Pupils: Pupils are equal, round, and reactive to light. Cardiovascular:      Rate and Rhythm: Normal rate and regular rhythm. Pulmonary:      Effort: She is intubated. Comments: Mechanical breath sound bilaterally. Abdominal:      General: Bowel sounds are normal.      Palpations: Abdomen is soft. Musculoskeletal:      Right lower leg: No edema. Left lower leg: No edema. Skin:     General: Skin is warm and dry. Neurological:      Motor: Weakness (Generalized. 1/5 upper extremities.  0/5 lower extremities. ) present. LABS:    CBC:   Recent Labs     03/08/23  0402 03/09/23 0430 03/10/23  0619   WBC 7.4 6.9 9.3   HGB 11.7* 11.0* 11.7*   HCT 35.1* 32.9* 35.5*    231 220   MCV 86.3 86.0 85.2       Renal:    Recent Labs     03/08/23  0402 03/09/23 0430 03/10/23  0619   * 133* 136   K 3.5 3.6 3.9   CL 96* 96* 100   CO2 26 27 29   BUN 14 17 14   CREATININE 0.6 <0.5* <0.5*   GLUCOSE 94 105* 100*   CALCIUM 8.5 8.5 8.8   MG 1.90 1.80 1.80   ANIONGAP 9 10 7       Hepatic:   Recent Labs     03/09/23 0430   AST 24   ALT 13   BILITOT 0.3   BILIDIR <0.2   PROT 8.0   LABALBU 3.1*   ALKPHOS 161*          -----------------------------------------------------------------  RAD:   XR CHEST PORTABLE   Final Result   1. Right CVC, dialysis catheter in satisfactory position with its tip in the proximal to mid SVC. (Catheter tip is approximately 4 cm above the cavoatrial junction)   2. No pneumothorax      XR CHEST PORTABLE   Final Result      1. Repositioning of ET tube with tip now lying approximately 1.6 cm above the douglas. 2.  Near complete reexpansion of the left lung with persistent mild left basilar atelectasis. 3.  Further advancement of feeding tube with tip projecting over the distal gastric body. XR CHEST 1 VIEW   Final Result   Impression: Interval intubation. The endotracheal tube terminates within the right mainstem bronchus and should be retracted by at least 3 cm. Findings were discussed with ESTELITA Blount at the time of report. CT HEAD WO CONTRAST    (Results Pending)       Assessment/Plan:   Merline Broody is a 46 y.o. female, who was admitted on 3/5/23 due to weakness and difficulty swallowing & phonating, found to be in myasthenic crisis, and was transferred to the ICU on 3/6/23 and intubated due to impending respiratory failure.      Acute respiratory failure 2/2 Myasthenic crisis  Per patient, her experience leading up to and including her hospitalization are consistent with prior episodes of myasthenic crisis. Symptoms include generalized fatigue & weakness, dysphagia, and dysphonia. - Neurology consulted and are managing myasthenic medications   - Q4H neuro checks  - S/p IVIG (2 g/kg total dose divided over 2 days). Patient is having a potential aseptic meningitis reaction to the IVIG so it has now been added to her allergy list in the EMR. She has a stiff neck and a headache for the past few days. -PLEX:  Started 3/9 and continue daily for 5 days in a row total. Today is day 2 of PLEX. Replacement with 5% albumin. Daily trending of fibrinogen, PT, PTT and if low will replace with cryoprecipitate or FFP. Daily CMP. Hypocalcemia protocol with calcium gluconate. - As able, please avoid the following medications as able as they can exacerbate myasthenia gravis:   - Anesthetic agents: neuromuscular blocking agents  - Cardiac Drugs: beta-blockers, procainamide, quinidine              - Antibiotics: aminoglycosides, fluoroquinolines  - Monitor neuromuscular respiratory parameters Q Shift: FVC and NIF measurements if FVC 15ml/kg or less (normal >= 60ml/kg)  and NIF 20  cm H2O or less (normal >=70cm H2O) notify Neurology Immediately   - Holding home mestinon dose, as patient has increased difficulty swallowing and this medication will increase secretions  - NG tube placed  - Sedation with propofol and fentanyl, RASS goal -1 to 0  Respiratory:   SpO2 100% on MV  Vent Settings: Vent Mode: AC/PRVC Resp Rate (Set): 14 bmp/Vt (Set, mL): 450 mL/ /FiO2 : 30 % PEEP 5    Headaches  Potentially from aseptic meningitis 2/2 IVIG. Patient has a history of migraines but is reporting this ongoing headache feels different. -PRN tylenol, fioricet, sumatriptan      Urinary retention  Patient has a history of urinary retention when intubated for past myasthenic crises. She has been straight cathed several times this hospital visit.  Yesterday afternoon, she was found to be retaining a litre of urine. -UA sent resulted in no signs of infection. Ketones present.   -Torres placed 3/8. Other chronic conditions:  Psoriasis - Home methotrexate weekly, folate  Hypothyroidism - Continuing home Synthroid  Anxiety - Continuing home Celexa, Doxepin         Code Status: Full Code  FEN: Diet NPO  ADULT TUBE FEEDING; Orogastric; Peptide Based High Protein; Continuous; 15; Yes; 10; Q 4 hours; 45; 30; Q 4 hours; Protein; 1 packet Prosource, hook packet to ENfit tube or follow instructions on pack of packet  PPX: Lovenox, Protonix  DISPO: ICU    Ranjan Woodall MD, PGY-1  03/10/23  8:48 AM    Patient has been staffed and discussed with attending physician Lali Simmons MD.     Patient examined, findings as discussed with Dr. Azul Ruiz. Agree with assessment and plan. Mentation is clear, muscle weakness remains profound, consequence of myasthenia gravis. Head CT obtained, with no findings of concern. Plasma exchange started yesterday, treatment #2 today. We will hold for the weekend and resume MWF for 3 additional treatments  Maintained on mechanical ventilator support with minute volume 7-8 L, FiO2 30%. Ventilation is adequate, gas exchange compensated. She requires moderate dose propofol to allow comfort while intubated. Hemodynamically stable. Renal function intact. Electrolytes normal.  Discussed with .   Time spent in critical care 40 minutes

## 2023-03-10 NOTE — PLAN OF CARE
Problem: Discharge Planning  Goal: Discharge to home or other facility with appropriate resources  Outcome: Progressing  Flowsheets (Taken 3/9/2023 2000)  Discharge to home or other facility with appropriate resources: Identify barriers to discharge with patient and caregiver     Problem: Safety - Adult  Goal: Free from fall injury  Outcome: Progressing  Flowsheets (Taken 3/9/2023 2000)  Free From Fall Injury: Instruct family/caregiver on patient safety     Problem: Pain  Goal: Verbalizes/displays adequate comfort level or baseline comfort level  Outcome: Progressing  Flowsheets (Taken 3/9/2023 2000)  Verbalizes/displays adequate comfort level or baseline comfort level: Encourage patient to monitor pain and request assistance     Problem: ABCDS Injury Assessment  Goal: Absence of physical injury  Outcome: Progressing  Flowsheets (Taken 3/9/2023 2000)  Absence of Physical Injury: Implement safety measures based on patient assessment     Problem: Safety - Medical Restraint  Goal: Remains free of injury from restraints (Restraint for Interference with Medical Device)  Description: INTERVENTIONS:  1. Determine that other, less restrictive measures have been tried or would not be effective before applying the restraint  2. Evaluate the patient's condition at the time of restraint application  3. Inform patient/family regarding the reason for restraint  4. Q2H: Monitor safety, psychosocial status, comfort, nutrition and hydration  Outcome: Progressing     Problem: Skin/Tissue Integrity  Goal: Absence of new skin breakdown  Description: 1. Monitor for areas of redness and/or skin breakdown  2. Assess vascular access sites hourly  3. Every 4-6 hours minimum:  Change oxygen saturation probe site  4. Every 4-6 hours:  If on nasal continuous positive airway pressure, respiratory therapy assess nares and determine need for appliance change or resting period.   Outcome: Progressing     Problem: Neurosensory - Adult  Goal: Achieves stable or improved neurological status  Outcome: Progressing  Flowsheets (Taken 3/9/2023 2000)  Achieves stable or improved neurological status: Assess for and report changes in neurological status  Goal: Absence of seizures  Outcome: Progressing  Flowsheets (Taken 3/9/2023 2000)  Absence of seizures: Monitor for seizure activity.   If seizure occurs, document type and location of movements and any associated apnea  Goal: Remains free of injury related to seizures activity  Outcome: Progressing  Flowsheets (Taken 3/9/2023 2000)  Remains free of injury related to seizure activity: Maintain airway, patient safety  and administer oxygen as ordered  Goal: Achieves maximal functionality and self care  Outcome: Progressing  Flowsheets (Taken 3/9/2023 2000)  Achieves maximal functionality and self care: Monitor swallowing and airway patency with patient fatigue and changes in neurological status     Problem: Respiratory - Adult  Goal: Achieves optimal ventilation and oxygenation  Outcome: Progressing  Flowsheets (Taken 3/9/2023 2000)  Achieves optimal ventilation and oxygenation: Assess for changes in respiratory status     Problem: Nutrition Deficit:  Goal: Optimize nutritional status  Outcome: Progressing

## 2023-03-10 NOTE — PROGRESS NOTES
ONCOLOGY HEMATOLOGY CARE PROGRESS NOTE      SUBJECTIVE:    She remains ventilated in the ICU       OBJECTIVE        Physical    VITALS:  Patient Vitals for the past 24 hrs:   BP Temp Temp src Pulse Resp SpO2 Weight   03/10/23 1300 114/61 -- -- 71 14 98 % --   03/10/23 1245 131/68 -- -- -- -- -- --   03/10/23 1230 127/61 -- -- -- -- -- --   03/10/23 1215 129/69 -- -- -- -- -- --   03/10/23 1200 127/69 -- Temporal 70 16 99 % --   03/10/23 1145 119/64 -- -- -- -- -- --   03/10/23 1130 127/69 -- -- -- -- -- --   03/10/23 1115 (!) 148/79 -- -- -- -- -- --   03/10/23 1109 -- -- -- 67 14 96 % --   03/10/23 1100 129/77 -- -- 68 14 96 % --   03/10/23 1045 121/64 -- -- -- -- -- --   03/10/23 1030 123/63 -- -- -- -- -- --   03/10/23 1015 (!) 142/70 -- -- -- -- -- --   03/10/23 1003 -- -- -- 68 17 99 % --   03/10/23 1000 136/74 -- -- 68 14 -- --   03/10/23 0930 (!) 124/110 -- -- -- -- -- --   03/10/23 0915 124/68 -- -- -- -- -- --   03/10/23 0900 113/72 -- -- 70 14 99 % --   03/10/23 0845 126/69 -- -- -- -- -- --   03/10/23 0830 128/74 -- -- -- -- -- --   03/10/23 0815 123/64 -- -- -- -- -- --   03/10/23 0800 123/67 97.9 °F (36.6 °C) Axillary 69 14 100 % --   03/10/23 0645 132/65 -- -- 64 14 -- --   03/10/23 0630 137/77 -- -- 71 17 -- --   03/10/23 0615 112/66 -- -- 65 15 -- --   03/10/23 0600 (!) 120/50 -- -- 68 14 -- 167 lb 8.8 oz (76 kg)   03/10/23 0545 117/65 -- -- 66 14 -- --   03/10/23 0530 114/74 -- -- 68 15 97 % --   03/10/23 0515 (!) 104/59 -- -- 64 14 95 % --   03/10/23 0500 108/62 -- -- 64 14 98 % --   03/10/23 0445 114/60 -- -- 67 13 99 % --   03/10/23 0430 125/68 -- -- 68 13 99 % --   03/10/23 0415 (!) 118/59 -- -- 68 13 99 % --   03/10/23 0400 128/63 98.1 °F (36.7 °C) Axillary 69 14 99 % --   03/10/23 0350 -- -- -- 64 14 98 % --   03/10/23 0345 (!) 106/59 -- -- 64 15 98 % --   03/10/23 0330 104/60 -- -- 64 14 98 % --   03/10/23 0315 (!) 100/54 -- -- 65 14 99 % --   03/10/23 0300 (!) 98/55 -- -- 64 14 99 % --   03/10/23 0245 (!) 96/54 -- -- 66 12 96 % --   03/10/23 0230 111/64 -- -- 68 14 96 % --   03/10/23 0215 -- -- -- 68 13 98 % --   03/10/23 0200 (!) 103/57 -- -- 67 14 97 % --   03/10/23 0145 (!) 100/56 -- -- 67 14 98 % --   03/10/23 0130 (!) 98/57 -- -- 67 14 98 % --   03/10/23 0115 (!) 97/55 -- -- 64 14 97 % --   03/10/23 0100 (!) 96/52 -- -- 64 14 97 % --   03/10/23 0045 (!) 98/55 -- -- 65 14 97 % --   03/10/23 0030 (!) 107/54 -- -- 66 14 98 % --   03/10/23 0022 -- -- -- 67 17 98 % --   03/10/23 0015 (!) 110/59 -- -- 68 14 97 % --   03/10/23 0001 -- -- -- 68 14 97 % --   03/10/23 0000 (!) 96/53 98 °F (36.7 °C) Axillary 68 14 97 % --   03/09/23 2345 (!) 101/55 -- -- 68 14 97 % --   03/09/23 2330 (!) 99/51 -- -- 70 14 97 % --   03/09/23 2315 (!) 102/54 -- -- 69 14 97 % --   03/09/23 2300 (!) 104/57 -- -- 70 14 97 % --   03/09/23 2245 (!) 107/56 -- -- 71 11 97 % --   03/09/23 2230 (!) 107/52 -- -- 73 14 97 % --   03/09/23 2215 (!) 107/56 -- -- 73 14 96 % --   03/09/23 2200 (!) 105/55 -- -- 73 14 96 % --   03/09/23 2145 (!) 105/58 -- -- 75 14 97 % --   03/09/23 2130 94/68 -- -- 78 14 97 % --   03/09/23 2115 (!) 107/58 -- -- 76 14 97 % --   03/09/23 2100 (!) 106/56 -- -- 78 14 97 % --   03/09/23 2045 113/64 -- -- 76 14 97 % --   03/09/23 2035 -- -- -- 79 14 97 % --   03/09/23 2030 (!) 105/58 -- -- 73 13 96 % --   03/09/23 2015 103/60 -- -- 75 15 96 % --   03/09/23 2000 115/63 98.8 °F (37.1 °C) Axillary 80 17 97 % --   03/09/23 1945 118/72 -- -- 77 17 -- --   03/09/23 1930 (!) 104/57 -- -- 76 13 -- --   03/09/23 1915 113/64 -- -- 75 13 -- --   03/09/23 1900 115/62 -- -- 76 17 90 % --   03/09/23 1800 125/79 -- -- 84 23 (!) 82 % --   03/09/23 1700 (!) 122/58 -- -- 78 14 99 % --   03/09/23 1614 -- -- -- 81 14 99 % --   03/09/23 1607 -- -- -- 81 14 97 % --   03/09/23 1600 118/60 99.7 °F (37.6 °C) Axillary 81 14 96 % --   03/09/23 1500 -- -- -- 84 14 98 % --   03/09/23 1400 (!) 154/71 -- -- 78 14 99 % --       24HR INTAKE/OUTPUT:    Intake/Output Summary (Last 24 hours) at 3/10/2023 1323  Last data filed at 3/10/2023 1000  Gross per 24 hour   Intake 1358 ml   Output 1810 ml   Net -452 ml         DATA:  CBC:    Recent Labs     03/10/23  0619 03/09/23  0430 03/08/23  0402 03/07/23  0523   WBC 9.3 6.9 7.4 6.3   NEUTROABS 6.9 4.6 5.3 4.7   LYMPHOPCT 15.7 20.0 19.1 15.8   RBC 4.17 3.83* 4.07 4.33   HGB 11.7* 11.0* 11.7* 12.5   HCT 35.5* 32.9* 35.1* 37.6   MCV 85.2 86.0 86.3 86.8   MCH 28.1 28.6 28.7 28.9   MCHC 33.0 33.3 33.3 33.3   RDW 13.5 13.7 13.8 13.6    231 261 262       PT/INR:    Recent Labs     03/09/23  1508 03/09/23  0430 03/07/23  0523 03/06/23  0726 03/03/23  1751   PROT  --  8.0 9.3* 8.4* 7.2   INR 0.99  --   --   --   --      PTT:    Recent Labs     03/09/23  1508   APTT 35.0*       CMP:    Recent Labs     03/10/23  0619 03/09/23  0430 03/08/23  0402 03/07/23  0523 03/06/23  0726 03/03/23  1751    133* 131* 134* 136 140   K 3.9 3.6 3.5 3.4* 4.1 4.1    96* 96* 98* 100 104   CO2 29 27 26 28 26 24   GLUCOSE 100* 105* 94 84 90 86   BUN 14 17 14 11 11 12   CREATININE <0.5* <0.5* 0.6 0.6 0.6 0.6   LABGLOM >60 >60 >60 >60 >60 >60   CALCIUM 8.8 8.5 8.5 8.5 8.8 8.8   PROT  --  8.0  --  9.3* 8.4* 7.2   LABALBU  --  3.1*  --  3.2* 3.3* 3.4   AGRATIO  --   --   --  0.5*  --  0.9*   BILITOT  --  0.3  --  0.4 0.3 <0.2   ALKPHOS  --  161*  --  159* 150* 151*   ALT  --  13  --  12 9* 11   AST  --  24  --  20 16 15   MG 1.80 1.80 1.90 1.80  --   --        Lab Results   Component Value Date    CALCIUM 8.8 03/10/2023    PHOS 3.5 03/07/2023       LDH:No results for input(s): LDH in the last 720 hours.     Radiology Review:  CT HEAD WO CONTRAST  Narrative: CT HEAD WITHOUT CONTRAST on March 10, 2023    HISTORY: Mental status change, increased weakness    PROCEDURE: Dose modulation, radiation reducing techniques and iterative reconstruction techniques utilized to reduce radiation exposure with up-to-date CT equipment. Noncontrast CT the brain performed with 3 mm contiguous sections . Sagittal and coronal reconstructed views were obtained and reviewed under separate computerized workstation. COMPARISON: January 2, 2022    FINDINGS:     There is no sign of any acute intracranial hemorrhage, hydrocephalus or edema. The brain volume is normal for the patient's age and there is no sign of any acute macro infarction. There is no depressed or displaced skull fracture. There is diffuse sinus opacification throughout the posterior nasal sinuses ethmoid air cells which are partially removed and previous antrostomy noted with air-fluid levels in the maxillary sinuses and sphenoid sinuses. Diffuse fluid noted throughout   the nasopharynx as well  Impression: Normal noncontrast CT the brain without acute hemorrhage, edema or hydrocephalus. Diffuse sinus opacity and fluid throughout the maxillary sinuses ethmoid air cells sphenoid sinuses. Duodenal feeding tube or NG tube in place with diffuse fluid throughout the nasopharynx.  There appears to be prior nasal sinus surgery      Problem List  Patient Active Problem List   Diagnosis    Myasthenia gravis ([address]    Myasthenia gravis with (acute) exacerbation ([address]    Appendicitis    Neck stiffness    Generalized weakness    Myasthenia gravis with exacerbation, adult form ([address]    Hyperlipidemia    Seizure (HCC)    GERD (gastroesophageal reflux disease)    Migraine    Anxiety    Insomnia    Myasthenia gravis with exacerbation (HCC)    Bradycardia    Sinus arrhythmia    Postablative hypothyroidism    'light-for-dates' infant with signs of fetal malnutrition    Left-sided weakness    Myasthenia gravis with acute exacerbation (HCC)    Respiratory failure requiring intubation ([address]       ASSESSMENT AND PLAN:    This is a 80-year-old female with a history of myasthenia gravis, seizures, thyroid disease, migraines who presented for progressive dysphagia s/p IVIG with worsening neck stiffness and respiratory failure requiring intubation. Per neurology/neuro critical care team she requires plasmapheresis. We will plan for 5 sessions total.     Myasthenic crisis  - Initial plan for 5 sessions daily  - Replacement with 5% albumin  - spoke with Kimani AGOSTO who recommends changing pheresis schedule and wants to hold treatment this weekend. Given risk of coagulopathy   - plan to resume treatment on Monday and give MWF   - Trend fibrinogen, PT, PTT daily to evaluate for possible need of cryoprecipitate or FFP--these have been normal   -Trend CMP daily, calcium gluconate orders for hypocalcemia protocol will be in place--Calcium WNL        We will follow closely given high risk of medical disc compensation. Case discussed with Kimani AGOSTO today     BETH Sanon - CNP  Please contact through [employer]     Patient is seen and examined with NP. Agree with plan above. She is intubated but able to follow commands. We discussed her PLEX schedule. Patient not happy about not getting daily treatments. I discussed that she received two treatments and plan for 3 more next week every other day. She is frustrated. We discussed risk of coagulopathy. Discussed with neurology and they agree with the plan for 2 PLEX now, and alternate treatment MWF.  Monitor fibrinogen and calcium

## 2023-03-10 NOTE — PLAN OF CARE
Problem: Discharge Planning  Goal: Discharge to home or other facility with appropriate resources  3/10/2023 0759 by Romain Alejandre RN  Outcome: Progressing     Problem: Safety - Adult  Goal: Free from fall injury  3/10/2023 0759 by Romain Alejandre RN  Outcome: Progressing     Problem: Pain  Goal: Verbalizes/displays adequate comfort level or baseline comfort level  3/10/2023 0759 by Romain Alejandre RN  Outcome: Progressing     Problem: ABCDS Injury Assessment  Goal: Absence of physical injury  3/10/2023 0759 by Romain Alejandre RN  Outcome: Progressing       Problem: Skin/Tissue Integrity  Goal: Absence of new skin breakdown  Description: 1. Monitor for areas of redness and/or skin breakdown  2. Assess vascular access sites hourly  3. Every 4-6 hours minimum:  Change oxygen saturation probe site  4. Every 4-6 hours:  If on nasal continuous positive airway pressure, respiratory therapy assess nares and determine need for appliance change or resting period.   3/10/2023 0759 by Romain Alejandre RN  Outcome: Progressing     Problem: Neurosensory - Adult  Goal: Achieves stable or improved neurological status  3/10/2023 0759 by Romain Alejandre RN  Outcome: Progressing     Problem: Neurosensory - Adult  Goal: Absence of seizures  3/10/2023 0759 by Romain Alejandre RN  Outcome: Progressing     Problem: Neurosensory - Adult  Goal: Remains free of injury related to seizures activity  3/10/2023 0759 by Romain Alejandre RN  Outcome: Progressing     Problem: Neurosensory - Adult  Goal: Achieves maximal functionality and self care  3/10/2023 0759 by Romain Alejandre RN  Outcome: Progressing     Problem: Respiratory - Adult  Goal: Achieves optimal ventilation and oxygenation  3/10/2023 0759 by Romain Alejandre RN  Outcome: Progressing     Problem: Nutrition Deficit:  Goal: Optimize nutritional status  3/10/2023 0759 by Romain Alejandre RN  Outcome: Progressing

## 2023-03-10 NOTE — PROGRESS NOTES
Neuro exam stable. Pt continues to be weak but able to follow commands in extremities x4. Equal on both sides. Able to communicate needs by writing as well as nodding/gestures. Propofol at 20 mcg/kg/min for comfort measures. Able to get the above neuro exam on this amt of sedation. NSR 60-70's. 's. Afebrile. Vent settings 30 450 14 5. Sounds clear. TF at goal.   UOP adequate. Fioricet given for headache. Plex therapy scheduled for this afternoon.

## 2023-03-11 LAB
A/G RATIO: 2.3 (ref 1.1–2.2)
ALBUMIN SERPL-MCNC: 4.3 G/DL (ref 3.4–5)
ALP BLD-CCNC: 62 U/L (ref 40–129)
ALT SERPL-CCNC: <5 U/L (ref 10–40)
ANION GAP SERPL CALCULATED.3IONS-SCNC: 8 MMOL/L (ref 3–16)
AST SERPL-CCNC: 9 U/L (ref 15–37)
BASOPHILS ABSOLUTE: 0 K/UL (ref 0–0.2)
BASOPHILS RELATIVE PERCENT: 0.4 %
BILIRUB SERPL-MCNC: <0.2 MG/DL (ref 0–1)
BUN BLDV-MCNC: 13 MG/DL (ref 7–20)
CALCIUM IONIZED: 1.17 MMOL/L (ref 1.12–1.32)
CALCIUM SERPL-MCNC: 9 MG/DL (ref 8.3–10.6)
CHLORIDE BLD-SCNC: 102 MMOL/L (ref 99–110)
CO2: 30 MMOL/L (ref 21–32)
CREAT SERPL-MCNC: <0.5 MG/DL (ref 0.6–1.1)
EOSINOPHILS ABSOLUTE: 0.5 K/UL (ref 0–0.6)
EOSINOPHILS RELATIVE PERCENT: 5 %
FIBRINOGEN: 318 MG/DL (ref 207–509)
GFR SERPL CREATININE-BSD FRML MDRD: >60 ML/MIN/{1.73_M2}
GLUCOSE BLD-MCNC: 99 MG/DL (ref 70–99)
HCT VFR BLD CALC: 35.7 % (ref 36–48)
HEMOGLOBIN: 11.9 G/DL (ref 12–16)
INR BLD: 1.07 (ref 0.87–1.14)
LYMPHOCYTES ABSOLUTE: 1.8 K/UL (ref 1–5.1)
LYMPHOCYTES RELATIVE PERCENT: 19.2 %
MAGNESIUM: 1.6 MG/DL (ref 1.8–2.4)
MCH RBC QN AUTO: 28.5 PG (ref 26–34)
MCHC RBC AUTO-ENTMCNC: 33.2 G/DL (ref 31–36)
MCV RBC AUTO: 85.7 FL (ref 80–100)
MONOCYTES ABSOLUTE: 0.7 K/UL (ref 0–1.3)
MONOCYTES RELATIVE PERCENT: 7 %
NEUTROPHILS ABSOLUTE: 6.6 K/UL (ref 1.7–7.7)
NEUTROPHILS RELATIVE PERCENT: 68.4 %
PDW BLD-RTO: 13.8 % (ref 12.4–15.4)
PH VENOUS: 7.33 (ref 7.35–7.45)
PLATELET # BLD: 239 K/UL (ref 135–450)
PMV BLD AUTO: 7.4 FL (ref 5–10.5)
POTASSIUM REFLEX MAGNESIUM: 4 MMOL/L (ref 3.5–5.1)
PROTHROMBIN TIME: 13.8 SEC (ref 11.7–14.5)
RBC # BLD: 4.17 M/UL (ref 4–5.2)
SODIUM BLD-SCNC: 140 MMOL/L (ref 136–145)
TOTAL PROTEIN: 6.2 G/DL (ref 6.4–8.2)
WBC # BLD: 9.6 K/UL (ref 4–11)

## 2023-03-11 PROCEDURE — 82330 ASSAY OF CALCIUM: CPT

## 2023-03-11 PROCEDURE — 99233 SBSQ HOSP IP/OBS HIGH 50: CPT | Performed by: INTERNAL MEDICINE

## 2023-03-11 PROCEDURE — 85025 COMPLETE CBC W/AUTO DIFF WBC: CPT

## 2023-03-11 PROCEDURE — 6360000002 HC RX W HCPCS

## 2023-03-11 PROCEDURE — 2580000003 HC RX 258

## 2023-03-11 PROCEDURE — 85610 PROTHROMBIN TIME: CPT

## 2023-03-11 PROCEDURE — C9113 INJ PANTOPRAZOLE SODIUM, VIA: HCPCS

## 2023-03-11 PROCEDURE — 94761 N-INVAS EAR/PLS OXIMETRY MLT: CPT

## 2023-03-11 PROCEDURE — 84478 ASSAY OF TRIGLYCERIDES: CPT

## 2023-03-11 PROCEDURE — 6370000000 HC RX 637 (ALT 250 FOR IP)

## 2023-03-11 PROCEDURE — 36592 COLLECT BLOOD FROM PICC: CPT

## 2023-03-11 PROCEDURE — 6360000002 HC RX W HCPCS: Performed by: STUDENT IN AN ORGANIZED HEALTH CARE EDUCATION/TRAINING PROGRAM

## 2023-03-11 PROCEDURE — 6370000000 HC RX 637 (ALT 250 FOR IP): Performed by: STUDENT IN AN ORGANIZED HEALTH CARE EDUCATION/TRAINING PROGRAM

## 2023-03-11 PROCEDURE — 2700000000 HC OXYGEN THERAPY PER DAY

## 2023-03-11 PROCEDURE — 83735 ASSAY OF MAGNESIUM: CPT

## 2023-03-11 PROCEDURE — APPNB30 APP NON BILLABLE TIME 0-30 MINS

## 2023-03-11 PROCEDURE — 36415 COLL VENOUS BLD VENIPUNCTURE: CPT

## 2023-03-11 PROCEDURE — 80053 COMPREHEN METABOLIC PANEL: CPT

## 2023-03-11 PROCEDURE — 94003 VENT MGMT INPAT SUBQ DAY: CPT

## 2023-03-11 PROCEDURE — 85384 FIBRINOGEN ACTIVITY: CPT

## 2023-03-11 PROCEDURE — 2000000000 HC ICU R&B

## 2023-03-11 RX ORDER — OXYCODONE HYDROCHLORIDE 5 MG/1
5 TABLET ORAL
Status: ACTIVE | OUTPATIENT
Start: 2023-03-11 | End: 2023-03-12

## 2023-03-11 RX ORDER — BUTALBITAL, ACETAMINOPHEN AND CAFFEINE 50; 325; 40 MG/1; MG/1; MG/1
1 TABLET ORAL 2 TIMES DAILY PRN
Status: DISCONTINUED | OUTPATIENT
Start: 2023-03-11 | End: 2023-03-17 | Stop reason: HOSPADM

## 2023-03-11 RX ADMIN — LEVOTHYROXINE SODIUM 150 MCG: 0.15 TABLET ORAL at 06:30

## 2023-03-11 RX ADMIN — DOXEPIN HYDROCHLORIDE 10 MG: 10 CAPSULE ORAL at 20:37

## 2023-03-11 RX ADMIN — FOLIC ACID 1 MG: 1 TABLET ORAL at 08:15

## 2023-03-11 RX ADMIN — SODIUM CHLORIDE, PRESERVATIVE FREE 10 ML: 5 INJECTION INTRAVENOUS at 20:38

## 2023-03-11 RX ADMIN — SODIUM CHLORIDE, PRESERVATIVE FREE 10 ML: 5 INJECTION INTRAVENOUS at 08:15

## 2023-03-11 RX ADMIN — ESTRADIOL 2 MG: 0.5 TABLET ORAL at 20:37

## 2023-03-11 RX ADMIN — BUTALBITAL, ACETAMINOPHEN, AND CAFFEINE 1 TABLET: 50; 325; 40 TABLET ORAL at 06:30

## 2023-03-11 RX ADMIN — BUTALBITAL, ACETAMINOPHEN, AND CAFFEINE 1 TABLET: 50; 325; 40 TABLET ORAL at 02:01

## 2023-03-11 RX ADMIN — PROPOFOL 35 MCG/KG/MIN: 10 INJECTION, EMULSION INTRAVENOUS at 10:36

## 2023-03-11 RX ADMIN — Medication 400 MG: at 08:16

## 2023-03-11 RX ADMIN — ENOXAPARIN SODIUM 40 MG: 100 INJECTION SUBCUTANEOUS at 20:38

## 2023-03-11 RX ADMIN — PROPOFOL 30 MCG/KG/MIN: 10 INJECTION, EMULSION INTRAVENOUS at 05:19

## 2023-03-11 RX ADMIN — ACETAMINOPHEN 1000 MG: 500 TABLET ORAL at 17:49

## 2023-03-11 RX ADMIN — PANTOPRAZOLE SODIUM 40 MG: 40 INJECTION, POWDER, FOR SOLUTION INTRAVENOUS at 08:15

## 2023-03-11 RX ADMIN — CITALOPRAM 40 MG: 40 TABLET, FILM COATED ORAL at 08:15

## 2023-03-11 RX ADMIN — PROPOFOL 35 MCG/KG/MIN: 10 INJECTION, EMULSION INTRAVENOUS at 15:52

## 2023-03-11 RX ADMIN — PROPOFOL 35 MCG/KG/MIN: 10 INJECTION, EMULSION INTRAVENOUS at 22:46

## 2023-03-11 ASSESSMENT — PULMONARY FUNCTION TESTS
PIF_VALUE: 28
PIF_VALUE: 26
PIF_VALUE: 24
PIF_VALUE: 34
PIF_VALUE: 24
PIF_VALUE: 27
PIF_VALUE: 28
PIF_VALUE: 25
PIF_VALUE: 32
PIF_VALUE: 36
PIF_VALUE: 32
PIF_VALUE: 25
PIF_VALUE: 25
PIF_VALUE: 26
PIF_VALUE: 31
PIF_VALUE: 24
PIF_VALUE: 22
PIF_VALUE: 24
PIF_VALUE: 25
PIF_VALUE: 25
PIF_VALUE: 24
PIF_VALUE: 25
PIF_VALUE: 24
PIF_VALUE: 25
PIF_VALUE: 25
PIF_VALUE: 26
PIF_VALUE: 31
PIF_VALUE: 32
PIF_VALUE: 25
PIF_VALUE: 26
PIF_VALUE: 42
PIF_VALUE: 30
PIF_VALUE: 29
PIF_VALUE: 29
PIF_VALUE: 30
PIF_VALUE: 29
PIF_VALUE: 26
PIF_VALUE: 37
PIF_VALUE: 30
PIF_VALUE: 23
PIF_VALUE: 25
PIF_VALUE: 26
PIF_VALUE: 26
PIF_VALUE: 23
PIF_VALUE: 24
PIF_VALUE: 26
PIF_VALUE: 24
PIF_VALUE: 24
PIF_VALUE: 25
PIF_VALUE: 25
PIF_VALUE: 23
PIF_VALUE: 25
PIF_VALUE: 31
PIF_VALUE: 25
PIF_VALUE: 28
PIF_VALUE: 30
PIF_VALUE: 29
PIF_VALUE: 26
PIF_VALUE: 26
PIF_VALUE: 27
PIF_VALUE: 26
PIF_VALUE: 32
PIF_VALUE: 25
PIF_VALUE: 24
PIF_VALUE: 22
PIF_VALUE: 41
PIF_VALUE: 33
PIF_VALUE: 27
PIF_VALUE: 26
PIF_VALUE: 25
PIF_VALUE: 29
PIF_VALUE: 23
PIF_VALUE: 24
PIF_VALUE: 26
PIF_VALUE: 24
PIF_VALUE: 25
PIF_VALUE: 26
PIF_VALUE: 24
PIF_VALUE: 28
PIF_VALUE: 24
PIF_VALUE: 25
PIF_VALUE: 26
PIF_VALUE: 41
PIF_VALUE: 34
PIF_VALUE: 25
PIF_VALUE: 27
PIF_VALUE: 21
PIF_VALUE: 24
PIF_VALUE: 24
PIF_VALUE: 34
PIF_VALUE: 24
PIF_VALUE: 26
PIF_VALUE: 29

## 2023-03-11 ASSESSMENT — PAIN DESCRIPTION - LOCATION
LOCATION: HEAD

## 2023-03-11 ASSESSMENT — PAIN SCALES - WONG BAKER
WONGBAKER_NUMERICALRESPONSE: 0
WONGBAKER_NUMERICALRESPONSE: 0
WONGBAKER_NUMERICALRESPONSE: 4
WONGBAKER_NUMERICALRESPONSE: 0

## 2023-03-11 ASSESSMENT — PAIN SCALES - GENERAL
PAINLEVEL_OUTOF10: 0
PAINLEVEL_OUTOF10: 3
PAINLEVEL_OUTOF10: 6
PAINLEVEL_OUTOF10: 0
PAINLEVEL_OUTOF10: 0
PAINLEVEL_OUTOF10: 5
PAINLEVEL_OUTOF10: 3
PAINLEVEL_OUTOF10: 6

## 2023-03-11 ASSESSMENT — PAIN DESCRIPTION - DESCRIPTORS: DESCRIPTORS: ACHING

## 2023-03-11 NOTE — PLAN OF CARE
Neurology Plan of Care:    Patient examined. Fibrinogen and pt/inr stable this AM. Will plan for another PLEX treatment on Monday per hematology note and will receive 5 treatments total. Has PRN fiorcet ordered and has been receiving round the clock dosing for the past day and a half for headache. I saw patient at bedside, who is sedated but will briskly follow commands and nod yes and no appropriately. When asked if she takes fiorcet every day, she nods her head yes, and when asked how many times a day she holds up two fingers. Her exam is stable, still writing notes at times. Will attempt to push/pull with her upper extremities today. Neck remains stiff but feels somewhat improved from previous exams. Afebrile. She has recently asked her nurse for a dose of fiorcet, though it seems her headaches may be improving as she reports her headache pain at a 5 today      Assessment:Radha gibbons is a 45 yo female with a known history of Myasthenia Gravis with previous exacerbations. Patient presented with trouble chewing, hoarseness of voice, and fatigue. Intubated on 3/6 for worsening respiratory status. She received IVIG x 2 days. She has been reporting a headache, she does have a history of migraines but had reported that her headache was more severe than her typical headache and she had associated neck stiffness and low grade temperature, concerning for an aseptic meningitis after IVIG (which she has a history of). Snell Armor was placed 3/9 and she was started on PLEX. Plan is for 5 doses. Plan:  -Q4 hour neuro checks  Continue ventilator support, managed by ICU team  Continue to document FVC and NIF and document in the chart  Valium PRN for muscle relaxation  Vascath was placed yesterday and she was started on PLEX last night at 20:18  Plan per oncology/hematology and Eliud Castanon MD recommends changing pheresis schedule and wants to hold treatment this weekend.  Given risk of coagulopathy plan to resume treatment on Monday and give MWF   Replacement with 5% albumin, trend fibrinogen > 100, PT and PTT - may need cryoprecipitate or FFP  Trend CMP daily  Monitor for hypocalcemia - hypocalcemia protocol order per hem/onc  Adjusted bandart to reflect what she takes at home, available twice daily prn but ideally we should be trying to wean this off as it could contribute to medication overuse headache  Ordered one time dose of oxycodone if headache pain becomes more severe, let Neurology know if this improves her headache  Will continue to follow exam, if exam changes over the weekend please call Neurocritical care     Plan of care discussed with Dr. Radha Rodriguez, bedside RN, and ICU team    I spent 23 minutes in the care of this patient. Over 50% of that time was in face-to-face counseling regarding disease process, diagnostic testing, preventative measures, and answering patient and family questions.

## 2023-03-11 NOTE — PLAN OF CARE
Problem: Discharge Planning  Goal: Discharge to home or other facility with appropriate resources  3/11/2023 1802 by Carlos Parker RN  Outcome: Progressing  3/11/2023 0553 by Ezzie Sacks, RN  Outcome: Progressing  Flowsheets (Taken 3/10/2023 2000)  Discharge to home or other facility with appropriate resources: Identify barriers to discharge with patient and caregiver     Problem: Safety - Adult  Goal: Free from fall injury  3/11/2023 1802 by Carlos Parker RN  Outcome: Progressing  3/11/2023 0553 by Ezzie Sacks, RN  Outcome: Progressing  Flowsheets (Taken 3/10/2023 2000)  Free From Fall Injury: Instruct family/caregiver on patient safety     Problem: Pain  Goal: Verbalizes/displays adequate comfort level or baseline comfort level  3/11/2023 1802 by Carlos Parker RN  Outcome: Progressing  Flowsheets (Taken 3/11/2023 1200)  Verbalizes/displays adequate comfort level or baseline comfort level:   Encourage patient to monitor pain and request assistance   Assess pain using appropriate pain scale   Administer analgesics based on type and severity of pain and evaluate response   Implement non-pharmacological measures as appropriate and evaluate response  3/11/2023 0553 by Ezzie Sacks, RN  Outcome: Progressing     Problem: ABCDS Injury Assessment  Goal: Absence of physical injury  3/11/2023 1802 by Carlos Parker RN  Outcome: Progressing  3/11/2023 0553 by Ezzie Sacks, RN  Outcome: Progressing  Flowsheets (Taken 3/10/2023 2000)  Absence of Physical Injury: Implement safety measures based on patient assessment     Problem: Skin/Tissue Integrity  Goal: Absence of new skin breakdown  Description: 1. Monitor for areas of redness and/or skin breakdown  2. Assess vascular access sites hourly  3. Every 4-6 hours minimum:  Change oxygen saturation probe site  4.   Every 4-6 hours:  If on nasal continuous positive airway pressure, respiratory therapy assess nares and determine need for appliance change or resting period. 3/11/2023 1802 by Himanshu Saxena RN  Outcome: Progressing  3/11/2023 0553 by Gladys Gee RN  Outcome: Progressing     Problem: Neurosensory - Adult  Goal: Achieves stable or improved neurological status  3/11/2023 1802 by Himanshu Saxena RN  Outcome: Progressing  3/11/2023 0553 by Gladys Gee RN  Outcome: Progressing  Flowsheets (Taken 3/10/2023 2000)  Achieves stable or improved neurological status: Assess for and report changes in neurological status  Goal: Absence of seizures  3/11/2023 1802 by Himanshu Saxena RN  Outcome: Progressing  3/11/2023 0553 by Gladys Gee RN  Outcome: Progressing  Flowsheets (Taken 3/10/2023 2000)  Absence of seizures: Monitor for seizure activity.   If seizure occurs, document type and location of movements and any associated apnea  Goal: Remains free of injury related to seizures activity  3/11/2023 1802 by Himanshu Saxena RN  Outcome: Progressing  3/11/2023 0553 by Gladys Gee RN  Outcome: Progressing  Flowsheets (Taken 3/10/2023 2000)  Remains free of injury related to seizure activity: Maintain airway, patient safety  and administer oxygen as ordered  Goal: Achieves maximal functionality and self care  3/11/2023 1802 by Himanshu Saxena RN  Outcome: Progressing  3/11/2023 0553 by Gladys Gee RN  Outcome: Progressing  Flowsheets (Taken 3/10/2023 2000)  Achieves maximal functionality and self care: Monitor swallowing and airway patency with patient fatigue and changes in neurological status     Problem: Respiratory - Adult  Goal: Achieves optimal ventilation and oxygenation  3/11/2023 1802 by Himanshu Saxena RN  Outcome: Progressing  3/11/2023 0553 by Gladys Gee RN  Outcome: Progressing  Flowsheets (Taken 3/10/2023 2000)  Achieves optimal ventilation and oxygenation: Assess for changes in respiratory status     Problem: Nutrition Deficit:  Goal: Optimize nutritional status  3/11/2023 1802 by Himanshu Saxena RN  Outcome: Progressing  3/11/2023 0553 by Gladys Gee RN  Outcome: Progressing     Problem: Safety - Medical Restraint  Goal: Remains free of injury from restraints (Restraint for Interference with Medical Device)  Description: INTERVENTIONS:  1. Determine that other, less restrictive measures have been tried or would not be effective before applying the restraint  2. Evaluate the patient's condition at the time of restraint application  3. Inform patient/family regarding the reason for restraint  4.  Q2H: Monitor safety, psychosocial status, comfort, nutrition and hydration  3/11/2023 1802 by Hedy Runner, RN  Outcome: Progressing  3/11/2023 0650 by Alysha Jane RN  Outcome: Progressing  Flowsheets (Taken 3/11/2023 0618)  Remains free of injury from restraints (restraint for interference with medical device): Determine that other, less restrictive measures have been tried or would not be effective before applying the restraint

## 2023-03-11 NOTE — PLAN OF CARE
Problem: Discharge Planning  Goal: Discharge to home or other facility with appropriate resources  Outcome: Progressing  Flowsheets (Taken 3/10/2023 2000)  Discharge to home or other facility with appropriate resources: Identify barriers to discharge with patient and caregiver     Problem: Safety - Adult  Goal: Free from fall injury  Outcome: Progressing  Flowsheets (Taken 3/10/2023 2000)  Free From Fall Injury: Instruct family/caregiver on patient safety     Problem: Pain  Goal: Verbalizes/displays adequate comfort level or baseline comfort level  Outcome: Progressing     Problem: ABCDS Injury Assessment  Goal: Absence of physical injury  Outcome: Progressing  Flowsheets (Taken 3/10/2023 2000)  Absence of Physical Injury: Implement safety measures based on patient assessment     Problem: Skin/Tissue Integrity  Goal: Absence of new skin breakdown  Description: 1. Monitor for areas of redness and/or skin breakdown  2. Assess vascular access sites hourly  3. Every 4-6 hours minimum:  Change oxygen saturation probe site  4. Every 4-6 hours:  If on nasal continuous positive airway pressure, respiratory therapy assess nares and determine need for appliance change or resting period. Outcome: Progressing     Problem: Neurosensory - Adult  Goal: Achieves stable or improved neurological status  Outcome: Progressing  Flowsheets (Taken 3/10/2023 2000)  Achieves stable or improved neurological status: Assess for and report changes in neurological status  Goal: Absence of seizures  Outcome: Progressing  Flowsheets (Taken 3/10/2023 2000)  Absence of seizures: Monitor for seizure activity.   If seizure occurs, document type and location of movements and any associated apnea  Goal: Remains free of injury related to seizures activity  Outcome: Progressing  Flowsheets (Taken 3/10/2023 2000)  Remains free of injury related to seizure activity: Maintain airway, patient safety  and administer oxygen as ordered  Goal: Achieves maximal functionality and self care  Outcome: Progressing  Flowsheets (Taken 3/10/2023 2000)  Achieves maximal functionality and self care: Monitor swallowing and airway patency with patient fatigue and changes in neurological status     Problem: Respiratory - Adult  Goal: Achieves optimal ventilation and oxygenation  Outcome: Progressing  Flowsheets (Taken 3/10/2023 2000)  Achieves optimal ventilation and oxygenation: Assess for changes in respiratory status     Problem: Nutrition Deficit:  Goal: Optimize nutritional status  Outcome: Progressing

## 2023-03-11 NOTE — PROGRESS NOTES
Hospitalist Progress Note      PCP: Flory Killian MD    Date of Admission: 3/5/2023    Chief Complaint: Difficulty swallowing        History Of Present Illness:       46 y.o. female who presented to [facility] with complaint of difficulty swallowing and raspy voice. According to patient she started experiencing difficulty swallowing for last few days. Patient felt like food was getting stuck in her throat. Patient also complained of difficulty with her speech. Patient felt weakness in her lower extremity. Symptoms were similar to her past episodes of myasthenia gravis exacerbation. Patient has experienced 5 exacerbations since 2018 and myasthenia crisis. Patient was first diagnosed with myasthenia gravis in 2005. Patient has history of thymectomy. Last exacerbation approximately 1 year ago. At that time patient was hospitalized at [facility].     Pt intubated on 3/6/23 due to worsening respiratory failure. S/p IVIG for 2 days. Subjective:     Remains on ventilator. On plamapharesis   FiO2 30%, PEEP 5.          Medications:  Reviewed    Infusion Medications    fentaNYL Stopped (03/09/23 1331)    propofol 35 mcg/kg/min (03/11/23 1036)    sodium chloride Stopped (03/10/23 9111)     Scheduled Medications    magnesium oxide  400 mg Per NG tube Daily    citalopram  40 mg Orogastric QAM    doxepin  10 mg Orogastric Nightly    estradiol  2 mg Orogastric Nightly    folic acid  1 mg Orogastric Daily    levothyroxine  150 mcg Orogastric Daily    pantoprazole  40 mg IntraVENous Daily    sodium chloride flush  5-40 mL IntraVENous 2 times per day    enoxaparin  40 mg SubCUTAneous Nightly     PRN Meds: diazePAM, acetaminophen **OR** acetaminophen, metoclopramide, hydrALAZINE, butalbital-acetaminophen-caffeine, LORazepam, sodium chloride flush, sodium chloride, ondansetron **OR** ondansetron, polyethylene glycol      Intake/Output Summary (Last 24 hours) at 3/11/2023 1516  Last data filed at 3/11/2023 1000  Gross per 24 hour   Intake 1544 ml   Output 1725 ml   Net -181 ml       Physical Exam Performed:    BP (!) 114/59   Pulse 79   Temp 98.7 °F (37.1 °C) (Axillary)   Resp 17   Ht 5' 2.01\" (1.575 m)   Wt 162 lb 11.2 oz (73.8 kg)   SpO2 100%   BMI 29.75 kg/m²     General appearance: intubated   HEENT: Pupils equal, round, and reactive to light. Conjunctivae/corneas clear. Neck: Supple, with full range of motion. No jugular venous distention. Trachea midline. Respiratory:  Normal respiratory effort. Clear to auscultation, bilaterally without Rales/Wheezes/Rhonchi. Cardiovascular: Regular rate and rhythm with normal S1/S2 without murmurs, rubs or gallops. Abdomen: Soft, non-tender, non-distended with normal bowel sounds. Musculoskeletal: No clubbing, cyanosis or edema bilaterally. Full range of motion without deformity. Skin: Skin color, texture, turgor normal.  No rashes or lesions. Neurologic:  Neurovascularly intact without any focal sensory/motor deficits. Cranial nerves: II-XII intact, grossly non-focal.  Psychiatric: Alert and oriented, thought content appropriate, normal insight  Capillary Refill: Brisk, 3 seconds, normal   Peripheral Pulses: +2 palpable, equal bilaterally       Labs:   Recent Labs     03/10/23  0619 03/10/23  1428 03/11/23  0430   WBC 9.3 8.3 9.6   HGB 11.7* 11.9* 11.9*   HCT 35.5* 36.4 35.7*    210 239     Recent Labs     03/09/23  0430 03/10/23  0619 03/10/23  1428 03/11/23  0430   * 136  --  140   K 3.6 3.9  --  4.0   CL 96* 100  --  102   CO2 27 29  --  30   BUN 17 14  --  13   CREATININE <0.5* <0.5*  --  <0.5*   CALCIUM 8.5 8.8  --  9.0   PHOS  --   --  3.3  --      Recent Labs     03/09/23 0430 03/11/23 0430   AST 24 9*   ALT 13 <5*   BILIDIR <0.2  --    BILITOT 0.3 <0.2   ALKPHOS 161* 62     Recent Labs     03/09/23  1508 03/11/23  0430   INR 0.99 1.07     No results for input(s): Chante Katz in the last 72 hours.     Urinalysis:      Lab Results Component Value Date/Time    NITRU Negative 03/08/2023 04:08 PM    WBCUA 1 06/30/2020 06:20 PM    BACTERIA RARE 06/30/2020 06:20 PM    RBCUA 1 06/30/2020 06:20 PM    BLOODU Negative 03/08/2023 04:08 PM    SPECGRAV 1.025 03/08/2023 04:08 PM    GLUCOSEU Negative 03/08/2023 04:08 PM       Radiology:  XR ABDOMEN (KUB) (SINGLE AP VIEW)   Final Result      Enteric tube tip in the proximal stomach just distal to the gastroesophageal junction. CT HEAD WO CONTRAST   Final Result      Normal noncontrast CT the brain without acute hemorrhage, edema or hydrocephalus. Diffuse sinus opacity and fluid throughout the maxillary sinuses ethmoid air cells sphenoid sinuses. Duodenal feeding tube or NG tube in place with diffuse fluid throughout the nasopharynx. There appears to be prior nasal sinus surgery      XR CHEST PORTABLE   Final Result   1. Right CVC, dialysis catheter in satisfactory position with its tip in the proximal to mid SVC. (Catheter tip is approximately 4 cm above the cavoatrial junction)   2. No pneumothorax      XR CHEST PORTABLE   Final Result      1. Repositioning of ET tube with tip now lying approximately 1.6 cm above the douglas. 2.  Near complete reexpansion of the left lung with persistent mild left basilar atelectasis. 3.  Further advancement of feeding tube with tip projecting over the distal gastric body. XR CHEST 1 VIEW   Final Result   Impression: Interval intubation. The endotracheal tube terminates within the right mainstem bronchus and should be retracted by at least 3 cm. Findings were discussed with ESTELITA Osullivan at the time of report.           IP CONSULT TO NEUROLOGY  IP CONSULT TO DIETITIAN  IP CONSULT TO GENERAL SURGERY  IP CONSULT TO HEMATOLOGY    Assessment/Plan:    Active Hospital Problems    Diagnosis     Respiratory failure requiring intubation ([address] [J96.90]      Priority: Medium    Myasthenia gravis with acute exacerbation ([address] [G70.01]      Priority: Medium Myasthenia gravis with (acute) exacerbation ([facility]) [G70.01]      -Acute hypoxemic respiratory failure   -Myasthenia crisis: pt presented with difficulty swallowing, speech difficulty and lower extremity weakness. Intubated on 3/6/23 due to worsening respiration.   - Aseptic meningitis due to IVIG  - Urinary retention   -Hypothyroidism  - GERD  - Dyslipidemia        PLAN:     Respiratory monitoring  Ventilator management as per ICU team   IVIG administration x 2 doses. Started plasmapharesis. Neurocritical care on board. Continue pyridostigmine  Continue home meds     DVT Prophylaxis: Lovenox  Diet: ADULT DIET;  Regular; Low Fat/Low Chol/High Fiber/2 gm Na  Code Status: Full Code     PT/OT Eval Status:      Dispo - Pending clinical improvement       Nabeel Romeo MD

## 2023-03-11 NOTE — PROGRESS NOTES
When administering medications through NG, position was noted to be around 40 cm rather than 50 cm which was previous position. TF paused. NG was advanced back to 50 cm position, CT chest ordered to confirm placement.

## 2023-03-11 NOTE — PLAN OF CARE
Problem: Safety - Medical Restraint  Goal: Remains free of injury from restraints (Restraint for Interference with Medical Device)  Description: INTERVENTIONS:  1. Determine that other, less restrictive measures have been tried or would not be effective before applying the restraint  2. Evaluate the patient's condition at the time of restraint application  3. Inform patient/family regarding the reason for restraint  4.  Q2H: Monitor safety, psychosocial status, comfort, nutrition and hydration  Outcome: Progressing  Flowsheets (Taken 3/11/2023 2618)  Remains free of injury from restraints (restraint for interference with medical device): Determine that other, less restrictive measures have been tried or would not be effective before applying the restraint

## 2023-03-11 NOTE — PROGRESS NOTES
Neuro status unchanged overnight. On propofol gtt at 30 mcg/kg/min for comfort, pt continues to nod appropriately and communicate through hand gestures and writing/typing, follows commands in all extremities and denies numbness/tingling. PRN Fioricet given for 6/10 headache. VSS. NG tube securement device replaced and has been intact since.

## 2023-03-12 LAB
A/G RATIO: 1.7 (ref 1.1–2.2)
ALBUMIN SERPL-MCNC: 4 G/DL (ref 3.4–5)
ALP BLD-CCNC: 84 U/L (ref 40–129)
ALT SERPL-CCNC: 6 U/L (ref 10–40)
ANION GAP SERPL CALCULATED.3IONS-SCNC: 11 MMOL/L (ref 3–16)
AST SERPL-CCNC: 11 U/L (ref 15–37)
BASOPHILS ABSOLUTE: 0.1 K/UL (ref 0–0.2)
BASOPHILS RELATIVE PERCENT: 0.9 %
BILIRUB SERPL-MCNC: <0.2 MG/DL (ref 0–1)
BUN BLDV-MCNC: 15 MG/DL (ref 7–20)
CALCIUM IONIZED: 1.11 MMOL/L (ref 1.12–1.32)
CALCIUM SERPL-MCNC: 8.9 MG/DL (ref 8.3–10.6)
CHLORIDE BLD-SCNC: 97 MMOL/L (ref 99–110)
CO2: 29 MMOL/L (ref 21–32)
CREAT SERPL-MCNC: <0.5 MG/DL (ref 0.6–1.1)
EOSINOPHILS ABSOLUTE: 0.5 K/UL (ref 0–0.6)
EOSINOPHILS RELATIVE PERCENT: 5.8 %
FIBRINOGEN: 508 MG/DL (ref 207–509)
GFR SERPL CREATININE-BSD FRML MDRD: >60 ML/MIN/{1.73_M2}
GLUCOSE BLD-MCNC: 86 MG/DL (ref 70–99)
HCT VFR BLD CALC: 35.1 % (ref 36–48)
HEMOGLOBIN: 11.7 G/DL (ref 12–16)
INR BLD: 1.02 (ref 0.87–1.14)
LYMPHOCYTES ABSOLUTE: 1.9 K/UL (ref 1–5.1)
LYMPHOCYTES RELATIVE PERCENT: 21.8 %
MAGNESIUM: 1.7 MG/DL (ref 1.8–2.4)
MCH RBC QN AUTO: 28.6 PG (ref 26–34)
MCHC RBC AUTO-ENTMCNC: 33.4 G/DL (ref 31–36)
MCV RBC AUTO: 85.6 FL (ref 80–100)
MONOCYTES ABSOLUTE: 0.6 K/UL (ref 0–1.3)
MONOCYTES RELATIVE PERCENT: 6.9 %
NEUTROPHILS ABSOLUTE: 5.8 K/UL (ref 1.7–7.7)
NEUTROPHILS RELATIVE PERCENT: 64.6 %
PDW BLD-RTO: 14 % (ref 12.4–15.4)
PH VENOUS: 7.38 (ref 7.35–7.45)
PLATELET # BLD: 266 K/UL (ref 135–450)
PMV BLD AUTO: 7.1 FL (ref 5–10.5)
POTASSIUM REFLEX MAGNESIUM: 3.8 MMOL/L (ref 3.5–5.1)
PROTHROMBIN TIME: 13.3 SEC (ref 11.7–14.5)
RBC # BLD: 4.1 M/UL (ref 4–5.2)
SODIUM BLD-SCNC: 137 MMOL/L (ref 136–145)
TOTAL PROTEIN: 6.4 G/DL (ref 6.4–8.2)
TRIGL SERPL-MCNC: 265 MG/DL (ref 0–150)
WBC # BLD: 9 K/UL (ref 4–11)

## 2023-03-12 PROCEDURE — 99233 SBSQ HOSP IP/OBS HIGH 50: CPT | Performed by: INTERNAL MEDICINE

## 2023-03-12 PROCEDURE — 94761 N-INVAS EAR/PLS OXIMETRY MLT: CPT

## 2023-03-12 PROCEDURE — 2000000000 HC ICU R&B

## 2023-03-12 PROCEDURE — 6360000002 HC RX W HCPCS

## 2023-03-12 PROCEDURE — C9113 INJ PANTOPRAZOLE SODIUM, VIA: HCPCS

## 2023-03-12 PROCEDURE — 36415 COLL VENOUS BLD VENIPUNCTURE: CPT

## 2023-03-12 PROCEDURE — 82330 ASSAY OF CALCIUM: CPT

## 2023-03-12 PROCEDURE — 2580000003 HC RX 258

## 2023-03-12 PROCEDURE — 6370000000 HC RX 637 (ALT 250 FOR IP)

## 2023-03-12 PROCEDURE — 2700000000 HC OXYGEN THERAPY PER DAY

## 2023-03-12 PROCEDURE — 6370000000 HC RX 637 (ALT 250 FOR IP): Performed by: STUDENT IN AN ORGANIZED HEALTH CARE EDUCATION/TRAINING PROGRAM

## 2023-03-12 PROCEDURE — 85384 FIBRINOGEN ACTIVITY: CPT

## 2023-03-12 PROCEDURE — 85610 PROTHROMBIN TIME: CPT

## 2023-03-12 PROCEDURE — 6360000002 HC RX W HCPCS: Performed by: STUDENT IN AN ORGANIZED HEALTH CARE EDUCATION/TRAINING PROGRAM

## 2023-03-12 PROCEDURE — 36592 COLLECT BLOOD FROM PICC: CPT

## 2023-03-12 PROCEDURE — 85025 COMPLETE CBC W/AUTO DIFF WBC: CPT

## 2023-03-12 PROCEDURE — 80053 COMPREHEN METABOLIC PANEL: CPT

## 2023-03-12 PROCEDURE — 94003 VENT MGMT INPAT SUBQ DAY: CPT

## 2023-03-12 PROCEDURE — 83735 ASSAY OF MAGNESIUM: CPT

## 2023-03-12 RX ORDER — POLYETHYLENE GLYCOL 3350 17 G/17G
17 POWDER, FOR SOLUTION ORAL DAILY
Status: DISCONTINUED | OUTPATIENT
Start: 2023-03-12 | End: 2023-03-17 | Stop reason: HOSPADM

## 2023-03-12 RX ORDER — CALCIUM GLUCONATE 10 MG/ML
1000 INJECTION, SOLUTION INTRAVENOUS ONCE
Status: COMPLETED | OUTPATIENT
Start: 2023-03-12 | End: 2023-03-12

## 2023-03-12 RX ORDER — LANOLIN ALCOHOL/MO/W.PET/CERES
400 CREAM (GRAM) TOPICAL ONCE
Status: DISCONTINUED | OUTPATIENT
Start: 2023-03-12 | End: 2023-03-12

## 2023-03-12 RX ORDER — SENNA AND DOCUSATE SODIUM 50; 8.6 MG/1; MG/1
2 TABLET, FILM COATED ORAL 2 TIMES DAILY
Status: DISCONTINUED | OUTPATIENT
Start: 2023-03-12 | End: 2023-03-17 | Stop reason: HOSPADM

## 2023-03-12 RX ADMIN — CALCIUM GLUCONATE 1000 MG: 10 INJECTION, SOLUTION INTRAVENOUS at 11:50

## 2023-03-12 RX ADMIN — Medication 400 MG: at 08:06

## 2023-03-12 RX ADMIN — BUTALBITAL, ACETAMINOPHEN, AND CAFFEINE 1 TABLET: 50; 325; 40 TABLET ORAL at 00:27

## 2023-03-12 RX ADMIN — PROPOFOL 35 MCG/KG/MIN: 10 INJECTION, EMULSION INTRAVENOUS at 05:07

## 2023-03-12 RX ADMIN — FOLIC ACID 1 MG: 1 TABLET ORAL at 08:06

## 2023-03-12 RX ADMIN — SENNOSIDES AND DOCUSATE SODIUM 2 TABLET: 50; 8.6 TABLET ORAL at 10:55

## 2023-03-12 RX ADMIN — BUTALBITAL, ACETAMINOPHEN, AND CAFFEINE 1 TABLET: 50; 325; 40 TABLET ORAL at 10:55

## 2023-03-12 RX ADMIN — SODIUM CHLORIDE, PRESERVATIVE FREE 10 ML: 5 INJECTION INTRAVENOUS at 20:26

## 2023-03-12 RX ADMIN — PANTOPRAZOLE SODIUM 40 MG: 40 INJECTION, POWDER, FOR SOLUTION INTRAVENOUS at 08:05

## 2023-03-12 RX ADMIN — ENOXAPARIN SODIUM 40 MG: 100 INJECTION SUBCUTANEOUS at 20:25

## 2023-03-12 RX ADMIN — ESTRADIOL 2 MG: 0.5 TABLET ORAL at 20:26

## 2023-03-12 RX ADMIN — PROPOFOL 35 MCG/KG/MIN: 10 INJECTION, EMULSION INTRAVENOUS at 11:45

## 2023-03-12 RX ADMIN — CITALOPRAM 40 MG: 40 TABLET, FILM COATED ORAL at 08:05

## 2023-03-12 RX ADMIN — LEVOTHYROXINE SODIUM 150 MCG: 0.15 TABLET ORAL at 06:37

## 2023-03-12 RX ADMIN — POLYETHYLENE GLYCOL 3350 17 G: 17 POWDER, FOR SOLUTION ORAL at 20:25

## 2023-03-12 RX ADMIN — DOXEPIN HYDROCHLORIDE 10 MG: 10 CAPSULE ORAL at 20:26

## 2023-03-12 RX ADMIN — SODIUM CHLORIDE, PRESERVATIVE FREE 10 ML: 5 INJECTION INTRAVENOUS at 08:06

## 2023-03-12 RX ADMIN — SENNOSIDES AND DOCUSATE SODIUM 2 TABLET: 50; 8.6 TABLET ORAL at 20:25

## 2023-03-12 RX ADMIN — PROPOFOL 35 MCG/KG/MIN: 10 INJECTION, EMULSION INTRAVENOUS at 16:49

## 2023-03-12 RX ADMIN — POLYETHYLENE GLYCOL 3350 17 G: 17 POWDER, FOR SOLUTION ORAL at 08:06

## 2023-03-12 ASSESSMENT — PULMONARY FUNCTION TESTS
PIF_VALUE: 24
PIF_VALUE: 36
PIF_VALUE: 38
PIF_VALUE: 28
PIF_VALUE: 24
PIF_VALUE: 37
PIF_VALUE: 27
PIF_VALUE: 28
PIF_VALUE: 24
PIF_VALUE: 33
PIF_VALUE: 36
PIF_VALUE: 29
PIF_VALUE: 28
PIF_VALUE: 35
PIF_VALUE: 28
PIF_VALUE: 31
PIF_VALUE: 38
PIF_VALUE: 33
PIF_VALUE: 35
PIF_VALUE: 34
PIF_VALUE: 24
PIF_VALUE: 25
PIF_VALUE: 36
PIF_VALUE: 35
PIF_VALUE: 33
PIF_VALUE: 28
PIF_VALUE: 24
PIF_VALUE: 24
PIF_VALUE: 33
PIF_VALUE: 28
PIF_VALUE: 28
PIF_VALUE: 37
PIF_VALUE: 24
PIF_VALUE: 33
PIF_VALUE: 24
PIF_VALUE: 28
PIF_VALUE: 27
PIF_VALUE: 24
PIF_VALUE: 32
PIF_VALUE: 28
PIF_VALUE: 24
PIF_VALUE: 30
PIF_VALUE: 28
PIF_VALUE: 34
PIF_VALUE: 36
PIF_VALUE: 24
PIF_VALUE: 36
PIF_VALUE: 46
PIF_VALUE: 28
PIF_VALUE: 28
PIF_VALUE: 38
PIF_VALUE: 24

## 2023-03-12 ASSESSMENT — PAIN SCALES - WONG BAKER
WONGBAKER_NUMERICALRESPONSE: 0
WONGBAKER_NUMERICALRESPONSE: 2
WONGBAKER_NUMERICALRESPONSE: 2

## 2023-03-12 ASSESSMENT — PAIN DESCRIPTION - LOCATION: LOCATION: HEAD

## 2023-03-12 ASSESSMENT — PAIN SCALES - GENERAL
PAINLEVEL_OUTOF10: 0
PAINLEVEL_OUTOF10: 6
PAINLEVEL_OUTOF10: 0
PAINLEVEL_OUTOF10: 0

## 2023-03-12 NOTE — PROGRESS NOTES
Hospitalist Progress Note      PCP: Alexander Alba MD    Date of Admission: 3/5/2023    Chief Complaint: Difficulty swallowing        History Of Present Illness:       46 y.o. female who presented to [facility] with complaint of difficulty swallowing and raspy voice. According to patient she started experiencing difficulty swallowing for last few days. Patient felt like food was getting stuck in her throat. Patient also complained of difficulty with her speech. Patient felt weakness in her lower extremity. Symptoms were similar to her past episodes of myasthenia gravis exacerbation. Patient has experienced 5 exacerbations since 2018 and myasthenia crisis. Patient was first diagnosed with myasthenia gravis in 2005. Patient has history of thymectomy. Last exacerbation approximately 1 year ago. At that time patient was hospitalized at [facility].     Pt intubated on 3/6/23 due to worsening respiratory failure. S/p IVIG for 2 days. Subjective:     Remains on ventilator. On plamapharesis   FiO2 30%, PEEP 5. Not ready for extubation.         Medications:  Reviewed    Infusion Medications    fentaNYL Stopped (03/09/23 1331)    propofol 35 mcg/kg/min (03/12/23 6497)    sodium chloride Stopped (03/10/23 0223)     Scheduled Medications    sennosides-docusate sodium  2 tablet Oral BID    polyethylene glycol  17 g Oral Daily    citalopram  40 mg Orogastric QAM    doxepin  10 mg Orogastric Nightly    estradiol  2 mg Orogastric Nightly    folic acid  1 mg Orogastric Daily    levothyroxine  150 mcg Orogastric Daily    pantoprazole  40 mg IntraVENous Daily    sodium chloride flush  5-40 mL IntraVENous 2 times per day    enoxaparin  40 mg SubCUTAneous Nightly     PRN Meds: butalbital-acetaminophen-caffeine, oxyCODONE, diazePAM, acetaminophen **OR** acetaminophen, metoclopramide, hydrALAZINE, LORazepam, sodium chloride flush, sodium chloride, ondansetron **OR** ondansetron      Intake/Output Summary (Last 24 hours) at 3/12/2023 1048  Last data filed at 3/12/2023 1000  Gross per 24 hour   Intake 1807.46 ml   Output 910 ml   Net 897.46 ml       Physical Exam Performed:    /76   Pulse 82   Temp 99 °F (37.2 °C) (Bladder)   Resp 14   Ht 5' 2.01\" (1.575 m)   Wt 160 lb 15 oz (73 kg)   SpO2 100%   BMI 29.43 kg/m²     General appearance: intubated   HEENT: Pupils equal, round, and reactive to light. Conjunctivae/corneas clear. Neck: Supple, with full range of motion. No jugular venous distention. Trachea midline. Respiratory:  Normal respiratory effort. Clear to auscultation, bilaterally without Rales/Wheezes/Rhonchi. Cardiovascular: Regular rate and rhythm with normal S1/S2 without murmurs, rubs or gallops. Abdomen: Soft, non-tender, non-distended with normal bowel sounds. Musculoskeletal: No clubbing, cyanosis or edema bilaterally. Full range of motion without deformity. Skin: Skin color, texture, turgor normal.  No rashes or lesions. Neurologic:  Neurovascularly intact without any focal sensory/motor deficits.  Cranial nerves: II-XII intact, grossly non-focal.  Psychiatric: Alert and oriented, thought content appropriate, normal insight  Capillary Refill: Brisk, 3 seconds, normal   Peripheral Pulses: +2 palpable, equal bilaterally       Labs:   Recent Labs     03/10/23  1428 03/11/23 0430 03/12/23  0404   WBC 8.3 9.6 9.0   HGB 11.9* 11.9* 11.7*   HCT 36.4 35.7* 35.1*    239 266     Recent Labs     03/10/23  0619 03/10/23  1428 03/11/23  0430 03/12/23  0404     --  140 137   K 3.9  --  4.0 3.8     --  102 97*   CO2 29  --  30 29   BUN 14  --  13 15   CREATININE <0.5*  --  <0.5* <0.5*   CALCIUM 8.8  --  9.0 8.9   PHOS  --  3.3  --   --      Recent Labs     03/11/23  0430 03/12/23  0404   AST 9* 11*   ALT <5* 6*   BILITOT <0.2 <0.2   ALKPHOS 62 84     Recent Labs     03/09/23  1508 03/11/23  0430 03/12/23  0404   INR 0.99 1.07 1.02     No results for input(s): Yang Carbajal in the last 72 hours. Urinalysis:      Lab Results   Component Value Date/Time    NITRU Negative 03/08/2023 04:08 PM    WBCUA 1 06/30/2020 06:20 PM    BACTERIA RARE 06/30/2020 06:20 PM    RBCUA 1 06/30/2020 06:20 PM    BLOODU Negative 03/08/2023 04:08 PM    SPECGRAV 1.025 03/08/2023 04:08 PM    GLUCOSEU Negative 03/08/2023 04:08 PM       Radiology:  XR ABDOMEN (KUB) (SINGLE AP VIEW)   Final Result      Enteric tube tip in the proximal stomach just distal to the gastroesophageal junction. CT HEAD WO CONTRAST   Final Result      Normal noncontrast CT the brain without acute hemorrhage, edema or hydrocephalus. Diffuse sinus opacity and fluid throughout the maxillary sinuses ethmoid air cells sphenoid sinuses. Duodenal feeding tube or NG tube in place with diffuse fluid throughout the nasopharynx. There appears to be prior nasal sinus surgery      XR CHEST PORTABLE   Final Result   1. Right CVC, dialysis catheter in satisfactory position with its tip in the proximal to mid SVC. (Catheter tip is approximately 4 cm above the cavoatrial junction)   2. No pneumothorax      XR CHEST PORTABLE   Final Result      1. Repositioning of ET tube with tip now lying approximately 1.6 cm above the douglas. 2.  Near complete reexpansion of the left lung with persistent mild left basilar atelectasis. 3.  Further advancement of feeding tube with tip projecting over the distal gastric body. XR CHEST 1 VIEW   Final Result   Impression: Interval intubation. The endotracheal tube terminates within the right mainstem bronchus and should be retracted by at least 3 cm. Findings were discussed with ESTELITA Matson at the time of report.           IP CONSULT TO NEUROLOGY  IP CONSULT TO DIETITIAN  IP CONSULT TO GENERAL SURGERY  IP CONSULT TO HEMATOLOGY    Assessment/Plan:    Active Hospital Problems    Diagnosis     Respiratory failure requiring intubation ([facility] [address] [J96.90]      Priority: Medium    Myasthenia gravis with acute exacerbation ([facility] [address] [G70.01]      Priority: Medium    Myasthenia gravis with (acute) exacerbation ([facility]) [G70.01]      -Acute hypoxemic respiratory failure   -Myasthenia crisis: pt presented with difficulty swallowing, speech difficulty and lower extremity weakness. Intubated on 3/6/23 due to worsening respiration.   - Aseptic meningitis due to IVIG  - Urinary retention   -Hypothyroidism  - GERD  - Dyslipidemia        PLAN:     Respiratory monitoring  Ventilator management as per ICU team   IVIG administration x 2 doses. Started plasmapharesis. Neurocritical care on board. Continue pyridostigmine  Continue home meds     DVT Prophylaxis: Lovenox  Diet: ADULT DIET;  Regular; Low Fat/Low Chol/High Fiber/2 gm Na  Code Status: Full Code     PT/OT Eval Status:      Dispo - Pending clinical improvement       Cee Atkins MD

## 2023-03-12 NOTE — PROGRESS NOTES
ICU Progress Note    Admit Date: 3/5/2023  Hospital day: 8  ICU day: 7  Vent Day: 7  IV Access:Peripheral  IV Fluids:None  Vasopressors:None                Antibiotics: None  Diet: Diet NPO  ADULT TUBE FEEDING; Orogastric; Peptide Based High Protein; Continuous; 15; Yes; 10; Q 4 hours; 45; 30; Q 4 hours; Protein; 1 packet Prosource, hook packet to ENfit tube or follow instructions on pack of packet    CC: difficulty swallowing and generalized weakness     Interval history:   Patient did not pass SBT yesterday. Remains on vent with 35 propofol. Hemodynamically stable and afebrile. Patient still has a pounding headache and right hip pain. Denies nausea or itching. No other complaints at the moment. Next PLEX is planned for tomorrow.      Medications:     Scheduled Meds:   sennosides-docusate sodium  2 tablet Oral BID    polyethylene glycol  17 g Oral Daily    calcium gluconate  1,000 mg IntraVENous Once    citalopram  40 mg Orogastric QAM    doxepin  10 mg Orogastric Nightly    estradiol  2 mg Orogastric Nightly    folic acid  1 mg Orogastric Daily    levothyroxine  150 mcg Orogastric Daily    pantoprazole  40 mg IntraVENous Daily    sodium chloride flush  5-40 mL IntraVENous 2 times per day    enoxaparin  40 mg SubCUTAneous Nightly     Continuous Infusions:   fentaNYL Stopped (03/09/23 1331)    propofol 35 mcg/kg/min (03/12/23 0507)    sodium chloride Stopped (03/10/23 0638)     PRN Meds:butalbital-acetaminophen-caffeine, oxyCODONE, diazePAM, acetaminophen **OR** acetaminophen, metoclopramide, hydrALAZINE, LORazepam, sodium chloride flush, sodium chloride, ondansetron **OR** ondansetron    Objective:   Vitals:   T-max:  Patient Vitals for the past 8 hrs:   BP Pulse Resp SpO2 Weight   03/12/23 1042 -- 82 14 100 % --   03/12/23 1000 133/76 76 14 -- --   03/12/23 0900 132/81 79 14 -- --   03/12/23 0800 (!) 162/74 84 13 -- --   03/12/23 0700 (!) 147/73 83 14 -- --   03/12/23 0615 (!) 160/85 77 14 99 % --   03/12/23 0600 (!) 145/78 78 14 99 % 160 lb 15 oz (73 kg)   03/12/23 0545 (!) 147/72 81 14 99 % --   03/12/23 0530 (!) 146/74 80 14 99 % --   03/12/23 0515 (!) 140/72 79 14 99 % --   03/12/23 0500 (!) 140/74 82 14 99 % --   03/12/23 0445 123/69 82 14 99 % --   03/12/23 0430 136/70 79 14 99 % --   03/12/23 0415 134/71 77 14 99 % --   03/12/23 0401 -- 76 14 99 % --   03/12/23 0400 133/76 76 14 99 % --   03/12/23 0345 134/69 79 14 99 % --         Intake/Output Summary (Last 24 hours) at 3/12/2023 1138  Last data filed at 3/12/2023 1000  Gross per 24 hour   Intake 1807.46 ml   Output 910 ml   Net 897.46 ml         Review of Systems - per interval history. Physical Exam  Constitutional:       General: She is not in acute distress. Interventions: She is intubated. HENT:      Head: Normocephalic and atraumatic. Eyes:      Conjunctiva/sclera: Conjunctivae normal.      Pupils: Pupils are equal, round, and reactive to light. Cardiovascular:      Rate and Rhythm: Normal rate and regular rhythm. Pulmonary:      Effort: She is intubated. Comments: Mechanical breath sound bilaterally. Abdominal:      General: Bowel sounds are normal.      Palpations: Abdomen is soft. Musculoskeletal:      Right lower leg: No edema. Left lower leg: No edema. Skin:     General: Skin is warm and dry. Neurological:      Motor: Weakness (Generalized. 1/5 upper extremities. 0/5 lower extremities. ) present.          LABS:    CBC:   Recent Labs     03/10/23  1428 03/11/23  0430 03/12/23  0404   WBC 8.3 9.6 9.0   HGB 11.9* 11.9* 11.7*   HCT 36.4 35.7* 35.1*    239 266   MCV 86.1 85.7 85.6       Renal:    Recent Labs     03/10/23  0619 03/10/23  1428 03/11/23  0430 03/12/23  0404     --  140 137   K 3.9  --  4.0 3.8     --  102 97*   CO2 29  --  30 29   BUN 14  --  13 15   CREATININE <0.5*  --  <0.5* <0.5*   GLUCOSE 100*  --  99 86   CALCIUM 8.8  --  9.0 8.9   MG 1.80 1.50* 1.60* 1.70*   PHOS  --  3.3  --   -- ANIONGAP 7  --  8 11       Hepatic:   Recent Labs     03/11/23  0430 03/12/23  0404   AST 9* 11*   ALT <5* 6*   BILITOT <0.2 <0.2   PROT 6.2* 6.4   LABALBU 4.3 4.0   ALKPHOS 62 84          -----------------------------------------------------------------  RAD:   XR ABDOMEN (KUB) (SINGLE AP VIEW)   Final Result      Enteric tube tip in the proximal stomach just distal to the gastroesophageal junction. CT HEAD WO CONTRAST   Final Result      Normal noncontrast CT the brain without acute hemorrhage, edema or hydrocephalus. Diffuse sinus opacity and fluid throughout the maxillary sinuses ethmoid air cells sphenoid sinuses. Duodenal feeding tube or NG tube in place with diffuse fluid throughout the nasopharynx. There appears to be prior nasal sinus surgery      XR CHEST PORTABLE   Final Result   1. Right CVC, dialysis catheter in satisfactory position with its tip in the proximal to mid SVC. (Catheter tip is approximately 4 cm above the cavoatrial junction)   2. No pneumothorax      XR CHEST PORTABLE   Final Result      1. Repositioning of ET tube with tip now lying approximately 1.6 cm above the douglas. 2.  Near complete reexpansion of the left lung with persistent mild left basilar atelectasis. 3.  Further advancement of feeding tube with tip projecting over the distal gastric body. XR CHEST 1 VIEW   Final Result   Impression: Interval intubation. The endotracheal tube terminates within the right mainstem bronchus and should be retracted by at least 3 cm. Findings were discussed with RN Epimenio Lat at the time of report. Assessment/Plan:      Acute respiratory failure 2/2 Myasthenic crisis  Per patient, her experience leading up to and including her hospitalization are consistent with prior episodes of myasthenic crisis. Symptoms include generalized fatigue & weakness, dysphagia, and dysphonia.    - Neuro following - q4 ncks  - S/p IVIG - possible allergy - meningitic rxn  - PLEX started 3/9,  holding for weekend  - Albumin, Calcium infusions  - labs daily - electrolytes, fibrinogen, PT/PTT  - avoid neuromuscular blocking agents, beta-blockers, procainamide, quinidine, aminoglycosides, fluoroquinolines  - NIF and VC q-shift - notify neuro for FVC < 15ml/kg and/or NIF < 20 cmH2O  - Holding home mestinon dose - may increase secretions  - NG tube placed  - Sedation with propofol, RASS goal -1 to 0  - minimal vent settings - did not tolerate CPAP mode as she went down in MV from 6L to 3L quickly, small VT. Headaches  Potentially from aseptic meningitis 2/2 IVIG. Patient has a history of migraines but is reporting this ongoing headache feels different. -PRN tylenol, fioricet, sumatriptan    - CT head - no concerning findings    Urinary retention  Patient has a history of urinary retention when intubated for past myasthenic crises. She has been straight cathed several times this hospital visit. She required Torres catheter because of persistent urinary retention of up to 1 L. -UA sent resulted in no signs of infection. Ketones present.   -Torres placed 3/8. Other chronic conditions:  Psoriasis - Home methotrexate weekly, folate  Hypothyroidism - Continuing home Synthroid  Anxiety - Continuing home Celexa, Doxepin         Code Status: Full Code  FEN: Diet NPO  ADULT TUBE FEEDING; Orogastric; Peptide Based High Protein; Continuous; 15; Yes; 10; Q 4 hours; 45; 30; Q 4 hours; Protein; 1 packet Prosource, hook packet to ENfit tube or follow instructions on pack of packet  PPX: Lovenox, Protonix  DISPO: ICU    Zuleyka Carpio MD, PGY-1  03/12/23  11:38 AM    Patient will be staffed and discussed with attending physician Camacho Odom MD.     Patient examined, findings as discussed with Dr. Nesha Uribe. Agree with assessment and plan. She continues to require full ventilator support, unable to generate adequate sustained ventilation with myasthenia gravis crisis.   Ventilatory requirements are fairly modest, with minute ventilation 6-7 L, FiO2 30%. Anticipate improvement with completion of plasma exchange therapy. Continuing nutritional support with enteral feeding per OGT. Urinary retention resolved with catheter placement. No infection. Family updated regarding progress and plan.   Discussed with BETH Russell-CNP

## 2023-03-12 NOTE — PROGRESS NOTES
No changes overnight. Pt stable on vent. Remains on 35 mcg/kg/min of propofol and still awake/able to communicate with hand gestures and writing.

## 2023-03-12 NOTE — PROGRESS NOTES
Hospitalist Progress Note      PCP: Lorenzo Denson MD    Date of Admission: 3/5/2023    Chief Complaint: Difficulty swallowing        History Of Present Illness:       46 y.o. female who presented to [facility] with complaint of difficulty swallowing and raspy voice. According to patient she started experiencing difficulty swallowing for last few days. Patient felt like food was getting stuck in her throat. Patient also complained of difficulty with her speech. Patient felt weakness in her lower extremity. Symptoms were similar to her past episodes of myasthenia gravis exacerbation. Patient has experienced 5 exacerbations since 2018 and myasthenia crisis. Patient was first diagnosed with myasthenia gravis in 2005. Patient has history of thymectomy. Last exacerbation approximately 1 year ago. At that time patient was hospitalized at [facility].     Pt intubated on 3/6/23 due to worsening respiratory failure. S/p IVIG for 2 days. Subjective:     Pt c/o headache. Patient remains intubated on MV. FiO2 30%, PEEP 5. Not ready for extubation.       Medications:  Reviewed    Infusion Medications    fentaNYL Stopped (03/09/23 8961)    propofol 35 mcg/kg/min (03/12/23 0599)    sodium chloride Stopped (03/10/23 5640)     Scheduled Medications    sennosides-docusate sodium  2 tablet Oral BID    polyethylene glycol  17 g Oral Daily    citalopram  40 mg Orogastric QAM    doxepin  10 mg Orogastric Nightly    estradiol  2 mg Orogastric Nightly    folic acid  1 mg Orogastric Daily    levothyroxine  150 mcg Orogastric Daily    pantoprazole  40 mg IntraVENous Daily    sodium chloride flush  5-40 mL IntraVENous 2 times per day    enoxaparin  40 mg SubCUTAneous Nightly     PRN Meds: butalbital-acetaminophen-caffeine, oxyCODONE, diazePAM, acetaminophen **OR** acetaminophen, metoclopramide, hydrALAZINE, LORazepam, sodium chloride flush, sodium chloride, ondansetron **OR** ondansetron      Intake/Output Summary (Last 24 hours) at 3/12/2023 1049  Last data filed at 3/12/2023 1000  Gross per 24 hour   Intake 1807.46 ml   Output 910 ml   Net 897.46 ml       Physical Exam Performed:    /76   Pulse 82   Temp 99 °F (37.2 °C) (Bladder)   Resp 14   Ht 5' 2.01\" (1.575 m)   Wt 160 lb 15 oz (73 kg)   SpO2 100%   BMI 29.43 kg/m²     General appearance: intubated   HEENT: Pupils equal, round, and reactive to light. Conjunctivae/corneas clear. Neck: Supple, with full range of motion. No jugular venous distention. Trachea midline. Respiratory:  Normal respiratory effort. Clear to auscultation, bilaterally without Rales/Wheezes/Rhonchi. Cardiovascular: Regular rate and rhythm with normal S1/S2 without murmurs, rubs or gallops. Abdomen: Soft, non-tender, non-distended with normal bowel sounds. Musculoskeletal: No clubbing, cyanosis or edema bilaterally. Full range of motion without deformity. Skin: Skin color, texture, turgor normal.  No rashes or lesions. Neurologic:  Neurovascularly intact without any focal sensory/motor deficits.  Cranial nerves: II-XII intact, grossly non-focal.  Psychiatric: Alert and oriented, thought content appropriate, normal insight  Capillary Refill: Brisk, 3 seconds, normal   Peripheral Pulses: +2 palpable, equal bilaterally       Labs:   Recent Labs     03/10/23  1428 03/11/23 0430 03/12/23  0404   WBC 8.3 9.6 9.0   HGB 11.9* 11.9* 11.7*   HCT 36.4 35.7* 35.1*    239 266     Recent Labs     03/10/23  0619 03/10/23  1428 03/11/23  0430 03/12/23  0404     --  140 137   K 3.9  --  4.0 3.8     --  102 97*   CO2 29  --  30 29   BUN 14  --  13 15   CREATININE <0.5*  --  <0.5* <0.5*   CALCIUM 8.8  --  9.0 8.9   PHOS  --  3.3  --   --      Recent Labs     03/11/23  0430 03/12/23  0404   AST 9* 11*   ALT <5* 6*   BILITOT <0.2 <0.2   ALKPHOS 62 84     Recent Labs     03/09/23  1508 03/11/23  0430 03/12/23  0404   INR 0.99 1.07 1.02     No results for input(s): CKTOTAL, TROPONINI in the last 72 hours. Urinalysis:      Lab Results   Component Value Date/Time    NITRU Negative 03/08/2023 04:08 PM    WBCUA 1 06/30/2020 06:20 PM    BACTERIA RARE 06/30/2020 06:20 PM    RBCUA 1 06/30/2020 06:20 PM    BLOODU Negative 03/08/2023 04:08 PM    SPECGRAV 1.025 03/08/2023 04:08 PM    GLUCOSEU Negative 03/08/2023 04:08 PM       Radiology:  XR ABDOMEN (KUB) (SINGLE AP VIEW)   Final Result      Enteric tube tip in the proximal stomach just distal to the gastroesophageal junction. CT HEAD WO CONTRAST   Final Result      Normal noncontrast CT the brain without acute hemorrhage, edema or hydrocephalus. Diffuse sinus opacity and fluid throughout the maxillary sinuses ethmoid air cells sphenoid sinuses. Duodenal feeding tube or NG tube in place with diffuse fluid throughout the nasopharynx. There appears to be prior nasal sinus surgery      XR CHEST PORTABLE   Final Result   1. Right CVC, dialysis catheter in satisfactory position with its tip in the proximal to mid SVC. (Catheter tip is approximately 4 cm above the cavoatrial junction)   2. No pneumothorax      XR CHEST PORTABLE   Final Result      1. Repositioning of ET tube with tip now lying approximately 1.6 cm above the douglas. 2.  Near complete reexpansion of the left lung with persistent mild left basilar atelectasis. 3.  Further advancement of feeding tube with tip projecting over the distal gastric body. XR CHEST 1 VIEW   Final Result   Impression: Interval intubation. The endotracheal tube terminates within the right mainstem bronchus and should be retracted by at least 3 cm. Findings were discussed with ESTELITA Osullivan at the time of report.           IP CONSULT TO NEUROLOGY  IP CONSULT TO DIETITIAN  IP CONSULT TO GENERAL SURGERY  IP CONSULT TO HEMATOLOGY    Assessment/Plan:    Active Hospital Problems    Diagnosis     Respiratory failure requiring intubation ([facility] [address] [J96.90]      Priority: Medium    Myasthenia gravis with acute exacerbation ([facility] [address] [G70.01]      Priority: Medium    Myasthenia gravis with (acute) exacerbation ([facility]) [G70.01]      -Acute hypoxemic respiratory failure     -Myasthenia crisis: pt presented with difficulty swallowing, speech difficulty and lower extremity weakness. Intubated on 3/6/23 due to worsening respiration.     - Aseptic meningitis due to IVIG    - Urinary retention   -Hypothyroidism  - GERD  - Dyslipidemia        PLAN:     Respiratory monitoring  Ventilator management as per ICU team   IVIG administration x 2 doses. Started plasmapharesis. Neurocritical care on board. Continue celexa, doxepin, levothyroxine     DVT Prophylaxis: Lovenox  Diet: ADULT DIET;  Regular; Low Fat/Low Chol/High Fiber/2 gm Na  Code Status: Full Code     PT/OT Eval Status:      Dispo - Pending clinical improvement       Nabeel Romeo MD

## 2023-03-12 NOTE — PLAN OF CARE
Problem: Discharge Planning  Goal: Discharge to home or other facility with appropriate resources  3/12/2023 1900 by Marianna Greene RN  Outcome: Progressing  3/12/2023 0630 by Avelina Lloyd RN  Outcome: Progressing  Flowsheets (Taken 3/11/2023 2000)  Discharge to home or other facility with appropriate resources: Identify barriers to discharge with patient and caregiver     Problem: Safety - Adult  Goal: Free from fall injury  3/12/2023 1900 by Marianna Greene RN  Outcome: Progressing  Flowsheets (Taken 3/12/2023 0800)  Free From Fall Injury: Instruct family/caregiver on patient safety  3/12/2023 0630 by Avelina Lloyd RN  Outcome: Progressing  [address] (Taken 3/11/2023 2118)  Free From Fall Injury: Instruct family/caregiver on patient safety     Problem: Pain  Goal: Verbalizes/displays adequate comfort level or baseline comfort level  3/12/2023 1900 by Marianna Greene RN  Outcome: Progressing  Flowsheets (Taken 3/12/2023 0800)  Verbalizes/displays adequate comfort level or baseline comfort level:   Encourage patient to monitor pain and request assistance   Assess pain using appropriate pain scale   Administer analgesics based on type and severity of pain and evaluate response  3/12/2023 0630 by Avelina Lloyd RN  Outcome: Progressing  Flowsheets (Taken 3/11/2023 2000)  Verbalizes/displays adequate comfort level or baseline comfort level: Encourage patient to monitor pain and request assistance     Problem: ABCDS Injury Assessment  Goal: Absence of physical injury  3/12/2023 1900 by Marianna Greene RN  Outcome: Progressing  Flowsheets (Taken 3/12/2023 0800)  Absence of Physical Injury: Implement safety measures based on patient assessment  3/12/2023 0630 by Avelina Lloyd RN  Outcome: Progressing  Flowsheets (Taken 3/11/2023 2118)  Absence of Physical Injury: Implement safety measures based on patient assessment     Problem: Skin/Tissue Integrity  Goal: Absence of new skin breakdown  Description: 1.   Monitor for areas of redness and/or skin breakdown  2. Assess vascular access sites hourly  3. Every 4-6 hours minimum:  Change oxygen saturation probe site  4. Every 4-6 hours:  If on nasal continuous positive airway pressure, respiratory therapy assess nares and determine need for appliance change or resting period. 3/12/2023 1900 by Himanshu Saxena RN  Outcome: Progressing  3/12/2023 0630 by Gladys Gee RN  Outcome: Progressing     Problem: Neurosensory - Adult  Goal: Achieves stable or improved neurological status  3/12/2023 1900 by Himanshu Saxena RN  Outcome: Progressing  3/12/2023 0630 by Gladys Gee RN  Outcome: Progressing  Flowsheets (Taken 3/11/2023 2000)  Achieves stable or improved neurological status: Assess for and report changes in neurological status  Goal: Absence of seizures  3/12/2023 1900 by Himanshu Saxena RN  Outcome: Progressing  3/12/2023 0630 by Gladys Gee RN  Outcome: Progressing  Flowsheets (Taken 3/11/2023 2000)  Absence of seizures: Monitor for seizure activity.   If seizure occurs, document type and location of movements and any associated apnea  Goal: Remains free of injury related to seizures activity  3/12/2023 1900 by Himanshu Saxena RN  Outcome: Progressing  3/12/2023 0630 by Gladys Gee RN  Outcome: [place] (Taken 3/11/2023 2000)  Remains free of injury related to seizure activity: Maintain airway, patient safety  and administer oxygen as ordered  Goal: Achieves maximal functionality and self care  3/12/2023 1900 by Himanshu Saxena RN  Outcome: Progressing  3/12/2023 0630 by Gladys Gee RN  Outcome: Progressing  Flowsheets (Taken 3/11/2023 2000)  Achieves maximal functionality and self care: Monitor swallowing and airway patency with patient fatigue and changes in neurological status     Problem: Respiratory - Adult  Goal: Achieves optimal ventilation and oxygenation  3/12/2023 1900 by Himanshu Saxena RN  Outcome: Progressing  3/12/2023 0630 by Gladys Gee RN  Outcome: Progressing  Flowsheets (Taken 3/11/2023 2000)  Achieves optimal ventilation and oxygenation: Assess for changes in respiratory status     Problem: Nutrition Deficit:  Goal: Optimize nutritional status  3/12/2023 1900 by Erickson Mcclure RN  Outcome: Progressing  3/12/2023 0630 by Sven Bagley RN  Outcome: Progressing     Problem: Safety - Medical Restraint  Goal: Remains free of injury from restraints (Restraint for Interference with Medical Device)  Description: INTERVENTIONS:  1. Determine that other, less restrictive measures have been tried or would not be effective before applying the restraint  2. Evaluate the patient's condition at the time of restraint application  3. Inform patient/family regarding the reason for restraint  4.  Q2H: Monitor safety, psychosocial status, comfort, nutrition and hydration  3/12/2023 1900 by Erickson Mcclure RN  Outcome: Progressing  Flowsheets (Taken 3/12/2023 0800)  Remains free of injury from restraints (restraint for interference with medical device):   Determine that other, less restrictive measures have been tried or would not be effective before applying the restraint   Evaluate the patient's condition at the time of restraint application   Inform patient/family regarding the reason for restraint   Every 2 hours: Monitor safety, psychosocial status, comfort, nutrition and hydration  3/12/2023 0630 by Sven Bagley RN  Outcome: Progressing  Flowsheets  Taken 3/12/2023 0600  Remains free of injury from restraints (restraint for interference with medical device): Determine that other, less restrictive measures have been tried or would not be effective before applying the restraint  Taken 3/12/2023 0400  Remains free of injury from restraints (restraint for interference with medical device): Determine that other, less restrictive measures have been tried or would not be effective before applying the restraint  Taken 3/12/2023 0300  Remains free of injury from restraints (restraint for interference with medical device): Determine that other, less restrictive measures have been tried or would not be effective before applying the restraint  Taken 3/11/2023 2000  Remains free of injury from restraints (restraint for interference with medical device): Determine that other, less restrictive measures have been tried or would not be effective before applying the restraint

## 2023-03-12 NOTE — PLAN OF CARE
Problem: Discharge Planning  Goal: Discharge to home or other facility with appropriate resources  3/12/2023 0630 by Lisa Trinidad RN  Outcome: Progressing  Flowsheets (Taken 3/11/2023 2000)  Discharge to home or other facility with appropriate resources: Identify barriers to discharge with patient and caregiver  3/11/2023 1802 by Meri Ponce RN  Outcome: Progressing  Flowsheets (Taken 3/11/2023 0800)  Discharge to home or other facility with appropriate resources:   Identify barriers to discharge with patient and caregiver   Arrange for needed discharge resources and transportation as appropriate   Identify discharge learning needs (meds, wound care, etc)     Problem: Safety - Adult  Goal: Free from fall injury  3/12/2023 0630 by Lisa Trinidad RN  Outcome: Progressing  [address] (Taken 3/11/2023 2118)  Free From Fall Injury: Instruct family/caregiver on patient safety  3/11/2023 1802 by Meri Ponce RN  Outcome: Progressing  Flowsheets (Taken 3/11/2023 0800)  Free From Fall Injury: Instruct family/caregiver on patient safety     Problem: Pain  Goal: Verbalizes/displays adequate comfort level or baseline comfort level  3/12/2023 0630 by Lisa Trinidad RN  Outcome: Progressing  Flowsheets (Taken 3/11/2023 2000)  Verbalizes/displays adequate comfort level or baseline comfort level: Encourage patient to monitor pain and request assistance  3/11/2023 1802 by Meri Ponce RN  Outcome: Progressing  Flowsheets (Taken 3/11/2023 1200)  Verbalizes/displays adequate comfort level or baseline comfort level:   Encourage patient to monitor pain and request assistance   Assess pain using appropriate pain scale   Administer analgesics based on type and severity of pain and evaluate response   Implement non-pharmacological measures as appropriate and evaluate response     Problem: ABCDS Injury Assessment  Goal: Absence of physical injury  3/12/2023 0630 by Lisa Trinidad RN  Outcome: Progressing  Flowsheets (Taken 3/11/2023 2118)  Absence of Physical Injury: Implement safety measures based on patient assessment  3/11/2023 1802 by Toña Burroughs RN  Outcome: Progressing  Flowsheets (Taken 3/11/2023 0800)  Absence of Physical Injury: Implement safety measures based on patient assessment     Problem: Skin/Tissue Integrity  Goal: Absence of new skin breakdown  Description: 1. Monitor for areas of redness and/or skin breakdown  2. Assess vascular access sites hourly  3. Every 4-6 hours minimum:  Change oxygen saturation probe site  4. Every 4-6 hours:  If on nasal continuous positive airway pressure, respiratory therapy assess nares and determine need for appliance change or resting period. 3/12/2023 0630 by Bridger To RN  Outcome: Progressing  3/11/2023 1802 by Toña Burroughs RN  Outcome: Progressing     Problem: Neurosensory - Adult  Goal: Achieves stable or improved neurological status  3/12/2023 0630 by Bridger To RN  Outcome: Progressing  Flowsheets (Taken 3/11/2023 2000)  Achieves stable or improved neurological status: Assess for and report changes in neurological status  3/11/2023 1802 by Toña Burroughs RN  Outcome: Progressing  Flowsheets (Taken 3/11/2023 0800)  Achieves stable or improved neurological status:   Assess for and report changes in neurological status   Initiate measures to prevent increased intracranial pressure   Maintain blood pressure and fluid volume within ordered parameters to optimize cerebral perfusion and minimize risk of hemorrhage   Monitor temperature, glucose, and sodium. Initiate appropriate interventions as ordered  Goal: Absence of seizures  3/12/2023 0630 by Bridger To RN  Outcome: Progressing  Flowsheets (Taken 3/11/2023 2000)  Absence of seizures: Monitor for seizure activity.   If seizure occurs, document type and location of movements and any associated apnea  3/11/2023 1802 by Toña Burroughs RN  Outcome: Progressing  Flowsheets (Taken 3/11/2023 0800)  Absence of seizures:   Monitor for seizure activity.   If seizure occurs, document type and location of movements and any associated apnea   If seizure occurs, turn head to side and suction secretions as needed   Administer anticonvulsants as ordered  Goal: Remains free of injury related to seizures activity  3/12/2023 0630 by Christina Grady RN  Outcome: Progressing  Flowsheets (Taken 3/11/2023 2000)  Remains free of injury related to seizure activity: Maintain airway, patient safety  and administer oxygen as ordered  3/11/2023 1802 by Ree Krueger RN  Outcome: Progressing  Flowsheets (Taken 3/11/2023 0800)  Remains free of injury related to seizure activity:   Maintain airway, patient safety  and administer oxygen as ordered   Monitor patient for seizure activity, document and report duration and description of seizure to Licensed Independent Practitioner   If seizure occurs, turn patient to side and suction secretions as needed   Reorient patient post seizure  Goal: Achieves maximal functionality and self care  3/12/2023 0630 by Christina Grady RN  Outcome: Progressing  Flowsheets (Taken 3/11/2023 2000)  Achieves maximal functionality and self care: Monitor swallowing and airway patency with patient fatigue and changes in neurological status  3/11/2023 1802 by Ree Krueger RN  Outcome: Progressing  Flowsheets (Taken 3/11/2023 0800)  Achieves maximal functionality and self care:   Monitor swallowing and airway patency with patient fatigue and changes in neurological status   Encourage and assist patient to increase activity and self care with guidance from physical therapy/occupational therapy   Encourage visually impaired, hearing impaired and aphasic patients to use assistive/communication devices     Problem: Respiratory - Adult  Goal: Achieves optimal ventilation and oxygenation  3/12/2023 0630 by Christina Grady RN  Outcome: Progressing  Flowsheets (Taken 3/11/2023 2000)  Achieves optimal ventilation and oxygenation: Assess for changes in respiratory status  3/11/2023 1802 by Mariah Yancey RN  Outcome: Progressing  Flowsheets (Taken 3/11/2023 0800)  Achieves optimal ventilation and oxygenation:   Assess for changes in respiratory status   Assess for changes in mentation and behavior   Position to facilitate oxygenation and minimize respiratory effort   Oxygen supplementation based on oxygen saturation or arterial blood gases     Problem: Nutrition Deficit:  Goal: Optimize nutritional status  3/12/2023 0630 by Eli Murrell RN  Outcome: Progressing  3/11/2023 1802 by Mariah Yancey RN  Outcome: Progressing     Problem: Safety - Medical Restraint  Goal: Remains free of injury from restraints (Restraint for Interference with Medical Device)  Description: INTERVENTIONS:  1. Determine that other, less restrictive measures have been tried or would not be effective before applying the restraint  2. Evaluate the patient's condition at the time of restraint application  3. Inform patient/family regarding the reason for restraint  4.  Q2H: Monitor safety, psychosocial status, comfort, nutrition and hydration  3/12/2023 0630 by Eli Murrell RN  Outcome: Progressing  Flowsheets  Taken 3/12/2023 0600  Remains free of injury from restraints (restraint for interference with medical device): Determine that other, less restrictive measures have been tried or would not be effective before applying the restraint  Taken 3/12/2023 0400  Remains free of injury from restraints (restraint for interference with medical device): Determine that other, less restrictive measures have been tried or would not be effective before applying the restraint  Taken 3/12/2023 0300  Remains free of injury from restraints (restraint for interference with medical device): Determine that other, less restrictive measures have been tried or would not be effective before applying the restraint  Taken 3/11/2023 2000  Remains free of injury from restraints (restraint for interference with medical device): Determine that other, less restrictive measures have been tried or would not be effective before applying the restraint  3/11/2023 1802 by Erickson Mcclure RN  Outcome: Progressing  Flowsheets (Taken 3/11/2023 0800)  Remains free of injury from restraints (restraint for interference with medical device):   Determine that other, less restrictive measures have been tried or would not be effective before applying the restraint   Evaluate the patient's condition at the time of restraint application   Inform patient/family regarding the reason for restraint   Every 2 hours: Monitor safety, psychosocial status, comfort, nutrition and hydration

## 2023-03-13 ENCOUNTER — APPOINTMENT (OUTPATIENT)
Dept: GENERAL RADIOLOGY | Age: 53
End: 2023-03-13
Attending: INTERNAL MEDICINE
Payer: COMMERCIAL

## 2023-03-13 LAB
A/G RATIO: 1.4 (ref 1.1–2.2)
ALBUMIN SERPL-MCNC: 3.8 G/DL (ref 3.4–5)
ALP BLD-CCNC: 115 U/L (ref 40–129)
ALT SERPL-CCNC: 10 U/L (ref 10–40)
ANION GAP SERPL CALCULATED.3IONS-SCNC: 10 MMOL/L (ref 3–16)
APTT: 30.5 SEC (ref 23–34.3)
AST SERPL-CCNC: 18 U/L (ref 15–37)
BASOPHILS ABSOLUTE: 0.1 K/UL (ref 0–0.2)
BASOPHILS RELATIVE PERCENT: 1.1 %
BILIRUB SERPL-MCNC: <0.2 MG/DL (ref 0–1)
BUN BLDV-MCNC: 17 MG/DL (ref 7–20)
CALCIUM IONIZED: 1.13 MMOL/L (ref 1.12–1.32)
CALCIUM SERPL-MCNC: 9 MG/DL (ref 8.3–10.6)
CHLORIDE BLD-SCNC: 97 MMOL/L (ref 99–110)
CO2: 29 MMOL/L (ref 21–32)
CREAT SERPL-MCNC: <0.5 MG/DL (ref 0.6–1.1)
EOSINOPHILS ABSOLUTE: 0.3 K/UL (ref 0–0.6)
EOSINOPHILS RELATIVE PERCENT: 3.5 %
FIBRINOGEN: 638 MG/DL (ref 207–509)
GFR SERPL CREATININE-BSD FRML MDRD: >60 ML/MIN/{1.73_M2}
GLUCOSE BLD-MCNC: 100 MG/DL (ref 70–99)
HCT VFR BLD CALC: 35.7 % (ref 36–48)
HEMOGLOBIN: 11.9 G/DL (ref 12–16)
INR BLD: 0.97 (ref 0.87–1.14)
LYMPHOCYTES ABSOLUTE: 1.9 K/UL (ref 1–5.1)
LYMPHOCYTES RELATIVE PERCENT: 20.7 %
MAGNESIUM: 1.9 MG/DL (ref 1.8–2.4)
MCH RBC QN AUTO: 28.2 PG (ref 26–34)
MCHC RBC AUTO-ENTMCNC: 33.2 G/DL (ref 31–36)
MCV RBC AUTO: 84.9 FL (ref 80–100)
MONOCYTES ABSOLUTE: 0.6 K/UL (ref 0–1.3)
MONOCYTES RELATIVE PERCENT: 6.8 %
NEUTROPHILS ABSOLUTE: 6.3 K/UL (ref 1.7–7.7)
NEUTROPHILS RELATIVE PERCENT: 67.9 %
PDW BLD-RTO: 13.6 % (ref 12.4–15.4)
PH VENOUS: 7.4 (ref 7.35–7.45)
PLATELET # BLD: 295 K/UL (ref 135–450)
PMV BLD AUTO: 6.9 FL (ref 5–10.5)
POTASSIUM REFLEX MAGNESIUM: 3.8 MMOL/L (ref 3.5–5.1)
PROTHROMBIN TIME: 12.8 SEC (ref 11.7–14.5)
RBC # BLD: 4.21 M/UL (ref 4–5.2)
SODIUM BLD-SCNC: 136 MMOL/L (ref 136–145)
TOTAL PROTEIN: 6.6 G/DL (ref 6.4–8.2)
TSH REFLEX: 1.73 UIU/ML (ref 0.27–4.2)
WBC # BLD: 9.3 K/UL (ref 4–11)

## 2023-03-13 PROCEDURE — 99291 CRITICAL CARE FIRST HOUR: CPT | Performed by: INTERNAL MEDICINE

## 2023-03-13 PROCEDURE — 82330 ASSAY OF CALCIUM: CPT

## 2023-03-13 PROCEDURE — 6370000000 HC RX 637 (ALT 250 FOR IP)

## 2023-03-13 PROCEDURE — 85025 COMPLETE CBC W/AUTO DIFF WBC: CPT

## 2023-03-13 PROCEDURE — 36592 COLLECT BLOOD FROM PICC: CPT

## 2023-03-13 PROCEDURE — 2580000003 HC RX 258: Performed by: STUDENT IN AN ORGANIZED HEALTH CARE EDUCATION/TRAINING PROGRAM

## 2023-03-13 PROCEDURE — 85730 THROMBOPLASTIN TIME PARTIAL: CPT

## 2023-03-13 PROCEDURE — 94761 N-INVAS EAR/PLS OXIMETRY MLT: CPT

## 2023-03-13 PROCEDURE — 85384 FIBRINOGEN ACTIVITY: CPT

## 2023-03-13 PROCEDURE — 2000000000 HC ICU R&B

## 2023-03-13 PROCEDURE — 6360000002 HC RX W HCPCS

## 2023-03-13 PROCEDURE — C9113 INJ PANTOPRAZOLE SODIUM, VIA: HCPCS

## 2023-03-13 PROCEDURE — 71045 X-RAY EXAM CHEST 1 VIEW: CPT

## 2023-03-13 PROCEDURE — 2700000000 HC OXYGEN THERAPY PER DAY

## 2023-03-13 PROCEDURE — 99291 CRITICAL CARE FIRST HOUR: CPT

## 2023-03-13 PROCEDURE — 80053 COMPREHEN METABOLIC PANEL: CPT

## 2023-03-13 PROCEDURE — 83735 ASSAY OF MAGNESIUM: CPT

## 2023-03-13 PROCEDURE — 36415 COLL VENOUS BLD VENIPUNCTURE: CPT

## 2023-03-13 PROCEDURE — 2580000003 HC RX 258

## 2023-03-13 PROCEDURE — 2500000003 HC RX 250 WO HCPCS: Performed by: STUDENT IN AN ORGANIZED HEALTH CARE EDUCATION/TRAINING PROGRAM

## 2023-03-13 PROCEDURE — 6360000002 HC RX W HCPCS: Performed by: STUDENT IN AN ORGANIZED HEALTH CARE EDUCATION/TRAINING PROGRAM

## 2023-03-13 PROCEDURE — 85610 PROTHROMBIN TIME: CPT

## 2023-03-13 PROCEDURE — 6370000000 HC RX 637 (ALT 250 FOR IP): Performed by: STUDENT IN AN ORGANIZED HEALTH CARE EDUCATION/TRAINING PROGRAM

## 2023-03-13 PROCEDURE — 94003 VENT MGMT INPAT SUBQ DAY: CPT

## 2023-03-13 PROCEDURE — P9045 ALBUMIN (HUMAN), 5%, 250 ML: HCPCS | Performed by: STUDENT IN AN ORGANIZED HEALTH CARE EDUCATION/TRAINING PROGRAM

## 2023-03-13 PROCEDURE — 84443 ASSAY THYROID STIM HORMONE: CPT

## 2023-03-13 RX ORDER — CALCIUM GLUCONATE 10 MG/ML
1000 INJECTION, SOLUTION INTRAVENOUS ONCE
Status: COMPLETED | OUTPATIENT
Start: 2023-03-13 | End: 2023-03-13

## 2023-03-13 RX ORDER — ALBUMIN, HUMAN INJ 5% 5 %
2800 SOLUTION INTRAVENOUS ONCE
Status: COMPLETED | OUTPATIENT
Start: 2023-03-13 | End: 2023-03-13

## 2023-03-13 RX ORDER — CALCIUM GLUCONATE 20 MG/ML
2000 INJECTION, SOLUTION INTRAVENOUS ONCE
Status: COMPLETED | OUTPATIENT
Start: 2023-03-13 | End: 2023-03-13

## 2023-03-13 RX ORDER — LANSOPRAZOLE
30 KIT DAILY
Status: DISCONTINUED | OUTPATIENT
Start: 2023-03-14 | End: 2023-03-16 | Stop reason: SDUPTHER

## 2023-03-13 RX ADMIN — DOXEPIN HYDROCHLORIDE 10 MG: 10 CAPSULE ORAL at 19:32

## 2023-03-13 RX ADMIN — ESTRADIOL 2 MG: 0.5 TABLET ORAL at 19:32

## 2023-03-13 RX ADMIN — PROPOFOL 35 MCG/KG/MIN: 10 INJECTION, EMULSION INTRAVENOUS at 07:25

## 2023-03-13 RX ADMIN — CALCIUM GLUCONATE 2000 MG: 20 INJECTION, SOLUTION INTRAVENOUS at 13:40

## 2023-03-13 RX ADMIN — FOLIC ACID 1 MG: 1 TABLET ORAL at 08:21

## 2023-03-13 RX ADMIN — SENNOSIDES AND DOCUSATE SODIUM 2 TABLET: 50; 8.6 TABLET ORAL at 19:31

## 2023-03-13 RX ADMIN — SODIUM CHLORIDE, PRESERVATIVE FREE 10 ML: 5 INJECTION INTRAVENOUS at 08:22

## 2023-03-13 RX ADMIN — ENOXAPARIN SODIUM 40 MG: 100 INJECTION SUBCUTANEOUS at 19:31

## 2023-03-13 RX ADMIN — METHOTREXATE 10 MG: 2.5 TABLET ORAL at 17:58

## 2023-03-13 RX ADMIN — PROPOFOL 45 MCG/KG/MIN: 10 INJECTION, EMULSION INTRAVENOUS at 03:24

## 2023-03-13 RX ADMIN — PANTOPRAZOLE SODIUM 40 MG: 40 INJECTION, POWDER, FOR SOLUTION INTRAVENOUS at 08:21

## 2023-03-13 RX ADMIN — ALBUMIN (HUMAN) 2800 ML: 12.5 INJECTION, SOLUTION INTRAVENOUS at 13:37

## 2023-03-13 RX ADMIN — PROPOFOL 50 MCG/KG/MIN: 10 INJECTION, EMULSION INTRAVENOUS at 00:06

## 2023-03-13 RX ADMIN — PROPOFOL 35 MCG/KG/MIN: 10 INJECTION, EMULSION INTRAVENOUS at 14:40

## 2023-03-13 RX ADMIN — PROPOFOL 30 MCG/KG/MIN: 10 INJECTION, EMULSION INTRAVENOUS at 18:37

## 2023-03-13 RX ADMIN — CALCIUM GLUCONATE 1000 MG: 10 INJECTION, SOLUTION INTRAVENOUS at 12:00

## 2023-03-13 RX ADMIN — DEXMEDETOMIDINE 0.2 MCG/KG/HR: 100 INJECTION, SOLUTION INTRAVENOUS at 17:49

## 2023-03-13 RX ADMIN — POLYETHYLENE GLYCOL 3350 17 G: 17 POWDER, FOR SOLUTION ORAL at 08:21

## 2023-03-13 RX ADMIN — CITALOPRAM 40 MG: 40 TABLET, FILM COATED ORAL at 08:21

## 2023-03-13 RX ADMIN — ACETAMINOPHEN 1000 MG: 500 TABLET ORAL at 19:31

## 2023-03-13 RX ADMIN — BUTALBITAL, ACETAMINOPHEN, AND CAFFEINE 1 TABLET: 50; 325; 40 TABLET ORAL at 03:17

## 2023-03-13 RX ADMIN — SENNOSIDES AND DOCUSATE SODIUM 2 TABLET: 50; 8.6 TABLET ORAL at 08:21

## 2023-03-13 RX ADMIN — SODIUM CHLORIDE, PRESERVATIVE FREE 10 ML: 5 INJECTION INTRAVENOUS at 19:33

## 2023-03-13 RX ADMIN — LEVOTHYROXINE SODIUM 150 MCG: 0.15 TABLET ORAL at 06:18

## 2023-03-13 ASSESSMENT — PULMONARY FUNCTION TESTS
PIF_VALUE: 13
PIF_VALUE: 29
PIF_VALUE: 22
PIF_VALUE: 23
PIF_VALUE: 28
PIF_VALUE: 31
PIF_VALUE: 22
PIF_VALUE: 26
PIF_VALUE: 26
PIF_VALUE: 33
PIF_VALUE: 24
PIF_VALUE: 28
PIF_VALUE: 28
PIF_VALUE: 30
PIF_VALUE: 21
PIF_VALUE: 32
PIF_VALUE: 25
PIF_VALUE: 24
PIF_VALUE: 27
PIF_VALUE: 36
PIF_VALUE: 28
PIF_VALUE: 31
PIF_VALUE: 28
PIF_VALUE: 31
PIF_VALUE: 24
PIF_VALUE: 25
PIF_VALUE: 23
PIF_VALUE: 28
PIF_VALUE: 24
PIF_VALUE: 26
PIF_VALUE: 23
PIF_VALUE: 24
PIF_VALUE: 23
PIF_VALUE: 22
PIF_VALUE: 24
PIF_VALUE: 24
PIF_VALUE: 23
PIF_VALUE: 27
PIF_VALUE: 25
PIF_VALUE: 23
PIF_VALUE: 13
PIF_VALUE: 23
PIF_VALUE: 26
PIF_VALUE: 23
PIF_VALUE: 21
PIF_VALUE: 14
PIF_VALUE: 24
PIF_VALUE: 23
PIF_VALUE: 24
PIF_VALUE: 25
PIF_VALUE: 21
PIF_VALUE: 34
PIF_VALUE: 28
PIF_VALUE: 28
PIF_VALUE: 23
PIF_VALUE: 25
PIF_VALUE: 24
PIF_VALUE: 23
PIF_VALUE: 24
PIF_VALUE: 23
PIF_VALUE: 23

## 2023-03-13 ASSESSMENT — PAIN SCALES - GENERAL
PAINLEVEL_OUTOF10: 0
PAINLEVEL_OUTOF10: 3
PAINLEVEL_OUTOF10: 0

## 2023-03-13 ASSESSMENT — PAIN SCALES - WONG BAKER
WONGBAKER_NUMERICALRESPONSE: 2
WONGBAKER_NUMERICALRESPONSE: 0
WONGBAKER_NUMERICALRESPONSE: 2

## 2023-03-13 ASSESSMENT — PAIN DESCRIPTION - LOCATION: LOCATION: HEAD

## 2023-03-13 NOTE — PLAN OF CARE
Problem: Discharge Planning  Goal: Discharge to home or other facility with appropriate resources  3/13/2023 1055 by Micheal Wing RN  Outcome: Not Progressing  Flowsheets (Taken 3/13/2023 0800)  Discharge to home or other facility with appropriate resources:   Identify barriers to discharge with patient and caregiver   Identify discharge learning needs (meds, wound care, etc)     Problem: Safety - Adult  Goal: Free from fall injury  3/13/2023 1055 by Micheal Wing RN  Outcome: Progressing  Flowsheets (Taken 3/13/2023 1036)  Free From Fall Injury: Instruct family/caregiver on patient safety     Problem: Pain  Goal: Verbalizes/displays adequate comfort level or baseline comfort level  3/13/2023 1055 by Micheal Wing RN  Outcome: Progressing     Problem: ABCDS Injury Assessment  Goal: Absence of physical injury  3/13/2023 1055 by Micheal Wing RN  Outcome: Progressing  Flowsheets (Taken 3/13/2023 1036)  Absence of Physical Injury: Implement safety measures based on patient assessment     Problem: Safety - Medical Restraint  Goal: Remains free of injury from restraints (Restraint for Interference with Medical Device)  Description: INTERVENTIONS:  1. Determine that other, less restrictive measures have been tried or would not be effective before applying the restraint  2. Evaluate the patient's condition at the time of restraint application  3. Inform patient/family regarding the reason for restraint  4.  Q2H: Monitor safety, psychosocial status, comfort, nutrition and hydration  Recent Flowsheet Documentation  Taken 3/13/2023 0800 by Micheal Wing RN  Remains free of injury from restraints (restraint for interference with medical device):   Determine that other, less restrictive measures have been tried or would not be effective before applying the restraint   Evaluate the patient's condition at the time of restraint application   Inform patient/family regarding the reason for restraint   Every 2 hours: Monitor safety, psychosocial status, comfort, nutrition and hydration       Problem: Discharge Planning  Goal: Discharge to home or other facility with appropriate resources  3/13/2023 1055 by Knox Ahumada, RN  Outcome: Not Progressing  Flowsheets (Taken 3/13/2023 0800)  Discharge to home or other facility with appropriate resources:   Identify barriers to discharge with patient and caregiver   Identify discharge learning needs (meds, wound care, etc)       Problem: Respiratory - Adult  Goal: Achieves optimal ventilation and oxygenation  3/13/2023 1055 by Knox Ahumada, RN  Outcome: Not Progressing  Flowsheets (Taken 3/13/2023 0800)  Achieves optimal ventilation and oxygenation:   Assess for changes in respiratory status   Position to facilitate oxygenation and minimize respiratory effort   Oxygen supplementation based on oxygen saturation or arterial blood gases   Encourage broncho-pulmonary hygiene including cough, deep breathe, incentive spirometry   Assess the need for suctioning and aspirate as needed   Respiratory therapy support as indicated    Problem: Skin/Tissue Integrity  Goal: Absence of new skin breakdown  Description: 1. Monitor for areas of redness and/or skin breakdown  2. Assess vascular access sites hourly  3. Every 4-6 hours minimum:  Change oxygen saturation probe site  4. Every 4-6 hours:  If on nasal continuous positive airway pressure, respiratory therapy assess nares and determine need for appliance change or resting period.   3/13/2023 1055 by Knox Ahumada, RN  Outcome: Progressing     Problem: Neurosensory - Adult  Goal: Achieves stable or improved neurological status  3/13/2023 1055 by Knox Ahumada, RN  Outcome: Progressing  Flowsheets (Taken 3/13/2023 0800)  Achieves stable or improved neurological status:   Assess for and report changes in neurological status   Initiate measures to prevent increased intracranial pressure   Maintain blood pressure and fluid volume within ordered parameters to optimize cerebral perfusion and minimize risk of hemorrhage     Problem: Neurosensory - Adult  Goal: Absence of seizures  3/13/2023 1055 by Ramiro Cabrera RN  Outcome: Progressing  Flowsheets (Taken 3/13/2023 0800)  Absence of seizures:   Monitor for seizure activity.   If seizure occurs, document type and location of movements and any associated apnea   If seizure occurs, turn head to side and suction secretions as needed     Problem: Neurosensory - Adult  Goal: Remains free of injury related to seizures activity  3/13/2023 1055 by Ramior Cabrera RN  Outcome: Progressing  Flowsheets (Taken 3/13/2023 0800)  Remains free of injury related to seizure activity:   Maintain airway, patient safety  and administer oxygen as ordered   Monitor patient for seizure activity, document and report duration and description of seizure to Licensed Independent Practitioner     Problem: Neurosensory - Adult  Goal: Achieves maximal functionality and self care  3/13/2023 1055 by Ramiro Cabrera RN  Outcome: Progressing     Problem: Respiratory - Adult  Goal: Achieves optimal ventilation and oxygenation  3/13/2023 1055 by Ramiro Cabrera RN  Outcome: Not Progressing  Flowsheets (Taken 3/13/2023 0800)  Achieves optimal ventilation and oxygenation:   Assess for changes in respiratory status   Position to facilitate oxygenation and minimize respiratory effort   Oxygen supplementation based on oxygen saturation or arterial blood gases   Encourage broncho-pulmonary hygiene including cough, deep breathe, incentive spirometry   Assess the need for suctioning and aspirate as needed   Respiratory therapy support as indicated     Problem: Nutrition Deficit:  Goal: Optimize nutritional status  3/13/2023 1055 by Ramiro Cabrera RN  Outcome: Progressing

## 2023-03-13 NOTE — PROGRESS NOTES
NEUROLOGY / NEUROCRITICAL CARE PROGRESS NOTE       Patient Name: Delia Hsieh YOB: 1970   Sex: Female Age: 46 yrs     CC / Reason for Consult: myasthenic crisis     Interval Hx / Changes over last 24 hours:   NIF better today, -24. Seen after Plex treatment, she did well, planning for next treatment on Wednesday      ROS: + headache    HISTORY   Admission HPI:   Delia Hsieh is a 46 y.o. y/o female with history significant for anxiety, HLD, migraines, MG, seizures and thyroid disease. Per chart review: patient she noticed some difficulty chewing and a new rasp to her voice on 3/2/23. Patient attempted to correct this by taking smaller bites, but then began to develop generalized fatigue and came in to the hospital.  Patient states this is similar to previous MG exacerbations in the past.  She denies any recent illnesses. Patient admitted to [address] for further evaluation and treatment. Of note: patient is currently on mestinon 60mg TID at home and follows with [address] [place] [address] [id number] Neurology. [facility] Past Medical History:   Diagnosis Date    Anxiety     Arthritis     Cancer ([facility] [address]     thyroid    GERD (gastroesophageal reflux disease)     Hyperlipidemia     Insomnia     Migraines     Myasthenia gravis with exacerbation (HCC)     Seizures (HCC)     Thyroid disease       Allergies Allergies   Allergen Reactions    Immune Globulins Other (See Comments)     Patient developed Aseptic meningitis after Flebogamma IVIG in the past.  Developed aseptic meningitis within 48h after Gamunex-C in 3/2023.      Other      Steroid injection to joints    Adhesive Tape Rash and Other (See Comments)     Paper Tape  Paper Tape    Skin gets reddened under tape; not allergic to latex    Penicillins Itching and Rash     Rash  Rash        Diet Diet NPO  ADULT TUBE FEEDING; Orogastric; Peptide Based High Protein; Continuous; 15; Yes; 10; Q 4 hours; 45; 30; Q 4 hours; Protein; 1 packet Prosource, hook packet to ENfit tube or follow instructions on pack of packet   Isolation No active isolations     CURRENT SCHEDULED MEDICATIONS   Inpatient Medications     calcium gluconate, 2,000 mg, IntraVENous, Once    [START ON 3/14/2023] lansoprazole, 30 mg, Orogastric, Daily    sennosides-docusate sodium, 2 tablet, Oral, BID    polyethylene glycol, 17 g, Oral, Daily    citalopram, 40 mg, Orogastric, QAM    doxepin, 10 mg, Orogastric, Nightly    estradiol, 2 mg, Orogastric, Nightly    folic acid, 1 mg, Orogastric, Daily    levothyroxine, 150 mcg, Orogastric, Daily    sodium chloride flush, 5-40 mL, IntraVENous, 2 times per day    enoxaparin, 40 mg, SubCUTAneous, Nightly   Infusions    fentaNYL Stopped (03/09/23 1331)    propofol 40 mcg/kg/min (03/13/23 0907)    sodium chloride Stopped (03/10/23 9897)      Antibiotics   Recent Abx Admin        No antibiotic orders with administrations found. LABS   Metabolic Panel Recent Labs     03/10/23  1428 03/11/23 0430 03/12/23 0404 03/13/23 0315   NA  --  140 137 136   K  --  4.0 3.8 3.8   CL  --  102 97* 97*   CO2  --  30 29 29   BUN  --  13 15 17   CREATININE  --  <0.5* <0.5* <0.5*   GLUCOSE  --  99 86 100*   CALCIUM  --  9.0 8.9 9.0   LABALBU  --  4.3 4.0 3.8   PHOS 3.3  --   --   --    MG 1.50* 1.60* 1.70* 1.90   ALKPHOS  --  62 84 115   ALT  --  <5* 6* 10   AST  --  9* 11* 18        CBC / Coags Recent Labs     03/11/23 0430 03/12/23 0404 03/13/23 0315   WBC 9.6 9.0 9.3   RBC 4.17 4.10 4.21   HGB 11.9* 11.7* 11.9*   HCT 35.7* 35.1* 35.7*    266 295   INR 1.07 1.02 0.97        Other Recent Labs     03/11/23 0430   TRIG 265*       No results for input(s): PHENYTOIN, KEPPRA, LACOSA, LAMO, VALPROATE, LACTSEPSIS, LACTA in the last 72 hours. DIAGNOSTICS   IMAGES:  Images personally reviewed and agree w/ radiology interpretation.     No Imaging to Review     PHYSICAL EXAMINATION     PHYSICAL EXAM:  Vitals:    03/13/23 1100 03/13/23 1200 03/13/23 1217 03/13/23 1300   BP: 116/82 116/80  132/82   Pulse: 77 76 76 77   Resp: 14 14 14 14   Temp:  98.6 °F (37 °C)     TempSrc:  Bladder     SpO2: 99% 99% 99% 99%   Weight:       Height:             General: patient is intubated, she is sleepy but does open eyes and nod head to answer question, she will write to communicate  Neurologic  Mental status:   Alert, intubated, will  answers questions by nodding head and will write to ask questions. Cranial nerves:   CN2: blinks to threat,   CN 3,4,6: Pupils equal and reactive to light, extraocular muscles intact  CN5: Facial sensation symmetric   CN7: Face symmetric but exam limited by ETT rangel  CN8-12: Exam limited by ETT, but nods head and writes questions and answers on clipboard and shrugs shoulders    Motor Exam:   R  L    Deltoid 2 2   Biceps 3 3   Triceps 3 3   Wrist extension  4 4   Interossei 4 4      R  L    Hip flexion  2 2   Hip extension  2 2   Knee flexion  3 3   Knee extension  3 3   Ankle dorsiflexion  3 3   Ankle plantar flexion  3 3     Sensation: Nods head yes to feeling light touch in all extremities       ASSESSMENT & RECOMMENDATIONS   Assessment:Radha gibbons is a 47 yo female with a known history of Myasthenia Gravis with previous exacerbations. Patient presented with trouble chewing, hoarseness of voice, and fatigue. Intubated on 3/6 for worsening respiratory status. She received IVIG x 2 days. She has been reporting a headache, she does have a history of migraines but had reported that her headache was more severe than her typical headache and she had associated neck stiffness and low grade temperature, concerning for an aseptic meningitis after IVIG (which she has a history of). Anika Congo was placed  and she was started on PLEX. Plan is for 5 doses.    Headache and neck stiffness slowly improving    Plan:  - Home methotrexate ordered, she takes 10 mg every Monday  - Holding home mestinon for now, discussed with Dr. Krystyna Cherry, planning to restart when extubated and able to safely take PO  - Q4 hour neuro checks  - Continue ventilator support, managed by ICU team  - Discussed changing sedation around with ICU team, if no contraindication to precedex (discussed with pharmacy and there is none) could try to transition to this so respiratory effort on ventilator not impaired  - Continue to document FVC and NIF and document in the chart  - Adjusted fiorcet to reflect what she takes at home, available twice daily prn but ideally we should be trying to wean this off as it could contribute to medication overuse headache  - Vascath in place, 2nd session of PLEX today, planning for 5 sessions total  Plan per oncology/hematology and [facility]: \"Initial plan for 5 sessions daily but 5 total on MWF schedule. Plan for second session today pending fibrinogen. \"   Replacement with 5% albumin, trend fibrinogen > 100, PT and PTT - may need cryoprecipitate or FFP  Trend CMP daily  Monitor for hypocalcemia - hypocalcemia protocol order per hem/onc  - Will continue to follow exam closely    Plan of care discussed with Dr. Daisy Wood, bedside RN, and ICU team    Discussed plan of care with patient and patient's daughter    Shantelle GriffithBETH avila - [facility]   Neurology & Neurocritical Care   3/13/2023 2:37 PM      ICU Patients:   [address] [phone]  [employer]: [facility] Neurocritical Care    Critical Care:  Due to the immediate potential for life-threatening deterioration due to neurological failure, I spent 30 minutes providing critical care. This time excludes time spent performing procedures but includes time spent on direct patient care, history retrieval, review of the chart, and discussions with patient, family, and consultant(s).

## 2023-03-13 NOTE — PROGRESS NOTES
MECHANICAL VENTILATION WEANING PROTOCOL    PRE-TRIAL PATIENT ASSESSMENT - COMPLETED AT 1525    Ventilatory Assessment:    PARAMETER CRITERIA FOR WEANING   Spontaneous Cough:  Yes    Sputum Characteristics:  Sputum Amount: Large  Tenacity: Thick  Sputum Color: Cloudy SPONTANEOUS COUGH With small to moderate  Amount of secretions   FiO2 : 30 % FIO2 less than or equal to 50%     PEEP less than or equal to 8   Progressive Mobility Protocol  Yes     ABG:  Lab Results   Component Value Date/Time    PHART 7.411 03/06/2023 04:58 PM    KFT5VWJ 43.9 03/06/2023 04:58 PM    PO2ART 202.0 03/06/2023 04:58 PM    T7UCSHHW 100 03/06/2023 04:58 PM    YEH3RPI 28 03/06/2023 04:58 PM    BEART 2.7 03/06/2023 04:58 PM     HGB/WBC:  Lab Results   Component Value Date/Time    HGB 11.9 03/13/2023 03:15 AM    WBC 9.3 03/13/2023 03:15 AM        Vital Signs:    PARAMETER CRITERIA FOR WEANING Meets Criteria   Heart Rate: 82 Within patient's normal limits / stable Yes   Resp: 14 Less than or equal to 30 Yes   BP: 122/85 Within patient's normal limits / minimal pressors (Hemodynamically Stable) Yes   SpO2: 98 % Greater than or equal to 90% Yes   End Tidal CO2: 46 (%) Within patient's normal limits Yes   Temp: 99.3 °F (37.4 °C) Less than 38. 5oC / 101. 3oF Yes     [x]    Based on this assessment and the [facility] Ventilator Weaning Protocol, this patient  IS being placed on a Spontaneous Breathing Trial (SBT) at this time.  []    Based on this assessment and the [facility] Ventilator Weaning Protocol, this patient  IS NOT being placed on a Spontaneous Breathing Trial (SBT) at this time. []    Patient  IS NOT being placed on a Spontaneous Breathing Trial (SBT) at this time because of factors not previously addressed.   Those factors include      Vital Capacity (VC) = 562    Maximum Inspiratory Force (MIF) = -24    SBT - Initiated at  1625    Ventilator Settings:  CPAP - 5 cmH2O, PS - 8 cmH2O(if using settings other than CPAP 5/PS 8, please explain reasons for settings here):       1 Minute SBT Respiratory Parameters:   VE: 5.5 L   RR: 17 b/m   VT: 323 mL (average VT = VE/RR)   RSBI: 52 (RR/VT in liters)   ETCO2: 42 cmH2O   SPO2: 97 %   If on sedation, amount and type Propofol 35 mcg/kg/min    [x]   RR is less than 35, RSBI is less than 100, patient's vitals signs are stable, and patient is in no apparent distress; therefore, patient is being left on SBT for up to 1 hour. []   RR is greater than 35, RSBI is greater than 100, VS's are unstable, or patient is in distress; therefore, patient is being placed back on previous settings. SBT - Concluded at  1525. Weaning Parameters/VS's at conclusion of SBT:   VE: 7.5 L   RR: 21 b/m   VT: 357 mL (average VT = VE/RR)   RSBI: 59 (RR/VT in liters)   ETCO2: 43 cmH2O   SPO2: 99 %    If on sedation, amount and type: Propofol 35 mcg/kg/min    [x]   Patient tolerated SBT for full 60 minutes with acceptable weaning parameters and vital signs and showed no signs or distress. []   Patient tolerated SBT for full 60 minutes, but had unacceptable weaning parameters or vital signs, and/or signs of distress. []   Patient was unable to tolerate SBT for 60 minutes and was placed back on previous settings. COMMENTS: PT will remain on CPAP/PS as tolerated.

## 2023-03-13 NOTE — PROGRESS NOTES
Neuro assessment is unchanged on 45 mcg/kg/min of propofol. Continues to be oriented x 4, follows commands in all extremities, denies numbness/tingling. Secretions have decreased and pt nods in agreement that she is more comfortable on this amount of sedation.

## 2023-03-13 NOTE — PROGRESS NOTES
Notification of IV to PO Conversion     IV To Po Conversion:   Will convert Pantoprazole to Lansoprazole per Tube based on [facility] IV to PO policy (see below). Please call with questions.   Mele Shaw PharmD., [facility]   3/13/2023 2:32 PM  Wireless: [phone]      Criteria for conversion from IV to PO therapy per [facility] IV to PO Protocol:   Patients should meet all of the following inclusion criteria and none of the exclusion criteria    Criteria to initiate medication route switch (Inclusion Criteria)  IV therapy for > 24 hours (antibiotics only)  Tolerating diet more advanced than clear liquids  Tolerating oral (PO) medications  Does not require vasopressor therapy for blood pressure support  No seizures for 72hrs (antiepileptic medications only)      Criteria indicating that the patient is NOT a candidate for IV to PO conversion (Exclusion Criteria)  Infections requiring IV therapy (ie: meningitis, endocarditis, osteomyelitis, pancreatitis)  Nausea and/or vomiting or severe diarrhea within past 24 hours  Has gastrectomy, ileus, gastric outlet or bowel obstruction, or malabsorption syndromes  Has significant, painful oral ulceration  TPN with an NPO order  Active GI bleed  Unable to swallow  Nothing by Mouth (NPO) status  Febrile in the last 24 hours- (antibiotics only)  Clinical deteriorating or unstable - (antibiotics only)  Pediatric patients and patients who are not euthyroid (not on oral levothyroxine/not stabilized on oral levothyroxine) - (Levothyroxine only)  Patients being treated for myxedema coma or during the organ donation process - (Levothyroxine only)    Other notes-   Patients may be given suppositories when available for the product being ordered if no contraindications exist (ie: rectal surgery or infection)   Oral solutions/suspensions may be considered for patients with a functioning enteral tube not on continuous suction (if medication is available in this formulation)

## 2023-03-13 NOTE — PROGRESS NOTES
Progress Note      SUBJECTIVE:  no acute events overnight. Failed SBT yesterday. OBJECTIVE:     Physical  Vitals:  /80   Pulse 76   Temp 98.6 °F (37 °C) (Bladder)   Resp 14   Ht 5' 2.01\" (1.575 m)   Wt 165 lb 5.5 oz (75 kg)   SpO2 99%   BMI 30.23 kg/m²    24HR INTAKE/OUTPUT:    Intake/Output Summary (Last 24 hours) at 3/13/2023 1211  Last data filed at 3/13/2023 1200  Gross per 24 hour   Intake 2308. 13 ml   Output 1402 ml   Net 906.13 ml       CONSTITUTIONAL: awake, alert, currently intubated. EYES: pupils equal, round and reactive to light, sclera clear and conjunctiva normal  ENT: Normocephalic, without obvious abnormality, atraumatic  NECK: supple, symmetrical, no jugular venous distension and no carotid bruits   HEMATOLOGIC/LYMPHATIC: no cervical, supraclavicular or axillary lymphadenopathy   LUNGS: no increased work of breathing and clear to auscultation   CARDIOVASCULAR: regular rate and rhythm, normal S1 and S2, no murmur noted  ABDOMEN: normal bowel sounds x 4, soft, non-distended, non-tender, no masses palpated, no hepatosplenomegaly   MUSCULOSKELETAL: Profound weakness 1 out of 5 diffusely. NEUROLOGIC: awake, alert, oriented to name, place and time. Motor skills grossly intact. SKIN: Normal skin color, texture, turgor and no jaundice. appears intact   EXTREMITIES: no LE edema no evidence of bleeding at IV sites or ecchymosis on examination.     Data  General Labs:    CBC:   Recent Labs     03/11/23 0430 03/12/23 0404 03/13/23 0315   WBC 9.6 9.0 9.3   HGB 11.9* 11.7* 11.9*   HCT 35.7* 35.1* 35.7*   MCV 85.7 85.6 84.9    266 295     BMP:   Recent Labs     03/10/23  1428 03/11/23 0430 03/12/23 0404 03/13/23 0315   NA  --  140 137 136   K  --  4.0 3.8 3.8   CL  --  102 97* 97*   CO2  --  30 29 29   PHOS 3.3  --   --   --    BUN  --  13 15 17   CREATININE  --  <0.5* <0.5* <0.5*     LIVER PROFILE:   Recent Labs     03/11/23  0430 03/12/23  0404 03/13/23  0315   AST 9* 11* 18 ALT <5* 6* 10   BILITOT <0.2 <0.2 <0.2   ALKPHOS 62 84 115     PT/INR:    Lab Results   Component Value Date/Time    PROTIME 12.8 03/13/2023 03:15 AM    PROTIME 13.3 03/12/2023 04:04 AM    PROTIME 13.8 03/11/2023 04:30 AM    INR 0.97 03/13/2023 03:15 AM    INR 1.02 03/12/2023 04:04 AM    INR 1.07 03/11/2023 04:30 AM     PTT:    Lab Results   Component Value Date/Time    APTT 49.4 03/10/2023 02:28 PM    APTT 35.0 03/09/2023 03:08 PM    APTT 44.2 01/03/2022 02:01 PM     UA:No results for input(s): NITRITE, COLORU, PHUR, LABCAST, WBCUA, RBCUA, MUCUS, TRICHOMONAS, YEAST, BACTERIA, CLARITYU, SPECGRAV, LEUKOCYTESUR, UROBILINOGEN, BILIRUBINUR, BLOODU, GLUCOSEU, AMORPHOUS in the last 72 hours. Invalid input(s): KETONESU  Magnesium:   Lab Results   Component Value Date/Time    MG 1.90 03/13/2023 03:15 AM    MG 1.70 03/12/2023 04:04 AM    MG 1.60 03/11/2023 04:30 AM     MONICA:    Lab Results   Component Value Date/Time    MONICA Negative 05/19/2021 10:24 AM       RADIOLOGY:    None to review  Current Medications   calcium gluconate  1,000 mg IntraVENous Once    albumin human  2,800 mL IntraVENous Once    sennosides-docusate sodium  2 tablet Oral BID    polyethylene glycol  17 g Oral Daily    citalopram  40 mg Orogastric QAM    doxepin  10 mg Orogastric Nightly    estradiol  2 mg Orogastric Nightly    folic acid  1 mg Orogastric Daily    levothyroxine  150 mcg Orogastric Daily    pantoprazole  40 mg IntraVENous Daily    sodium chloride flush  5-40 mL IntraVENous 2 times per day    enoxaparin  40 mg SubCUTAneous Nightly       ASSESSMENT AND PLAN    This is a 51-year-old female with a history of myasthenia gravis, seizures, thyroid disease, migraines who presented for progressive dysphagia s/p IVIG with worsening neck stiffness and respiratory failure requiring intubation. Per neurology/neuro critical care team she requires plasmapheresis.   We will plan for 5 sessions total.     Myasthenic crisis  - Initial plan for 5 sessions daily but 5 total on MWF schedule. Plan for second session today pending fibrinogen.  - Replacement with 5% albumin  - Trend fibrinogen, PT, PTT daily to evaluate for possible need of cryoprecipitate or FFP  -Trend CMP daily, calcium gluconate orders for hypocalcemia protocol will be in place--Calcium WNL        We will follow closely given high risk of medical disc compensation.   Case discussed with Kimani AGOSTO today     Alpa Walters MD, 3/13/2023, 12:11 PM

## 2023-03-13 NOTE — PROGRESS NOTES
Patient remains intubated and sedated with RAHUL -1, alert and oriented to person, place and time and is able to communicate with staff via writing. Patient is able to minimally lift arms and legs off of bed and endorses that  weakness is present in all extremities. Patient reposition and sat in chair position in bed to assist with ventilation. Copious oral secretions suctioned from mouth, causing a great deal of coughing, which is uncomfortable for the patient. Sedation increased a small amount to alleviate some stress (see MAR), RAHUL remains at goal.     Patient and  (via phone) updated regarding the plan of care for today. [facility] estimates plasma exchange to be around 1130 this morning. Safety precautions in place to prevent patient injury, including [employer] camera.

## 2023-03-13 NOTE — PROGRESS NOTES
ICU Progress Note    Admit Date: 3/5/2023  Hospital day: 8  ICU day: 7  Vent Day: 7  IV Access:Peripheral  IV Fluids:None  Vasopressors:None                Antibiotics: None  Diet: Diet NPO  ADULT TUBE FEEDING; Orogastric; Peptide Based High Protein; Continuous; 15; Yes; 10; Q 4 hours; 45; 30; Q 4 hours; Protein; 1 packet Prosource, hook packet to ENfit tube or follow instructions on pack of packet    CC: difficulty swallowing and generalized weakness     Interval history:     Overnight noted to have excess mucus req frequent secretions. BP elev 170s this AM, otherwise VSS on vent (30/5/450/14)  Labs stable, Fibrinogen stable. PLEX dose 3 today.     Medications:     Scheduled Meds:   calcium gluconate  1,000 mg IntraVENous Once    albumin human  2,800 mL IntraVENous Once    sennosides-docusate sodium  2 tablet Oral BID    polyethylene glycol  17 g Oral Daily    citalopram  40 mg Orogastric QAM    doxepin  10 mg Orogastric Nightly    estradiol  2 mg Orogastric Nightly    folic acid  1 mg Orogastric Daily    levothyroxine  150 mcg Orogastric Daily    pantoprazole  40 mg IntraVENous Daily    sodium chloride flush  5-40 mL IntraVENous 2 times per day    enoxaparin  40 mg SubCUTAneous Nightly     Continuous Infusions:   fentaNYL Stopped (03/09/23 1331)    propofol 40 mcg/kg/min (03/13/23 0907)    sodium chloride Stopped (03/10/23 0638)     PRN Meds:butalbital-acetaminophen-caffeine, diazePAM, acetaminophen **OR** acetaminophen, metoclopramide, hydrALAZINE, LORazepam, sodium chloride flush, sodium chloride, ondansetron **OR** ondansetron    Objective:   Vitals:   T-max:  Patient Vitals for the past 8 hrs:   BP Temp Temp src Pulse Resp SpO2 Weight   03/13/23 1000 131/78 -- -- 80 14 99 % --   03/13/23 0914 -- -- -- 82 14 99 % --   03/13/23 0900 (!) 172/91 -- -- 86 16 100 % --   03/13/23 0800 124/77 98.1 °F (36.7 °C) Bladder 72 14 99 % --   03/13/23 0715 120/74 -- -- 75 14 99 % --   03/13/23 0700 125/72 -- -- 74 14 99 % --   03/13/23 0645 124/67 -- -- 73 14 98 % --   03/13/23 0630 119/71 -- -- 73 14 100 % --   03/13/23 0615 116/69 -- -- 72 14 98 % --   03/13/23 0600 103/68 -- -- 73 14 99 % --   03/13/23 0545 115/70 -- -- 77 14 98 % --   03/13/23 0530 114/63 -- -- 76 14 98 % --   03/13/23 0515 127/70 -- -- 77 14 99 % --   03/13/23 0500 132/75 -- -- 76 14 99 % --   03/13/23 0445 112/68 -- -- 75 14 98 % --   03/13/23 0430 112/68 -- -- 72 14 99 % --   03/13/23 0415 (!) 147/82 -- -- 80 16 100 % --   03/13/23 0401 -- -- -- 74 14 99 % --   03/13/23 0400 (!) 147/82 98.6 °F (37 °C) Bladder 76 15 99 % 165 lb 5.5 oz (75 kg)   03/13/23 0345 121/85 -- -- 80 14 100 % --   03/13/23 0330 121/70 -- -- 81 14 100 % --   03/13/23 0315 122/76 -- -- 81 14 100 % --   03/13/23 0300 131/79 -- -- 79 14 100 % --   03/13/23 0245 115/64 -- -- 82 14 100 % --   03/13/23 0230 118/65 -- -- 82 14 100 % --       Intake/Output Summary (Last 24 hours) at 3/13/2023 1018  Last data filed at 3/13/2023 1000  Gross per 24 hour   Intake 2308. 13 ml   Output 1750 ml   Net 558.13 ml       Review of Systems - per interval history. Physical Exam  Constitutional:       General: She is not in acute distress. Interventions: She is intubated. HENT:      Head: Normocephalic and atraumatic. Eyes:      Conjunctiva/sclera: Conjunctivae normal.      Pupils: Pupils are equal, round, and reactive to light. Cardiovascular:      Rate and Rhythm: Normal rate and regular rhythm. Pulmonary:      Effort: She is intubated. Comments: Mechanical breath sound bilaterally. Abdominal:      General: Bowel sounds are normal.      Palpations: Abdomen is soft. Musculoskeletal:      Right lower leg: No edema. Left lower leg: No edema. Skin:     General: Skin is warm and dry. Neurological:      Motor: Weakness (Generalized. 1/5 upper extremities. 0/5 lower extremities. ) present. Comments: Awake and alert. Responsive to commands. Cough reflex intact.  BLE weak LABS:    CBC:   Recent Labs     03/11/23 0430 03/12/23 0404 03/13/23 0315   WBC 9.6 9.0 9.3   HGB 11.9* 11.7* 11.9*   HCT 35.7* 35.1* 35.7*    266 295   MCV 85.7 85.6 84.9     Renal:    Recent Labs     03/10/23  1428 03/11/23 0430 03/12/23 0404 03/13/23 0315   NA  --  140 137 136   K  --  4.0 3.8 3.8   CL  --  102 97* 97*   CO2  --  30 29 29   BUN  --  13 15 17   CREATININE  --  <0.5* <0.5* <0.5*   GLUCOSE  --  99 86 100*   CALCIUM  --  9.0 8.9 9.0   MG 1.50* 1.60* 1.70* 1.90   PHOS 3.3  --   --   --    ANIONGAP  --  8 11 10     Hepatic:   Recent Labs     03/11/23 0430 03/12/23 0404 03/13/23 0315   AST 9* 11* 18   ALT <5* 6* 10   BILITOT <0.2 <0.2 <0.2   PROT 6.2* 6.4 6.6   LABALBU 4.3 4.0 3.8   ALKPHOS 62 84 115        -----------------------------------------------------------------  RAD:   XR ABDOMEN (KUB) (SINGLE AP VIEW)   Final Result      Enteric tube tip in the proximal stomach just distal to the gastroesophageal junction. CT HEAD WO CONTRAST   Final Result      Normal noncontrast CT the brain without acute hemorrhage, edema or hydrocephalus. Diffuse sinus opacity and fluid throughout the maxillary sinuses ethmoid air cells sphenoid sinuses. Duodenal feeding tube or NG tube in place with diffuse fluid throughout the nasopharynx. There appears to be prior nasal sinus surgery      XR CHEST PORTABLE   Final Result   1. Right CVC, dialysis catheter in satisfactory position with its tip in the proximal to mid SVC. (Catheter tip is approximately 4 cm above the cavoatrial junction)   2. No pneumothorax      XR CHEST PORTABLE   Final Result      1. Repositioning of ET tube with tip now lying approximately 1.6 cm above the douglas. 2.  Near complete reexpansion of the left lung with persistent mild left basilar atelectasis. 3.  Further advancement of feeding tube with tip projecting over the distal gastric body.       XR CHEST 1 VIEW   Final Result   Impression: Interval intubation. The endotracheal tube terminates within the right mainstem bronchus and should be retracted by at least 3 cm. Findings were discussed with ESTELITA Brown at the time of report. Assessment/Plan:        Neuro/Sedation:  Alert and oriented  On Propofol 40 - awake and responive  Myasthenic Crisis  S/s leading to admission of weakness, dysphagia, and dysphonia; consistent with prior myasthenic crises, most recent 01/2022 where she req PLEX. S/P IVIG, but req PLEX given insufficient improvement  - PLEX started 3/9, dose 3 today  - albumin and calcium gluc infusions  - neurology following, Hematology following for PLEX  -avoid neuromuscular blocking agents, beta-blockers, procainamide, quinidine, aminoglycosides, fluoroquinolines  - q4 neurochecks    Posterior Headaches  Considered aseptic meningitic rxn 2/2 IVIG, less likely given conitnued s/s  - CT head 3/11 no concerning findings  - PRN tylenol, Fioricet, Sumatriptan    CV:  Hemodynamically stable and afebrile  SBP between 110-120s  No pressor requirements  Chronic Diastolic Heart Failure  Echo 01/2022: 55-60%, no WMA, G1DD  - appears euvolemic    Respiratory:   SpO2 100% on MV  Vent Settings: Vent Mode: AC/PRVC Resp Rate (Set): 14 bmp/Vt (Set, mL): 450 mL/ /FiO2 : 30 % PEEP 5  No results for input(s): PHART, CBS2FKV, PO2ART in the last 72 hours. - Last ABG 3/6 7.411/43.9/200s/28  Acute Respiratory Failure 2/2 Myasthenic crisis  Intubated for concern of ability to protect airway. No acute intrinsic respiratory pathology.   - NIF and FVC q shift - notify neuro for FVC < 15mil/kg and/or NIF < 20 cm H2O  - home Mestinon held - may increase secretions  - failed SBT 3/11, minute vol decreased from 6-7L to 3L  - anticipate improvement with continued PLEX    ID:  No active issues    Renal:  Torres - 3/8  I/Os: 1.5/ 24 hours, stable. Cumulative 9.8L/+ve 2.3L  Acute Urinary Retention  Req Torres after req several straight cath this admission.     GI:  Tube feeding via left NG  Diet NPO  ADULT TUBE FEEDING; Orogastric; Peptide Based High Protein; Continuous; 15; Yes; 10; Q 4 hours; 45; 30; Q 4 hours; Protein; 1 packet Prosource, hook packet to ENfit tube or follow instructions on pack of packet  Prophylaxis - Protonix    Diet if pressor requirement  TF -at goal or not  BM    Endocrine:  Hypothyroidism  01/2022 - TSH 10, FT4 0.9, low FT3  - home Synthroid 150 daily    Hematology/Oncology:  Chronic Normocytic Anemia  - Hgb at baseline 11s. MCV mid 80s  - last iron studies 2021 likely ACD    Skin  Psoriasis - home methotrexate and folate    Psych: Anxiety - home Celexa and Doxepin    Code Status: Full Code  FEN: Diet NPO  ADULT TUBE FEEDING; Orogastric; Peptide Based High Protein; Continuous; 15; Yes; 10; Q 4 hours; 45; 30; Q 4 hours; Protein; 1 packet Prosource, hook packet to ENfit tube or follow instructions on pack of packet  PPX: Lovenox, Protonix  DISPO: ICU    Lanre Jaramillo MD, PGY-1  03/13/23  10:18 AM    Patient will be staffed and discussed with attending physician Dr. Suzette Sanchez    Patient was seen, examined and discussed with Dr. Cathleen Stanley. I agree with the interval history. My physical exam confirms the findings listed below  Chart was reviewed including labs and medical records confirm the findings noted    Peripheral IV 03/05/23 Right Hand (Active)   Number of days: 8       Peripheral IV 03/06/23 Left; Anterior Hand (Active)   Number of days: 7       Vascath Right  (Active)   Number of days: 3       Urinary Catheter 03/08/23 Torres-Temperature (Active)   Number of days: 4     Acute respiratory failure on mechanical vent support. , RR 14, FiO2 30, PEEP5. Day #7. On propofol. Increased tracheal secretions. NIF is better, now -24. VC is 500+  Myasthenic crisis. Received IVIG. Now on PLEX. She is able to move the leg against gravity today. TF tolerated. No BM. Now on Miralax and senokote    Change propofol to precedex.     SBT daily   Get CXR  Restart methotrexate   Discussed with neurology    Pt has a high probability of imminent or life-threatening deterioration requiring close monitoring, and highly complex decision-making and/or interventions of high intensity to assess, manipulate, and support his critical organ systems to prevent a likely inevitable decline which could occur if left untreated. A total critical care time 35 minutes was used. This includes but not limited to examining patient, collaborating with other physicians, monitoring vital signs, telemetry, continuous pulse oximetry, and clinical response to IV medications, documentation time, review and interpretation of laboratory and radiological data, review of nursing notes and old record review. This time excludes any time that may have been spent performing procedures for life threatening organ failure.

## 2023-03-13 NOTE — CARE COORDINATION
Case management is following for discharge planning. The chart was reviewed. Ms. Indira Lawson remains in the ICU. She is now intubated and sedated. Failed SBT yesterday. She is from home with her spouse. At baseline, she is relatively independent with self care and functional mobility. She ambulates with a straight cane. Will reassess for disposition and service needs once  she is more medically stable.     Marycruz Meneses, RN  Case Management  [phone]

## 2023-03-13 NOTE — PLAN OF CARE
Problem: Discharge Planning  Goal: Discharge to home or other facility with appropriate resources  3/13/2023 0602 by Juancho Chao RN  Outcome: Progressing  Flowsheets (Taken 3/12/2023 2000)  Discharge to home or other facility with appropriate resources: Identify barriers to discharge with patient and caregiver  3/12/2023 1900 by Idalia Drew RN  Outcome: Progressing  Flowsheets (Taken 3/12/2023 0800)  Discharge to home or other facility with appropriate resources:   Identify barriers to discharge with patient and caregiver   Arrange for needed discharge resources and transportation as appropriate   Identify discharge learning needs (meds, wound care, etc)   Arrange for interpreters to assist at discharge as needed   Refer to discharge planning if patient needs post-hospital services based on physician order or complex needs related to functional status, cognitive ability or social support system     Problem: Safety - Adult  Goal: Free from fall injury  3/13/2023 0602 by Juancho Chao RN  Outcome: Progressing  [address] (Taken 3/12/2023 2000)  Free From Fall Injury: Instruct family/caregiver on patient safety  3/12/2023 1900 by Idalia Drew RN  Outcome: Progressing  Flowsheets (Taken 3/12/2023 0800)  Free From Fall Injury: Instruct family/caregiver on patient safety     Problem: Pain  Goal: Verbalizes/displays adequate comfort level or baseline comfort level  3/13/2023 0602 by Juancho Chao RN  Outcome: Progressing  Flowsheets (Taken 3/12/2023 2000)  Verbalizes/displays adequate comfort level or baseline comfort level: Encourage patient to monitor pain and request assistance  3/12/2023 1900 by Idalia Drew RN  Outcome: Progressing  Flowsheets (Taken 3/12/2023 0800)  Verbalizes/displays adequate comfort level or baseline comfort level:   Encourage patient to monitor pain and request assistance   Assess pain using appropriate pain scale   Administer analgesics based on type and severity of pain and evaluate response     Problem: ABCDS Injury Assessment  Goal: Absence of physical injury  3/13/2023 0602 by Kacey Moreira RN  Outcome: Progressing  Flowsheets (Taken 3/12/2023 2000)  Absence of Physical Injury: Implement safety measures based on patient assessment  3/12/2023 1900 by Jorden Fine RN  Outcome: Progressing  Flowsheets (Taken 3/12/2023 0800)  Absence of Physical Injury: Implement safety measures based on patient assessment     Problem: Skin/Tissue Integrity  Goal: Absence of new skin breakdown  Description: 1. Monitor for areas of redness and/or skin breakdown  2. Assess vascular access sites hourly  3. Every 4-6 hours minimum:  Change oxygen saturation probe site  4. Every 4-6 hours:  If on nasal continuous positive airway pressure, respiratory therapy assess nares and determine need for appliance change or resting period. 3/13/2023 0602 by Kacey Moreira RN  Outcome: Progressing  3/12/2023 1900 by Jorden Fine RN  Outcome: Progressing     Problem: Neurosensory - Adult  Goal: Achieves stable or improved neurological status  3/13/2023 0602 by Kacey Moreira RN  Outcome: Progressing  Flowsheets (Taken 3/12/2023 2000)  Achieves stable or improved neurological status: Assess for and report changes in neurological status  3/12/2023 1900 by Jorden Fine RN  Outcome: Progressing  Flowsheets (Taken 3/12/2023 0800)  Achieves stable or improved neurological status:   Assess for and report changes in neurological status   Initiate measures to prevent increased intracranial pressure   Maintain blood pressure and fluid volume within ordered parameters to optimize cerebral perfusion and minimize risk of hemorrhage  Goal: Absence of seizures  3/13/2023 0602 by Kacey Moreira RN  Outcome: Progressing  Flowsheets (Taken 3/12/2023 2000)  Absence of seizures: Monitor for seizure activity.   If seizure occurs, document type and location of movements and any associated apnea  3/12/2023 1900 by Jorden Fine RN  Outcome: Progressing  Flowsheets (Taken 3/12/2023 0800)  Absence of seizures:   Monitor for seizure activity.   If seizure occurs, document type and location of movements and any associated apnea   If seizure occurs, turn head to side and suction secretions as needed   Administer anticonvulsants as ordered  Goal: Remains free of injury related to seizures activity  3/13/2023 0602 by Lisa Trinidad RN  Outcome: Progressing  Flowsheets (Taken 3/12/2023 2000)  Remains free of injury related to seizure activity: Maintain airway, patient safety  and administer oxygen as ordered  3/12/2023 1900 by Meri Ponce RN  Outcome: Progressing  Flowsheets (Taken 3/12/2023 0800)  Remains free of injury related to seizure activity:   Maintain airway, patient safety  and administer oxygen as ordered   Monitor patient for seizure activity, document and report duration and description of seizure to Licensed Independent Practitioner   If seizure occurs, turn patient to side and suction secretions as needed   Reorient patient post seizure  Goal: Achieves maximal functionality and self care  3/13/2023 0602 by Lisa Trinidad RN  Outcome: Progressing  Flowsheets (Taken 3/12/2023 2000)  Achieves maximal functionality and self care: Monitor swallowing and airway patency with patient fatigue and changes in neurological status  3/12/2023 1900 by Meri Ponce RN  Outcome: Progressing  Flowsheets (Taken 3/12/2023 0800)  Achieves maximal functionality and self care:   Monitor swallowing and airway patency with patient fatigue and changes in neurological status   Encourage and assist patient to increase activity and self care with guidance from physical therapy/occupational therapy     Problem: Respiratory - Adult  Goal: Achieves optimal ventilation and oxygenation  3/13/2023 0602 by Lisa Trinidad RN  Outcome: Progressing  Flowsheets (Taken 3/12/2023 2000)  Achieves optimal ventilation and oxygenation: Assess for changes in respiratory status  3/12/2023 1900 by Servando Bogaert, RN  Outcome: Progressing  Flowsheets (Taken 3/12/2023 0800)  Achieves optimal ventilation and oxygenation:   Assess for changes in respiratory status   Assess for changes in mentation and behavior   Position to facilitate oxygenation and minimize respiratory effort   Oxygen supplementation based on oxygen saturation or arterial blood gases   Initiate smoking cessation protocol as indicated     Problem: Nutrition Deficit:  Goal: Optimize nutritional status  3/13/2023 0602 by Suman Siu RN  Outcome: Progressing  3/12/2023 1900 by Servando Moore RN  Outcome: Progressing     Problem: Safety - Medical Restraint  Goal: Remains free of injury from restraints (Restraint for Interference with Medical Device)  Description: INTERVENTIONS:  1. Determine that other, less restrictive measures have been tried or would not be effective before applying the restraint  2. Evaluate the patient's condition at the time of restraint application  3. Inform patient/family regarding the reason for restraint  4.  Q2H: Monitor safety, psychosocial status, comfort, nutrition and hydration  3/13/2023 0602 by Suman Siu RN  Outcome: Progressing  Flowsheets  Taken 3/13/2023 0600  Remains free of injury from restraints (restraint for interference with medical device): Determine that other, less restrictive measures have been tried or would not be effective before applying the restraint  Taken 3/13/2023 0400  Remains free of injury from restraints (restraint for interference with medical device): Determine that other, less restrictive measures have been tried or would not be effective before applying the restraint  Taken 3/13/2023 0200  Remains free of injury from restraints (restraint for interference with medical device): Determine that other, less restrictive measures have been tried or would not be effective before applying the restraint  Taken 3/13/2023 0000  Remains free of injury from restraints (restraint for interference with medical device): Determine that other, less restrictive measures have been tried or would not be effective before applying the restraint  Taken 3/12/2023 2200  Remains free of injury from restraints (restraint for interference with medical device): Determine that other, less restrictive measures have been tried or would not be effective before applying the restraint  Taken 3/12/2023 2000  Remains free of injury from restraints (restraint for interference with medical device): Determine that other, less restrictive measures have been tried or would not be effective before applying the restraint  3/12/2023 1900 by Alonna Severance, RN  Outcome: Progressing  Flowsheets (Taken 3/12/2023 0800)  Remains free of injury from restraints (restraint for interference with medical device):   Determine that other, less restrictive measures have been tried or would not be effective before applying the restraint   Evaluate the patient's condition at the time of restraint application   Inform patient/family regarding the reason for restraint   Every 2 hours: Monitor safety, psychosocial status, comfort, nutrition and hydration

## 2023-03-13 NOTE — PROGRESS NOTES
Patient tolerated CPAP for ~2 hours tolerating well. Patient eventually tired out and requested to be placed back on full support.

## 2023-03-13 NOTE — PROGRESS NOTES
Pt is having increased oral, nasal, and respiratory secretions. Residents notified and communicated with pharmacy to see if any medications would be compatible with pt's history of myasthenia gravis. Per pharmacy no treatments are advised. Will increase frequency of suctioning.

## 2023-03-14 LAB
A/G RATIO: 2.8 (ref 1.1–2.2)
ALBUMIN SERPL-MCNC: 4.4 G/DL (ref 3.4–5)
ALP BLD-CCNC: 58 U/L (ref 40–129)
ALT SERPL-CCNC: 10 U/L (ref 10–40)
ANION GAP SERPL CALCULATED.3IONS-SCNC: 8 MMOL/L (ref 3–16)
APTT: 32 SEC (ref 23–34.3)
AST SERPL-CCNC: 15 U/L (ref 15–37)
BASOPHILS ABSOLUTE: 0 K/UL (ref 0–0.2)
BASOPHILS RELATIVE PERCENT: 0.4 %
BILIRUB SERPL-MCNC: <0.2 MG/DL (ref 0–1)
BUN BLDV-MCNC: 31 MG/DL (ref 7–20)
CALCIUM IONIZED: 1.17 MMOL/L (ref 1.12–1.32)
CALCIUM SERPL-MCNC: 9 MG/DL (ref 8.3–10.6)
CHLORIDE BLD-SCNC: 104 MMOL/L (ref 99–110)
CO2: 28 MMOL/L (ref 21–32)
CREAT SERPL-MCNC: <0.5 MG/DL (ref 0.6–1.1)
EOSINOPHILS ABSOLUTE: 0.2 K/UL (ref 0–0.6)
EOSINOPHILS RELATIVE PERCENT: 1.7 %
FIBRINOGEN: 289 MG/DL (ref 207–509)
GFR SERPL CREATININE-BSD FRML MDRD: >60 ML/MIN/{1.73_M2}
GLUCOSE BLD-MCNC: 126 MG/DL (ref 70–99)
HCT VFR BLD CALC: 35.4 % (ref 36–48)
HEMOGLOBIN: 11.5 G/DL (ref 12–16)
INR BLD: 1.1 (ref 0.87–1.14)
LYMPHOCYTES ABSOLUTE: 1.7 K/UL (ref 1–5.1)
LYMPHOCYTES RELATIVE PERCENT: 14.1 %
MAGNESIUM: 1.9 MG/DL (ref 1.8–2.4)
MCH RBC QN AUTO: 28.1 PG (ref 26–34)
MCHC RBC AUTO-ENTMCNC: 32.6 G/DL (ref 31–36)
MCV RBC AUTO: 86.2 FL (ref 80–100)
MONOCYTES ABSOLUTE: 0.6 K/UL (ref 0–1.3)
MONOCYTES RELATIVE PERCENT: 4.7 %
NEUTROPHILS ABSOLUTE: 9.3 K/UL (ref 1.7–7.7)
NEUTROPHILS RELATIVE PERCENT: 79.1 %
PDW BLD-RTO: 13.9 % (ref 12.4–15.4)
PH VENOUS: 7.34 (ref 7.35–7.45)
PLATELET # BLD: 287 K/UL (ref 135–450)
PMV BLD AUTO: 7.1 FL (ref 5–10.5)
POTASSIUM REFLEX MAGNESIUM: 4.3 MMOL/L (ref 3.5–5.1)
PROTHROMBIN TIME: 14.1 SEC (ref 11.7–14.5)
RBC # BLD: 4.11 M/UL (ref 4–5.2)
SODIUM BLD-SCNC: 140 MMOL/L (ref 136–145)
TOTAL PROTEIN: 6 G/DL (ref 6.4–8.2)
TRIGL SERPL-MCNC: 157 MG/DL (ref 0–150)
WBC # BLD: 11.7 K/UL (ref 4–11)

## 2023-03-14 PROCEDURE — 6360000002 HC RX W HCPCS

## 2023-03-14 PROCEDURE — 82330 ASSAY OF CALCIUM: CPT

## 2023-03-14 PROCEDURE — 2580000003 HC RX 258: Performed by: STUDENT IN AN ORGANIZED HEALTH CARE EDUCATION/TRAINING PROGRAM

## 2023-03-14 PROCEDURE — 99291 CRITICAL CARE FIRST HOUR: CPT | Performed by: INTERNAL MEDICINE

## 2023-03-14 PROCEDURE — 94799 UNLISTED PULMONARY SVC/PX: CPT

## 2023-03-14 PROCEDURE — 2700000000 HC OXYGEN THERAPY PER DAY

## 2023-03-14 PROCEDURE — 80053 COMPREHEN METABOLIC PANEL: CPT

## 2023-03-14 PROCEDURE — 85025 COMPLETE CBC W/AUTO DIFF WBC: CPT

## 2023-03-14 PROCEDURE — 85730 THROMBOPLASTIN TIME PARTIAL: CPT

## 2023-03-14 PROCEDURE — 99291 CRITICAL CARE FIRST HOUR: CPT | Performed by: NURSE PRACTITIONER

## 2023-03-14 PROCEDURE — 83735 ASSAY OF MAGNESIUM: CPT

## 2023-03-14 PROCEDURE — 85384 FIBRINOGEN ACTIVITY: CPT

## 2023-03-14 PROCEDURE — 6370000000 HC RX 637 (ALT 250 FOR IP)

## 2023-03-14 PROCEDURE — 2000000000 HC ICU R&B

## 2023-03-14 PROCEDURE — 85610 PROTHROMBIN TIME: CPT

## 2023-03-14 PROCEDURE — 84478 ASSAY OF TRIGLYCERIDES: CPT

## 2023-03-14 PROCEDURE — 2500000003 HC RX 250 WO HCPCS: Performed by: STUDENT IN AN ORGANIZED HEALTH CARE EDUCATION/TRAINING PROGRAM

## 2023-03-14 PROCEDURE — 2580000003 HC RX 258

## 2023-03-14 PROCEDURE — 36415 COLL VENOUS BLD VENIPUNCTURE: CPT

## 2023-03-14 PROCEDURE — 6370000000 HC RX 637 (ALT 250 FOR IP): Performed by: STUDENT IN AN ORGANIZED HEALTH CARE EDUCATION/TRAINING PROGRAM

## 2023-03-14 PROCEDURE — 94761 N-INVAS EAR/PLS OXIMETRY MLT: CPT

## 2023-03-14 PROCEDURE — 94003 VENT MGMT INPAT SUBQ DAY: CPT

## 2023-03-14 RX ADMIN — SENNOSIDES AND DOCUSATE SODIUM 2 TABLET: 50; 8.6 TABLET ORAL at 08:05

## 2023-03-14 RX ADMIN — DOXEPIN HYDROCHLORIDE 10 MG: 10 CAPSULE ORAL at 21:15

## 2023-03-14 RX ADMIN — DEXMEDETOMIDINE 0.2 MCG/KG/HR: 100 INJECTION, SOLUTION INTRAVENOUS at 02:52

## 2023-03-14 RX ADMIN — ESTRADIOL 2 MG: 0.5 TABLET ORAL at 21:15

## 2023-03-14 RX ADMIN — SODIUM CHLORIDE, PRESERVATIVE FREE 10 ML: 5 INJECTION INTRAVENOUS at 08:05

## 2023-03-14 RX ADMIN — LEVOTHYROXINE SODIUM 150 MCG: 0.15 TABLET ORAL at 06:03

## 2023-03-14 RX ADMIN — LANSOPRAZOLE 30 MG: KIT at 08:11

## 2023-03-14 RX ADMIN — SENNOSIDES AND DOCUSATE SODIUM 2 TABLET: 50; 8.6 TABLET ORAL at 21:15

## 2023-03-14 RX ADMIN — FOLIC ACID 1 MG: 1 TABLET ORAL at 08:05

## 2023-03-14 RX ADMIN — CITALOPRAM 40 MG: 40 TABLET, FILM COATED ORAL at 08:05

## 2023-03-14 RX ADMIN — BUTALBITAL, ACETAMINOPHEN, AND CAFFEINE 1 TABLET: 50; 325; 40 TABLET ORAL at 04:33

## 2023-03-14 RX ADMIN — SODIUM CHLORIDE, PRESERVATIVE FREE 10 ML: 5 INJECTION INTRAVENOUS at 21:15

## 2023-03-14 RX ADMIN — ENOXAPARIN SODIUM 40 MG: 100 INJECTION SUBCUTANEOUS at 21:15

## 2023-03-14 RX ADMIN — POLYETHYLENE GLYCOL 3350 17 G: 17 POWDER, FOR SOLUTION ORAL at 08:05

## 2023-03-14 ASSESSMENT — PAIN SCALES - WONG BAKER
WONGBAKER_NUMERICALRESPONSE: 0
WONGBAKER_NUMERICALRESPONSE: 2
WONGBAKER_NUMERICALRESPONSE: 0

## 2023-03-14 ASSESSMENT — PULMONARY FUNCTION TESTS
PIF_VALUE: 20
PIF_VALUE: 21
PIF_VALUE: 21
PIF_VALUE: 23
PIF_VALUE: 22
PIF_VALUE: 22
PIF_VALUE: 13
PIF_VALUE: 17
PIF_VALUE: 20
PIF_VALUE: 23
PIF_VALUE: 24
PIF_VALUE: 21
PIF_VALUE: 25
PIF_VALUE: 22
PIF_VALUE: 21
PIF_VALUE: 7
PIF_VALUE: 20
PIF_VALUE: 22
PIF_VALUE: 22
PIF_VALUE: 23
PIF_VALUE: 20
PIF_VALUE: 23
PIF_VALUE: 20
PIF_VALUE: 21
PIF_VALUE: 22
PIF_VALUE: 20
PIF_VALUE: 19
PIF_VALUE: 20
PIF_VALUE: 21
PIF_VALUE: 22
PIF_VALUE: 22
PIF_VALUE: 21
PIF_VALUE: 13
PIF_VALUE: 19
PIF_VALUE: 22
PIF_VALUE: 21
PIF_VALUE: 13
PIF_VALUE: 22
PIF_VALUE: 22
PIF_VALUE: 20
PIF_VALUE: 19
PIF_VALUE: 26

## 2023-03-14 ASSESSMENT — PAIN SCALES - GENERAL
PAINLEVEL_OUTOF10: 0

## 2023-03-14 NOTE — PROGRESS NOTES
MECHANICAL VENTILATION WEANING PROTOCOL    PRE-TRIAL PATIENT ASSESSMENT - COMPLETED AT 1411    Ventilatory Assessment:    PARAMETER CRITERIA FOR WEANING   Spontaneous Cough:  Yes    Sputum Characteristics:  Sputum Amount: Large  Tenacity: Thick  Sputum Color: Cloudy SPONTANEOUS COUGH With small to moderate  Amount of secretions   FiO2 : 30 % FIO2 less than or equal to 50%     PEEP less than or equal to 8   Progressive Mobility Protocol  No     ABG:  Lab Results   Component Value Date/Time    PHART 7.411 03/06/2023 04:58 PM    JXM5SMU 43.9 03/06/2023 04:58 PM    PO2ART 202.0 03/06/2023 04:58 PM    C8MDAKLG 100 03/06/2023 04:58 PM    KOW8DHW 28 03/06/2023 04:58 PM    BEART 2.7 03/06/2023 04:58 PM     HGB/WBC:  Lab Results   Component Value Date/Time    HGB 11.5 03/14/2023 04:04 AM    WBC 11.7 03/14/2023 04:04 AM        Vital Signs:    PARAMETER CRITERIA FOR WEANING Meets Criteria   Heart Rate: 65 Within patient's normal limits / stable Yes   Resp: 17 Less than or equal to 30 Yes   BP: 118/62 Within patient's normal limits / minimal pressors (Hemodynamically Stable) Yes   SpO2: 100 % Greater than or equal to 90% Yes   End Tidal CO2: 46 (%) Within patient's normal limits Yes   Temp: 99 °F (37.2 °C) Less than 38. 5oC / 101. 3oF Yes     [x]    Based on this assessment and the [facility] Ventilator Weaning Protocol, this patient  IS being placed on a Spontaneous Breathing Trial (SBT) at this time.  []    Based on this assessment and the [facility] Ventilator Weaning Protocol, this patient  IS NOT being placed on a Spontaneous Breathing Trial (SBT) at this time. []    Patient  IS NOT being placed on a Spontaneous Breathing Trial (SBT) at this time because of factors not previously addressed. Those factors include ***     Vital Capacity (VC) = ***    Maximum Inspiratory Force (MIF) = ***    SBT - Initiated at  [facility] placed pt on wean.     Ventilator Settings:  CPAP - 5 cmH2O, PS - 8 cmH2O(if using settings other than CPAP 5/PS 8, please explain reasons for settings here):       1 Minute SBT Respiratory Parameters:   VE: 6.2 L   RR: 18 b/m   VT: 344 mL (average VT = VE/RR)   RSBI: 52 (RR/VT in liters)   ETCO2: 40 cmH2O   SPO2: 100 %   If on sedation, amount and type Precedex 0.3    [x]   RR is less than 35, RSBI is less than 100, patient's vitals signs are stable, and patient is in no apparent distress; therefore, patient is being left on SBT for up to 1 hour. []   RR is greater than 35, RSBI is greater than 100, VS's are unstable, or patient is in distress; therefore, patient is being placed back on previous settings. SBT - Concluded at  ***. Weaning Parameters/VS's at conclusion of SBT:   VE: *** L   RR: *** b/m   VT: *** mL (average VT = VE/RR)   RSBI: *** (RR/VT in liters)   ETCO2: *** cmH2O   SPO2: *** %    If on sedation, amount and type ***    []   Patient tolerated SBT for full 60 minutes with acceptable weaning parameters and vital signs and showed no signs or distress. []   Patient tolerated SBT for full 60 minutes, but had unacceptable weaning parameters or vital signs, and/or signs of distress. []   Patient was unable to tolerate SBT for 60 minutes and was placed back on previous settings.             COMMENTS:

## 2023-03-14 NOTE — PROGRESS NOTES
NEUROLOGY / NEUROCRITICAL CARE PROGRESS NOTE       Patient Name: Delia Hsieh YOB: 1970   Sex: Female Age: 46 yrs     CC / Reason for Consult: myasthenic crisis     Interval Hx / Changes over last 24 hours:   RT at bedside during exam, NIF -25. Headaches now improved, planning for next PLEX treatment on Wednesday. ROS: negative    HISTORY   Admission HPI:   Delia Hsieh is a 46 y.o. y/o female with history significant for anxiety, HLD, migraines, MG, seizures and thyroid disease. Per chart review: patient she noticed some difficulty chewing and a new rasp to her voice on 3/2/23. Patient attempted to correct this by taking smaller bites, but then began to develop generalized fatigue and came in to the hospital.  Patient states this is similar to previous MG exacerbations in the past.  She denies any recent illnesses. Patient admitted to [address] for further evaluation and treatment. Of note: patient is currently on mestinon 60mg TID at home and follows with [address] [place] [address] [address] Neurology. [facility] Past Medical History:   Diagnosis Date    Anxiety     Arthritis     Cancer ([facility] [address]     thyroid    GERD (gastroesophageal reflux disease)     Hyperlipidemia     Insomnia     Migraines     Myasthenia gravis with exacerbation (HCC)     Seizures (HCC)     Thyroid disease       Allergies Allergies   Allergen Reactions    Immune Globulins Other (See Comments)     Patient developed Aseptic meningitis after Flebogamma IVIG in the past.  Developed aseptic meningitis within 48h after Gamunex-C in 3/2023.      Other      Steroid injection to joints    Adhesive Tape Rash and Other (See Comments)     Paper Tape  Paper Tape    Skin gets reddened under tape; not allergic to latex    Penicillins Itching and Rash     Rash  Rash        Diet Diet NPO  ADULT TUBE FEEDING; Orogastric; Peptide Based High Protein; Continuous; 15; Yes; 10; Q 4 hours; 45; 30; Q 4 hours; Protein; 1 packet Prosource, hook packet to ENfit tube or follow instructions on pack of packet   Isolation No active isolations     CURRENT SCHEDULED MEDICATIONS   Inpatient Medications     lansoprazole, 30 mg, Orogastric, Daily    methotrexate, 10 mg, Oral, Weekly    sennosides-docusate sodium, 2 tablet, Oral, BID    polyethylene glycol, 17 g, Oral, Daily    citalopram, 40 mg, Orogastric, QAM    doxepin, 10 mg, Orogastric, Nightly    estradiol, 2 mg, Orogastric, Nightly    folic acid, 1 mg, Orogastric, Daily    levothyroxine, 150 mcg, Orogastric, Daily    sodium chloride flush, 5-40 mL, IntraVENous, 2 times per day    enoxaparin, 40 mg, SubCUTAneous, Nightly   Infusions    dexmedetomidine (PRECEDEX) IV infusion 0.3 mcg/kg/hr (03/14/23 0820)    fentaNYL Stopped (03/09/23 1331)    propofol Stopped (03/14/23 2559)    sodium chloride Stopped (03/10/23 1784)      Antibiotics   Recent Abx Admin        No antibiotic orders with administrations found. LABS   Metabolic Panel Recent Labs     03/12/23  0404 03/13/23  0315 03/14/23  0404    136 140   K 3.8 3.8 4.3   CL 97* 97* 104   CO2 29 29 28   BUN 15 17 31*   CREATININE <0.5* <0.5* <0.5*   GLUCOSE 86 100* 126*   CALCIUM 8.9 9.0 9.0   LABALBU 4.0 3.8 4.4   MG 1.70* 1.90 1.90   ALKPHOS 84 115 58   ALT 6* 10 10   AST 11* 18 15        CBC / Coags Recent Labs     03/12/23  0404 03/13/23  0315 03/14/23  0404   WBC 9.0 9.3 11.7*   RBC 4.10 4.21 4.11   HGB 11.7* 11.9* 11.5*   HCT 35.1* 35.7* 35.4*    295 287   INR 1.02 0.97 1.10        Other No results for input(s): LABA1C, LDLCALC, TRIG, TSH, IBLDDVYH10, FOLATE, LABSALI, COVID19 in the last 72 hours. No results for input(s): PHENYTOIN, KEPPRA, LACOSA, LAMO, VALPROATE, LACTSEPSIS, LACTA in the last 72 hours. DIAGNOSTICS   IMAGES:  Images personally reviewed and agree w/ radiology interpretation.     No Imaging to Review     PHYSICAL EXAMINATION     PHYSICAL EXAM:  Vitals:    03/14/23 3428 03/14/23 0630 03/14/23 0800 03/14/23 2409 BP:  112/60 (!) 145/72    Pulse: 66 68 78 69   Resp: 14 16 14 17   Temp:   99 °F (37.2 °C)    TempSrc:   Bladder    SpO2: 100% 99% 100% 98%   Weight:       Height:             General: patient is intubated, she is sleepy but does open eyes and nod head to answer question, she will write to communicate  Neurologic  Mental status:   Alert, intubated, will  answers questions by nodding head and will write to ask questions. Cranial nerves:   CN2: blinks to threat,   CN 3,4,6: Pupils equal and reactive to light, extraocular muscles intact  CN5: Facial sensation symmetric   CN7: Face symmetric but exam limited by ETT rangel  CN8-12: Exam limited by ETT, but nods head and writes questions and answers on clipboard and shrugs shoulders    Motor Exam:   R  L    Deltoid 2 2   Biceps 3 3   Triceps 3 3   Wrist extension  4 4   Interossei 4 4      R  L    Hip flexion  2 2   Hip extension  2 2   Knee flexion  3 3   Knee extension  3 3   Ankle dorsiflexion  3 3   Ankle plantar flexion  3 3     Deep tendon reflexes:     R  L    Biceps  2  2   Brachioradialis  2 2   Patellar  * 2     Sensation: Nods head yes to feeling light touch in all extremities       ASSESSMENT & RECOMMENDATIONS   Assessment:Radha gibbons is a 47 yo female with a known history of Myasthenia Gravis with previous exacerbations. Patient presented with trouble chewing, hoarseness of voice, and fatigue. Intubated on 3/6 for worsening respiratory status. She received IVIG x 2 days. She has been reporting a headache, she does have a history of migraines but had reported that her headache was more severe than her typical headache and she had associated neck stiffness and low grade temperature, concerning for an aseptic meningitis after IVIG (which she has a history of). Larena Emperor was placed  and she was started on PLEX. Plan is for 5 doses.    Headache and neck stiffness slowly improving    Plan:  - Holding home mestinon for now, discussed with Dr. Juan Hoffman, planning to restart when extubated and able to safely take PO  - Q4 hour neuro checks  - Continue ventilator support, managed by ICU team  - Transitioned to precedex  - Continue to document FVC and NIF and document in the chart  - Vascath in place, 2nd session of PLEX yesterday, planning for 5 sessions total  Plan per oncology/hematology and [facility]: \"Initial plan for 5 sessions daily but 5 total on MWF schedule. Plan for third session Wednesday  Replacement with 5% albumin, trend fibrinogen > 100, PT and PTT - may need cryoprecipitate or FFP  Trend CMP daily  Monitor for hypocalcemia - hypocalcemia protocol order per hem/onc  - Will continue to follow exam closely      [facility]   Neurology & Neurocritical Care   3/14/2023 8:55 AM      ICU Patients:   [address] [phone]  [facility]: [facility] Neurocritical [facility]    Critical Care:  Due to the immediate potential for life-threatening deterioration due to neurological failure, I spent 30 minutes providing critical care. This time excludes time spent performing procedures but includes time spent on direct patient care, history retrieval, review of the chart, and discussions with patient, family, and consultant(s).

## 2023-03-14 NOTE — PROGRESS NOTES
NEUROLOGY / NEUROCRITICAL CARE PROGRESS NOTE       Patient Name: Krista Hopkins YOB: 1970   Sex: Female Age: 46 yrs     CC / Reason for Consult: myasthenic crisis     Interval Hx / Changes over last 24 hours:   RT at bedside during exam, NIF -25. VC 1160   Headaches now improved, planning for next PLEX treatment on Wednesday. ROS: negative; reports RLE weakness is chronic r/t hip replacement     HISTORY   Admission HPI:   Krista Hopkins is a 46 y.o. y/o female with history significant for anxiety, HLD, migraines, MG, seizures and thyroid disease. Per chart review: patient she noticed some difficulty chewing and a new rasp to her voice on 3/2/23. Patient attempted to correct this by taking smaller bites, but then began to develop generalized fatigue and came in to the hospital.  Patient states this is similar to previous MG exacerbations in the past.  She denies any recent illnesses. Patient admitted to [address] for further evaluation and treatment. Of note: patient is currently on mestinon 60mg TID at home and follows with [address] [place] [address] [address] Neurology. [facility] Past Medical History:   Diagnosis Date    Anxiety     Arthritis     Cancer ([facility] [address]     thyroid    GERD (gastroesophageal reflux disease)     Hyperlipidemia     Insomnia     Migraines     Myasthenia gravis with exacerbation (HCC)     Seizures (HCC)     Thyroid disease       Allergies Allergies   Allergen Reactions    Immune Globulins Other (See Comments)     Patient developed Aseptic meningitis after Flebogamma IVIG in the past.  Developed aseptic meningitis within 48h after Gamunex-C in 3/2023.      Other      Steroid injection to joints    Adhesive Tape Rash and Other (See Comments)     Paper Tape  Paper Tape    Skin gets reddened under tape; not allergic to latex    Penicillins Itching and Rash     Rash  Rash        Diet Diet NPO  ADULT TUBE FEEDING; Orogastric; Peptide Based High Protein; Continuous; 15; Yes; 10; Q 4 hours; 45; 30; Q 4 hours; Protein; 1 packet Prosource, hook packet to ENfit tube or follow instructions on pack of packet   Isolation No active isolations     CURRENT SCHEDULED MEDICATIONS   Inpatient Medications     lansoprazole, 30 mg, Orogastric, Daily    methotrexate, 10 mg, Oral, Weekly    sennosides-docusate sodium, 2 tablet, Oral, BID    polyethylene glycol, 17 g, Oral, Daily    citalopram, 40 mg, Orogastric, QAM    doxepin, 10 mg, Orogastric, Nightly    estradiol, 2 mg, Orogastric, Nightly    folic acid, 1 mg, Orogastric, Daily    levothyroxine, 150 mcg, Orogastric, Daily    sodium chloride flush, 5-40 mL, IntraVENous, 2 times per day    enoxaparin, 40 mg, SubCUTAneous, Nightly   Infusions    dexmedetomidine (PRECEDEX) IV infusion 0.3 mcg/kg/hr (03/14/23 0820)    fentaNYL Stopped (03/09/23 1331)    propofol Stopped (03/14/23 0623)    sodium chloride Stopped (03/10/23 6654)              LABS   Metabolic Panel Recent Labs     03/12/23  0404 03/13/23  0315 03/14/23  0404    136 140   K 3.8 3.8 4.3   CL 97* 97* 104   CO2 29 29 28   BUN 15 17 31*   CREATININE <0.5* <0.5* <0.5*   GLUCOSE 86 100* 126*   CALCIUM 8.9 9.0 9.0   LABALBU 4.0 3.8 4.4   MG 1.70* 1.90 1.90   ALKPHOS 84 115 58   ALT 6* 10 10   AST 11* 18 15        CBC / Coags Recent Labs     03/12/23  0404 03/13/23  0315 03/14/23  0404   WBC 9.0 9.3 11.7*   RBC 4.10 4.21 4.11   HGB 11.7* 11.9* 11.5*   HCT 35.1* 35.7* 35.4*    295 287   INR 1.02 0.97 1.10              DIAGNOSTICS   IMAGES:  Images personally reviewed and agree w/ radiology interpretation. CT head 3/10/23  Normal noncontrast CT the brain without acute hemorrhage, edema or hydrocephalus. Diffuse sinus opacity and fluid throughout the maxillary sinuses ethmoid air cells sphenoid sinuses. Duodenal feeding tube or NG tube in place with diffuse fluid throughout the nasopharynx.  There appears to be prior nasal sinus surgery     PHYSICAL EXAMINATION     PHYSICAL EXAM:  Vitals: 03/14/23 1000 03/14/23 1200 03/14/23 1219 03/14/23 1225   BP: 109/65 107/60  112/60   Pulse: 65 65 68 68   Resp: 14 15 19 16   Temp:       TempSrc:  Oral     SpO2: 97% 97% 99% 100%   Weight:       Height:             General: patient is intubated, awake; writing legible notes  Neurologic  Mental status:   Alert, intubated, will  answers questions by nodding head and will write to ask questions. Follows commands briskly      Cranial nerves:   CN2: blinks to threat  CN 3,4,6: Pupils equal and reactive to light, extraocular muscles intact; good upgaze  CN5: Facial sensation symmetric   CN7: Face symmetric but exam limited by ETT rangel  CN8-12: Exam limited by ETT, but nods head and writes questions and answers on clipboard and shrugs shoulders; able to lift head to adjust pillow     Motor Exam:   R  L    Deltoid 2 2   Biceps 3 3   Triceps 3 3   Wrist extension  4 4   Interossei 4 4      R  L    Hip flexion  2 2   Hip extension  2 2   Knee flexion  3 3   Knee extension  3 3   Ankle dorsiflexion  3 3   Ankle plantar flexion  3 3     Deep tendon reflexes:     R  L    Biceps  2  2   Brachioradialis  2 2   Patellar  * 2     Sensation: Nods head yes to feeling light touch in all extremities     ASSESSMENT & RECOMMENDATIONS   Assessment:  Estelle Milton is a 45 yo female with MG who presented with trouble chewing, hoarseness of voice, and fatigue. Intubated on 3/6 for worsening respiratory status. She received IVIG x 2 days complicated by probable aseptic meningitis. Started on PLEX, 3rd exchange to be done Wednesday 3/15. Plan:  - Holding home mestinon   - Q4 hour neuro checks  - Continue ventilator support, managed by ICU team  - Limit sedation as able   - Continue to document FVC and NIF  - Vascath in place, planning for 5 sessions total  Plan per oncology/hematology and [facility]: \"Initial plan for 5 sessions daily but 5 total on MWF schedule.  Plan for third session Wednesday  Replacement with 5% albumin, trend fibrinogen > 100, PT and PTT - may need cryoprecipitate or FFP  Trend CMP daily  Monitor for hypocalcemia - hypocalcemia protocol order per hem/onc  - Will continue to follow exam closely    BETH Capone - CNP   Neurology & Neurocritical Care   3/14/2023 1:26 PM    ICU Patients:   [address] [phone]  [facility]: [facility] Neurocritical [facility]    Critical Care:  Due to the immediate potential for life-threatening deterioration due to neurological failure, I spent 30 minutes providing critical care. This time excludes time spent performing procedures but includes time spent on direct patient care, history retrieval, review of the chart, and discussions with patient, family, and consultant(s).

## 2023-03-14 NOTE — PROGRESS NOTES
Attempting to increase Precedex rate to wean off of propofol. Pt is becoming bradycardic in 50s and hypotensive with SBP 80s-90s. Currently titrating down on Precedex for this reason.

## 2023-03-14 NOTE — PROGRESS NOTES
Patient extubated per orders by RT Gaby Rich. Patient tolerating well, strong cough with minimal secretions, lung sounds are clear bilaterally, O2 saturation 100 % on 2 L nasal cannula. Voice is hoarse post extubation, patient oriented to person and place. Will continue to monitor closely.

## 2023-03-14 NOTE — PROGRESS NOTES
Comprehensive Nutrition Assessment    RECOMMENDATIONS:  Modify current TF to Vital HP @ 55 mL/hr  D/c Prosource  Continue FW flushes of 30 mL q4  Nutrition Education: Education not appropriate     NUTRITION ASSESSMENT:   Nutritional summary & status: Follow up: Pt remains intubated, she was extubated yesterday and placed on cPAP, but reintubated per request d/t lethargy. Sedated on precedex, propofol d/c'ed. Will place new TF orders as current orders no longer meet pt needs. Pt was constipated, miralax/senna on board, BM 3/14. Continues on PLEX MWF. Will continue to monitor. Admission/PMH: Myasthenia gravis w/ acute exacerbation, respiratory failure    MALNUTRITION ASSESSMENT  Context of Malnutrition: Acute Illness   Malnutrition Status: At risk for malnutrition (Comment)  Findings of the 6 clinical characteristics of malnutrition (Minimum of 2 out of 6 clinical characteristics is required to make the diagnosis of moderate or severe Protein Calorie Malnutrition based on AND/ASPEN Guidelines):  Energy Intake:  Mild decrease in energy intake (Comment)  Weight Loss:  No significant weight loss     Body Fat Loss:  No significant body fat loss     Muscle Mass Loss:  No significant muscle mass loss    Fluid Accumulation:  No significant fluid accumulation      NUTRITION DIAGNOSIS   Inadequate oral intake related to impaired respiratory function as evidenced by intubation, NPO or clear liquid status due to medical condition    Nutrition Monitoring and Evaluation:   Food/Nutrient Intake Outcomes:  Enteral Nutrition Intake/Tolerance  Physical Signs/Symptoms Outcomes:  Biochemical Data, Weight, Nutrition Focused Physical Findings, GI Status     OBJECTIVE DATA: Significant to nutrition assessment  Nutrition Related Findings: . +BM 3/14. +3.3L. Wounds: None  Nutrition Goals:  Tolerate nutrition support at goal rate     CURRENT NUTRITION THERAPIES  Current Tube Feeding (TF) Orders:  Feeding Route: Orogastric  Formula: Peptide Based High Protein  Schedule: Continuous  Additives/Modulars: None  Goal TF & Flush Orders Provides: Vital HP @ 55 mL/hr to provide: 1320 mL TV, 1320 kcal, 115 g/pro and 1104 mL FW + FW flushes of 30 mL q4  PO Intake: NPO   PO Supplement Intake:NPO  Additional Sources of Calories/IVF:N/A     ANTHROPOMETRICS  Current Height: 5' 2.01\" (157.5 cm)  Current Weight: 167 lb 8.8 oz (76 kg)    Admission weight: 167 lb 12.3 oz (76.1 kg)  Ideal Body Weight (IBW): 110 lbs  (50 kg)    BMI: 30.7    COMPARATIVE STANDARDS  Energy (kcal):  2470-9266 (15-18 kcal/kg CBW)     Protein (g):  100-110 (2.0-2.2 g/kg IBW)       Fluid (mL/day):  1 mL/kcal or per MD    The patient will be monitored per nutrition standards of care. Consult dietitian if additional nutrition interventions are needed prior to RD reassessment.      Emmy Ortiz, [address], [address] [place] [address] Drive:  [phone]  Office:  [phone]

## 2023-03-14 NOTE — PROGRESS NOTES
ICU Progress Note    Admit Date: 3/5/2023  Hospital day: 8  ICU day: 7  Vent Day: 7  IV Access:Peripheral  IV Fluids:None  Vasopressors:None                Antibiotics: None  Diet: Diet NPO  ADULT TUBE FEEDING; Orogastric; Peptide Based High Protein; Continuous; 15; Yes; 10; Q 4 hours; 45; 30; Q 4 hours; Protein; 1 packet Prosource, hook packet to ENfit tube or follow instructions on pack of packet    CC: difficulty swallowing and generalized weakness     Interval history: Tolerated CPAP mode for 2hrs yesterday, however pt asked to be put back on full support after tiring out per nurse. Of note, unable to obtain nightly NIF/VC, pt reported as sleepy, as well apnea observed on CPAP mode. Of note, CXR showing ET tube at level of R mainstem bronchus, pulled back per RT. Overnight, noted to have bradycardia to 50s wit SBP 80-90s. Thus Precedex was weaned down 0.6 > 03. Off propofol. This AM, NIF -30 and VC 1068 mL x2 on CPAP mode. Otherwise, VSS stable, on vent.  Cr/lytes stable, Fibrinogen wnl this AM.    PLEX dose 4 planned for tomorrow (MWF schedule)    Medications:     Scheduled Meds:   lansoprazole  30 mg Orogastric Daily    methotrexate  10 mg Oral Weekly    sennosides-docusate sodium  2 tablet Oral BID    polyethylene glycol  17 g Oral Daily    citalopram  40 mg Orogastric QAM    doxepin  10 mg Orogastric Nightly    estradiol  2 mg Orogastric Nightly    folic acid  1 mg Orogastric Daily    levothyroxine  150 mcg Orogastric Daily    sodium chloride flush  5-40 mL IntraVENous 2 times per day    enoxaparin  40 mg SubCUTAneous Nightly     Continuous Infusions:   dexmedetomidine (PRECEDEX) IV infusion 0.3 mcg/kg/hr (03/14/23 0820)    fentaNYL Stopped (03/09/23 1331)    propofol Stopped (03/14/23 1246)    sodium chloride Stopped (03/10/23 0638)     PRN Meds:butalbital-acetaminophen-caffeine, acetaminophen **OR** acetaminophen, metoclopramide, hydrALAZINE, LORazepam, sodium chloride flush, sodium chloride, ondansetron **OR** ondansetron    Objective:   Vitals:   T-max:  Patient Vitals for the past 8 hrs:   BP Temp Temp src Pulse Resp SpO2 Weight   03/14/23 0913 -- -- -- 68 16 98 % --   03/14/23 0900 115/62 -- -- 68 15 98 % --   03/14/23 0849 -- -- -- 69 17 98 % --   03/14/23 0845 122/65 -- -- 70 18 99 % --   03/14/23 0800 (!) 145/72 99 °F (37.2 °C) Bladder 78 14 100 % --   03/14/23 0630 112/60 -- -- 68 16 99 % --   03/14/23 0623 -- -- -- 66 14 100 % --   03/14/23 0615 114/60 -- -- 67 15 99 % --   03/14/23 0600 116/64 -- -- 68 15 99 % --   03/14/23 0545 118/60 -- -- 68 15 100 % --   03/14/23 0530 120/61 -- -- 70 15 100 % --   03/14/23 0515 129/65 -- -- 73 15 100 % --   03/14/23 0500 129/67 -- -- 75 18 100 % --   03/14/23 0456 -- -- -- -- -- -- 167 lb 8.8 oz (76 kg)   03/14/23 0445 101/60 -- -- 77 21 -- --   03/14/23 0430 120/60 -- -- 72 18 99 % --   03/14/23 0415 106/63 -- -- 64 17 96 % --   03/14/23 0410 -- -- -- 65 14 98 % --   03/14/23 0400 94/63 99 °F (37.2 °C) Bladder 63 14 97 % --   03/14/23 0345 (!) 97/59 -- -- 61 14 97 % --   03/14/23 0330 (!) 92/56 -- -- 59 14 98 % --   03/14/23 0315 (!) 99/57 -- -- 60 16 98 % --   03/14/23 0300 (!) 85/58 -- -- 58 15 100 % --   03/14/23 0245 (!) 88/55 -- -- 59 14 98 % --   03/14/23 0230 (!) 88/53 -- -- 58 14 98 % --   03/14/23 0215 91/63 -- -- 57 14 98 % --   03/14/23 0200 (!) 83/53 -- -- 57 14 -- --   03/14/23 0145 (!) 84/52 -- -- 58 16 100 % --       Intake/Output Summary (Last 24 hours) at 3/14/2023 0944  Last data filed at 3/14/2023 0837  Gross per 24 hour   Intake 2452.73 ml   Output 1772 ml   Net 680.73 ml       Review of Systems - per interval history. Physical Exam  Constitutional:       General: She is not in acute distress. Interventions: She is intubated. HENT:      Head: Normocephalic and atraumatic. Eyes:      Conjunctiva/sclera: Conjunctivae normal.      Pupils: Pupils are equal, round, and reactive to light.    Cardiovascular:      Rate and Rhythm: Normal rate and regular rhythm. Pulmonary:      Effort: She is intubated. Comments: Mechanical breath sound bilaterally. Abdominal:      General: Bowel sounds are normal.      Palpations: Abdomen is soft. Musculoskeletal:      Right lower leg: No edema. Left lower leg: No edema. Skin:     General: Skin is warm and dry. Neurological:      Motor: Weakness (Generalized. 4/5 upper extremities. 3/5 lower extremities. ) present. Comments: Awake and alert. Responsive to commands. Cough reflex intact. BLE strength slowly improving         LABS:    CBC:   Recent Labs     03/12/23 0404 03/13/23 0315 03/14/23 0404   WBC 9.0 9.3 11.7*   HGB 11.7* 11.9* 11.5*   HCT 35.1* 35.7* 35.4*    295 287   MCV 85.6 84.9 86.2     Renal:    Recent Labs     03/12/23 0404 03/13/23 0315 03/14/23 0404    136 140   K 3.8 3.8 4.3   CL 97* 97* 104   CO2 29 29 28   BUN 15 17 31*   CREATININE <0.5* <0.5* <0.5*   GLUCOSE 86 100* 126*   CALCIUM 8.9 9.0 9.0   MG 1.70* 1.90 1.90   ANIONGAP 11 10 8     Hepatic:   Recent Labs     03/12/23 0404 03/13/23 0315 03/14/23 0404   AST 11* 18 15   ALT 6* 10 10   BILITOT <0.2 <0.2 <0.2   PROT 6.4 6.6 6.0*   LABALBU 4.0 3.8 4.4   ALKPHOS 84 115 58        -----------------------------------------------------------------  RAD:   XR CHEST PORTABLE   Final Result   1. Advancement of endotracheal tube with the tip now at the level the right mainstem bronchus. Consider repositioning. 2. No significant change from prior otherwise. XR ABDOMEN (KUB) (SINGLE AP VIEW)   Final Result      Enteric tube tip in the proximal stomach just distal to the gastroesophageal junction. CT HEAD WO CONTRAST   Final Result      Normal noncontrast CT the brain without acute hemorrhage, edema or hydrocephalus. Diffuse sinus opacity and fluid throughout the maxillary sinuses ethmoid air cells sphenoid sinuses.  Duodenal feeding tube or NG tube in place with diffuse fluid throughout the nasopharynx. There appears to be prior nasal sinus surgery      XR CHEST PORTABLE   Final Result   1. Right CVC, dialysis catheter in satisfactory position with its tip in the proximal to mid SVC. (Catheter tip is approximately 4 cm above the cavoatrial junction)   2. No pneumothorax      XR CHEST PORTABLE   Final Result      1.  Repositioning of ET tube with tip now lying approximately 1.6 cm above the douglas.      2.  Near complete reexpansion of the left lung with persistent mild left basilar atelectasis.      3.  Further advancement of feeding tube with tip projecting over the distal gastric body.      XR CHEST 1 VIEW   Final Result   Impression: Interval intubation. The endotracheal tube terminates within the right mainstem bronchus and should be retracted by at least 3 cm. Findings were discussed with ESTELITA Carmen at the time of report.          Assessment/Plan:        Neuro/Sedation:  Alert and oriented  On Precedex 0.3, weaned off Propofol  Myasthenic Crisis  S/s leading to admission of weakness, dysphagia, and dysphonia; consistent with prior myasthenic crises, most recent 01/2022 where she req PLEX. S/P IVIG, but req PLEX given insufficient improvement  - PLEX started 3/9, dose 4 tomorrow  - albumin and calcium gluc infusions  - neurology following, Hematology following for PLEX  -avoid neuromuscular blocking agents, beta-blockers, procainamide, quinidine, aminoglycosides, fluoroquinolines  - q4 neurochecks  - restarted Methotrexate yesterday  - cont holding Mestinon per Neuro    Posterior Headaches  Considered aseptic meningitic rxn 2/2 IVIG, less likely given conitnued s/s  - CT head 3/11 no concerning findings  - PRN tylenol, Fioricet, Sumatriptan    CV:  Hemodynamically stable and afebrile  SBP between 110-120s  No pressor requirements  Chronic Diastolic Heart Failure  Echo 01/2022: 55-60%, no WMA, G1DD  - appears euvolemic    Respiratory:   SpO2 100% on MV  Vent Settings: Vent Mode: AC/PRVC  Resp Rate (Set): 14 bmp/Vt (Set, mL): 450 mL/ /FiO2 : 30 % PEEP 5  No results for input(s): PHART, TBS4VPW, PO2ART in the last 72 hours. - Last ABG 3/6 7.411/43.9/200s/28  Acute Respiratory Failure 2/2 Myasthenic crisis  Intubated for concern of ability to protect airway. No acute intrinsic respiratory pathology.   - NIF and FVC q shift - notify neuro for FVC < 15mil/kg and/or NIF < 20 cm H2O  - home Mestinon held - may increase secretions  - failed SBT 3/11, minute vol decreased from 6-7L to 3L  - anticipate improvement with continued PLEX  - Tolerated SBT for 2 hrs yesterday before pt tiring out requesting to be put back on full support  - this AM, NIF -30 and VC 1090 ml x2    ID:  No active issues    Renal:  Torres - 3/8  I/Os: 1.5/ 24 hours, stable. Cumulative 9.8L/+ve 2.3L  Acute Urinary Retention  Req Torres after req several straight cath this admission. GI:  Tube feeding via left NG  Diet NPO  ADULT TUBE FEEDING; Orogastric; Peptide Based High Protein; Continuous; 15; Yes; 10; Q 4 hours; 45; 30; Q 4 hours; Protein; 1 packet Prosource, hook packet to ENfit tube or follow instructions on pack of packet  Prophylaxis - Protonix    Diet if pressor requirement  TF -at goal or not  BM    Endocrine:  Hypothyroidism  01/2022 - TSH 10, FT4 0.9, low FT3  - home Synthroid 150 daily    Hematology/Oncology:  Chronic Normocytic Anemia  - Hgb at baseline 11s. MCV mid 80s  - last iron studies 2021 likely ACD    Skin  Psoriasis - home methotrexate and folate    Psych:   Anxiety - home Celexa and Doxepin    Code Status: Full Code  FEN: Diet NPO  ADULT TUBE FEEDING; Orogastric; Peptide Based High Protein; Continuous; 15; Yes; 10; Q 4 hours; 45; 30; Q 4 hours; Protein; 1 packet Prosource, hook packet to ENfit tube or follow instructions on pack of packet  PPX: Lovenox, Protonix  DISPO: ICU    Devika Quesada MD, PGY-1  03/14/23  9:44 AM    Patient will be staffed and discussed with attending physician Dr. Elyse Jarrett    Patient was seen, examined and discussed with Dr. Crescencio Sawant. I agree with the interval history. My physical exam confirms the findings listed below  Chart was reviewed including labs and medical records confirm the findings noted    Peripheral IV 03/05/23 Right Hand (Active)   Number of days: 9       Peripheral IV 03/06/23 Left; Anterior Hand (Active)   Number of days: 8       Vascath Right  (Active)   Number of days: 4       Urinary Catheter 03/08/23 Torres-Temperature (Active)   Number of days: 5     Acute respiratory failure on mechanical vent support. , RR 14, FiO2 30, PEEP5. Day #8. On precedex 0.3.  now she is able to move her head off the pillow. NIF and VC are improving. Myasthenic crisis. Received IVIG. Now on PLEX  TF tolerated. No BM. Now on Miralax and senokote     SBT daily   NIF and VC are improving. Hopefully we can extubate today     Pt has a high probability of imminent or life-threatening deterioration requiring close monitoring, and highly complex decision-making and/or interventions of high intensity to assess, manipulate, and support his critical organ systems to prevent a likely inevitable decline which could occur if left untreated. A total critical care time 35 minutes was used. This includes but not limited to examining patient, collaborating with other physicians, monitoring vital signs, telemetry, continuous pulse oximetry, and clinical response to IV medications, documentation time, review and interpretation of laboratory and radiological data, review of nursing notes and old record review.  This time excludes any time that may have been spent performing procedures for life threatening organ failure

## 2023-03-14 NOTE — PLAN OF CARE
Problem: Discharge Planning  Goal: Discharge to home or other facility with appropriate resources  Outcome: Progressing  Flowsheets (Taken 3/13/2023 2000)  Discharge to home or other facility with appropriate resources: Identify barriers to discharge with patient and caregiver     Problem: Safety - Adult  Goal: Free from fall injury  Outcome: Progressing  Flowsheets (Taken 3/13/2023 2000)  Free From Fall Injury: Instruct family/caregiver on patient safety     Problem: Pain  Goal: Verbalizes/displays adequate comfort level or baseline comfort level  Outcome: Progressing  Flowsheets (Taken 3/13/2023 2000)  Verbalizes/displays adequate comfort level or baseline comfort level: Encourage patient to monitor pain and request assistance     Problem: ABCDS Injury Assessment  Goal: Absence of physical injury  Outcome: Progressing  Flowsheets (Taken 3/13/2023 2000)  Absence of Physical Injury: Implement safety measures based on patient assessment     Problem: Skin/Tissue Integrity  Goal: Absence of new skin breakdown  Description: 1. Monitor for areas of redness and/or skin breakdown  2. Assess vascular access sites hourly  3. Every 4-6 hours minimum:  Change oxygen saturation probe site  4. Every 4-6 hours:  If on nasal continuous positive airway pressure, respiratory therapy assess nares and determine need for appliance change or resting period. Outcome: Progressing     Problem: Neurosensory - Adult  Goal: Achieves stable or improved neurological status  Outcome: Progressing  Flowsheets (Taken 3/13/2023 2000)  Achieves stable or improved neurological status: Assess for and report changes in neurological status  Goal: Absence of seizures  Outcome: Progressing  Flowsheets (Taken 3/13/2023 2000)  Absence of seizures: Monitor for seizure activity.   If seizure occurs, document type and location of movements and any associated apnea  Goal: Remains free of injury related to seizures activity  Outcome: Progressing  Flowsheets (Taken 3/13/2023 2000)  Remains free of injury related to seizure activity: Maintain airway, patient safety  and administer oxygen as ordered  Goal: Achieves maximal functionality and self care  Outcome: Progressing  Flowsheets (Taken 3/13/2023 2000)  Achieves maximal functionality and self care: Monitor swallowing and airway patency with patient fatigue and changes in neurological status     Problem: Respiratory - Adult  Goal: Achieves optimal ventilation and oxygenation  Outcome: Progressing  Flowsheets (Taken 3/13/2023 2000)  Achieves optimal ventilation and oxygenation: Assess for changes in respiratory status     Problem: Nutrition Deficit:  Goal: Optimize nutritional status  Outcome: Progressing     Problem: Safety - Medical Restraint  Goal: Remains free of injury from restraints (Restraint for Interference with Medical Device)  Description: INTERVENTIONS:  1. Determine that other, less restrictive measures have been tried or would not be effective before applying the restraint  2. Evaluate the patient's condition at the time of restraint application  3. Inform patient/family regarding the reason for restraint  4.  Q2H: Monitor safety, psychosocial status, comfort, nutrition and hydration  Outcome: Progressing  Flowsheets  Taken 3/14/2023 0600  Remains free of injury from restraints (restraint for interference with medical device): Determine that other, less restrictive measures have been tried or would not be effective before applying the restraint  Taken 3/14/2023 0400  Remains free of injury from restraints (restraint for interference with medical device): Determine that other, less restrictive measures have been tried or would not be effective before applying the restraint  Taken 3/14/2023 0200  Remains free of injury from restraints (restraint for interference with medical device): Determine that other, less restrictive measures have been tried or would not be effective before applying the restraint  Taken 3/14/2023 0000  Remains free of injury from restraints (restraint for interference with medical device): Determine that other, less restrictive measures have been tried or would not be effective before applying the restraint  Taken 3/13/2023 2200  Remains free of injury from restraints (restraint for interference with medical device): Determine that other, less restrictive measures have been tried or would not be effective before applying the restraint  Taken 3/13/2023 2000  Remains free of injury from restraints (restraint for interference with medical device): Determine that other, less restrictive measures have been tried or would not be effective before applying the restraint

## 2023-03-14 NOTE — PROGRESS NOTES
ONCOLOGY HEMATOLOGY CARE PROGRESS NOTE      SUBJECTIVE:    Resting quietly in bed today, therefore not disturbed. She was able to tolerate CPAP x 2 hours yesterday but tired out and asked to be put back on full support.        OBJECTIVE        Physical    VITALS:  Patient Vitals for the past 24 hrs:   BP Temp Temp src Pulse Resp SpO2 Weight   03/14/23 0913 -- -- -- 68 16 98 % --   03/14/23 0900 115/62 -- -- 68 15 98 % --   03/14/23 0849 -- -- -- 69 17 98 % --   03/14/23 0845 122/65 -- -- 70 18 99 % --   03/14/23 0800 (!) 145/72 99 °F (37.2 °C) Bladder 78 14 100 % --   03/14/23 0630 112/60 -- -- 68 16 99 % --   03/14/23 0623 -- -- -- 66 14 100 % --   03/14/23 0615 114/60 -- -- 67 15 99 % --   03/14/23 0600 116/64 -- -- 68 15 99 % --   03/14/23 0545 118/60 -- -- 68 15 100 % --   03/14/23 0530 120/61 -- -- 70 15 100 % --   03/14/23 0515 129/65 -- -- 73 15 100 % --   03/14/23 0500 129/67 -- -- 75 18 100 % --   03/14/23 0456 -- -- -- -- -- -- 167 lb 8.8 oz (76 kg)   03/14/23 0445 101/60 -- -- 77 21 -- --   03/14/23 0430 120/60 -- -- 72 18 99 % --   03/14/23 0415 106/63 -- -- 64 17 96 % --   03/14/23 0410 -- -- -- 65 14 98 % --   03/14/23 0400 94/63 99 °F (37.2 °C) Bladder 63 14 97 % --   03/14/23 0345 (!) 97/59 -- -- 61 14 97 % --   03/14/23 0330 (!) 92/56 -- -- 59 14 98 % --   03/14/23 0315 (!) 99/57 -- -- 60 16 98 % --   03/14/23 0300 (!) 85/58 -- -- 58 15 100 % --   03/14/23 0245 (!) 88/55 -- -- 59 14 98 % --   03/14/23 0230 (!) 88/53 -- -- 58 14 98 % --   03/14/23 0215 91/63 -- -- 57 14 98 % --   03/14/23 0200 (!) 83/53 -- -- 57 14 -- --   03/14/23 0145 (!) 84/52 -- -- 58 16 100 % --   03/14/23 0130 (!) 91/56 -- -- 57 18 99 % --   03/14/23 0115 (!) 86/51 -- -- 58 16 100 % --   03/14/23 0110 -- -- -- 59 19 99 % --   03/14/23 0100 (!) 87/54 -- -- 59 15 99 % --   03/14/23 0045 (!) 91/57 -- -- 59 15 98 % --   03/14/23 0030 93/60 -- -- 60 14 99 % --   03/14/23 0015 (!) 95/56 -- -- 59 14 98 % --   03/14/23 0000 96/62 99.7 °F (37.6 °C) -- 66 17 -- --   03/13/23 2345 106/72 -- -- 70 17 100 % --   03/13/23 2330 100/63 -- -- 76 18 100 % --   03/13/23 2315 (!) 101/59 -- -- 68 15 100 % --   03/13/23 2300 (!) 103/56 -- -- 69 15 -- --   03/13/23 2245 103/65 -- -- 73 15 -- --   03/13/23 2230 104/62 -- -- 75 15 -- --   03/13/23 2215 (!) 103/59 -- -- 72 17 -- --   03/13/23 2200 95/65 -- -- 70 14 98 % --   03/13/23 2145 103/61 -- -- 70 14 99 % --   03/13/23 2130 104/63 -- -- 75 14 100 % --   03/13/23 2115 107/73 -- -- 80 15 100 % --   03/13/23 2100 108/65 -- -- 80 14 100 % --   03/13/23 2045 111/66 -- -- 78 13 100 % --   03/13/23 2041 -- -- -- 81 16 100 % --   03/13/23 2030 114/67 -- -- 78 14 -- --   03/13/23 2015 125/73 -- -- 81 15 -- --   03/13/23 2000 136/76 99.7 °F (37.6 °C) Bladder 82 15 100 % --   03/13/23 1945 -- -- -- 80 14 -- --   03/13/23 1930 -- -- -- 79 14 -- --   03/13/23 1915 -- -- -- 81 14 -- --   03/13/23 1900 (!) 141/81 -- -- 84 14 -- --   03/13/23 1822 (!) 155/82 -- -- 96 17 100 % --   03/13/23 1801 -- -- -- (!) 115 13 97 % --   03/13/23 1700 113/81 -- -- 82 22 97 % --   03/13/23 1625 -- -- -- 82 14 98 % --   03/13/23 1600 122/85 99.3 °F (37.4 °C) Bladder 80 14 98 % --   03/13/23 1500 107/63 -- -- 80 14 99 % --   03/13/23 1400 (!) 108/57 -- -- 77 14 99 % --   03/13/23 1300 132/82 -- -- 77 14 99 % --   03/13/23 1217 -- -- -- 76 14 99 % --   03/13/23 1200 116/80 98.6 °F (37 °C) Bladder 76 14 99 % --   03/13/23 1100 116/82 -- -- 77 14 99 % --       24HR INTAKE/OUTPUT:    Intake/Output Summary (Last 24 hours) at 3/14/2023 1003  Last data filed at 3/14/2023 7418  Gross per 24 hour   Intake 2452.73 ml   Output 1527 ml   Net 925.73 ml       CONSTITUTIONAL: awake, alert, cooperative, no apparent distress   EYES: pupils equal, round and reactive to light, sclera clear and conjunctiva normal  ENT: Normocephalic, without obvious abnormality, atraumatic  NECK: supple, symmetrical, no jugular venous distension and no carotid bruits   HEMATOLOGIC/LYMPHATIC: no cervical, supraclavicular or axillary lymphadenopathy   LUNGS: no increased work of breathing and clear to auscultation   CARDIOVASCULAR: regular rate and rhythm, normal S1 and S2, no murmur noted  ABDOMEN: normal bowel sounds x 4, soft, non-distended, non-tender, no masses palpated, no hepatosplenomgaly   MUSCULOSKELETAL: full range of motion noted, tone is normal  NEUROLOGIC: awake, alert, oriented to name, place and time. Motor skills grossly intact. SKIN: Normal skin color, texture, turgor and no jaundice.  appears intact   EXTREMITIES: no LE edema     DATA:  CBC:    Recent Labs     03/14/23  0404 03/13/23  0315 03/12/23  0404 03/11/23  0430   WBC 11.7* 9.3 9.0 9.6   NEUTROABS 9.3* 6.3 5.8 6.6   LYMPHOPCT 14.1 20.7 21.8 19.2   RBC 4.11 4.21 4.10 4.17   HGB 11.5* 11.9* 11.7* 11.9*   HCT 35.4* 35.7* 35.1* 35.7*   MCV 86.2 84.9 85.6 85.7   MCH 28.1 28.2 28.6 28.5   MCHC 32.6 33.2 33.4 33.2   RDW 13.9 13.6 14.0 13.8    295 266 239       PT/INR:    Recent Labs     03/14/23  0404 03/13/23  0315 03/12/23  0404 03/11/23  0430 03/09/23  1508 03/09/23  0430   PROT 6.0* 6.6 6.4 6.2*  --  8.0   INR 1.10 0.97 1.02 1.07 0.99  --      PTT:    Recent Labs     03/14/23  0404 03/13/23  1244 03/10/23  1428 03/09/23  1508   APTT 32.0 30.5 49.4* 35.0*       CMP:    Recent Labs     03/14/23  0404 03/13/23  0315 03/12/23  0404 03/11/23  0430    136 137 140   K 4.3 3.8 3.8 4.0    97* 97* 102   CO2 28 29 29 30   GLUCOSE 126* 100* 86 99   BUN 31* 17 15 13   CREATININE <0.5* <0.5* <0.5* <0.5*   LABGLOM >60 >60 >60 >60   CALCIUM 9.0 9.0 8.9 9.0   PROT 6.0* 6.6 6.4 6.2*   LABALBU 4.4 3.8 4.0 4.3   AGRATIO 2.8* 1.4 1.7 2.3*   BILITOT <0.2 <0.2 <0.2 <0.2   ALKPHOS 58 115 84 62   ALT 10 10 6* <5*   AST 15 18 11* 9*   MG 1.90 1.90 1.70* 1.60*       Lab Results   Component Value Date    CALCIUM 9.0 03/14/2023    PHOS 3.3 03/10/2023       LDH:  Recent Labs 03/10/23  1428   LDH 65*       Radiology Review:  XR CHEST PORTABLE  Narrative: History: Increased secretions. Respiratory distress. AP chest.    COMPARISON: 3/9/2023. FINDINGS: Endotracheal tube tip advanced and now at the origin of the right mainstem bronchus. Consider repositioning. Right IJ dual-lumen catheter in stable position. Stable heart size. No significant effusion or consolidation. Feeding tube tip in the   mid stomach. Impression: 1. Advancement of endotracheal tube with the tip now at the level the right mainstem bronchus. Consider repositioning. 2. No significant change from prior otherwise. Problem List  Patient Active Problem List   Diagnosis    Myasthenia gravis ([address]    Myasthenia gravis with (acute) exacerbation (HCC)    Appendicitis    Neck stiffness    Generalized weakness    Myasthenia gravis with exacerbation, adult form ([address]    Hyperlipidemia    Seizure (HCC)    GERD (gastroesophageal reflux disease)    Migraine    Anxiety    Insomnia    Myasthenia gravis with exacerbation (HCC)    Bradycardia    Sinus arrhythmia    Postablative hypothyroidism    'light-for-dates' infant with signs of fetal malnutrition    Left-sided weakness    Myasthenia gravis with acute exacerbation (HCC)    Respiratory failure requiring intubation ([address]       ASSESSMENT AND PLAN:    This is a 78-year-old female with a history of myasthenia gravis, seizures, thyroid disease, migraines who presented for progressive dysphagia s/p IVIG with worsening neck stiffness and respiratory failure requiring intubation. Per neurology/neuro critical care team she requires plasmapheresis. We will plan for 5 sessions total.     Myasthenic crisis  - Initial plan for 5 sessions daily but 5 total on MWF schedule.  S/p 2nd session yesterday 3/13  - Replacement with 5% albumin  - Trend fibrinogen, PT, PTT daily to evaluate for possible need of cryoprecipitate or FFP--these remain WNL   -Trend CMP daily, calcium gluconate orders for hypocalcemia protocol will be in place--Calcium WNL    - Plan 3rd dose PLEX tomorrow       BETH Harrington - CNP  Please contact through [facility]

## 2023-03-14 NOTE — PROGRESS NOTES
Patient very sleepy, unable to perform NIF and VC tonight. Apnea observed when placed on Spontaneous mode for NIF and VC.

## 2023-03-14 NOTE — PROGRESS NOTES
NEUROLOGY / NEUROCRITICAL CARE PROGRESS NOTE       Patient Name: Merline Broody YOB: 1970   Sex: Female Age: 46 yrs     CC / Reason for Consult: myasthenic crisis     Interval Hx / Changes over last 24 hours:   RT at bedside during exam, NIF -25. Headaches now improved, planning for next PLEX treatment on Wednesday. ROS: negative    HISTORY   Admission HPI:   Merline Broody is a 46 y.o. y/o female with history significant for anxiety, HLD, migraines, MG, seizures and thyroid disease. Per chart review: patient she noticed some difficulty chewing and a new rasp to her voice on 3/2/23. Patient attempted to correct this by taking smaller bites, but then began to develop generalized fatigue and came in to the hospital.  Patient states this is similar to previous MG exacerbations in the past.  She denies any recent illnesses. Patient admitted to [address] for further evaluation and treatment. Of note: patient is currently on mestinon 60mg TID at home and follows with [address] [place] [address] [address] Neurology. [facility] Past Medical History:   Diagnosis Date    Anxiety     Arthritis     Cancer ([facility] [address]     thyroid    GERD (gastroesophageal reflux disease)     Hyperlipidemia     Insomnia     Migraines     Myasthenia gravis with exacerbation (HCC)     Seizures (HCC)     Thyroid disease       Allergies Allergies   Allergen Reactions    Immune Globulins Other (See Comments)     Patient developed Aseptic meningitis after Flebogamma IVIG in the past.  Developed aseptic meningitis within 48h after Gamunex-C in 3/2023.      Other      Steroid injection to joints    Adhesive Tape Rash and Other (See Comments)     Paper Tape  Paper Tape    Skin gets reddened under tape; not allergic to latex    Penicillins Itching and Rash     Rash  Rash        Diet Diet NPO  ADULT TUBE FEEDING; Orogastric; Peptide Based High Protein; Continuous; 15; Yes; 10; Q 4 hours; 45; 30; Q 4 hours; Protein; 1 packet Prosource, hook packet to ENfit tube or follow instructions on pack of packet   Isolation No active isolations     CURRENT SCHEDULED MEDICATIONS   Inpatient Medications     lansoprazole, 30 mg, Orogastric, Daily    methotrexate, 10 mg, Oral, Weekly    sennosides-docusate sodium, 2 tablet, Oral, BID    polyethylene glycol, 17 g, Oral, Daily    citalopram, 40 mg, Orogastric, QAM    doxepin, 10 mg, Orogastric, Nightly    estradiol, 2 mg, Orogastric, Nightly    folic acid, 1 mg, Orogastric, Daily    levothyroxine, 150 mcg, Orogastric, Daily    sodium chloride flush, 5-40 mL, IntraVENous, 2 times per day    enoxaparin, 40 mg, SubCUTAneous, Nightly   Infusions    dexmedetomidine (PRECEDEX) IV infusion 0.3 mcg/kg/hr (03/14/23 0820)    fentaNYL Stopped (03/09/23 1331)    propofol Stopped (03/14/23 3823)    sodium chloride Stopped (03/10/23 1119)      Antibiotics   Recent Abx Admin        No antibiotic orders with administrations found. LABS   Metabolic Panel Recent Labs     03/12/23  0404 03/13/23  0315 03/14/23  0404    136 140   K 3.8 3.8 4.3   CL 97* 97* 104   CO2 29 29 28   BUN 15 17 31*   CREATININE <0.5* <0.5* <0.5*   GLUCOSE 86 100* 126*   CALCIUM 8.9 9.0 9.0   LABALBU 4.0 3.8 4.4   MG 1.70* 1.90 1.90   ALKPHOS 84 115 58   ALT 6* 10 10   AST 11* 18 15        CBC / Coags Recent Labs     03/12/23  0404 03/13/23  0315 03/14/23  0404   WBC 9.0 9.3 11.7*   RBC 4.10 4.21 4.11   HGB 11.7* 11.9* 11.5*   HCT 35.1* 35.7* 35.4*    295 287   INR 1.02 0.97 1.10        Other No results for input(s): LABA1C, LDLCALC, TRIG, TSH, [id number], FOLATE, LABSALI, COVID19 in the last 72 hours. No results for input(s): PHENYTOIN, KEPPRA, LACOSA, LAMO, VALPROATE, LACTSEPSIS, LACTA in the last 72 hours. DIAGNOSTICS   IMAGES:  Images personally reviewed and agree w/ radiology interpretation.     No Imaging to Review     PHYSICAL EXAMINATION     PHYSICAL EXAM:  Vitals:    03/14/23 9995 03/14/23 0630 03/14/23 0800 03/14/23 3598 BP:  112/60 (!) 145/72    Pulse: 66 68 78 69   Resp: 14 16 14 17   Temp:   99 °F (37.2 °C)    TempSrc:   Bladder    SpO2: 100% 99% 100% 98%   Weight:       Height:             General: patient is intubated, she is sleepy but does open eyes and nod head to answer question, she will write to communicate  Neurologic  Mental status:   Alert, intubated, will  answers questions by nodding head and will write to ask questions. Cranial nerves:   CN2: blinks to threat,   CN 3,4,6: Pupils equal and reactive to light, extraocular muscles intact  CN5: Facial sensation symmetric   CN7: Face symmetric but exam limited by ETT rangel  CN8-12: Exam limited by ETT, but nods head and writes questions and answers on clipboard and shrugs shoulders    Motor Exam:   R  L    Deltoid 2 2   Biceps 3 3   Triceps 3 3   Wrist extension  4 4   Interossei 4 4      R  L    Hip flexion  2 2   Hip extension  2 2   Knee flexion  3 3   Knee extension  3 3   Ankle dorsiflexion  3 3   Ankle plantar flexion  3 3     Deep tendon reflexes:     R  L    Biceps  2  2   Brachioradialis  2 2   Patellar  * 2     Sensation: Nods head yes to feeling light touch in all extremities       ASSESSMENT & RECOMMENDATIONS   Assessment:Radha gibbons is a 45 yo female with a known history of Myasthenia Gravis with previous exacerbations. Patient presented with trouble chewing, hoarseness of voice, and fatigue. Intubated on 3/6 for worsening respiratory status. She received IVIG x 2 days. She has been reporting a headache, she does have a history of migraines but had reported that her headache was more severe than her typical headache and she had associated neck stiffness and low grade temperature, concerning for an aseptic meningitis after IVIG (which she has a history of). Jullie Baldwin was placed  and she was started on PLEX. Plan is for 5 doses.    Headache and neck stiffness slowly improving    Plan:  - Holding home mestinon for now, discussed with Dr. Royal Downs, planning to restart when extubated and able to safely take PO  - Q4 hour neuro checks  - Continue ventilator support, managed by ICU team  - Transitioned to precedex  - Continue to document FVC and NIF and document in the chart  - Vascath in place, 2nd session of PLEX yesterday, planning for 5 sessions total  Plan per oncology/hematology and [place]: \"Initial plan for 5 sessions daily but 5 total on MWF schedule. Plan for third session Wednesday  Replacement with 5% albumin, trend fibrinogen > 100, PT and PTT - may need cryoprecipitate or FFP  Trend CMP daily  Monitor for hypocalcemia - hypocalcemia protocol order per hem/onc  - Will continue to follow exam closely      [facility]   Neurology & Neurocritical Care   3/14/2023 8:55 AM      ICU Patients:   [address] [phone]  [employer]: [facility] Neurocritical Care    Critical Care:  Due to the immediate potential for life-threatening deterioration due to neurological failure, I spent 30 minutes providing critical care. This time excludes time spent performing procedures but includes time spent on direct patient care, history retrieval, review of the chart, and discussions with patient, family, and consultant(s).

## 2023-03-14 NOTE — PROGRESS NOTES
Patient placed on spontaneous mode for NIF and Vital capacity. PS 8/5 for duration of the maneuvers. NIF achieved was -30. Vital capacity is 1068mL repeatable x 2. Placed back on full support following these parameters being achieved.

## 2023-03-14 NOTE — PROGRESS NOTES
Hospitalist Progress Note      PCP: Alexander Alba MD    Date of Admission: 3/5/2023    Chief Complaint: Difficulty swallowing        History Of Present Illness:       46 y.o. female who presented to [facility] with complaint of difficulty swallowing and raspy voice. According to patient she started experiencing difficulty swallowing for last few days. Patient felt like food was getting stuck in her throat. Patient also complained of difficulty with her speech. Patient felt weakness in her lower extremity. Symptoms were similar to her past episodes of myasthenia gravis exacerbation. Patient has experienced 5 exacerbations since 2018 and myasthenia crisis. Patient was first diagnosed with myasthenia gravis in 2005. Patient has history of thymectomy. Last exacerbation approximately 1 year ago. At that time patient was hospitalized at [facility].     Pt intubated on 3/6/23 due to worsening respiratory failure. S/p IVIG for 2 days. On PLEX. Subjective:     Denies headache this morning. Patient remains intubated on MV.    On CPAP trial.       Medications:  Reviewed    Infusion Medications    dexmedetomidine (PRECEDEX) IV infusion 0.3 mcg/kg/hr (03/14/23 0820)    fentaNYL Stopped (03/09/23 1331)    propofol Stopped (03/14/23 6038)    sodium chloride Stopped (03/10/23 6020)     Scheduled Medications    lansoprazole  30 mg Orogastric Daily    methotrexate  10 mg Oral Weekly    sennosides-docusate sodium  2 tablet Oral BID    polyethylene glycol  17 g Oral Daily    citalopram  40 mg Orogastric QAM    doxepin  10 mg Orogastric Nightly    estradiol  2 mg Orogastric Nightly    folic acid  1 mg Orogastric Daily    levothyroxine  150 mcg Orogastric Daily    sodium chloride flush  5-40 mL IntraVENous 2 times per day    enoxaparin  40 mg SubCUTAneous Nightly     PRN Meds: butalbital-acetaminophen-caffeine, acetaminophen **OR** acetaminophen, metoclopramide, hydrALAZINE, LORazepam, sodium chloride flush, sodium chloride, ondansetron **OR** ondansetron      Intake/Output Summary (Last 24 hours) at 3/14/2023 1258  Last data filed at 3/14/2023 0837  Gross per 24 hour   Intake 2452.73 ml   Output 1425 ml   Net 1027.73 ml       Physical Exam Performed:    /60   Pulse 68   Temp 99 °F (37.2 °C) (Bladder)   Resp 16   Ht 5' 2.01\" (1.575 m)   Wt 167 lb 8.8 oz (76 kg)   SpO2 100%   BMI 30.64 kg/m²     General appearance: intubated   HEENT: Pupils equal, round, and reactive to light. Conjunctivae/corneas clear. Neck: Supple, with full range of motion. No jugular venous distention. Trachea midline. Respiratory:  Normal respiratory effort. Clear to auscultation, bilaterally without Rales/Wheezes/Rhonchi. Cardiovascular: Regular rate and rhythm with normal S1/S2 without murmurs, rubs or gallops. Abdomen: Soft, non-tender, non-distended with normal bowel sounds. Musculoskeletal: No clubbing, cyanosis or edema bilaterally. Full range of motion without deformity. Skin: Skin color, texture, turgor normal.  No rashes or lesions. Neurologic:  Neurovascularly intact without any focal sensory/motor deficits.  Cranial nerves: II-XII intact, grossly non-focal.  Psychiatric: Alert and oriented, thought content appropriate, normal insight  Capillary Refill: Brisk, 3 seconds, normal   Peripheral Pulses: +2 palpable, equal bilaterally       Labs:   Recent Labs     03/12/23 0404 03/13/23 0315 03/14/23 0404   WBC 9.0 9.3 11.7*   HGB 11.7* 11.9* 11.5*   HCT 35.1* 35.7* 35.4*    295 287     Recent Labs     03/12/23 0404 03/13/23 0315 03/14/23 0404    136 140   K 3.8 3.8 4.3   CL 97* 97* 104   CO2 29 29 28   BUN 15 17 31*   CREATININE <0.5* <0.5* <0.5*   CALCIUM 8.9 9.0 9.0     Recent Labs     03/12/23  0404 03/13/23  0315 03/14/23  0404   AST 11* 18 15   ALT 6* 10 10   BILITOT <0.2 <0.2 <0.2   ALKPHOS 84 115 58     Recent Labs     03/12/23  0404 03/13/23  0315 03/14/23  0404   INR 1.02 0.97 1.10     No results for input(s): Beverley Alves in the last 72 hours. Urinalysis:      Lab Results   Component Value Date/Time    NITRU Negative 03/08/2023 04:08 PM    WBCUA 1 06/30/2020 06:20 PM    BACTERIA RARE 06/30/2020 06:20 PM    RBCUA 1 06/30/2020 06:20 PM    BLOODU Negative 03/08/2023 04:08 PM    SPECGRAV 1.025 03/08/2023 04:08 PM    GLUCOSEU Negative 03/08/2023 04:08 PM       Radiology:  XR CHEST PORTABLE   Final Result   1. Advancement of endotracheal tube with the tip now at the level the right mainstem bronchus. Consider repositioning. 2. No significant change from prior otherwise. XR ABDOMEN (KUB) (SINGLE AP VIEW)   Final Result      Enteric tube tip in the proximal stomach just distal to the gastroesophageal junction. CT HEAD WO CONTRAST   Final Result      Normal noncontrast CT the brain without acute hemorrhage, edema or hydrocephalus. Diffuse sinus opacity and fluid throughout the maxillary sinuses ethmoid air cells sphenoid sinuses. Duodenal feeding tube or NG tube in place with diffuse fluid throughout the nasopharynx. There appears to be prior nasal sinus surgery      XR CHEST PORTABLE   Final Result   1. Right CVC, dialysis catheter in satisfactory position with its tip in the proximal to mid SVC. (Catheter tip is approximately 4 cm above the cavoatrial junction)   2. No pneumothorax      XR CHEST PORTABLE   Final Result      1. Repositioning of ET tube with tip now lying approximately 1.6 cm above the douglas. 2.  Near complete reexpansion of the left lung with persistent mild left basilar atelectasis. 3.  Further advancement of feeding tube with tip projecting over the distal gastric body. XR CHEST 1 VIEW   Final Result   Impression: Interval intubation. The endotracheal tube terminates within the right mainstem bronchus and should be retracted by at least 3 cm. Findings were discussed with RN Epimenio Lat at the time of report.           IP CONSULT TO NEUROLOGY  IP CONSULT TO DIETITIAN  IP CONSULT TO GENERAL SURGERY  IP CONSULT TO HEMATOLOGY    Assessment/Plan:    Active Hospital Problems    Diagnosis     Respiratory failure requiring intubation ([facility] [address] [J96.90]      Priority: Medium    Myasthenia gravis with acute exacerbation ([facility] [address] [G70.01]      Priority: Medium    Myasthenia gravis with (acute) exacerbation ([facility] [address] [G70.01]      -Acute hypoxemic respiratory failure     -Myasthenia crisis  Intubated on 3/6/23 due to worsening respiration.     - Aseptic meningitis due to IVIG    - Urinary retention   -Hypothyroidism  - GERD  - Dyslipidemia        PLAN:     Respiratory monitoring  Ventilator management as per ICU team, weaning trial, sedation vacation. IVIG administration x 2 doses. Plasmapharesis total 5 doses. Neurocritical care on board. Fioricet for headache   Continue celexa, doxepin, levothyroxine     DVT Prophylaxis: Lovenox  Diet: ADULT DIET;  Regular; Low Fat/Low Chol/High Fiber/2 gm Na  Code Status: Full Code     PT/OT Eval Status:      Dispo - Pending clinical improvement       Sterling Osorio MD

## 2023-03-14 NOTE — PROGRESS NOTES
Hospitalist Progress Note      PCP: Amanda Silver MD    Date of Admission: 3/5/2023    Chief Complaint: Difficulty swallowing        History Of Present Illness:       46 y.o. female who presented to [facility] with complaint of difficulty swallowing and raspy voice. According to patient she started experiencing difficulty swallowing for last few days. Patient felt like food was getting stuck in her throat. Patient also complained of difficulty with her speech. Patient felt weakness in her lower extremity. Symptoms were similar to her past episodes of myasthenia gravis exacerbation. Patient has experienced 5 exacerbations since 2018 and myasthenia crisis. Patient was first diagnosed with myasthenia gravis in 2005. Patient has history of thymectomy. Last exacerbation approximately 1 year ago. At that time patient was hospitalized at [facility].     Pt intubated on 3/6/23 due to worsening respiratory failure. S/p IVIG for 2 days. Subjective:     Pt c/o headache. Patient remains intubated on MV. FiO2 30%, PEEP 5.          Medications:  Reviewed    Infusion Medications    dexmedetomidine (PRECEDEX) IV infusion 0.3 mcg/kg/hr (03/14/23 0820)    fentaNYL Stopped (03/09/23 1331)    propofol Stopped (03/14/23 5929)    sodium chloride Stopped (03/10/23 4426)     Scheduled Medications    lansoprazole  30 mg Orogastric Daily    methotrexate  10 mg Oral Weekly    sennosides-docusate sodium  2 tablet Oral BID    polyethylene glycol  17 g Oral Daily    citalopram  40 mg Orogastric QAM    doxepin  10 mg Orogastric Nightly    estradiol  2 mg Orogastric Nightly    folic acid  1 mg Orogastric Daily    levothyroxine  150 mcg Orogastric Daily    sodium chloride flush  5-40 mL IntraVENous 2 times per day    enoxaparin  40 mg SubCUTAneous Nightly     PRN Meds: butalbital-acetaminophen-caffeine, acetaminophen **OR** acetaminophen, metoclopramide, hydrALAZINE, LORazepam, sodium chloride flush, sodium chloride, ondansetron **OR** ondansetron      Intake/Output Summary (Last 24 hours) at 3/14/2023 0931  Last data filed at 3/14/2023 0837  Gross per 24 hour   Intake 2452.73 ml   Output 1772 ml   Net 680.73 ml       Physical Exam Performed:    /62   Pulse 68   Temp 99 °F (37.2 °C) (Bladder)   Resp 16   Ht 5' 2.01\" (1.575 m)   Wt 167 lb 8.8 oz (76 kg)   SpO2 98%   BMI 30.64 kg/m²     General appearance: intubated   HEENT: Pupils equal, round, and reactive to light. Conjunctivae/corneas clear. Neck: Supple, with full range of motion. No jugular venous distention. Trachea midline. Respiratory:  Normal respiratory effort. Clear to auscultation, bilaterally without Rales/Wheezes/Rhonchi. Cardiovascular: Regular rate and rhythm with normal S1/S2 without murmurs, rubs or gallops. Abdomen: Soft, non-tender, non-distended with normal bowel sounds. Musculoskeletal: No clubbing, cyanosis or edema bilaterally. Full range of motion without deformity. Skin: Skin color, texture, turgor normal.  No rashes or lesions. Neurologic:  Neurovascularly intact without any focal sensory/motor deficits.  Cranial nerves: II-XII intact, grossly non-focal.  Psychiatric: Alert and oriented, thought content appropriate, normal insight  Capillary Refill: Brisk, 3 seconds, normal   Peripheral Pulses: +2 palpable, equal bilaterally       Labs:   Recent Labs     03/12/23 0404 03/13/23 0315 03/14/23 0404   WBC 9.0 9.3 11.7*   HGB 11.7* 11.9* 11.5*   HCT 35.1* 35.7* 35.4*    295 287     Recent Labs     03/12/23 0404 03/13/23 0315 03/14/23 0404    136 140   K 3.8 3.8 4.3   CL 97* 97* 104   CO2 29 29 28   BUN 15 17 31*   CREATININE <0.5* <0.5* <0.5*   CALCIUM 8.9 9.0 9.0     Recent Labs     03/12/23  0404 03/13/23 0315 03/14/23  0404   AST 11* 18 15   ALT 6* 10 10   BILITOT <0.2 <0.2 <0.2   ALKPHOS 84 115 58     Recent Labs     03/12/23  0404 03/13/23  0315 03/14/23  0404   INR 1.02 0.97 1.10     No results for input(s): Sukhjinder Gubler in the last 72 hours. Urinalysis:      Lab Results   Component Value Date/Time    NITRU Negative 03/08/2023 04:08 PM    WBCUA 1 06/30/2020 06:20 PM    BACTERIA RARE 06/30/2020 06:20 PM    RBCUA 1 06/30/2020 06:20 PM    BLOODU Negative 03/08/2023 04:08 PM    SPECGRAV 1.025 03/08/2023 04:08 PM    GLUCOSEU Negative 03/08/2023 04:08 PM       Radiology:  XR CHEST PORTABLE   Final Result   1. Advancement of endotracheal tube with the tip now at the level the right mainstem bronchus. Consider repositioning. 2. No significant change from prior otherwise. XR ABDOMEN (KUB) (SINGLE AP VIEW)   Final Result      Enteric tube tip in the proximal stomach just distal to the gastroesophageal junction. CT HEAD WO CONTRAST   Final Result      Normal noncontrast CT the brain without acute hemorrhage, edema or hydrocephalus. Diffuse sinus opacity and fluid throughout the maxillary sinuses ethmoid air cells sphenoid sinuses. Duodenal feeding tube or NG tube in place with diffuse fluid throughout the nasopharynx. There appears to be prior nasal sinus surgery      XR CHEST PORTABLE   Final Result   1. Right CVC, dialysis catheter in satisfactory position with its tip in the proximal to mid SVC. (Catheter tip is approximately 4 cm above the cavoatrial junction)   2. No pneumothorax      XR CHEST PORTABLE   Final Result      1. Repositioning of ET tube with tip now lying approximately 1.6 cm above the douglas. 2.  Near complete reexpansion of the left lung with persistent mild left basilar atelectasis. 3.  Further advancement of feeding tube with tip projecting over the distal gastric body. XR CHEST 1 VIEW   Final Result   Impression: Interval intubation. The endotracheal tube terminates within the right mainstem bronchus and should be retracted by at least 3 cm. Findings were discussed with ESTELITA [facility]ENDOZA [facility] at the time of report.           IP CONSULT TO NEUROLOGY  IP CONSULT TO DIETITIAN  IP CONSULT TO GENERAL SURGERY  IP CONSULT TO HEMATOLOGY    Assessment/Plan:    Active Hospital Problems    Diagnosis     Respiratory failure requiring intubation ([facility] [address] [J96.90]      Priority: Medium    Myasthenia gravis with acute exacerbation ([facility] [address] [G70.01]      Priority: Medium    Myasthenia gravis with (acute) exacerbation ([facility] [address] [G70.01]      -Acute hypoxemic respiratory failure     -Myasthenia crisis: pt presented with difficulty swallowing, speech difficulty and lower extremity weakness. Intubated on 3/6/23 due to worsening respiration.     - Aseptic meningitis due to IVIG    - Urinary retention   -Hypothyroidism  - GERD  - Dyslipidemia        PLAN:     Respiratory monitoring  Ventilator management as per ICU team   IVIG administration x 2 doses. Started plasmapharesis. Neurocritical care on board. Continue celexa, doxepin, levothyroxine     DVT Prophylaxis: Lovenox  Diet: ADULT DIET;  Regular; Low Fat/Low Chol/High Fiber/2 gm Na  Code Status: Full Code     PT/OT Eval Status:      Dispo - Pending clinical improvement       Asher Antonio MD

## 2023-03-14 NOTE — PLAN OF CARE
Problem: Discharge Planning  Goal: Discharge to home or other facility with appropriate resources  Outcome: Progressing  Discharge to home or other facility with appropriate resources: Identify barriers to discharge with patient and caregiver     Problem: Safety - Adult  Goal: Free from fall injury  3/14/2023 1419 by Christie Torres RN  Outcome: Progressing  Flowsheets (Taken 3/14/2023 0839)  Free From Fall Injury: Instruct family/caregiver on patient safety    Problem: Pain  Goal: Verbalizes/displays adequate comfort level or baseline comfort level  3/14/2023 1419 by Chritsie Torres RN  Outcome: Progressing  Verbalizes/displays adequate comfort level or baseline comfort level: Encourage patient to monitor pain and request assistance     Problem: ABCDS Injury Assessment  Goal: Absence of physical injury  3/14/2023 1419 by Christie Torres RN  Outcome: Progressing  Flowsheets (Taken 3/14/2023 0839)  Absence of Physical Injury: Implement safety measures based on patient assessment    Problem: Skin/Tissue Integrity  Goal: Absence of new skin breakdown  Description: 1. Monitor for areas of redness and/or skin breakdown  2. Assess vascular access sites hourly  3. Every 4-6 hours minimum:  Change oxygen saturation probe site  4. Every 4-6 hours:  If on nasal continuous positive airway pressure, respiratory therapy assess nares and determine need for appliance change or resting period.   3/14/2023 1419 by Christie Torres RN  Outcome: Progressing

## 2023-03-15 LAB
ALBUMIN SERPL-MCNC: 4.3 G/DL (ref 3.4–5)
ALBUMIN/GLOB SERPL: 2 {RATIO} (ref 1.1–2.2)
ALP SERPL-CCNC: 82 U/L (ref 40–129)
ALT SERPL-CCNC: 14 U/L (ref 10–40)
ANION GAP SERPL CALCULATED.3IONS-SCNC: 8 MMOL/L (ref 3–16)
APTT BLD: 33.3 SEC (ref 23–34.3)
AST SERPL-CCNC: 20 U/L (ref 15–37)
BASOPHILS # BLD: 0.1 K/UL (ref 0–0.2)
BASOPHILS NFR BLD: 0.7 %
BILIRUB SERPL-MCNC: <0.2 MG/DL (ref 0–1)
BUN SERPL-MCNC: 21 MG/DL (ref 7–20)
CALCIUM SERPL-MCNC: 9 MG/DL (ref 8.3–10.6)
CHLORIDE SERPL-SCNC: 103 MMOL/L (ref 99–110)
CO2 SERPL-SCNC: 31 MMOL/L (ref 21–32)
CREAT SERPL-MCNC: <0.5 MG/DL (ref 0.6–1.1)
DEPRECATED RDW RBC AUTO: 14.1 % (ref 12.4–15.4)
EOSINOPHIL # BLD: 0.3 K/UL (ref 0–0.6)
EOSINOPHIL NFR BLD: 3.1 %
FIBRINOGEN PPP-MCNC: 384 MG/DL (ref 207–509)
GFR SERPLBLD CREATININE-BSD FMLA CKD-EPI: >60 ML/MIN/{1.73_M2}
GLUCOSE SERPL-MCNC: 85 MG/DL (ref 70–99)
HCT VFR BLD AUTO: 36.9 % (ref 36–48)
HGB BLD-MCNC: 11.8 G/DL (ref 12–16)
INR PPP: 1.02 (ref 0.87–1.14)
LYMPHOCYTES # BLD: 2.5 K/UL (ref 1–5.1)
LYMPHOCYTES NFR BLD: 22.3 %
MAGNESIUM SERPL-MCNC: 2 MG/DL (ref 1.8–2.4)
MCH RBC QN AUTO: 27.6 PG (ref 26–34)
MCHC RBC AUTO-ENTMCNC: 32 G/DL (ref 31–36)
MCV RBC AUTO: 86.3 FL (ref 80–100)
MONOCYTES # BLD: 0.6 K/UL (ref 0–1.3)
MONOCYTES NFR BLD: 5.7 %
NEUTROPHILS # BLD: 7.6 K/UL (ref 1.7–7.7)
NEUTROPHILS NFR BLD: 68.2 %
PLATELET # BLD AUTO: 324 K/UL (ref 135–450)
PMV BLD AUTO: 7.2 FL (ref 5–10.5)
POTASSIUM SERPL-SCNC: 3.5 MMOL/L (ref 3.5–5.1)
PROT SERPL-MCNC: 6.5 G/DL (ref 6.4–8.2)
PROTHROMBIN TIME: 13.3 SEC (ref 11.7–14.5)
RBC # BLD AUTO: 4.28 M/UL (ref 4–5.2)
SODIUM SERPL-SCNC: 142 MMOL/L (ref 136–145)
WBC # BLD AUTO: 11.1 K/UL (ref 4–11)

## 2023-03-15 PROCEDURE — 6360000002 HC RX W HCPCS

## 2023-03-15 PROCEDURE — 6370000000 HC RX 637 (ALT 250 FOR IP): Performed by: STUDENT IN AN ORGANIZED HEALTH CARE EDUCATION/TRAINING PROGRAM

## 2023-03-15 PROCEDURE — 99291 CRITICAL CARE FIRST HOUR: CPT | Performed by: NURSE PRACTITIONER

## 2023-03-15 PROCEDURE — 80053 COMPREHEN METABOLIC PANEL: CPT

## 2023-03-15 PROCEDURE — 83735 ASSAY OF MAGNESIUM: CPT

## 2023-03-15 PROCEDURE — 85025 COMPLETE CBC W/AUTO DIFF WBC: CPT

## 2023-03-15 PROCEDURE — 94799 UNLISTED PULMONARY SVC/PX: CPT

## 2023-03-15 PROCEDURE — 36415 COLL VENOUS BLD VENIPUNCTURE: CPT

## 2023-03-15 PROCEDURE — 85384 FIBRINOGEN ACTIVITY: CPT

## 2023-03-15 PROCEDURE — 6360000002 HC RX W HCPCS: Performed by: STUDENT IN AN ORGANIZED HEALTH CARE EDUCATION/TRAINING PROGRAM

## 2023-03-15 PROCEDURE — 6370000000 HC RX 637 (ALT 250 FOR IP)

## 2023-03-15 PROCEDURE — 85730 THROMBOPLASTIN TIME PARTIAL: CPT

## 2023-03-15 PROCEDURE — 99231 SBSQ HOSP IP/OBS SF/LOW 25: CPT | Performed by: INTERNAL MEDICINE

## 2023-03-15 PROCEDURE — 2580000003 HC RX 258

## 2023-03-15 PROCEDURE — 92612 ENDOSCOPY SWALLOW (FEES) VID: CPT

## 2023-03-15 PROCEDURE — 2580000003 HC RX 258: Performed by: STUDENT IN AN ORGANIZED HEALTH CARE EDUCATION/TRAINING PROGRAM

## 2023-03-15 PROCEDURE — 85610 PROTHROMBIN TIME: CPT

## 2023-03-15 PROCEDURE — 1200000000 HC SEMI PRIVATE

## 2023-03-15 PROCEDURE — 92610 EVALUATE SWALLOWING FUNCTION: CPT

## 2023-03-15 PROCEDURE — P9045 ALBUMIN (HUMAN), 5%, 250 ML: HCPCS | Performed by: STUDENT IN AN ORGANIZED HEALTH CARE EDUCATION/TRAINING PROGRAM

## 2023-03-15 RX ORDER — LANOLIN ALCOHOL/MO/W.PET/CERES
3 CREAM (GRAM) TOPICAL NIGHTLY PRN
Status: DISCONTINUED | OUTPATIENT
Start: 2023-03-15 | End: 2023-03-17 | Stop reason: HOSPADM

## 2023-03-15 RX ORDER — POTASSIUM CHLORIDE 7.45 MG/ML
10 INJECTION INTRAVENOUS
Status: COMPLETED | OUTPATIENT
Start: 2023-03-15 | End: 2023-03-15

## 2023-03-15 RX ORDER — PYRIDOSTIGMINE BROMIDE 60 MG/1
30 TABLET ORAL 3 TIMES DAILY
Status: DISCONTINUED | OUTPATIENT
Start: 2023-03-15 | End: 2023-03-17 | Stop reason: HOSPADM

## 2023-03-15 RX ORDER — ALBUMIN, HUMAN INJ 5% 5 %
2800 SOLUTION INTRAVENOUS ONCE
Status: COMPLETED | OUTPATIENT
Start: 2023-03-15 | End: 2023-03-15

## 2023-03-15 RX ADMIN — POTASSIUM CHLORIDE 10 MEQ: 10 INJECTION, SOLUTION INTRAVENOUS at 09:38

## 2023-03-15 RX ADMIN — ALBUMIN (HUMAN) 2800 ML: 12.5 INJECTION, SOLUTION INTRAVENOUS at 13:00

## 2023-03-15 RX ADMIN — POTASSIUM CHLORIDE 10 MEQ: 10 INJECTION, SOLUTION INTRAVENOUS at 10:34

## 2023-03-15 RX ADMIN — SENNOSIDES AND DOCUSATE SODIUM 2 TABLET: 50; 8.6 TABLET ORAL at 08:13

## 2023-03-15 RX ADMIN — PYRIDOSTIGMINE BROMIDE 30 MG: 60 TABLET ORAL at 14:54

## 2023-03-15 RX ADMIN — POTASSIUM CHLORIDE 10 MEQ: 10 INJECTION, SOLUTION INTRAVENOUS at 08:30

## 2023-03-15 RX ADMIN — ESTRADIOL 2 MG: 0.5 TABLET ORAL at 21:06

## 2023-03-15 RX ADMIN — SODIUM CHLORIDE, PRESERVATIVE FREE 10 ML: 5 INJECTION INTRAVENOUS at 21:08

## 2023-03-15 RX ADMIN — SODIUM CHLORIDE, PRESERVATIVE FREE 10 ML: 5 INJECTION INTRAVENOUS at 08:32

## 2023-03-15 RX ADMIN — LEVOTHYROXINE SODIUM 150 MCG: 0.15 TABLET ORAL at 06:47

## 2023-03-15 RX ADMIN — CITALOPRAM 40 MG: 40 TABLET, FILM COATED ORAL at 08:13

## 2023-03-15 RX ADMIN — FOLIC ACID 1 MG: 1 TABLET ORAL at 08:13

## 2023-03-15 RX ADMIN — POTASSIUM CHLORIDE 10 MEQ: 10 INJECTION, SOLUTION INTRAVENOUS at 06:49

## 2023-03-15 RX ADMIN — CALCIUM GLUCONATE 5000 MG: 98 INJECTION, SOLUTION INTRAVENOUS at 13:00

## 2023-03-15 RX ADMIN — ENOXAPARIN SODIUM 40 MG: 100 INJECTION SUBCUTANEOUS at 21:05

## 2023-03-15 RX ADMIN — Medication 3 MG: at 21:05

## 2023-03-15 RX ADMIN — DOXEPIN HYDROCHLORIDE 10 MG: 10 CAPSULE ORAL at 21:05

## 2023-03-15 RX ADMIN — PYRIDOSTIGMINE BROMIDE 30 MG: 60 TABLET ORAL at 21:05

## 2023-03-15 ASSESSMENT — PAIN SCALES - WONG BAKER
WONGBAKER_NUMERICALRESPONSE: 0

## 2023-03-15 ASSESSMENT — PAIN SCALES - GENERAL
PAINLEVEL_OUTOF10: 0
PAINLEVEL_OUTOF10: 0

## 2023-03-15 NOTE — PROGRESS NOTES
Speech Language Pathology    Patient was attempted to be seen by Speech Therapy Department this date for dysphagia evaluation. Patient was unable to be seen due to pt in process of plasmapheresis. Speech Therapy will re-attempt to see patient if/when patient is appropriate and available. Thank you. No Charge. Signature:  Jerry MACE  [address] [facility]  [phone]

## 2023-03-15 NOTE — PROGRESS NOTES
Pt A&O x 4. Voice is becoming more clear. Able to move all 4 extremities. No complaints of numbness/tingling. Resp status remains stable throughout night on 1 L NC. Pt has no complaints at this time. Standard safety measures are in place.

## 2023-03-15 NOTE — PLAN OF CARE
Problem: Safety - Adult  Goal: Free from fall injury  3/15/2023 0305 by Lisset Benavides RN  Outcome: Progressing  3/14/2023 1419 by Roselia Tavares RN  Outcome: Progressing  Flowsheets (Taken 3/14/2023 0839)  Free From Fall Injury: Roseann Nageotte family/caregiver on patient safety     Problem: Pain  Goal: Verbalizes/displays adequate comfort level or baseline comfort level  3/15/2023 0305 by Lisset Benavides RN  Outcome: Progressing  Flowsheets (Taken 3/14/2023 2000)  Verbalizes/displays adequate comfort level or baseline comfort level:   Assess pain using appropriate pain scale   Encourage patient to monitor pain and request assistance  3/14/2023 1419 by Roselia Tavares RN  Outcome: Progressing

## 2023-03-15 NOTE — PROGRESS NOTES
Speech Language Pathology  Facility/Department:[facility] ICU  Dysphagia Evaluation  Name: Yennifer Olivera  : 1970  MRN: [id number]                                                         Patient Diagnosis(es):   Patient Active Problem List    Diagnosis Date Noted    Respiratory failure requiring intubation ([address] 2023    Myasthenia gravis with acute exacerbation ([address] 2023    Left-sided weakness 2022    'light-for-dates' infant with signs of fetal malnutrition 10/13/2021    Bradycardia 2020    Sinus arrhythmia 2020    Postablative hypothyroidism 2020    Hyperlipidemia 2020    Seizure ([address] 2020    GERD (gastroesophageal reflux disease) 2020    Migraine 2020    Anxiety 2020    Insomnia 2020    Myasthenia gravis with exacerbation ([address] 2020    Myasthenia gravis with exacerbation, adult form ([address] 2019    Generalized weakness 12/10/2019    Appendicitis 11/10/2019    Neck stiffness 11/10/2019    Myasthenia gravis with (acute) exacerbation ([address] 2019    Myasthenia gravis ([address] 10/13/2019       Past Medical History:   Diagnosis Date    Anxiety     Arthritis     Cancer ([address]     thyroid    GERD (gastroesophageal reflux disease)     Hyperlipidemia     Insomnia     Migraines     Myasthenia gravis with exacerbation (HCC)     Seizures (HCC)     Thyroid disease      Past Surgical History:   Procedure Laterality Date    APPENDECTOMY      CAPSULE ENDOSCOPY N/A 2020    ESOPHAGEAL CAPSULE ENDOSCOPY performed by Wendi Garcia MD at [address] 2019    COLONOSCOPY performed by Victor M Menezes MD at [address] Right 10/15/2021    19cm tunneled dual lumen dialysis catheter inserted by Dr. Dilan Lainez 2020    ESOPHAGEAL MANOMETRY performed by Wendi Garcia MD at [facility] (CERVIX STATUS UNKNOWN)      IR BIOPSY LIVER PERCUTANEOUS  10/15/2021    IR BIOPSY LIVER PERCUTANEOUS 10/15/2021 [facility]    IR TUNNELED CATHETER PLACEMENT GREATER THAN 5 YEARS  10/15/2021    IR TUNNELED CATHETER PLACEMENT GREATER THAN 5 YEARS 10/15/2021 [facility]    OTHER SURGICAL HISTORY Right 10/15/2021    Transjugular liver Biopsy    SHOULDER SURGERY      rotator cuff repair- bilateral    THYROIDECTOMY      TUMOR REMOVAL      UPPER GASTROINTESTINAL ENDOSCOPY N/A 12/17/2019    EGD BIOPSY performed by Claudia Domingo MD at [address] [place] [address] Street 12/17/2019    EGD DILATION SAVORY performed by Claudia Domingo MD at [address] [place] [address] Street  02/28/2020    EGD DIAGNOSTIC ONLY performed by Solomon Solis MD at [address] [place] [address]  02/28/2020    Esophagogastroduodenoscopy with Bravo Capsule placement performed by Solomon Solis MD at [address] [facility]       Reason for Referral:  Viv Mcclure  was referred for a Speech Therapy evaluation to assess swallow function and/or communication. History of Present Illness  Per H&P on 3/5/23:  \"53 y.o. female who presented to [facility] with complaint of difficulty swallowing and raspy voice. According to patient she started experiencing difficulty swallowing for last few days. Patient felt like food was getting stuck in her throat. Patient also complained of difficulty with her speech. Patient felt weakness in her lower extremity. Symptoms were similar to her past episodes of myasthenia gravis exacerbation. Patient has experienced 5 exacerbations since 2018 and myasthenia crisis. Patient was first diagnosed with myasthenia gravis in 2005. Patient has history of thymectomy. Last exacerbation approximately 1 year ago. At that time patient was hospitalized at [facility]"    She received IVIG x 2 days complicated by probable aseptic meningitis.  Started on PLEX, 3rd exchange completed Wednesday 3/15 just prior to swallow evaluation. Pt was intubated on 3/6/23 and extubated on 3/14/23 (8 days). Pt is tolerating 1 L NC at time of swallow evaluation. CXR: 3/13/23  1. Advancement of endotracheal tube with the tip now at the level the right mainstem bronchus. Consider repositioning. 2. No significant change from prior otherwise. CT head: 3/9/23      Normal noncontrast CT the brain without acute hemorrhage, edema or hydrocephalus. Diffuse sinus opacity and fluid throughout the maxillary sinuses ethmoid air cells sphenoid sinuses. Duodenal feeding tube or NG tube in place with diffuse fluid throughout the nasopharynx. There appears to be prior nasal sinus surgery       Date of onset: 3/6/23    Current Diet:  Diet NPO  ADULT TUBE FEEDING; Orogastric; Peptide Based High Protein; Continuous; 15; Yes; 10; Q 4 hours; 45; 30; Q 4 hours; Protein; 1 packet Prosource, hook packet to ENfit tube or follow instructions on pack of packet      Treatment Diagnosis: dysphagia    Pain:  None reported    General:  Chart reviewed: Yes  Behavior/Cognition: WFL  Communication Observation: WFL  Follows Directions: [facility]  Dentition/Oral Mucosa: edentulous, wears upper and lower dentures typically. Good oral hygiene   Oral motor exam: [facility]  Vocal Quality: mildly hoarse  Respiratory Status/oxygen requirements: 1 L NC  Vision/Hearing: WFL  Patient Complaint: none  Patient Positioning: upright in bed    Prior Dysphagia History:   Pt reports prior dysphagia with previous MG. Pt reports she never required modified diet or liquids. Pt was tolerating regular diet and thin liquids prior to onset of symptoms. Bedside Swallowing Evaluation Impression (3/15/23)  Pt alert and upright in bed for evaluation. OME unremarkable other than edentulous nature. Pt with mild hoarse vocal quality. Effective strong volitional cough. Dry swallow appeared intact with laryngeal elevation present.  Pt tolerated ice chip trials x2, tsp of thin liquid x2 and straw drink of thin liquid x2 with no overt s/s of aspiration. Pt trialed puree via tsp x2 with noted multiple swallows. Pt initially reported no globus sensation, however a few minutes after PO trials concluded, pt reported globus sensation and stated it felt that apple sauce was in her throat. Pt completed strong cued cough and liquid wash without symptoms resolving. Recommend FEES to further assess pharyngeal swallow function given pt's prolonged intubation, MG dx and reports of globus sensation this date. Speech, Language, Cognitive Screen   3/15/23-Pt appeared appropriate. No immediate cognitive concerns identified. Formal assessment not completed this date. ST will monitor for need in subsequent sessions    Prognosis:  Prognosis for improvement: good  Barriers to reach goals: medical dx  Consulted and agree with results and recommendations: pt agreeable, RN agreeable    Treatment:  Dysphagia Goals: The pt will be seen  2-3 x to address the following goals:  Pt will participate in instrumental swallow assessment to determine safest and least restrictive diet/liquids  Pt will tolerate safest and least restrictive diet and liquids per recommendations of instrumental swallow assessment without overt s/s of aspiration  Pt will demonstrate understanding and use of safe swallow precautions      Education  Pt educated regarding recommendations for instrumental swallow assessment MBSS vs FEES. Pt was agreeable to either assessment. Pt goal: eat  Pt discharge goal: home    Total treatment time: 20 minutes    Plan: Continue per POC  Recommended Diet: NPO pending instrumental swallow study, ice chips OK  Medication administration: alternative means      Discharge Recommendation: TBD pending progress  Discussed with RNAb Van Buren  Needs met prior to leaving room, call light within reach    Finesse Robels. NAKITA  [address] PRN  [id number]   # [phone]    This document will serve as a dc summary if pt dc prior to next visit

## 2023-03-15 NOTE — PROGRESS NOTES
Progress Note      SUBJECTIVE:  respiratory status stable. Able to now move all 4 extremities. She is on nasal cannula. No bleeding issues. OBJECTIVE:     Physical  Vitals:  /65   Pulse 72   Temp 97 °F (36.1 °C) (Temporal)   Resp 16   Ht 5' 2.01\" (1.575 m)   Wt 167 lb 8.8 oz (76 kg)   SpO2 98%   BMI 30.64 kg/m²    24HR INTAKE/OUTPUT:    Intake/Output Summary (Last 24 hours) at 3/15/2023 8232  Last data filed at 3/15/2023 0500  Gross per 24 hour   Intake --   Output 1025 ml   Net -1025 ml       CONSTITUTIONAL: awake, alert, currently intubated. EYES: pupils equal, round and reactive to light, sclera clear and conjunctiva normal  ENT: Normocephalic, without obvious abnormality, atraumatic  NECK: supple, symmetrical, no jugular venous distension and no carotid bruits   HEMATOLOGIC/LYMPHATIC: no cervical, supraclavicular or axillary lymphadenopathy   LUNGS: no increased work of breathing and clear to auscultation   CARDIOVASCULAR: regular rate and rhythm, normal S1 and S2, no murmur noted  ABDOMEN: normal bowel sounds x 4, soft, non-distended, non-tender, no masses palpated, no hepatosplenomegaly   MUSCULOSKELETAL: Profound weakness 1 out of 5 diffusely. NEUROLOGIC: awake, alert, oriented to name, place and time. Motor skills grossly intact. SKIN: Normal skin color, texture, turgor and no jaundice. appears intact   EXTREMITIES: no LE edema no evidence of bleeding at IV sites or ecchymosis on examination.     Data  General Labs:    CBC:   Recent Labs     03/13/23  0315 03/14/23  0404 03/15/23  0419   WBC 9.3 11.7* 11.1*   HGB 11.9* 11.5* 11.8*   HCT 35.7* 35.4* 36.9   MCV 84.9 86.2 86.3    287 324     BMP:   Recent Labs     03/13/23  0315 03/14/23  0404 03/15/23  0419    140 142   K 3.8 4.3 3.5   CL 97* 104 103   CO2 29 28 31   BUN 17 31* 21*   CREATININE <0.5* <0.5* <0.5*     LIVER PROFILE:   Recent Labs     03/13/23 0315 03/14/23  0404 03/15/23  0419   AST 18 15 20   ALT 10 10 14 BILITOT <0.2 <0.2 <0.2   ALKPHOS 115 58 82     PT/INR:    Lab Results   Component Value Date/Time    PROTIME 14.1 03/14/2023 04:04 AM    PROTIME 12.8 03/13/2023 03:15 AM    PROTIME 13.3 03/12/2023 04:04 AM    INR 1.10 03/14/2023 04:04 AM    INR 0.97 03/13/2023 03:15 AM    INR 1.02 03/12/2023 04:04 AM     PTT:    Lab Results   Component Value Date/Time    APTT 33.3 03/15/2023 04:20 AM    APTT 32.0 03/14/2023 04:04 AM    APTT 30.5 03/13/2023 12:44 PM     UA:No results for input(s): NITRITE, COLORU, PHUR, LABCAST, WBCUA, RBCUA, MUCUS, TRICHOMONAS, YEAST, BACTERIA, CLARITYU, SPECGRAV, LEUKOCYTESUR, UROBILINOGEN, BILIRUBINUR, BLOODU, GLUCOSEU, AMORPHOUS in the last 72 hours. Invalid input(s): KETONESU  Magnesium:   Lab Results   Component Value Date/Time    MG 2.00 03/15/2023 04:19 AM    MG 1.90 03/14/2023 04:04 AM    MG 1.90 03/13/2023 03:15 AM     MONICA:    Lab Results   Component Value Date/Time    MONICA Negative 05/19/2021 10:24 AM       RADIOLOGY:    None to review  Current Medications   potassium chloride  10 mEq IntraVENous Q1H    albumin human  2,800 mL IntraVENous Once    calcium gluconate  5,000 mg IntraVENous Once    lansoprazole  30 mg Orogastric Daily    methotrexate  10 mg Oral Weekly    sennosides-docusate sodium  2 tablet Oral BID    polyethylene glycol  17 g Oral Daily    citalopram  40 mg Orogastric QAM    doxepin  10 mg Orogastric Nightly    estradiol  2 mg Orogastric Nightly    folic acid  1 mg Orogastric Daily    levothyroxine  150 mcg Orogastric Daily    sodium chloride flush  5-40 mL IntraVENous 2 times per day    enoxaparin  40 mg SubCUTAneous Nightly       ASSESSMENT AND PLAN    This is a 77-year-old female with a history of myasthenia gravis, seizures, thyroid disease, migraines who presented for progressive dysphagia s/p IVIG with worsening neck stiffness and respiratory failure requiring intubation. Per neurology/neuro critical care team she requires plasmapheresis.   We will plan for 5 sessions total.     Myasthenic crisis  - Initial plan for 5 sessions daily but 5 total on MWF schedule. Plan for third session today. Coags yesterday and fibrinogen today are ok. - Replacement with 5% albumin  - Trend fibrinogen, PT, PTT daily to evaluate for possible need of cryoprecipitate or FFP  -Trend CMP daily,   -calcium gluconate orders for hypocalcemia protocol will be in place--Calcium WNL        We will follow closely given high risk of medical disc compensation.     Ortega Rogers MD, 3/15/2023, 6:42 AM

## 2023-03-15 NOTE — PROGRESS NOTES
Hospitalist Progress Note      PCP: Jennifer Dominguez MD    Date of Admission: 3/5/2023    Chief Complaint: Difficulty swallowing        History Of Present Illness:       46 y.o. female who presented to [facility] with complaint of difficulty swallowing and raspy voice. According to patient she started experiencing difficulty swallowing for last few days. Patient felt like food was getting stuck in her throat. Patient also complained of difficulty with her speech. Patient felt weakness in her lower extremity. Symptoms were similar to her past episodes of myasthenia gravis exacerbation. Patient has experienced 5 exacerbations since 2018 and myasthenia crisis. Patient was first diagnosed with myasthenia gravis in 2005. Patient has history of thymectomy. Last exacerbation approximately 1 year ago. At that time patient was hospitalized at [facility].     Pt intubated on 3/6/23 due to worsening respiratory failure. S/p IVIG for 2 days. On PLEX. Extubated on 3/14/23. Subjective:     Extubated yesterday. Now on room air. Denies dyspnea. Lower extremity weakness improved.        Medications:  Reviewed    Infusion Medications    sodium chloride Stopped (03/10/23 0070)     Scheduled Medications    albumin human  2,800 mL IntraVENous Once    calcium gluconate  5,000 mg IntraVENous Once    pyridostigmine  30 mg Oral TID    lansoprazole  30 mg Orogastric Daily    methotrexate  10 mg Oral Weekly    sennosides-docusate sodium  2 tablet Oral BID    polyethylene glycol  17 g Oral Daily    citalopram  40 mg Orogastric QAM    doxepin  10 mg Orogastric Nightly    estradiol  2 mg Orogastric Nightly    folic acid  1 mg Orogastric Daily    levothyroxine  150 mcg Orogastric Daily    sodium chloride flush  5-40 mL IntraVENous 2 times per day    enoxaparin  40 mg SubCUTAneous Nightly     PRN Meds: butalbital-acetaminophen-caffeine, acetaminophen **OR** acetaminophen, metoclopramide, hydrALAZINE, LORazepam, sodium chloride flush, sodium chloride, ondansetron **OR** ondansetron      Intake/Output Summary (Last 24 hours) at 3/15/2023 1422  Last data filed at 3/15/2023 1404  Gross per 24 hour   Intake 1230 ml   Output 1450 ml   Net -220 ml       Physical Exam Performed:    /70   Pulse 74   Temp 98 °F (36.7 °C) (Axillary)   Resp 17   Ht 5' 2.01\" (1.575 m)   Wt 167 lb 8.8 oz (76 kg)   SpO2 97%   BMI 30.64 kg/m²     General appearance: intubated   HEENT: Pupils equal, round, and reactive to light. Conjunctivae/corneas clear. Neck: Supple, with full range of motion. No jugular venous distention. Trachea midline. Respiratory:  Normal respiratory effort. Clear to auscultation, bilaterally without Rales/Wheezes/Rhonchi. Cardiovascular: Regular rate and rhythm with normal S1/S2 without murmurs, rubs or gallops. Abdomen: Soft, non-tender, non-distended with normal bowel sounds. Musculoskeletal: No clubbing, cyanosis or edema bilaterally. Full range of motion without deformity. Skin: Skin color, texture, turgor normal.  No rashes or lesions. Neurologic:  Neurovascularly intact without any focal sensory/motor deficits.  Cranial nerves: II-XII intact, grossly non-focal.  Psychiatric: Alert and oriented, thought content appropriate, normal insight  Capillary Refill: Brisk, 3 seconds, normal   Peripheral Pulses: +2 palpable, equal bilaterally       Labs:   Recent Labs     03/13/23  0315 03/14/23  0404 03/15/23  0419   WBC 9.3 11.7* 11.1*   HGB 11.9* 11.5* 11.8*   HCT 35.7* 35.4* 36.9    287 324     Recent Labs     03/13/23  0315 03/14/23  0404 03/15/23  0419    140 142   K 3.8 4.3 3.5   CL 97* 104 103   CO2 29 28 31   BUN 17 31* 21*   CREATININE <0.5* <0.5* <0.5*   CALCIUM 9.0 9.0 9.0     Recent Labs     03/13/23  0315 03/14/23  0404 03/15/23  0419   AST 18 15 20   ALT 10 10 14   BILITOT <0.2 <0.2 <0.2   ALKPHOS 115 58 82     Recent Labs     03/13/23  0315 03/14/23  0404 03/15/23  0420   INR 0. 97 1.10 1.02     No results for input(s): Pardeep Hinton in the last 72 hours. Urinalysis:      Lab Results   Component Value Date/Time    NITRU Negative 03/08/2023 04:08 PM    WBCUA 1 06/30/2020 06:20 PM    BACTERIA RARE 06/30/2020 06:20 PM    RBCUA 1 06/30/2020 06:20 PM    BLOODU Negative 03/08/2023 04:08 PM    SPECGRAV 1.025 03/08/2023 04:08 PM    GLUCOSEU Negative 03/08/2023 04:08 PM       Radiology:  XR CHEST PORTABLE   Final Result   1. Advancement of endotracheal tube with the tip now at the level the right mainstem bronchus. Consider repositioning. 2. No significant change from prior otherwise. XR ABDOMEN (KUB) (SINGLE AP VIEW)   Final Result      Enteric tube tip in the proximal stomach just distal to the gastroesophageal junction. CT HEAD WO CONTRAST   Final Result      Normal noncontrast CT the brain without acute hemorrhage, edema or hydrocephalus. Diffuse sinus opacity and fluid throughout the maxillary sinuses ethmoid air cells sphenoid sinuses. Duodenal feeding tube or NG tube in place with diffuse fluid throughout the nasopharynx. There appears to be prior nasal sinus surgery      XR CHEST PORTABLE   Final Result   1. Right CVC, dialysis catheter in satisfactory position with its tip in the proximal to mid SVC. (Catheter tip is approximately 4 cm above the cavoatrial junction)   2. No pneumothorax      XR CHEST PORTABLE   Final Result      1. Repositioning of ET tube with tip now lying approximately 1.6 cm above the douglas. 2.  Near complete reexpansion of the left lung with persistent mild left basilar atelectasis. 3.  Further advancement of feeding tube with tip projecting over the distal gastric body. XR CHEST 1 VIEW   Final Result   Impression: Interval intubation. The endotracheal tube terminates within the right mainstem bronchus and should be retracted by at least 3 cm. Findings were discussed with ESTELITA Godinez at the time of report.           IP CONSULT TO NEUROLOGY  IP CONSULT TO DIETITIAN  IP CONSULT TO GENERAL SURGERY  IP CONSULT TO HEMATOLOGY    Assessment/Plan:    Active Hospital Problems    Diagnosis     Respiratory failure requiring intubation ([facility] [J96.90]      Priority: Medium    Myasthenia gravis with acute exacerbation ([facility] [G70.01]      Priority: Medium    Myasthenia gravis with (acute) exacerbation ([facility] [G70.01]      -Acute hypoxemic respiratory failure     -Myasthenia crisis  Intubated on 3/6/23 due to worsening respiration. Extubation on 3/14/23. - Aseptic meningitis due to IVIG    - Urinary retention   -Hypothyroidism  - GERD  - Dyslipidemia        PLAN:     Respiratory monitoring  IVIG administration x 2 doses. Plasmapharesis total 5 doses. Neurocritical care on board. Fioricet for headache   Continue celexa, doxepin, levothyroxine     DVT Prophylaxis: Lovenox  Diet: ADULT DIET;  Regular; Low Fat/Low Chol/High Fiber/2 gm Na  Code Status: Full Code     PT/OT Eval Status:      Dispo - Pending clinical improvement       John Galeas MD

## 2023-03-15 NOTE — PROGRESS NOTES
ICU Progress Note    Admit Date: 3/5/2023  Hospital day: 8  ICU day: 7  Vent Day: 7  IV Access:Peripheral  IV Fluids:None  Vasopressors:None                Antibiotics: None  Diet: Diet NPO  ADULT TUBE FEEDING; Orogastric; Peptide Based High Protein; Continuous; 15; Yes; 10; Q 4 hours; 45; 30; Q 4 hours; Protein; 1 packet Prosource, hook packet to ENfit tube or follow instructions on pack of packet    CC: difficulty swallowing and generalized weakness     Interval history:     Extubated yesterday, initially on 4L, now on 1L satting well. No issues overnight. Doing well today, reports cough, but denies trouble breathing. Moving BLE much better today. VSS, on 1L satting well. Labs show stable Cr/lytes, WBC wnl, Hgb baseline.     PLEX dose 4 of 5 today (MWF)    Medications:     Scheduled Meds:   potassium chloride  10 mEq IntraVENous Q1H    albumin human  2,800 mL IntraVENous Once    calcium gluconate  5,000 mg IntraVENous Once    lansoprazole  30 mg Orogastric Daily    methotrexate  10 mg Oral Weekly    sennosides-docusate sodium  2 tablet Oral BID    polyethylene glycol  17 g Oral Daily    citalopram  40 mg Orogastric QAM    doxepin  10 mg Orogastric Nightly    estradiol  2 mg Orogastric Nightly    folic acid  1 mg Orogastric Daily    levothyroxine  150 mcg Orogastric Daily    sodium chloride flush  5-40 mL IntraVENous 2 times per day    enoxaparin  40 mg SubCUTAneous Nightly     Continuous Infusions:   sodium chloride Stopped (03/10/23 0638)     PRN Meds:butalbital-acetaminophen-caffeine, acetaminophen **OR** acetaminophen, metoclopramide, hydrALAZINE, LORazepam, sodium chloride flush, sodium chloride, ondansetron **OR** ondansetron    Objective:   Vitals:   T-max:  Patient Vitals for the past 8 hrs:   BP Temp Temp src Pulse Resp SpO2   03/15/23 0800 127/71 98.1 °F (36.7 °C) Oral 75 15 100 %   03/15/23 0731 -- -- -- 77 20 100 %   03/15/23 0700 116/62 -- -- 77 16 --   03/15/23 0600 120/65 -- -- 72 16 -- 03/15/23 0500 118/60 -- -- 76 16 --   03/15/23 0400 115/67 97 °F (36.1 °C) Temporal 73 15 98 %   03/15/23 0300 110/61 -- -- 77 18 97 %   03/15/23 0200 104/85 -- -- 72 19 99 %   03/15/23 0100 115/62 -- -- 73 16 99 %       Intake/Output Summary (Last 24 hours) at 3/15/2023 0858  Last data filed at 3/15/2023 2280  Gross per 24 hour   Intake --   Output 975 ml   Net -975 ml       Review of Systems - per interval history. Physical Exam  Constitutional:       General: She is not in acute distress. Interventions: She is intubated. HENT:      Head: Normocephalic and atraumatic. Eyes:      Conjunctiva/sclera: Conjunctivae normal.      Pupils: Pupils are equal, round, and reactive to light. Cardiovascular:      Rate and Rhythm: Normal rate and regular rhythm. Pulmonary:      Effort: She is intubated. Comments: Mechanical breath sound bilaterally. Abdominal:      General: Bowel sounds are normal.      Palpations: Abdomen is soft. Musculoskeletal:      Right lower leg: No edema. Left lower leg: No edema. Skin:     General: Skin is warm and dry. Neurological:      Comments: Awake and alert. Responsive to commands. Cough reflex intact.  BLE strength slowly improving - now 5/5 in BLE and BUE this AM.         LABS:    CBC:   Recent Labs     03/13/23  0315 03/14/23  0404 03/15/23  0419   WBC 9.3 11.7* 11.1*   HGB 11.9* 11.5* 11.8*   HCT 35.7* 35.4* 36.9    287 324   MCV 84.9 86.2 86.3     Renal:    Recent Labs     03/13/23  0315 03/14/23  0404 03/15/23  0419    140 142   K 3.8 4.3 3.5   CL 97* 104 103   CO2 29 28 31   BUN 17 31* 21*   CREATININE <0.5* <0.5* <0.5*   GLUCOSE 100* 126* 85   CALCIUM 9.0 9.0 9.0   MG 1.90 1.90 2.00   ANIONGAP 10 8 8     Hepatic:   Recent Labs     03/13/23 0315 03/14/23  0404 03/15/23  0419   AST 18 15 20   ALT 10 10 14   BILITOT <0.2 <0.2 <0.2   PROT 6.6 6.0* 6.5   LABALBU 3.8 4.4 4.3   ALKPHOS 115 58 82 -----------------------------------------------------------------  RAD:   XR CHEST PORTABLE   Final Result   1. Advancement of endotracheal tube with the tip now at the level the right mainstem bronchus. Consider repositioning. 2. No significant change from prior otherwise. XR ABDOMEN (KUB) (SINGLE AP VIEW)   Final Result      Enteric tube tip in the proximal stomach just distal to the gastroesophageal junction. CT HEAD WO CONTRAST   Final Result      Normal noncontrast CT the brain without acute hemorrhage, edema or hydrocephalus. Diffuse sinus opacity and fluid throughout the maxillary sinuses ethmoid air cells sphenoid sinuses. Duodenal feeding tube or NG tube in place with diffuse fluid throughout the nasopharynx. There appears to be prior nasal sinus surgery      XR CHEST PORTABLE   Final Result   1. Right CVC, dialysis catheter in satisfactory position with its tip in the proximal to mid SVC. (Catheter tip is approximately 4 cm above the cavoatrial junction)   2. No pneumothorax      XR CHEST PORTABLE   Final Result      1. Repositioning of ET tube with tip now lying approximately 1.6 cm above the douglas. 2.  Near complete reexpansion of the left lung with persistent mild left basilar atelectasis. 3.  Further advancement of feeding tube with tip projecting over the distal gastric body. XR CHEST 1 VIEW   Final Result   Impression: Interval intubation. The endotracheal tube terminates within the right mainstem bronchus and should be retracted by at least 3 cm. Findings were discussed with ESTELITA Lovett at the time of report. Assessment/Plan:        Neuro/Sedation:  Alert and oriented  Off sedation now  Myasthenic Crisis  S/s leading to admission of weakness, dysphagia, and dysphonia; consistent with prior myasthenic crises, most recent 01/2022 where she req PLEX.  S/P IVIG, but req PLEX given insufficient improvement  - PLEX started 3/9, dose 4 of 5 today  - albumin and calcium gluc infusions  - neurology following, Hematology following for PLEX  -avoid neuromuscular blocking agents, beta-blockers, procainamide, quinidine, aminoglycosides, fluoroquinolines  - q4 neurochecks  - restarted Methotrexate 3/13  - cont holding Mestinon per Neuro    Posterior Headaches  Considered aseptic meningitic rxn 2/2 IVIG, less likely given conitnued s/s  - CT head 3/11 no concerning findings  - PRN tylenol, Fioricet, Sumatriptan    CV:  Hemodynamically stable and afebrile  SBP between 110-120s  No pressor requirements  Chronic Diastolic Heart Failure  Echo 01/2022: 55-60%, no WMA, G1DD  - appears euvolemic    Respiratory:   SpO2 100% on 1L NC  Extubated Yesterday  Acute Respiratory Failure 2/2 Myasthenic crisis (Improved)  Intubated for concern of ability to protect airway. No acute intrinsic respiratory pathology.   - NIF and FVC q shift - notify neuro for FVC < 15mil/kg and/or NIF < 20 cm H2O  - home Mestinon held - may increase secretions  - 3/11- failed SBT  - 3/13 - ana SBT for full 2 hrs, but pt tired out req full support  - 3/14 - extubated - now on 1L doing great  - cont with NIF/VC q4    ID:  No active issues    Renal:  Dahl - 3/8  I/Os: 1/ 24 hours, stable. Cumulative 12.6L/+ve 1.6L  Acute Urinary Retention  - Req Dahl after req several straight cath this admission.  - will consider pulling dahl and TOV    GI:  Tube feeding via left NG  ADULT TUBE FEEDING; Orogastric; Peptide Based High Protein; Continuous; 15; Yes; 10; Q 4 hours; 45; 30; Q 4 hours; Protein; 1 packet Prosource, hook packet to ENfit tube or follow instructions on pack of packet  Prophylaxis - Protonix    - NPO - SLP eval today - possible PO diet today? Endocrine:  Hypothyroidism  01/2022 - TSH 10, FT4 0.9, low FT3  - home Synthroid 150 daily    Hematology/Oncology:  Chronic Normocytic Anemia  - Hgb at baseline 11s.  MCV mid 80s  - last iron studies 2021 likely ACD    Skin  Psoriasis - home methotrexate and folate    Psych: Anxiety - home Celexa and Doxepin    Code Status: Full Code  FEN: Diet NPO  ADULT TUBE FEEDING; Orogastric; Peptide Based High Protein; Continuous; 15; Yes; 10; Q 4 hours; 45; 30; Q 4 hours; Protein; 1 packet Prosource, hook packet to ENfit tube or follow instructions on pack of packet  PPX: Lovenox, Protonix  DISPO: ICU    Karlos Reyes MD, PGY-1  03/15/23  8:58 AM    Patient will be staffed and discussed with attending physician Dr. Everett Goddard    Patient was seen, examined and discussed with Dr. Emanuel Kraus. I agree with the interval history. My physical exam confirms the findings listed below  Chart was reviewed including labs and medical records confirm the findings noted    Peripheral IV 03/05/23 Right Hand (Active)   Number of days: 10       Peripheral IV 03/06/23 Left; Anterior Hand (Active)   Number of days: 9       Vascath Right  (Active)   Number of days: 5     Acute respiratory failure. Extubated on 3/14. Now on 1 liter. [facility] has improved significantly     Myasthenic crisis. Received IVIG.   Now on PLEX    OK to transfer to the floor

## 2023-03-15 NOTE — PROGRESS NOTES
NEUROLOGY / NEUROCRITICAL CARE PROGRESS NOTE       Patient Name: Merline Broody YOB: 1970   Sex: Female Age: 46 yrs     CC / Reason for Consult: myasthenic crisis     Interval Hx / Changes over last 24 hours:   Extubated yesterday afternoon  Last night reported , NIF -20  Breathing easily on my visit today  No overnight events    ROS: no diplopia, no dysphagia, no dysarthria or ptosis     HISTORY   Admission HPI:   Merline Broody is a 46 y.o. y/o female with history significant for anxiety, HLD, migraines, MG, seizures and thyroid disease. Per chart review: patient she noticed some difficulty chewing and a new rasp to her voice on 3/2/23. Patient attempted to correct this by taking smaller bites, but then began to develop generalized fatigue and came in to the hospital.  Patient states this is similar to previous MG exacerbations in the past.  She denies any recent illnesses. Patient admitted to [address] for further evaluation and treatment. Of note: patient is currently on mestinon 60mg TID at home and follows with [address] [place] [address] [address] Neurology. [facility] Past Medical History:   Diagnosis Date    Anxiety     Arthritis     Cancer ([facility] [address]     thyroid    GERD (gastroesophageal reflux disease)     Hyperlipidemia     Insomnia     Migraines     Myasthenia gravis with exacerbation (HCC)     Seizures (HCC)     Thyroid disease       Allergies Allergies   Allergen Reactions    Immune Globulins Other (See Comments)     Patient developed Aseptic meningitis after Flebogamma IVIG in the past.  Developed aseptic meningitis within 48h after Gamunex-C in 3/2023.      Other      Steroid injection to joints    Adhesive Tape Rash and Other (See Comments)     Paper Tape  Paper Tape    Skin gets reddened under tape; not allergic to latex    Penicillins Itching and Rash     Rash  Rash        Diet Diet NPO  ADULT TUBE FEEDING; Orogastric; Peptide Based High Protein; Continuous; 15; Yes; 10; Q 4 hours; 45; 30; Q 4 hours; Protein; 1 packet Prosource, hook packet to ENfit tube or follow instructions on pack of packet   Isolation No active isolations     CURRENT SCHEDULED MEDICATIONS   Inpatient Medications     potassium chloride, 10 mEq, IntraVENous, Q1H    albumin human, 2,800 mL, IntraVENous, Once    calcium gluconate, 5,000 mg, IntraVENous, Once    lansoprazole, 30 mg, Orogastric, Daily    methotrexate, 10 mg, Oral, Weekly    sennosides-docusate sodium, 2 tablet, Oral, BID    polyethylene glycol, 17 g, Oral, Daily    citalopram, 40 mg, Orogastric, QAM    doxepin, 10 mg, Orogastric, Nightly    estradiol, 2 mg, Orogastric, Nightly    folic acid, 1 mg, Orogastric, Daily    levothyroxine, 150 mcg, Orogastric, Daily    sodium chloride flush, 5-40 mL, IntraVENous, 2 times per day    enoxaparin, 40 mg, SubCUTAneous, Nightly   Infusions    dexmedetomidine (PRECEDEX) IV infusion Stopped (03/14/23 2114)    fentaNYL Stopped (03/09/23 1331)    propofol Stopped (03/14/23 5384)    sodium chloride Stopped (03/10/23 0812)              LABS   Metabolic Panel Recent Labs     03/13/23 0315 03/14/23  0404 03/15/23  0419    140 142   K 3.8 4.3 3.5   CL 97* 104 103   CO2 29 28 31   BUN 17 31* 21*   CREATININE <0.5* <0.5* <0.5*   GLUCOSE 100* 126* 85   CALCIUM 9.0 9.0 9.0   LABALBU 3.8 4.4 4.3   MG 1.90 1.90 2.00   ALKPHOS 115 58 82   ALT 10 10 14   AST 18 15 20        CBC / Coags Recent Labs     03/13/23  0315 03/14/23  0404 03/15/23  0419 03/15/23  0420   WBC 9.3 11.7* 11.1*  --    RBC 4.21 4.11 4.28  --    HGB 11.9* 11.5* 11.8*  --    HCT 35.7* 35.4* 36.9  --     287 324  --    INR 0.97 1.10  --  1.02              DIAGNOSTICS   IMAGES:  Images personally reviewed and agree w/ radiology interpretation. CT head 3/10/23  Normal noncontrast CT the brain without acute hemorrhage, edema or hydrocephalus. Diffuse sinus opacity and fluid throughout the maxillary sinuses ethmoid air cells sphenoid sinuses.  Duodenal feeding tube or NG tube in place with diffuse fluid throughout the nasopharynx. There appears to be prior nasal sinus surgery     PHYSICAL EXAMINATION     PHYSICAL EXAM:  Vitals:    03/15/23 0400 03/15/23 0500 03/15/23 0600 03/15/23 0731   BP: 115/67 118/60 120/65    Pulse: 73 76 72 77   Resp: 15 16 16 20   Temp: 97 °F (36.1 °C)      TempSrc: Temporal      SpO2: 98%   100%   Weight:       Height:           Exam  -Mental status: A&O x4; pleasant & appropriate  -Speech & Language: no aphasia; no dysarthria  -Cranial nerves: pupils symmetric; no notable dysconjugate gaze; eyes midline; no facial asymmetry. No ptosis. EOMI - sustained up-gaze.  -Motor: moving all extremities symmetrically and fully, 4/5 RLE weakness is chronic  -Other: no adventitious movements noted  Other Systems  -General Appearance: well-developed, well-nourished, no apparent distress  -Neck: supple. Neck flexion 5/5 and sustained  -Lungs: breathing unlabored, regular, no audible wheezes. Single breath count 20.   -CV: pulses strong x4 extremities  -Abd: flat     ASSESSMENT & RECOMMENDATIONS   Assessment:  Rolly Weber is a 47 yo female with MG who presented with trouble chewing, hoarseness of voice, and fatigue. Intubated on 3/6 for worsening respiratory status. She received IVIG x 2 days complicated by probable aseptic meningitis. Started on PLEX, 3rd exchange to be done Wednesday 3/15. Plan:  - Repeat NIF and FVC now. Will check q4h. - Swallow evaluation today prior to PO intake  - Will probably resume mestinon later today if she remains stable and respiratory parameters look good  - Q4 hour neuro checks  - Vascath in place, planning for 5 sessions total  Plan per oncology/hematology and [place]: \"Initial plan for 5 sessions daily but 5 total on MWF schedule.  Plan for third session 3/15  Replacement with 5% albumin, trend fibrinogen > 100, PT and PTT - may need cryoprecipitate or FFP  Trend CMP daily  Monitor for hypocalcemia - hypocalcemia protocol order per hem/onc  - Notify neurology of any dyspnea, dysphagia, diplopia, ptosis, or increased weakness    BETH Rahman - CNP   Neurology & Neurocritical Care   3/15/2023 7:46 AM    ICU Patients:   Neurocritical Care Line: [phone]  [employer]: [facility] Neurocritical Care    Critical Care:  Due to the immediate potential for life-threatening deterioration due to neurological failure, I spent 30 minutes providing critical care. This time excludes time spent performing procedures but includes time spent on direct patient care, history retrieval, review of the chart, and discussions with patient, family, and consultant(s).

## 2023-03-15 NOTE — PLAN OF CARE
Problem: Discharge Planning  Goal: Discharge to home or other facility with appropriate resources  3/15/2023 1435 by Jayla Yi RN  Outcome: Progressing  Flowsheets (Taken 3/15/2023 0800)  Discharge to home or other facility with appropriate resources: Identify barriers to discharge with patient and caregiver    Problem: Safety - Adult  Goal: Free from fall injury  3/15/2023 1435 by Jayla Yi RN  Outcome: Progressing  Flowsheets (Taken 3/15/2023 0930)    Problem: Pain  Goal: Verbalizes/displays adequate comfort level or baseline comfort level  Outcome: Progressing  Verbalizes/displays adequate comfort level or baseline comfort level:   Assess pain using appropriate pain scale   Encourage patient to monitor pain and request assistance     Problem: Skin/Tissue Integrity  Goal: Absence of new skin breakdown  Description: 1. Monitor for areas of redness and/or skin breakdown  2. Assess vascular access sites hourly  3. Every 4-6 hours minimum:  Change oxygen saturation probe site  4. Every 4-6 hours:  If on nasal continuous positive airway pressure, respiratory therapy assess nares and determine need for appliance change or resting period.   3/15/2023 1435 by Jayla Yi RN  Outcome: Progressing

## 2023-03-15 NOTE — PROGRESS NOTES
Patient is alert/oriented x 4, neuro assessment consistent/stable. Motor strength improving bilaterally, patient reports baseline RLE weakness. Pheresis completed this afternoon and patient tolerated well. Hemodynamically stable. Patient started on diet per orders and SLP recommendations,  NG removed. Torres removed at approximately 1630. Will continue to monitor closely.

## 2023-03-15 NOTE — PROCEDURES
[facility]  Fiberoptic Endoscopic Evaluation of Swallowing  (FEES)      Name: Chip Durham  YOB: 1970  Gender: female  MRN: [id number]  Account #: [de-identified]  Referring Physician: Dr. Fay Sam  Diagnosis: Dysphagia  Onset Date: 03/06/2023  History of Present Illness/Injury:    Patient Active Problem List    Diagnosis Date Noted    Respiratory failure requiring intubation ([address] 03/06/2023    Myasthenia gravis with acute exacerbation ([address] 03/05/2023    Left-sided weakness 01/02/2022    'light-for-dates' infant with signs of fetal malnutrition 10/13/2021    Bradycardia 08/05/2020    Sinus arrhythmia 08/05/2020    Postablative hypothyroidism 08/05/2020    Hyperlipidemia 06/30/2020    Seizure ([address] 06/30/2020    GERD (gastroesophageal reflux disease) 06/30/2020    Migraine 06/30/2020    Anxiety 06/30/2020    Insomnia 06/30/2020    Myasthenia gravis with exacerbation ([address] 06/30/2020    Myasthenia gravis with exacerbation, adult form ([address] 12/11/2019    Generalized weakness 12/10/2019    Appendicitis 11/10/2019    Neck stiffness 11/10/2019    Myasthenia gravis with (acute) exacerbation ([address] 11/04/2019    Myasthenia gravis ([address] 10/13/2019     Date of Exam: 03/06/2023     Patient Complaints/Reason for Referral:  Chip Durham was referred for a FEES to assess the efficiency of his/her swallow function, assess for aspiration, and to make recommendations regarding safe dietary consistencies, effective compensatory strategies, and safe eating environment. Patient complaints: globus sensation    Recent Chest X-ray - 03/06/2023  1. Advancement of endotracheal tube with the tip now at the level the right mainstem bronchus. Consider repositioning. 2. No significant change from prior otherwise. Previous SLP Evaluations-  Bedside Swallowing Evaluation Impression (3/15/23)  Pt alert and upright in bed for evaluation. OME unremarkable other than edentulous nature. Pt with mild hoarse vocal quality. Effective strong volitional cough. Dry swallow appeared intact with laryngeal elevation present. Pt tolerated ice chip trials x2, tsp of thin liquid x2 and straw drink of thin liquid x2 with no overt s/s of aspiration. Pt trialed puree via tsp x2 with noted multiple swallows. Pt initially reported no globus sensation, however a few minutes after PO trials concluded, pt reported globus sensation and stated it felt that apple sauce was in her throat. Pt completed strong cued cough and liquid wash without symptoms resolving. Recommend FEES to further assess pharyngeal swallow function given pt's prolonged intubation, MG dx and reports of globus sensation this date.    Current Diet Order: NPO    Pain:    Pain Assessment  Pain Assessment: None - Denies Pain  Pain Level: 0  Mcelroy-Baker Pain Rating: No hurt  Patient's Stated Pain Goal: 0 - No pain  Pain Location: Head  Pain Descriptors: Aching  Functional Pain Assessment: Activities are not prevented  Non-Pharmaceutical Pain Intervention(s): Rest  Response to Pain Intervention: Patient satisfied  Side Effects: No reported side effects    SUBJECTIVE:   awake, alert, pleasant, cooperative; agreeable to procedure    ORAL MOTOR EXAMINATION:  Alert : [x] Yes  [] No    Functional Comm : [x] intact  [] impaired [] absent  Voice Quality : [] normal  [x] hoarse  [] wet  [] aphonic  [] breathy  [] strained  Tracheotomy : [] Yes  [x] No    Ventilator Dependent : [] Yes  [x] No   Volitional Cough : [x] intact  [] impaired [] absent   Volitional Swallow : [x] intact  [] impaired [] absent   Spontaneous Swallow : [x] intact  [] impaired [] absent   Laryngeal Elevation : [x] intact  [] impaired [] absent    ENDOSCOPIC EXAMINATION:  Vocal Fold adduction : [x] Complete  [] Incomplete   Interarytenoid/Post-com Edema : [x] Yes  [] No   Arytenoid Edema : [x] Yes  [] No   Arytenoid Erythema : [x] Yes  [] No   Vocal Fold Edema : [] Yes  [x] No   Pharyngeal  Squeeze : LEFT: [x] intact  [] impaired [] absent      RIGHT:[x] intact  [] impaired [] absent    SWALLOWING EVALUATION:  Testing position :[] chair, upright  [] chair, 45 degrees  [x] bed, upright      [] bed, 45 degrees [] fully upright  Baseline Pooling of Secretions : [] Yes  [x] No   Baseline Penetration of Secretions : [] Yes  [x] No   Baseline Aspiration of Secretions : [] Yes  [x] No  Backflow of Secretions : [] Yes  [x] No    CONSISTENCIES TRIALED :  Ice chips, thins via tsp/cup edge/straw, puree, regular solid    COMPENSATORY TECHNIQUES FOR INCREASED SAFETY:  Postural Changes : [] Yes  [x] No   Comments:    INCREASED RISK OF ASPIRATION DUE TO:  Poor Oral control and/or copious oral residue : [] Yes  [x] No   Reduced laryngopharyngeal sensation : [] Yes  [x] No  Premature spillage of bolus : [x] Yes  [] No  Inability to clear material from valleculae, pharynx, pyriform sinus or endolarynx : [] Yes  [x] No   Backflow to Pharynx : [] Yes  [x] No       Endoscope was removed without incident, no adverse reactions. PENETRATION-ASPIRATION SCALE (PAS)  [] 8 Material enters the airway, passes below the vocal folds, and no effort is made to eject  [] 7 Material enters the airway, passes below the vocal folds, and is not ejected from the trachea despite effort  [] 6 Material enters the airway, passes below the vocal folds, and is ejected into the larynx or out of the airway  [] 5 Material enters the airway, contacts the vocal folds, and is not ejected into the airway  [] 4 Material enters the airway, contacts the vocal folds, and is ejected from the airway  [] 3 Material enters the airway, remains above the vocal folds, and is not ejected from airway  [] 2 Material enters the airway, remains above the vocal folds, and is ejected from airway  [x] 1 Material does not enter the airway    IMPRESSIONS:   Mild oral dysphagia characterized by slowed mastication of regular texture solid.  Pharyngeal phase grossly WFL; some premature spillage to vallecula, however overall timely swallow initiation with good clearance. No aspiration or penetration visualized. No significant stasis. Of note, arytenoid/interarytenoid edema/erythema present; question possible reflux (though none visualized during this procedure). Dysphagia Treatment Goals  Pt will participate in instrumental swallow assessment to determine safest and least restrictive diet/liquids  3/15: goal met via FEES, d/c goal  Pt will tolerate safest and least restrictive diet and liquids per recommendations of instrumental swallow assessment without overt s/s of aspiration  Pt will demonstrate understanding and use of safe swallow precautions    EDUCATION:  Educated to FEES procedure, risks associated with procedure (including but not limited to discomfort and bleeding), rationale for completing, results of session, and recommendations. Plan: Continued dysphagia tx per POC  Diet/Strategies: Soft & Bite-Sized / Thin Liquids / meds PO  - general aspiration and reflux precautions  - upright position during/after PO  - small bites/sips  - slow rate  *oral hygiene x 2 daily*    Discharge Plan:  TBD  Discussed with RNAb. Needs within reach. Electronically Signed by:  Osvaldo Rodriguez  Pager #[phone]    This document will serve as a discharge summary if pt discharges before next treatment.

## 2023-03-16 LAB
ALBUMIN SERPL-MCNC: 4.9 G/DL (ref 3.4–5)
ALBUMIN/GLOB SERPL: 3.5 {RATIO} (ref 1.1–2.2)
ALP SERPL-CCNC: 56 U/L (ref 40–129)
ALT SERPL-CCNC: 14 U/L (ref 10–40)
ANION GAP SERPL CALCULATED.3IONS-SCNC: 11 MMOL/L (ref 3–16)
APTT BLD: 32.6 SEC (ref 23–34.3)
AST SERPL-CCNC: 19 U/L (ref 15–37)
BASOPHILS # BLD: 0.1 K/UL (ref 0–0.2)
BASOPHILS NFR BLD: 0.8 %
BILIRUB SERPL-MCNC: 0.5 MG/DL (ref 0–1)
BUN SERPL-MCNC: 16 MG/DL (ref 7–20)
CALCIUM SERPL-MCNC: 9.2 MG/DL (ref 8.3–10.6)
CHLORIDE SERPL-SCNC: 103 MMOL/L (ref 99–110)
CO2 SERPL-SCNC: 27 MMOL/L (ref 21–32)
CREAT SERPL-MCNC: <0.5 MG/DL (ref 0.6–1.1)
DEPRECATED RDW RBC AUTO: 13.7 % (ref 12.4–15.4)
EOSINOPHIL # BLD: 0.3 K/UL (ref 0–0.6)
EOSINOPHIL NFR BLD: 2.5 %
FIBRINOGEN PPP-MCNC: 222 MG/DL (ref 207–509)
GFR SERPLBLD CREATININE-BSD FMLA CKD-EPI: >60 ML/MIN/{1.73_M2}
GLUCOSE SERPL-MCNC: 93 MG/DL (ref 70–99)
HCT VFR BLD AUTO: 37.7 % (ref 36–48)
HGB BLD-MCNC: 12.3 G/DL (ref 12–16)
INR PPP: 1.11 (ref 0.87–1.14)
LYMPHOCYTES # BLD: 2.2 K/UL (ref 1–5.1)
LYMPHOCYTES NFR BLD: 16 %
MAGNESIUM SERPL-MCNC: 2 MG/DL (ref 1.8–2.4)
MCH RBC QN AUTO: 27.9 PG (ref 26–34)
MCHC RBC AUTO-ENTMCNC: 32.7 G/DL (ref 31–36)
MCV RBC AUTO: 85.2 FL (ref 80–100)
MONOCYTES # BLD: 0.6 K/UL (ref 0–1.3)
MONOCYTES NFR BLD: 4.4 %
NEUTROPHILS # BLD: 10.4 K/UL (ref 1.7–7.7)
NEUTROPHILS NFR BLD: 76.3 %
PLATELET # BLD AUTO: 373 K/UL (ref 135–450)
PMV BLD AUTO: 6.7 FL (ref 5–10.5)
POTASSIUM SERPL-SCNC: 3.9 MMOL/L (ref 3.5–5.1)
PROT SERPL-MCNC: 6.3 G/DL (ref 6.4–8.2)
PROTHROMBIN TIME: 14.3 SEC (ref 11.7–14.5)
RBC # BLD AUTO: 4.43 M/UL (ref 4–5.2)
SODIUM SERPL-SCNC: 141 MMOL/L (ref 136–145)
WBC # BLD AUTO: 13.7 K/UL (ref 4–11)

## 2023-03-16 PROCEDURE — 6370000000 HC RX 637 (ALT 250 FOR IP)

## 2023-03-16 PROCEDURE — 80053 COMPREHEN METABOLIC PANEL: CPT

## 2023-03-16 PROCEDURE — 83735 ASSAY OF MAGNESIUM: CPT

## 2023-03-16 PROCEDURE — 99232 SBSQ HOSP IP/OBS MODERATE 35: CPT | Performed by: NURSE PRACTITIONER

## 2023-03-16 PROCEDURE — 92526 ORAL FUNCTION THERAPY: CPT

## 2023-03-16 PROCEDURE — 1200000000 HC SEMI PRIVATE

## 2023-03-16 PROCEDURE — 2580000003 HC RX 258

## 2023-03-16 PROCEDURE — 85025 COMPLETE CBC W/AUTO DIFF WBC: CPT

## 2023-03-16 PROCEDURE — 85730 THROMBOPLASTIN TIME PARTIAL: CPT

## 2023-03-16 PROCEDURE — 85384 FIBRINOGEN ACTIVITY: CPT

## 2023-03-16 PROCEDURE — 85610 PROTHROMBIN TIME: CPT

## 2023-03-16 RX ORDER — LANSOPRAZOLE 30 MG/1
30 TABLET, ORALLY DISINTEGRATING, DELAYED RELEASE ORAL DAILY
Status: DISCONTINUED | OUTPATIENT
Start: 2023-03-16 | End: 2023-03-17 | Stop reason: HOSPADM

## 2023-03-16 RX ADMIN — LEVOTHYROXINE SODIUM 150 MCG: 0.15 TABLET ORAL at 06:19

## 2023-03-16 RX ADMIN — SODIUM CHLORIDE, PRESERVATIVE FREE 10 ML: 5 INJECTION INTRAVENOUS at 21:00

## 2023-03-16 RX ADMIN — ESTRADIOL 2 MG: 0.5 TABLET ORAL at 20:58

## 2023-03-16 RX ADMIN — PYRIDOSTIGMINE BROMIDE 30 MG: 60 TABLET ORAL at 08:43

## 2023-03-16 RX ADMIN — DOXEPIN HYDROCHLORIDE 10 MG: 10 CAPSULE ORAL at 20:56

## 2023-03-16 RX ADMIN — CITALOPRAM 40 MG: 40 TABLET, FILM COATED ORAL at 08:42

## 2023-03-16 RX ADMIN — PYRIDOSTIGMINE BROMIDE 30 MG: 60 TABLET ORAL at 20:56

## 2023-03-16 RX ADMIN — FOLIC ACID 1 MG: 1 TABLET ORAL at 08:43

## 2023-03-16 RX ADMIN — PYRIDOSTIGMINE BROMIDE 30 MG: 60 TABLET ORAL at 14:48

## 2023-03-16 RX ADMIN — SODIUM CHLORIDE, PRESERVATIVE FREE 10 ML: 5 INJECTION INTRAVENOUS at 08:51

## 2023-03-16 RX ADMIN — LANSOPRAZOLE 30 MG: 30 TABLET, ORALLY DISINTEGRATING, DELAYED RELEASE ORAL at 08:46

## 2023-03-16 NOTE — PROGRESS NOTES
4 Eyes Admission Assessment     I agree as the admission nurse that 2 RN's have performed a thorough Head to Toe Skin Assessment on the patient. ALL assessment sites listed below have been assessed on admission. Areas assessed by both nurses:   [x]   Head, Face, and Ears   [x]   Shoulders, Back, and Chest  [x]   Arms, Elbows, and Hands  [x]   Coccyx, Sacrum, and Ischium  [x]   Legs, Feet, and Heels    Old scarring noted on right hip and midline abdomen        Does the Patient have Skin Breakdown?   No         Adam Prevention initiated:  NA   Wound Care Orders initiated:  NA      WOC nurse consulted for Pressure Injury (Stage 3,4, Unstageable, DTI, NWPT, and Complex wounds) or Adam score 18 or lower:  NA      Nurse 1 eSignature: Electronically signed by Leticia Yen RN on 3/16/23 at 12:23 AM EDT    **SHARE this note so that the co-signing nurse is able to place an eSignature**    Nurse 2 eSignature: Electronically signed by Garcia Reynaga RN on 3/16/23 at 12:24 AM EDT

## 2023-03-16 NOTE — CARE COORDINATION
11:12 AM  Upon review of pts chart, pt is from home, IPTA, no current home services. Pt denies a need for any. Pt has transport at time of dc. CM will sign off at this time. Please consult CM/SW if any dcp needs arise.     Electronically signed by Tamera Alvarado RN, CM on 3/16/2023 at 11:12 AM.  Phone: [phone]  Fax: [phone]

## 2023-03-16 NOTE — PROGRESS NOTES
Patient is alert and oriented x4. VSS. Plasmapharesis treatment tomorrow and then to d/c home after. Ambulating in room and voiding in restroom with no difficulties. Denies any pain. Call light is within reach.      Electronically signed by Michelle Fernandez RN on 3/16/2023 at 3:15 PM

## 2023-03-16 NOTE — PROGRESS NOTES
Transfer  Pt transported to new room 5301 by bed and RN; all belongings sent with pt- including upper and lower dentures, cell phone and , glasses, bag of personal clothing; pt declined for RN to notify  and states he is already asleep and she will let him know in the am

## 2023-03-16 NOTE — PROGRESS NOTES
Vital Signs stable, A&Ox4    Transferred to floor, settled in room, resting the remainder of the night.    On tele monitor  Denies any pain or nausea    No further needs identified at this time

## 2023-03-16 NOTE — PROGRESS NOTES
NEUROLOGY / NEUROCRITICAL CARE PROGRESS NOTE       Patient Name: Delia Hsieh YOB: 1970   Sex: Female Age: 46 yrs     CC / Reason for Consult: myasthenic crisis     Interval Hx / Changes over last 24 hours:   Patient is doing great - she states she feels much better   NIF -40 & VC 2L     ROS: no diplopia, no dysphagia, no dysarthria or ptosis     HISTORY   Admission HPI:   Delia Hsieh is a 46 y.o. y/o female with history significant for anxiety, HLD, migraines, MG, seizures and thyroid disease. Per chart review: patient she noticed some difficulty chewing and a new rasp to her voice on 3/2/23. Patient attempted to correct this by taking smaller bites, but then began to develop generalized fatigue and came in to the hospital.  Patient states this is similar to previous MG exacerbations in the past.  She denies any recent illnesses. Patient admitted to [address] for further evaluation and treatment. Of note: patient is currently on mestinon 60mg TID at home and follows with [address] [place] [address] [address] Neurology. [facility] Past Medical History:   Diagnosis Date    Anxiety     Arthritis     Cancer ([facility]     thyroid    GERD (gastroesophageal reflux disease)     Hyperlipidemia     Insomnia     Migraines     Myasthenia gravis with exacerbation (HCC)     Seizures (HCC)     Thyroid disease       Allergies Allergies   Allergen Reactions    Immune Globulins Other (See Comments)     Patient developed Aseptic meningitis after Flebogamma IVIG in the past.  Developed aseptic meningitis within 48h after Gamunex-C in 3/2023. Other      Steroid injection to joints    Adhesive Tape Rash and Other (See Comments)     Paper Tape  Paper Tape    Skin gets reddened under tape; not allergic to latex    Penicillins Itching and Rash     Rash  Rash        Diet ADULT DIET; Dysphagia - Soft and Bite Sized;  Low Fat/Low Chol/High Fiber/AMADOR   Isolation No active isolations     CURRENT SCHEDULED MEDICATIONS   Inpatient Medications     lansoprazole, 30 mg, Oral, Daily    pyridostigmine, 30 mg, Oral, TID    methotrexate, 10 mg, Oral, Weekly    sennosides-docusate sodium, 2 tablet, Oral, BID    polyethylene glycol, 17 g, Oral, Daily    citalopram, 40 mg, Orogastric, QAM    doxepin, 10 mg, Orogastric, Nightly    estradiol, 2 mg, Orogastric, Nightly    folic acid, 1 mg, Orogastric, Daily    levothyroxine, 150 mcg, Orogastric, Daily    sodium chloride flush, 5-40 mL, IntraVENous, 2 times per day    enoxaparin, 40 mg, SubCUTAneous, Nightly   Infusions    sodium chloride Stopped (03/10/23 5127)              LABS   Metabolic Panel Recent Labs     03/14/23  0404 03/15/23  0419 03/16/23  0632    142 141   K 4.3 3.5 3.9    103 103   CO2 28 31 27   BUN 31* 21* 16   CREATININE <0.5* <0.5* <0.5*   GLUCOSE 126* 85 93   CALCIUM 9.0 9.0 9.2   LABALBU 4.4 4.3 4.9   MG 1.90 2.00 2.00   ALKPHOS 58 82 56   ALT 10 14 14   AST 15 20 19        CBC / Coags Recent Labs     03/14/23  0404 03/15/23  0419 03/15/23  0420 03/16/23  0632   WBC 11.7* 11.1*  --  13.7*   RBC 4.11 4.28  --  4.43   HGB 11.5* 11.8*  --  12.3   HCT 35.4* 36.9  --  37.7    324  --  373   INR 1.10  --  1.02 1.11              DIAGNOSTICS   IMAGES:  Images personally reviewed and agree w/ radiology interpretation. CT head 3/10/23  Normal noncontrast CT the brain without acute hemorrhage, edema or hydrocephalus. Diffuse sinus opacity and fluid throughout the maxillary sinuses ethmoid air cells sphenoid sinuses. Duodenal feeding tube or NG tube in place with diffuse fluid throughout the nasopharynx.  There appears to be prior nasal sinus surgery     PHYSICAL EXAMINATION     PHYSICAL EXAM:  Vitals:    03/16/23 0316 03/16/23 0750 03/16/23 1052 03/16/23 1430   BP: 116/69 129/78 113/79 (!) 140/84   Pulse: 72 72 95 81   Resp: 18 16 18 18   Temp: 98.3 °F (36.8 °C) 98.4 °F (36.9 °C) 98.2 °F (36.8 °C) 97.6 °F (36.4 °C)   TempSrc: Oral Oral Oral Oral   SpO2: 94% 97% 97% 97% Weight:       Height:           Exam  -Mental status: A&O x4; pleasant & appropriate  -Speech & Language: no aphasia; no dysarthria  -Cranial nerves: pupils symmetric; no notable dysconjugate gaze; eyes midline; no facial asymmetry. No ptosis. EOMI - sustained up-gaze.  -Motor: moving all extremities symmetrically and fully, 4/5 RLE weakness is chronic  -Other: no adventitious movements noted  Other Systems  -General Appearance: well-developed, well-nourished, no apparent distress  -Neck: supple. Neck flexion 5/5 and sustained  -Lungs: breathing unlabored, regular, no audible wheezes. Single breath count 20.   -CV: pulses strong x4 extremities  -Abd: flat     ASSESSMENT & RECOMMENDATIONS   Assessment:  Lucian Herrera is a 45 yo female with MG who presented with trouble chewing, hoarseness of voice, and fatigue. Intubated on 3/6 for worsening respiratory status. She received IVIG x 2 days complicated by probable aseptic meningitis. S/p PLEX 3 treatments given. Planning for 4th tomorrow. Patient agreeable to stay till tomorrow.      Plan:  - Okay to DC NIFs & FVC checks  - Mestinon restarted  - Q4 hour neuro checks  - Vascath in place, planning for 5 sessions total - patient agreeable to stay for 4th treatment  Plan per oncology/hematology and [place]: Plan for tomorrow to be last treatment  Replacement with 5% albumin, trend fibrinogen > 100, PT and PTT - may need cryoprecipitate or FFP  Trend CMP daily  Monitor for hypocalcemia - hypocalcemia protocol order per hem/onc  - Notify neurology of any dyspnea, dysphagia, diplopia, ptosis, or increased weakness  - After 4th treatment patient is okay to have line out & go home    Nan Juarez, [address]   Neurology & Neurocritical Care   3/16/2023 6:02 PM    ICU Patients:   [employer]: [address]

## 2023-03-16 NOTE — PROGRESS NOTES
Speech Language Pathology  Facility/Department:[address]  Dysphagia Therapy & D/C Note    Name: Merline Broody  : 1970  MRN: [id number]                                                       Patient Diagnosis(es):   Patient Active Problem List    Diagnosis Date Noted    Respiratory failure requiring intubation ([address] 2023    Myasthenia gravis with acute exacerbation ([address] 2023    Left-sided weakness 2022    'light-for-dates' infant with signs of fetal malnutrition 10/13/2021    Bradycardia 2020    Sinus arrhythmia 2020    Postablative hypothyroidism 2020    Hyperlipidemia 2020    Seizure ([address] 2020    GERD (gastroesophageal reflux disease) 2020    Migraine 2020    Anxiety 2020    Insomnia 2020    Myasthenia gravis with exacerbation ([address] 2020    Myasthenia gravis with exacerbation, adult form ([address] 2019    Generalized weakness 12/10/2019    Appendicitis 11/10/2019    Neck stiffness 11/10/2019    Myasthenia gravis with (acute) exacerbation ([address] 2019    Myasthenia gravis ([address] 10/13/2019       Past Medical History:   Diagnosis Date    Anxiety     Arthritis     Cancer ([address]     thyroid    GERD (gastroesophageal reflux disease)     Hyperlipidemia     Insomnia     Migraines     Myasthenia gravis with exacerbation (HCC)     Seizures (HCC)     Thyroid disease      Past Surgical History:   Procedure Laterality Date    APPENDECTOMY      CAPSULE ENDOSCOPY N/A 2020    ESOPHAGEAL CAPSULE ENDOSCOPY performed by Aki Grier MD at [address] 2019    COLONOSCOPY performed by Susanna Miranda MD at [address] Right 10/15/2021    19cm tunneled dual lumen dialysis catheter inserted by Dr. Breezy Soto 2020    ESOPHAGEAL MANOMETRY performed by Aki Grier MD at [facility] HYSTERECTOMY (CERVIX STATUS UNKNOWN)      IR BIOPSY LIVER PERCUTANEOUS  10/15/2021    IR BIOPSY LIVER PERCUTANEOUS 10/15/2021 [facility]    IR TUNNELED CATHETER PLACEMENT GREATER THAN 5 YEARS  10/15/2021    IR TUNNELED CATHETER PLACEMENT GREATER THAN 5 YEARS 10/15/2021 [facility]    OTHER SURGICAL HISTORY Right 10/15/2021    Transjugular liver Biopsy    SHOULDER SURGERY      rotator cuff repair- bilateral    THYROIDECTOMY      TUMOR REMOVAL      UPPER GASTROINTESTINAL ENDOSCOPY N/A 12/17/2019    EGD BIOPSY performed by Gail Fuentes MD at [address] [place] [address] 12/17/2019    EGD DILATION SAVORY performed by Gail Fuentes MD at [place] [address]  02/28/2020    EGD DIAGNOSTIC ONLY performed by Fletcher Box MD at [place] [address]  02/28/2020    Esophagogastroduodenoscopy with Bravo Capsule placement performed by Fletcher Box MD at [address] [place] [address]: 3/13/23  1. Advancement of endotracheal tube with the tip now at the level the right mainstem bronchus. Consider repositioning. 2. No significant change from prior otherwise. CT head: 3/9/23      Normal noncontrast CT the brain without acute hemorrhage, edema or hydrocephalus. Diffuse sinus opacity and fluid throughout the maxillary sinuses ethmoid air cells sphenoid sinuses. Duodenal feeding tube or NG tube in place with diffuse fluid throughout the nasopharynx. There appears to be prior nasal sinus surgery     Date of onset: 3/6/23    Current Diet:  ADULT DIET; Dysphagia - Soft and Bite Sized; Low Fat/Low Chol/High Fiber/AMADOR      Treatment Diagnosis: dysphagia    Pain:  None indicated by any means    Bedside Swallowing Evaluation Impression (3/15/23)  Pt alert and upright in bed for evaluation. OME unremarkable other than edentulous nature. Pt with mild hoarse vocal quality. Effective strong volitional cough.  Dry swallow appeared intact with laryngeal elevation present. Pt tolerated ice chip trials x2, tsp of thin liquid x2 and straw drink of thin liquid x2 with no overt s/s of aspiration. Pt trialed puree via tsp x2 with noted multiple swallows. Pt initially reported no globus sensation, however a few minutes after PO trials concluded, pt reported globus sensation and stated it felt that apple sauce was in her throat. Pt completed strong cued cough and liquid wash without symptoms resolving. Recommend FEES to further assess pharyngeal swallow function given pt's prolonged intubation, MG dx and reports of globus sensation this date. FEES Impressions: (03/15/2023)  Mild oral dysphagia characterized by slowed mastication of regular texture solid. Pharyngeal phase grossly WFL; some premature spillage to vallecula, however overall timely swallow initiation with good clearance. No aspiration or penetration visualized. No significant stasis. Of note, arytenoid/interarytenoid edema/erythema present; question possible reflux (though none visualized during this procedure). Treatment:  Dysphagia Goals: The pt will be seen  2-3 x to address the following goals:  Goal 1: Pt will participate in instrumental swallow assessment to determine safest and least restrictive diet/liquids  3/15: goal met via FEES, d/c goal  Goal 2: Pt will tolerate safest and least restrictive diet and liquids per recommendations of instrumental swallow assessment without overt s/s of aspiration. 3/16: Per chart review, d/w RN, and patient, no issues with diet tolerance. Received pt awake, alert, seated up in bed, on room air. Reports feeling much improved this date. Seen with thins via cup edge; positive oral acceptance, swallow movement, clearance. No overt s/s aspiration. Pt declined further PO, but reports satisfaction with current diet level.   Goal met, d/c goal  Goal 3: Pt will demonstrate understanding and use of safe swallow precautions  3/16: Assisted pt with upright positioning, however she independently demonstrated appropriate rate/sip size. Goal met, d/c goal    Education  See goal 3 above    Total treatment time: 10 minutes    Plan: Continue per POC  Diet/Strategies: Soft & Bite-Sized / Thin Liquids / meds PO  - general aspiration and reflux precautions  - upright position during/after PO  - small bites/sips  - slow rate  *oral hygiene x 2 daily*    Discharge Recommendation: no further speech therapy needs anticipated  Discussed with RNJasmin  Needs met prior to leaving room, call light within reach    [facility], [address], [id number]  Pg.  # [phone]

## 2023-03-17 VITALS
BODY MASS INDEX: 30.83 KG/M2 | TEMPERATURE: 98 F | SYSTOLIC BLOOD PRESSURE: 112 MMHG | RESPIRATION RATE: 16 BRPM | WEIGHT: 167.55 LBS | HEIGHT: 62 IN | OXYGEN SATURATION: 98 % | HEART RATE: 72 BPM | DIASTOLIC BLOOD PRESSURE: 73 MMHG

## 2023-03-17 LAB
ALBUMIN SERPL-MCNC: 4.5 G/DL (ref 3.4–5)
ALBUMIN/GLOB SERPL: 2.4 {RATIO} (ref 1.1–2.2)
ALP SERPL-CCNC: 83 U/L (ref 40–129)
ALT SERPL-CCNC: 20 U/L (ref 10–40)
ANION GAP SERPL CALCULATED.3IONS-SCNC: 13 MMOL/L (ref 3–16)
APTT BLD: 29.7 SEC (ref 23–34.3)
APTT BLD: 31 SEC (ref 23–34.3)
AST SERPL-CCNC: 25 U/L (ref 15–37)
BASOPHILS # BLD: 0.1 K/UL (ref 0–0.2)
BASOPHILS NFR BLD: 1 %
BILIRUB SERPL-MCNC: 0.4 MG/DL (ref 0–1)
BUN SERPL-MCNC: 16 MG/DL (ref 7–20)
CALCIUM SERPL-MCNC: 9 MG/DL (ref 8.3–10.6)
CHLORIDE SERPL-SCNC: 100 MMOL/L (ref 99–110)
CO2 SERPL-SCNC: 26 MMOL/L (ref 21–32)
CREAT SERPL-MCNC: <0.5 MG/DL (ref 0.6–1.1)
DEPRECATED RDW RBC AUTO: 13.9 % (ref 12.4–15.4)
EOSINOPHIL # BLD: 0.4 K/UL (ref 0–0.6)
EOSINOPHIL NFR BLD: 2.9 %
FIBRINOGEN PPP-MCNC: 305 MG/DL (ref 207–509)
GFR SERPLBLD CREATININE-BSD FMLA CKD-EPI: >60 ML/MIN/{1.73_M2}
GLUCOSE SERPL-MCNC: 84 MG/DL (ref 70–99)
HCT VFR BLD AUTO: 38.4 % (ref 36–48)
HGB BLD-MCNC: 12.3 G/DL (ref 12–16)
INR PPP: 1.1 (ref 0.87–1.14)
LYMPHOCYTES # BLD: 2.4 K/UL (ref 1–5.1)
LYMPHOCYTES NFR BLD: 19.4 %
MAGNESIUM SERPL-MCNC: 1.9 MG/DL (ref 1.8–2.4)
MCH RBC QN AUTO: 27.2 PG (ref 26–34)
MCHC RBC AUTO-ENTMCNC: 32.1 G/DL (ref 31–36)
MCV RBC AUTO: 84.8 FL (ref 80–100)
MONOCYTES # BLD: 0.6 K/UL (ref 0–1.3)
MONOCYTES NFR BLD: 4.9 %
NEUTROPHILS # BLD: 9 K/UL (ref 1.7–7.7)
NEUTROPHILS NFR BLD: 71.8 %
PLATELET # BLD AUTO: 394 K/UL (ref 135–450)
PMV BLD AUTO: 6.4 FL (ref 5–10.5)
POTASSIUM SERPL-SCNC: 3.6 MMOL/L (ref 3.5–5.1)
PROT SERPL-MCNC: 6.4 G/DL (ref 6.4–8.2)
PROTHROMBIN TIME: 14.1 SEC (ref 11.7–14.5)
RBC # BLD AUTO: 4.53 M/UL (ref 4–5.2)
SODIUM SERPL-SCNC: 139 MMOL/L (ref 136–145)
WBC # BLD AUTO: 12.6 K/UL (ref 4–11)

## 2023-03-17 PROCEDURE — 2580000003 HC RX 258

## 2023-03-17 PROCEDURE — 83735 ASSAY OF MAGNESIUM: CPT

## 2023-03-17 PROCEDURE — 85610 PROTHROMBIN TIME: CPT

## 2023-03-17 PROCEDURE — P9045 ALBUMIN (HUMAN), 5%, 250 ML: HCPCS | Performed by: INTERNAL MEDICINE

## 2023-03-17 PROCEDURE — 80053 COMPREHEN METABOLIC PANEL: CPT

## 2023-03-17 PROCEDURE — 85025 COMPLETE CBC W/AUTO DIFF WBC: CPT

## 2023-03-17 PROCEDURE — 85730 THROMBOPLASTIN TIME PARTIAL: CPT

## 2023-03-17 PROCEDURE — 2580000003 HC RX 258: Performed by: INTERNAL MEDICINE

## 2023-03-17 PROCEDURE — 6370000000 HC RX 637 (ALT 250 FOR IP)

## 2023-03-17 PROCEDURE — 6360000002 HC RX W HCPCS: Performed by: INTERNAL MEDICINE

## 2023-03-17 PROCEDURE — 85384 FIBRINOGEN ACTIVITY: CPT

## 2023-03-17 RX ORDER — CALCIUM GLUCONATE 94 MG/ML
5000 INJECTION, SOLUTION INTRAVENOUS ONCE
Status: DISCONTINUED | OUTPATIENT
Start: 2023-03-17 | End: 2023-03-17

## 2023-03-17 RX ORDER — ALBUMIN, HUMAN INJ 5% 5 %
140 SOLUTION INTRAVENOUS ONCE
Status: COMPLETED | OUTPATIENT
Start: 2023-03-17 | End: 2023-03-17

## 2023-03-17 RX ORDER — CALCIUM GLUCONATE 94 MG/ML
500 INJECTION, SOLUTION INTRAVENOUS ONCE
Status: DISCONTINUED | OUTPATIENT
Start: 2023-03-17 | End: 2023-03-17

## 2023-03-17 RX ADMIN — CALCIUM GLUCONATE 5000 MG: 98 INJECTION, SOLUTION INTRAVENOUS at 10:56

## 2023-03-17 RX ADMIN — CITALOPRAM 40 MG: 40 TABLET, FILM COATED ORAL at 09:15

## 2023-03-17 RX ADMIN — ALBUMIN (HUMAN) 140 G: 12.5 INJECTION, SOLUTION INTRAVENOUS at 10:55

## 2023-03-17 RX ADMIN — SODIUM CHLORIDE, PRESERVATIVE FREE 10 ML: 5 INJECTION INTRAVENOUS at 09:16

## 2023-03-17 RX ADMIN — LANSOPRAZOLE 30 MG: 30 TABLET, ORALLY DISINTEGRATING, DELAYED RELEASE ORAL at 09:15

## 2023-03-17 RX ADMIN — PYRIDOSTIGMINE BROMIDE 30 MG: 60 TABLET ORAL at 09:15

## 2023-03-17 RX ADMIN — FOLIC ACID 1 MG: 1 TABLET ORAL at 09:15

## 2023-03-17 RX ADMIN — LEVOTHYROXINE SODIUM 150 MCG: 0.15 TABLET ORAL at 05:14

## 2023-03-17 NOTE — PROGRESS NOTES
Hospitalist Progress Note      PCP: Alexander Alba MD    Date of Admission: 3/5/2023    Chief Complaint: Difficulty swallowing        History Of Present Illness:       46 y.o. female who presented to [facility] with complaint of difficulty swallowing and raspy voice. According to patient she started experiencing difficulty swallowing for last few days. Patient felt like food was getting stuck in her throat. Patient also complained of difficulty with her speech. Patient felt weakness in her lower extremity. Symptoms were similar to her past episodes of myasthenia gravis exacerbation. Patient has experienced 5 exacerbations since 2018 and myasthenia crisis. Patient was first diagnosed with myasthenia gravis in 2005. Patient has history of thymectomy. Last exacerbation approximately 1 year ago. At that time patient was hospitalized at [facility].     Pt intubated on 3/6/23 due to worsening respiratory failure. S/p IVIG for 2 days. On PLEX. Extubated on 3/14/23. Subjective:   Now on room air. Denies dyspnea. Lower extremity weakness improved. Pt wants to go home. Ok if PLEX can be arranged as outpatient.        Medications:  Reviewed    Infusion Medications    sodium chloride Stopped (03/10/23 6134)     Scheduled Medications    lansoprazole  30 mg Oral Daily    pyridostigmine  30 mg Oral TID    methotrexate  10 mg Oral Weekly    sennosides-docusate sodium  2 tablet Oral BID    polyethylene glycol  17 g Oral Daily    citalopram  40 mg Orogastric QAM    doxepin  10 mg Orogastric Nightly    estradiol  2 mg Orogastric Nightly    folic acid  1 mg Orogastric Daily    levothyroxine  150 mcg Orogastric Daily    sodium chloride flush  5-40 mL IntraVENous 2 times per day    enoxaparin  40 mg SubCUTAneous Nightly     PRN Meds: melatonin, butalbital-acetaminophen-caffeine, acetaminophen **OR** acetaminophen, metoclopramide, hydrALAZINE, LORazepam, sodium chloride flush, sodium chloride, ondansetron **OR** ondansetron      Intake/Output Summary (Last 24 hours) at 3/16/2023 2239  Last data filed at 3/16/2023 1708  Gross per 24 hour   Intake 360 ml   Output --   Net 360 ml       Physical Exam Performed:    /79   Pulse 79   Temp 98 °F (36.7 °C) (Oral)   Resp 18   Ht 5' 2.01\" (1.575 m)   Wt 167 lb 8.8 oz (76 kg)   SpO2 97%   BMI 30.64 kg/m²     General appearance: intubated   HEENT: Pupils equal, round, and reactive to light. Conjunctivae/corneas clear. Neck: Supple, with full range of motion. No jugular venous distention. Trachea midline. Respiratory:  Normal respiratory effort. Clear to auscultation, bilaterally without Rales/Wheezes/Rhonchi. Cardiovascular: Regular rate and rhythm with normal S1/S2 without murmurs, rubs or gallops. Abdomen: Soft, non-tender, non-distended with normal bowel sounds. Musculoskeletal: No clubbing, cyanosis or edema bilaterally. Full range of motion without deformity. Skin: Skin color, texture, turgor normal.  No rashes or lesions. Neurologic:  Neurovascularly intact without any focal sensory/motor deficits.  Cranial nerves: II-XII intact, grossly non-focal.  Psychiatric: Alert and oriented, thought content appropriate, normal insight  Capillary Refill: Brisk, 3 seconds, normal   Peripheral Pulses: +2 palpable, equal bilaterally       Labs:   Recent Labs     03/14/23  0404 03/15/23  0419 03/16/23  0632   WBC 11.7* 11.1* 13.7*   HGB 11.5* 11.8* 12.3   HCT 35.4* 36.9 37.7    324 373     Recent Labs     03/14/23  0404 03/15/23  0419 03/16/23  0632    142 141   K 4.3 3.5 3.9    103 103   CO2 28 31 27   BUN 31* 21* 16   CREATININE <0.5* <0.5* <0.5*   CALCIUM 9.0 9.0 9.2     Recent Labs     03/14/23  0404 03/15/23  0419 03/16/23  0632   AST 15 20 19   ALT 10 14 14   BILITOT <0.2 <0.2 0.5   ALKPHOS 58 82 56     Recent Labs     03/14/23  0404 03/15/23  0420 03/16/23  0632   INR 1.10 1.02 1.11     No results for input(s): Aleks Bray in the last 72 hours. Urinalysis:      Lab Results   Component Value Date/Time    NITRU Negative 03/08/2023 04:08 PM    WBCUA 1 06/30/2020 06:20 PM    BACTERIA RARE 06/30/2020 06:20 PM    RBCUA 1 06/30/2020 06:20 PM    BLOODU Negative 03/08/2023 04:08 PM    SPECGRAV 1.025 03/08/2023 04:08 PM    GLUCOSEU Negative 03/08/2023 04:08 PM       Radiology:  XR CHEST PORTABLE   Final Result   1. Advancement of endotracheal tube with the tip now at the level the right mainstem bronchus. Consider repositioning. 2. No significant change from prior otherwise. XR ABDOMEN (KUB) (SINGLE AP VIEW)   Final Result      Enteric tube tip in the proximal stomach just distal to the gastroesophageal junction. CT HEAD WO CONTRAST   Final Result      Normal noncontrast CT the brain without acute hemorrhage, edema or hydrocephalus. Diffuse sinus opacity and fluid throughout the maxillary sinuses ethmoid air cells sphenoid sinuses. Duodenal feeding tube or NG tube in place with diffuse fluid throughout the nasopharynx. There appears to be prior nasal sinus surgery      XR CHEST PORTABLE   Final Result   1. Right CVC, dialysis catheter in satisfactory position with its tip in the proximal to mid SVC. (Catheter tip is approximately 4 cm above the cavoatrial junction)   2. No pneumothorax      XR CHEST PORTABLE   Final Result      1. Repositioning of ET tube with tip now lying approximately 1.6 cm above the douglas. 2.  Near complete reexpansion of the left lung with persistent mild left basilar atelectasis. 3.  Further advancement of feeding tube with tip projecting over the distal gastric body. XR CHEST 1 VIEW   Final Result   Impression: Interval intubation. The endotracheal tube terminates within the right mainstem bronchus and should be retracted by at least 3 cm. Findings were discussed with ESTELITA Lee at the time of report.           IP CONSULT TO NEUROLOGY  IP CONSULT TO DIETITIAN  IP CONSULT TO GENERAL SURGERY  IP CONSULT TO HEMATOLOGY  IP CONSULT TO CASE MANAGEMENT    Assessment/Plan:    Active Hospital Problems    Diagnosis     Respiratory failure requiring intubation ([facility] [address] [J96.90]      Priority: Medium    Myasthenia gravis with acute exacerbation ([facility] [address] [G70.01]      Priority: Medium    Myasthenia gravis with (acute) exacerbation ([facility] [address] [G70.01]      -Acute hypoxemic respiratory failure     -Myasthenia crisis  Intubated on 3/6/23 due to worsening respiration. Extubation on 3/14/23. - Aseptic meningitis due to IVIG    - Urinary retention   -Hypothyroidism  - GERD  - Dyslipidemia        PLAN:     Respiratory monitoring  IVIG administration x 2 doses. Plasmapharesis total 5 doses. Neurocritical care on board. Fioricet for headache   Continue celexa, doxepin, levothyroxine     DVT Prophylaxis: Lovenox  Diet: ADULT DIET;  Regular; Low Fat/Low Chol/High Fiber/2 gm Na  Code Status: Full Code     PT/OT Eval Status:      Dispo home after 5 sessions of Makenna Damon MD

## 2023-03-17 NOTE — PROGRESS NOTES
Comprehensive Nutrition Assessment    RECOMMENDATIONS:  Continue dysphagia- soft & bite sized, low fat/low chol/high fiber/AMADOR diet   ONS: pt denied   Nutrition Education: Education not appropriate     NUTRITION ASSESSMENT:   Nutritional summary & status: Follow up. Pt was receiving TF; has since been d/c'd and dysphagia- soft & bite sized diet added w/ low fat/chol/high fiber/AMADOR component. Current diet appropriate for dx(s). RD spoke w/ pt this AM, pt reports tube feeding has been d/c'd entirely & pt is tolerating diet well. Pt has no complaints or food prefs at this time. When prompted for ONS, pt denied request & said that she is going home today so she doesn't want a supplement. Rec'd continuing POC until d/c. RD following. Admission/PMH: Myasthenia gravis w/ acute exacerbation, respiratory failure    MALNUTRITION ASSESSMENT  Context of Malnutrition: Acute Illness   Malnutrition Status: At risk for malnutrition (Comment)  Findings of the 6 clinical characteristics of malnutrition (Minimum of 2 out of 6 clinical characteristics is required to make the diagnosis of moderate or severe Protein Calorie Malnutrition based on AND/ASPEN Guidelines):  Energy Intake:  Mild decrease in energy intake (Comment)  Weight Loss:  No significant weight loss     Body Fat Loss:  No significant body fat loss     Muscle Mass Loss:  No significant muscle mass loss    Fluid Accumulation:  No significant fluid accumulation      NUTRITION DIAGNOSIS   Inadequate oral intake related to acute injury/trauma as evidenced by intake 26-50%    Nutrition Monitoring and Evaluation:   Food/Nutrient Intake Outcomes:  Food and Nutrient Intake  Physical Signs/Symptoms Outcomes:  Biochemical Data, Weight, Nutrition Focused Physical Findings, GI Status     OBJECTIVE DATA: Significant to nutrition assessment  Nutrition Related Findings: +1 pitting BLE edema.  +BM 3/15  Wounds: None  Nutrition Goals: PO intake 75% or greater, prior to discharge CURRENT NUTRITION THERAPIES  Current Tube Feeding (TF) Orders:  Feeding Route: Orogastric  Formula: Peptide Based High Protein  Schedule: Continuous  Additives/Modulars: None  Goal TF & Flush Orders Provides: Vital HP @ 55 mL/hr to provide: 1320 mL TV, 1320 kcal, 115 g/pro and 1104 mL FW + FW flushes of 30 mL q4  PO Intake: 26-50%   PO Supplement Intake:None Ordered  Additional Sources of Calories/IVF:N/A     ANTHROPOMETRICS  Current Height: 5' 2.01\" (157.5 cm)  Current Weight: 167 lb 8.8 oz (76 kg)    Admission weight: 167 lb 12.3 oz (76.1 kg)  Ideal Body Weight (IBW): 110 lbs  (50 kg)    BMI: 30.7    COMPARATIVE STANDARDS  Energy (kcal):  3698-3329 (15-18 kcal/kg CBW)     Protein (g):  100-110 (2.0-2.2 g/kg IBW)       Fluid (mL/day):  1 mL/kcal or per MD    The patient will be monitored per nutrition standards of care. Consult dietitian if additional nutrition interventions are needed prior to RD reassessment.      Lyly Hough, 66 [address], [address] [place] [address] Drive:  [phone]  Office:  [phone]

## 2023-03-17 NOTE — PROGRESS NOTES
ONCOLOGY HEMATOLOGY CARE PROGRESS NOTE      SUBJECTIVE:    Anxious to go home. Feeling much better overall. On RA and without dyspnea. ROS:     Constitutional:  No weight loss, No fever, No chills, No night sweats.   Energy level improving   Eyes:  No impairment or change in vision  ENT / Mouth:  No pain, abnormal ulceration, bleeding, nasal drip or change in voice or hearing  Cardiovascular:  No chest pain, palpitations, new edema, or calf discomfort  Respiratory:  No pain, hemoptysis, +change to breathing  Breast:  No pain, discharge, change in appearance or texture  Gastrointestinal:  No pain, cramping, jaundice, change to eating and bowel habits  Urinary:  No pain, bleeding or change in continence  Genitalia: No pain, bleeding or discharge  Musculoskeletal:  No redness, pain, edema or weakness  Skin:  No pruritus, rash, change to nodules or lesions  Neurologic:  No discomfort, change in mental status, speech, sensory or motor activity  Psychiatric:  No change in concentration or change to affect or mood  Endocrine:  No hot flashes, increased thirst, or change to urine production  Hematologic: No petechiae, ecchymosis or bleeding  Lymphatic:  No lymphadenopathy or lymphedema  Allergy / Immunologic:  No eczema, hives, frequent or recurrent infections    OBJECTIVE        Physical    VITALS:  Patient Vitals for the past 24 hrs:   BP Temp Temp src Pulse Resp SpO2   03/17/23 0259 116/67 97.7 °F (36.5 °C) Oral 72 16 94 %   03/16/23 2340 127/88 98.5 °F (36.9 °C) Oral 71 16 96 %   03/16/23 1935 126/79 98 °F (36.7 °C) Oral 79 18 97 %   03/16/23 1430 (!) 140/84 97.6 °F (36.4 °C) Oral 81 18 97 %   03/16/23 1052 113/79 98.2 °F (36.8 °C) Oral 95 18 97 %   03/16/23 0750 129/78 98.4 °F (36.9 °C) Oral 72 16 97 %       24HR INTAKE/OUTPUT:    Intake/Output Summary (Last 24 hours) at 3/17/2023 0750  Last data filed at 3/16/2023 1708  Gross per 24 hour   Intake 360 ml   Output --   Net 360 ml CONSTITUTIONAL: awake, alert, cooperative, no apparent distress   EYES: pupils equal, round and reactive to light, sclera clear and conjunctiva normal  ENT: Normocephalic, without obvious abnormality, atraumatic  NECK: supple, symmetrical, no jugular venous distension and no carotid bruits   HEMATOLOGIC/LYMPHATIC: no cervical, supraclavicular or axillary lymphadenopathy   LUNGS: no increased work of breathing and clear to auscultation   CARDIOVASCULAR: regular rate and rhythm, normal S1 and S2, no murmur noted  ABDOMEN: normal bowel sounds x 4, soft, non-distended, non-tender, no masses palpated, no hepatosplenomgaly   MUSCULOSKELETAL: full range of motion noted, tone is normal  NEUROLOGIC: awake, alert, oriented to name, place and time. Motor skills grossly intact. SKIN: Normal skin color, texture, turgor and no jaundice.  appears intact   EXTREMITIES: no LE edema     DATA:  CBC:    Recent Labs     03/17/23  0525 03/16/23  0632 03/15/23  0419 03/14/23  0404   WBC 12.6* 13.7* 11.1* 11.7*   NEUTROABS 9.0* 10.4* 7.6 9.3*   LYMPHOPCT 19.4 16.0 22.3 14.1   RBC 4.53 4.43 4.28 4.11   HGB 12.3 12.3 11.8* 11.5*   HCT 38.4 37.7 36.9 35.4*   MCV 84.8 85.2 86.3 86.2   MCH 27.2 27.9 27.6 28.1   MCHC 32.1 32.7 32.0 32.6   RDW 13.9 13.7 14.1 13.9    373 324 287       PT/INR:    Recent Labs     03/17/23  0525 03/16/23  0632 03/15/23  0420 03/15/23  0419 03/14/23  0404 03/13/23  0315   PROT 6.4 6.3*  --  6.5 6.0* 6.6   INR 1.10 1.11 1.02  --  1.10 0.97     PTT:    Recent Labs     03/17/23  0554 03/17/23  0525 03/16/23  0632 03/15/23  0420 03/14/23  0404   APTT 31.0 29.7 32.6 33.3 32.0       CMP:    Recent Labs     03/17/23  0525 03/16/23  0632 03/15/23  0419 03/14/23  0404    141 142 140   K 3.6 3.9 3.5 4.3    103 103 104   CO2 26 27 31 28   GLUCOSE 84 93 85 126*   BUN 16 16 21* 31*   CREATININE <0.5* <0.5* <0.5* <0.5*   LABGLOM >60 >60 >60 >60   CALCIUM 9.0 9.2 9.0 9.0   PROT 6.4 6.3* 6.5 6.0*   LABALBU 4.5 4.9 4.3 4.4   AGRATIO 2.4* 3.5* 2.0 2.8*   BILITOT 0.4 0.5 <0.2 <0.2   ALKPHOS 83 56 82 58   ALT 20 14 14 10   AST 25 19 20 15   MG 1.90 2.00 2.00 1.90       Lab Results   Component Value Date    CALCIUM 9.0 03/17/2023    PHOS 3.3 03/10/2023       LDH:  Recent Labs     03/10/23  1428   LDH 65*       Radiology Review:  XR CHEST PORTABLE  Narrative: History: Increased secretions. Respiratory distress. AP chest.    COMPARISON: 3/9/2023. FINDINGS: Endotracheal tube tip advanced and now at the origin of the right mainstem bronchus. Consider repositioning. Right IJ dual-lumen catheter in stable position. Stable heart size. No significant effusion or consolidation. Feeding tube tip in the   mid stomach. Impression: 1. Advancement of endotracheal tube with the tip now at the level the right mainstem bronchus. Consider repositioning. 2. No significant change from prior otherwise. Problem List  Patient Active Problem List   Diagnosis    Myasthenia gravis ([address]    Myasthenia gravis with (acute) exacerbation (HCC)    Appendicitis    Neck stiffness    Generalized weakness    Myasthenia gravis with exacerbation, adult form ([address]    Hyperlipidemia    Seizure (HCC)    GERD (gastroesophageal reflux disease)    Migraine    Anxiety    Insomnia    Myasthenia gravis with exacerbation (HCC)    Bradycardia    Sinus arrhythmia    Postablative hypothyroidism    'light-for-dates' infant with signs of fetal malnutrition    Left-sided weakness    Myasthenia gravis with acute exacerbation (HCC)    Respiratory failure requiring intubation ([address]       ASSESSMENT AND PLAN:    This is a 58-year-old female with a history of myasthenia gravis, seizures, thyroid disease, migraines who presented for progressive dysphagia s/p IVIG with worsening neck stiffness and respiratory failure requiring intubation. Per neurology/neuro critical care team she requires plasmapheresis.        Myasthenic crisis  - Plan for  fourth PLEX session today--this will be her final treatment   - Replacement with 5% albumin  - Coags have remained normal   - check Fibrinogen following PLEX today   -calcium gluconate orders for hypocalcemia protocol will be in place--Calcium WNL     Case discussed with Dr. Keller Gains today     ONCOLOGIC DISPOSITION:  Home following PLEX treatment     BETH Baca - CNP  Please contact through [facility]

## 2023-03-17 NOTE — DISCHARGE SUMMARY
Hospital Medicine Discharge Summary    Patient ID: Phong Marcum      Patient's PCP: Gavi Del Rosario MD    Admit Date: 3/5/2023     Discharge Date:   3/17/2023    Admitting Provider: Kalyan Cruz MD     Discharge Provider: Patsy Hernandez MD     Discharge Diagnoses: Active Hospital Problems    Diagnosis     Respiratory failure requiring intubation ([facility] [address] [J96.90]      Priority: Medium    Myasthenia gravis with acute exacerbation ([facility] [address] [G70.01]      Priority: Medium    Myasthenia gravis with (acute) exacerbation ([address] [G70.01]        The patient was seen and examined on day of discharge and this discharge summary is in conjunction with any daily progress note from day of discharge. Hospital Course: This is 66-year-old female with medical history significant for generalized myasthenia gravis, history of thymoma s/p thymectomy, hyperlipidemia, anxiety, migraine headache and hypothyroidism who was admitted with difficulty chewing and swallowing, droopy eyelids and changing her voice on March 2, 2023. She was also having generalized fatigue. She was intubated on 3/6/2023 for worsening respiratory status. She did receive IV immunoglobulin for 2 days complicated by probable aseptic meningitis. She was started on plasmapheresis and did receive 5 treatment, last treatment today. Her symptom completely resolved and she was restarted on Mestinon. Her Vas-Cath was removed and she was discharged in a hemodynamically stable condition with a plan to follow-up with neurology as outpatient. Physical Exam Performed:     /73   Pulse 72   Temp 98 °F (36.7 °C) (Oral)   Resp 16   Ht 5' 2.01\" (1.575 m)   Wt 167 lb 8.8 oz (76 kg)   SpO2 98%   BMI 30.64 kg/m²       General appearance:  No apparent distress, appears stated age and cooperative. HEENT:  Normal cephalic, atraumatic without obvious deformity. Pupils equal, round, and reactive to light. Extra ocular muscles intact.  Conjunctivae/corneas clear.  Neck: Supple, with full range of motion. No jugular venous distention. Trachea midline. Respiratory:  Normal respiratory effort. Clear to auscultation, bilaterally without Rales/Wheezes/Rhonchi. Cardiovascular:  Regular rate and rhythm with normal S1/S2 without murmurs, rubs or gallops. Abdomen: Soft, non-tender, non-distended with normal bowel sounds. Musculoskeletal:  No clubbing, cyanosis or edema bilaterally. Full range of motion without deformity. Skin: Skin color, texture, turgor normal.  No rashes or lesions. Neurologic:  Neurovascularly intact without any focal sensory/motor deficits. Cranial nerves: II-XII intact, grossly non-focal.  Psychiatric:  Alert and oriented, thought content appropriate, normal insight  Capillary Refill: Brisk,< 3 seconds   Peripheral Pulses: +2 palpable, equal bilaterally       Labs: For convenience and continuity at follow-up the following most recent labs are provided:      CBC:    Lab Results   Component Value Date/Time    WBC 12.6 03/17/2023 05:25 AM    HGB 12.3 03/17/2023 05:25 AM    HCT 38.4 03/17/2023 05:25 AM     03/17/2023 05:25 AM       Renal:    Lab Results   Component Value Date/Time     03/17/2023 05:25 AM    K 3.6 03/17/2023 05:25 AM     03/17/2023 05:25 AM    CO2 26 03/17/2023 05:25 AM    BUN 16 03/17/2023 05:25 AM    CREATININE <0.5 03/17/2023 05:25 AM    CALCIUM 9.0 03/17/2023 05:25 AM    PHOS 3.3 03/10/2023 02:28 PM         Significant Diagnostic Studies    Radiology:   XR CHEST PORTABLE   Final Result   1. Advancement of endotracheal tube with the tip now at the level the right mainstem bronchus. Consider repositioning. 2. No significant change from prior otherwise. XR ABDOMEN (KUB) (SINGLE AP VIEW)   Final Result      Enteric tube tip in the proximal stomach just distal to the gastroesophageal junction.             CT HEAD WO CONTRAST   Final Result      Normal noncontrast CT the brain without acute hemorrhage, edema or hydrocephalus.       Diffuse sinus opacity and fluid throughout the maxillary sinuses ethmoid air cells sphenoid sinuses. Duodenal feeding tube or NG tube in place with diffuse fluid throughout the nasopharynx. There appears to be prior nasal sinus surgery      XR CHEST PORTABLE   Final Result   1. Right CVC, dialysis catheter in satisfactory position with its tip in the proximal to mid SVC. (Catheter tip is approximately 4 cm above the cavoatrial junction)   2. No pneumothorax      XR CHEST PORTABLE   Final Result      1.  Repositioning of ET tube with tip now lying approximately 1.6 cm above the douglas.      2.  Near complete reexpansion of the left lung with persistent mild left basilar atelectasis.      3.  Further advancement of feeding tube with tip projecting over the distal gastric body.      XR CHEST 1 VIEW   Final Result   Impression: Interval intubation. The endotracheal tube terminates within the right mainstem bronchus and should be retracted by at least 3 cm. Findings were discussed with ESTELITA Carmen at the time of report.             Consults:     IP CONSULT TO NEUROLOGY  IP CONSULT TO DIETITIAN  IP CONSULT TO GENERAL SURGERY  IP CONSULT TO HEMATOLOGY  IP CONSULT TO CASE MANAGEMENT    Disposition: Home    Condition at Discharge: Stable    Discharge Instructions/Follow-up: Follow-up with neurology    Code Status:  Full Code     Activity: activity as tolerated    Diet: regular diet      Discharge Medications:     Current Discharge Medication List             Details   pyridostigmine (MESTINON) 60 MG tablet Take 60 mg by mouth 3 times daily      levothyroxine (SYNTHROID) 150 MCG tablet Take 150 mcg by mouth Daily      butalbital-acetaminophen-caffeine (FIORICET, ESGIC) -40 MG per tablet Take 1 tablet by mouth every 4 hours as needed for Migraine      LORazepam (ATIVAN) 0.5 MG tablet Take 0.5 mg by mouth 2 times daily as needed.       methotrexate (RHEUMATREX) 2.5 MG chemo tablet Take 10 mg by mouth  once a week Take 4 tablets together every Monday. doxepin (SINEQUAN) 10 MG capsule Take 10 mg by mouth nightly      estradiol (ESTRACE) 2 MG tablet Take 2 mg by mouth nightly      folic acid (FOLVITE) 1 MG tablet Take 1 mg by mouth daily      zolpidem (AMBIEN) 10 MG tablet Take 10 mg by mouth nightly. citalopram (CELEXA) 40 MG tablet Take 40 mg by mouth every morning              Time Spent on discharge: 28 in the examination, evaluation, counseling and review of medications and discharge plan. Signed:    Amor Oliva MD   3/17/2023      Thank you Adal Bailey MD for the opportunity to be involved in this patient's care. If you have any questions or concerns, please feel free to contact me at [phone].

## 2023-03-17 NOTE — PROGRESS NOTES
Vital Signs stable, A&Ox4  Ambulating independently in room, voiding freely  Denies any pain or nausea    Resting during the night     No further needs identified at this time

## 2023-04-18 NOTE — PROGRESS NOTES
4 Eyes Skin Assessment     The patient is being assess for  Admission    I agree that 2 RN's have performed a thorough Head to Toe Skin Assessment on the patient. ALL assessment sites listed below have been assessed. Areas assessed by both nurses: ***  [x]   Head, Face, and Ears   [x]   Shoulders, Back, and Chest  [x]   Arms, Elbows, and Hands   [x]   Coccyx, Sacrum, and IschIum  [x]   Legs, Feet, and Heels        Does the Patient have Skin Breakdown?   No         Adam Prevention initiated:  No   Wound Care Orders initiated:  No      [facility] nurse consulted for Pressure Injury (Stage 3,4, Unstageable, DTI, NWPT, and Complex wounds), New and Established Ostomies:  No      Nurse 1 eSignature: Electronically signed by Arti Sheth RN on 7/1/20 at 3:19 AM EDT    **SHARE this note so that the co-signing nurse is able to place an eSignature**    Nurse 2 eSignature: {Esignature:[id number]} Complex Repair And O-L Flap Text: The defect edges were debeveled with a #15 scalpel blade.  The primary defect was closed partially with a complex linear closure.  Given the location of the remaining defect, shape of the defect and the proximity to free margins an O-L flap was deemed most appropriate for complete closure of the defect.  Using a sterile surgical marker, an appropriate flap was drawn incorporating the defect and placing the expected incisions within the relaxed skin tension lines where possible. The area thus outlined was incised deep to adipose tissue with a #15 scalpel blade. The skin margins were undermined to an appropriate distance in all directions utilizing iris scissors and carried over to close the primary defect.

## 2023-07-11 NOTE — PROGRESS NOTES
Keep your wound clean and dry. Use the antibiotic ointment 3 times a day for a week. Soak your foot in warm water 2-3 times a day. Follow up with podiatry if symptoms do not improve.   Pt arrived to [facility] today for scheduled plasmapheresis procedure. Apheresis, line care, education, & lab draws all completed by Kimani CAPONE, Elisha Rivera. Review Kimani documentation for details.    Holden Robledo RN

## 2024-08-27 ENCOUNTER — HOSPITAL ENCOUNTER (INPATIENT)
Age: 54
LOS: 1 days | Discharge: HOME | DRG: 057 | End: 2024-08-28
Attending: STUDENT IN AN ORGANIZED HEALTH CARE EDUCATION/TRAINING PROGRAM | Admitting: HOSPITALIST
Payer: COMMERCIAL

## 2024-08-27 DIAGNOSIS — Z71.89 GOALS OF CARE, COUNSELING/DISCUSSION: ICD-10-CM

## 2024-08-27 DIAGNOSIS — G70.01 MYASTHENIA GRAVIS WITH (ACUTE) EXACERBATION (HCC): Primary | ICD-10-CM

## 2024-08-27 LAB
ALBUMIN SERPL-MCNC: 3.9 G/DL (ref 3.4–5)
ALBUMIN/GLOB SERPL: 1.1 {RATIO} (ref 1.1–2.2)
ALP SERPL-CCNC: 175 U/L (ref 40–129)
ALT SERPL-CCNC: 13 U/L (ref 10–40)
ANION GAP SERPL CALCULATED.3IONS-SCNC: 12 MMOL/L (ref 3–16)
AST SERPL-CCNC: 18 U/L (ref 15–37)
BASOPHILS # BLD: 0.1 K/UL (ref 0–0.2)
BASOPHILS NFR BLD: 0.6 %
BILIRUB SERPL-MCNC: <0.2 MG/DL (ref 0–1)
BUN SERPL-MCNC: 12 MG/DL (ref 7–20)
CALCIUM SERPL-MCNC: 9.3 MG/DL (ref 8.3–10.6)
CHLORIDE SERPL-SCNC: 101 MMOL/L (ref 99–110)
CO2 SERPL-SCNC: 28 MMOL/L (ref 21–32)
CREAT SERPL-MCNC: 0.6 MG/DL (ref 0.6–1.1)
DEPRECATED RDW RBC AUTO: 17.5 % (ref 12.4–15.4)
EOSINOPHIL # BLD: 0.1 K/UL (ref 0–0.6)
EOSINOPHIL NFR BLD: 0.6 %
GFR SERPLBLD CREATININE-BSD FMLA CKD-EPI: >90 ML/MIN/{1.73_M2}
GLUCOSE SERPL-MCNC: 96 MG/DL (ref 70–99)
HCT VFR BLD AUTO: 35.7 % (ref 36–48)
HGB BLD-MCNC: 11.8 G/DL (ref 12–16)
IGA SERPL-MCNC: 298 MG/DL (ref 70–400)
LYMPHOCYTES # BLD: 1.6 K/UL (ref 1–5.1)
LYMPHOCYTES NFR BLD: 16.5 %
MCH RBC QN AUTO: 28.2 PG (ref 26–34)
MCHC RBC AUTO-ENTMCNC: 33 G/DL (ref 31–36)
MCV RBC AUTO: 85.6 FL (ref 80–100)
MONOCYTES # BLD: 0.4 K/UL (ref 0–1.3)
MONOCYTES NFR BLD: 4.1 %
NEUTROPHILS # BLD: 7.7 K/UL (ref 1.7–7.7)
NEUTROPHILS NFR BLD: 78.2 %
PLATELET # BLD AUTO: 318 K/UL (ref 135–450)
PMV BLD AUTO: 7 FL (ref 5–10.5)
POTASSIUM SERPL-SCNC: 3.7 MMOL/L (ref 3.5–5.1)
PROT SERPL-MCNC: 7.4 G/DL (ref 6.4–8.2)
RBC # BLD AUTO: 4.17 M/UL (ref 4–5.2)
SARS-COV-2 RDRP RESP QL NAA+PROBE: NOT DETECTED
SODIUM SERPL-SCNC: 141 MMOL/L (ref 136–145)
WBC # BLD AUTO: 9.9 K/UL (ref 4–11)

## 2024-08-27 PROCEDURE — 05HB33Z INSERTION OF INFUSION DEVICE INTO RIGHT BASILIC VEIN, PERCUTANEOUS APPROACH: ICD-10-PCS | Performed by: HOSPITALIST

## 2024-08-27 PROCEDURE — 6360000002 HC RX W HCPCS: Performed by: STUDENT IN AN ORGANIZED HEALTH CARE EDUCATION/TRAINING PROGRAM

## 2024-08-27 PROCEDURE — 2580000003 HC RX 258: Performed by: HOSPITALIST

## 2024-08-27 PROCEDURE — 6370000000 HC RX 637 (ALT 250 FOR IP): Performed by: STUDENT IN AN ORGANIZED HEALTH CARE EDUCATION/TRAINING PROGRAM

## 2024-08-27 PROCEDURE — 85025 COMPLETE CBC W/AUTO DIFF WBC: CPT

## 2024-08-27 PROCEDURE — 82784 ASSAY IGA/IGD/IGG/IGM EACH: CPT

## 2024-08-27 PROCEDURE — 2000000000 HC ICU R&B

## 2024-08-27 PROCEDURE — 87635 SARS-COV-2 COVID-19 AMP PRB: CPT

## 2024-08-27 PROCEDURE — 94760 N-INVAS EAR/PLS OXIMETRY 1: CPT

## 2024-08-27 PROCEDURE — C1751 CATH, INF, PER/CENT/MIDLINE: HCPCS

## 2024-08-27 PROCEDURE — 99285 EMERGENCY DEPT VISIT HI MDM: CPT

## 2024-08-27 PROCEDURE — 94799 UNLISTED PULMONARY SVC/PX: CPT

## 2024-08-27 PROCEDURE — 36415 COLL VENOUS BLD VENIPUNCTURE: CPT

## 2024-08-27 PROCEDURE — 6370000000 HC RX 637 (ALT 250 FOR IP): Performed by: HOSPITALIST

## 2024-08-27 PROCEDURE — 36569 INSJ PICC 5 YR+ W/O IMAGING: CPT

## 2024-08-27 PROCEDURE — 80053 COMPREHEN METABOLIC PANEL: CPT

## 2024-08-27 RX ORDER — SODIUM CHLORIDE 0.9 % (FLUSH) 0.9 %
5-40 SYRINGE (ML) INJECTION EVERY 12 HOURS SCHEDULED
Status: DISCONTINUED | OUTPATIENT
Start: 2024-08-27 | End: 2024-08-28 | Stop reason: HOSPADM

## 2024-08-27 RX ORDER — SODIUM CHLORIDE 9 MG/ML
INJECTION, SOLUTION INTRAVENOUS PRN
Status: DISCONTINUED | OUTPATIENT
Start: 2024-08-27 | End: 2024-08-28 | Stop reason: HOSPADM

## 2024-08-27 RX ORDER — LORAZEPAM 0.5 MG/1
0.5 TABLET ORAL 2 TIMES DAILY PRN
Status: DISCONTINUED | OUTPATIENT
Start: 2024-08-27 | End: 2024-08-28 | Stop reason: HOSPADM

## 2024-08-27 RX ORDER — ONDANSETRON 2 MG/ML
4 INJECTION INTRAMUSCULAR; INTRAVENOUS EVERY 6 HOURS PRN
Status: DISCONTINUED | OUTPATIENT
Start: 2024-08-27 | End: 2024-08-28 | Stop reason: HOSPADM

## 2024-08-27 RX ORDER — CITALOPRAM HYDROBROMIDE 20 MG/1
40 TABLET ORAL EVERY MORNING
Status: DISCONTINUED | OUTPATIENT
Start: 2024-08-28 | End: 2024-08-28 | Stop reason: HOSPADM

## 2024-08-27 RX ORDER — ZOLPIDEM TARTRATE 5 MG/1
10 TABLET ORAL NIGHTLY
Status: DISCONTINUED | OUTPATIENT
Start: 2024-08-27 | End: 2024-08-28 | Stop reason: HOSPADM

## 2024-08-27 RX ORDER — ENOXAPARIN SODIUM 100 MG/ML
40 INJECTION SUBCUTANEOUS DAILY
Status: DISCONTINUED | OUTPATIENT
Start: 2024-08-28 | End: 2024-08-28 | Stop reason: HOSPADM

## 2024-08-27 RX ORDER — ESTRADIOL 1 MG/1
1 TABLET ORAL DAILY
COMMUNITY

## 2024-08-27 RX ORDER — ACETAMINOPHEN 650 MG/1
650 SUPPOSITORY RECTAL EVERY 6 HOURS PRN
Status: DISCONTINUED | OUTPATIENT
Start: 2024-08-27 | End: 2024-08-28 | Stop reason: HOSPADM

## 2024-08-27 RX ORDER — DIPHENHYDRAMINE HYDROCHLORIDE 50 MG/ML
50 INJECTION INTRAMUSCULAR; INTRAVENOUS DAILY
Status: DISCONTINUED | OUTPATIENT
Start: 2024-08-27 | End: 2024-08-28

## 2024-08-27 RX ORDER — ATOMOXETINE 80 MG/1
80 CAPSULE ORAL DAILY
COMMUNITY

## 2024-08-27 RX ORDER — ONDANSETRON 4 MG/1
4 TABLET, ORALLY DISINTEGRATING ORAL EVERY 8 HOURS PRN
Status: DISCONTINUED | OUTPATIENT
Start: 2024-08-27 | End: 2024-08-28 | Stop reason: HOSPADM

## 2024-08-27 RX ORDER — POTASSIUM CHLORIDE 7.45 MG/ML
10 INJECTION INTRAVENOUS PRN
Status: DISCONTINUED | OUTPATIENT
Start: 2024-08-27 | End: 2024-08-28 | Stop reason: HOSPADM

## 2024-08-27 RX ORDER — LIDOCAINE HYDROCHLORIDE 10 MG/ML
50 INJECTION, SOLUTION EPIDURAL; INFILTRATION; INTRACAUDAL; PERINEURAL ONCE
Status: DISCONTINUED | OUTPATIENT
Start: 2024-08-27 | End: 2024-08-28 | Stop reason: HOSPADM

## 2024-08-27 RX ORDER — POTASSIUM CHLORIDE 1500 MG/1
40 TABLET, EXTENDED RELEASE ORAL PRN
Status: DISCONTINUED | OUTPATIENT
Start: 2024-08-27 | End: 2024-08-28 | Stop reason: HOSPADM

## 2024-08-27 RX ORDER — PYRIDOSTIGMINE BROMIDE 60 MG/1
60 TABLET ORAL 4 TIMES DAILY
Status: DISCONTINUED | OUTPATIENT
Start: 2024-08-27 | End: 2024-08-28 | Stop reason: HOSPADM

## 2024-08-27 RX ORDER — ACETAMINOPHEN 325 MG/1
650 TABLET ORAL EVERY 6 HOURS PRN
Status: DISCONTINUED | OUTPATIENT
Start: 2024-08-27 | End: 2024-08-28 | Stop reason: HOSPADM

## 2024-08-27 RX ORDER — MAGNESIUM SULFATE IN WATER 40 MG/ML
2000 INJECTION, SOLUTION INTRAVENOUS PRN
Status: DISCONTINUED | OUTPATIENT
Start: 2024-08-27 | End: 2024-08-28 | Stop reason: HOSPADM

## 2024-08-27 RX ORDER — SODIUM CHLORIDE 9 MG/ML
INJECTION, SOLUTION INTRAVENOUS CONTINUOUS
Status: DISCONTINUED | OUTPATIENT
Start: 2024-08-27 | End: 2024-08-28

## 2024-08-27 RX ORDER — DOXEPIN HYDROCHLORIDE 10 MG/1
10 CAPSULE ORAL NIGHTLY
Status: DISCONTINUED | OUTPATIENT
Start: 2024-08-27 | End: 2024-08-28 | Stop reason: HOSPADM

## 2024-08-27 RX ORDER — LEVOTHYROXINE SODIUM 137 UG/1
137 TABLET ORAL DAILY
COMMUNITY

## 2024-08-27 RX ORDER — SODIUM CHLORIDE 0.9 % (FLUSH) 0.9 %
5-40 SYRINGE (ML) INJECTION PRN
Status: DISCONTINUED | OUTPATIENT
Start: 2024-08-27 | End: 2024-08-28 | Stop reason: HOSPADM

## 2024-08-27 RX ORDER — ESTRADIOL 1 MG/1
1 TABLET ORAL NIGHTLY
Status: DISCONTINUED | OUTPATIENT
Start: 2024-08-27 | End: 2024-08-28 | Stop reason: HOSPADM

## 2024-08-27 RX ORDER — POLYETHYLENE GLYCOL 3350 17 G/17G
17 POWDER, FOR SOLUTION ORAL DAILY PRN
Status: DISCONTINUED | OUTPATIENT
Start: 2024-08-27 | End: 2024-08-28 | Stop reason: HOSPADM

## 2024-08-27 RX ORDER — ACETAMINOPHEN 325 MG/1
650 TABLET ORAL DAILY
Status: DISCONTINUED | OUTPATIENT
Start: 2024-08-27 | End: 2024-08-28 | Stop reason: HOSPADM

## 2024-08-27 RX ADMIN — Medication 10 ML: at 20:11

## 2024-08-27 RX ADMIN — PYRIDOSTIGMINE BROMIDE 60 MG: 60 TABLET ORAL at 20:10

## 2024-08-27 RX ADMIN — ESTRADIOL 1 MG: 1 TABLET ORAL at 20:10

## 2024-08-27 RX ADMIN — ACETAMINOPHEN 325MG 650 MG: 325 TABLET ORAL at 18:12

## 2024-08-27 RX ADMIN — SODIUM CHLORIDE: 9 INJECTION, SOLUTION INTRAVENOUS at 15:57

## 2024-08-27 RX ADMIN — Medication 10 ML: at 20:10

## 2024-08-27 RX ADMIN — IMMUNE GLOBULIN (HUMAN) 5 G: 10 INJECTION INTRAVENOUS; SUBCUTANEOUS at 20:38

## 2024-08-27 RX ADMIN — ZOLPIDEM TARTRATE 10 MG: 5 TABLET ORAL at 20:10

## 2024-08-27 RX ADMIN — DIPHENHYDRAMINE HYDROCHLORIDE 50 MG: 50 INJECTION INTRAMUSCULAR; INTRAVENOUS at 18:13

## 2024-08-27 RX ADMIN — DOXEPIN HYDROCHLORIDE 10 MG: 10 CAPSULE ORAL at 20:10

## 2024-08-27 RX ADMIN — IMMUNE GLOBULIN (HUMAN) 20 G: 10 INJECTION INTRAVENOUS; SUBCUTANEOUS at 19:16

## 2024-08-27 RX ADMIN — PYRIDOSTIGMINE BROMIDE 60 MG: 60 TABLET ORAL at 17:00

## 2024-08-27 ASSESSMENT — PAIN DESCRIPTION - DESCRIPTORS
DESCRIPTORS: SHARP;BURNING
DESCRIPTORS: SHARP;BURNING

## 2024-08-27 ASSESSMENT — PAIN DESCRIPTION - ORIENTATION: ORIENTATION: ANTERIOR;POSTERIOR

## 2024-08-27 ASSESSMENT — PAIN SCALES - GENERAL
PAINLEVEL_OUTOF10: 7
PAINLEVEL_OUTOF10: 0
PAINLEVEL_OUTOF10: 6
PAINLEVEL_OUTOF10: 6

## 2024-08-27 ASSESSMENT — PAIN - FUNCTIONAL ASSESSMENT
PAIN_FUNCTIONAL_ASSESSMENT: ACTIVITIES ARE NOT PREVENTED
PAIN_FUNCTIONAL_ASSESSMENT: 0-10
PAIN_FUNCTIONAL_ASSESSMENT: PREVENTS OR INTERFERES SOME ACTIVE ACTIVITIES AND ADLS

## 2024-08-27 ASSESSMENT — LIFESTYLE VARIABLES
HOW MANY STANDARD DRINKS CONTAINING ALCOHOL DO YOU HAVE ON A TYPICAL DAY: PATIENT DOES NOT DRINK
HOW OFTEN DO YOU HAVE A DRINK CONTAINING ALCOHOL: NEVER

## 2024-08-27 ASSESSMENT — PAIN DESCRIPTION - FREQUENCY
FREQUENCY: CONTINUOUS
FREQUENCY: CONTINUOUS

## 2024-08-27 ASSESSMENT — PAIN DESCRIPTION - ONSET: ONSET: ON-GOING

## 2024-08-27 ASSESSMENT — PAIN DESCRIPTION - LOCATION
LOCATION: HEAD
LOCATION: HEAD

## 2024-08-27 ASSESSMENT — PAIN DESCRIPTION - PAIN TYPE: TYPE: ACUTE PAIN

## 2024-08-27 NOTE — CONSULTS
Neurology Consult Note  Reason for Consult: Discussed with ED attending; Possible MG crisis     Chief complaint: \"weakness, shortness of breath. \"    Bhavna Bennett MD asked me to see Radha Borja in consultation for evaluation of Discussed with ED attending; Possible MG crisis     History of Present Illness:  I obtained my information via the patient, supplemented with chart review.     Radha Borja is a 54 y.o. female with reported hx of seizures (2013) ; thymoma s/p thymectomy, cervical myelopathy s/p surgical intervention; She carries a working diagnosis of  seronegative Myasthenia Gravis with complex diagnosis. She has followed with Dr. Donald and [facility]. Other possible alternative considerations have been psychiatric/conversion disorder as well as other non specified neurological disorders. Patient tells me she was diagnosed with MG in 2005 at  when they found she had a thymoma.     She presented to the ED today 8/27/24 for evaluation of generalized and BLE weakness and reports a fall, speech changes, shortness of breath, blurry vision. Her symptoms started yesterday and are worsening. She has required intubation in the past for concerns for Myasthenia Gravis crisis and reportedly as recently as May.      It was initially reported that her OP neurologist sent her for admission however I spoke with Dr. Donald who at time of call was unaware of symptoms or hospital presentation. She had just seen him in clinic 8/22/24. She has been on IVIG and mestinon in the past but otherwise routine maintenance therapy she had not been on. She had decided at that appointment to continue with therapy Mestinon 60mg 4 times/day. She has hx of aseptic meningitis 2/2 to IVIG however patient reports she tolerates Gamunex C. She was planned to receive IVIG 1g/kg every 4 weeks and was being coordinated as an OP.     She has been saturating well on room air. Given her symptoms and hx she was admitted for further  evaluation.     At time of encounter she feels like her symptoms are persisting. Denies any difficulty swallowing. She does not report any double vision. She reports she has chronic neck flexion weakness.         Medical History:  Past Medical History:   Diagnosis Date    Anxiety     Arthritis     Cancer (HCC)     thyroid    GERD (gastroesophageal reflux disease)     Hyperlipidemia     Insomnia     Migraines     Myasthenia gravis with exacerbation (HCC)     Seizures (HCC)     Thyroid disease      Past Surgical History:   Procedure Laterality Date    APPENDECTOMY      CAPSULE ENDOSCOPY N/A 02/28/2020    ESOPHAGEAL CAPSULE ENDOSCOPY performed by Serafin Hidalgo MD at [facility] ENDOSCOPY    CHOLECYSTECTOMY      COLONOSCOPY N/A 12/17/2019    COLONOSCOPY performed by Neeraj Whitfield MD at [facility] ENDOSCOPY    DIALYSIS CATHETER INSERTION Right 10/15/2021    19cm tunneled dual lumen dialysis catheter inserted by Dr. Acosta    ESOPHAGEAL MOTILITY STUDY N/A 02/11/2020    ESOPHAGEAL MANOMETRY performed by Serafin Hidalgo MD at [facility] ENDOSCOPY    HYSTERECTOMY (CERVIX STATUS UNKNOWN)      IR BIOPSY LIVER PERCUTANEOUS  10/15/2021    IR BIOPSY LIVER PERCUTANEOUS 10/15/2021 [facility] SPECIAL PROCEDURES    IR TUNNELED CATHETER PLACEMENT GREATER THAN 5 YEARS  10/15/2021    IR TUNNELED CATHETER PLACEMENT GREATER THAN 5 YEARS 10/15/2021 [facility] SPECIAL PROCEDURES    OTHER SURGICAL HISTORY Right 10/15/2021    Transjugular liver Biopsy    SHOULDER SURGERY      rotator cuff repair- bilateral    THYROIDECTOMY      TUMOR REMOVAL      UPPER GASTROINTESTINAL ENDOSCOPY N/A 12/17/2019    EGD BIOPSY performed by Neeraj Whitfield MD at [facility] ENDOSCOPY    UPPER GASTROINTESTINAL ENDOSCOPY N/A 12/17/2019    EGD DILATION SAVORY performed by Neeraj Whitfield MD at [facility] ENDOSCOPY    UPPER GASTROINTESTINAL ENDOSCOPY  02/28/2020    EGD DIAGNOSTIC ONLY performed by Serafin Hidalgo MD at [facility] ENDOSCOPY    UPPER GASTROINTESTINAL ENDOSCOPY  02/28/2020

## 2024-08-27 NOTE — PROGRESS NOTES
Medication Reconciliation    List of medications patient is currently taking is complete.     Source of information: 1. Conversation with patient at bedside                                      2. EPIC records      Notes regarding home medications:   1. Patient received Citalopram, Pyridostigmine, and Atomoxetine early this morning - she has not taken any of her other home medications yet today.  2. Patient takes Methotrexate 10 mg po Q7Days on Mondays    Jeovany No RPH, PharmD, BCPS  8/27/2024 3:01 PM

## 2024-08-27 NOTE — H&P
04:08 PM    BILIRUBINUR Negative 03/08/2023 04:08 PM    BLOODU Negative 03/08/2023 04:08 PM    GLUCOSEU Negative 03/08/2023 04:08 PM    KETUA 15 03/08/2023 04:08 PM     Urine Cultures:   Lab Results   Component Value Date/Time    LABURIN  12/10/2019 07:10 PM     <50,000 CFU/ml mixed skin/urogenital cecy. No further workup     Blood Cultures:   Lab Results   Component Value Date/Time    BC No growth after 5 days of incubation. 11/10/2019 12:06 PM     Lab Results   Component Value Date/Time    BLOODCULT2 No growth after 5 days of incubation. 11/10/2019 12:51 PM     Organism:   Lab Results   Component Value Date/Time    ORG Yeast 11/05/2019 11:54 AM     Imaging/Diagnostics Last 24 Hours   No results found.  Assessment and Plan:     Radha Borja is a 54 y.o. female, presents with MG with exacerbation (myasthenia gravis) ([facility])    Hospital Problems             Last Modified POA    * (Principal) MG with exacerbation (myasthenia gravis) ([facility]) 8/27/2024 Yes       Conditions under treatment:    # Probable exacerbation of myasthenia gravis  # Anxiety/depression  # History of migraine headaches  # Seizures  # Hypothyroidism    Plan:    -Patient presents with proximal muscle weakness both of upper and lower extremities, she does not have any extraocular symptoms at this moment but she complained of blurry vision earlier, I do not appreciate any ptosis, she also reported crackling of her voice, no other concerning findings for bulbar involvement, no dysphagia per se, saturations are okay at this point, NIF is okay, since in the past 2 times, she needed intubation, she will be admitted to the ICU for closer observation, will be continue every 6 hours NIF, she may need blood gases to make sure that she is not retaining CO2.  Neurology has been consulted for further recommendations in the meanwhile patient's home medications will be continued.  Will request a PT/OT evaluation    -Continue home medications for  anxiety/depression    -Continue home medications for seizure    -Patient has a history of thyroid cancer, she started for surgery, on thyroid placement, that will be continued    -Patient will be on DVT prophylaxis      Disposition:   Current Living situation: Family  Expected Disposition: Home versus rehab  Estimated D/C: 2 to 3 days    Diet No diet orders on file   DVT Prophylaxis [x] Lovenox, []  Heparin, [] SCDs, [] Ambulation,  [] Eliquis, [] Xarelto, [] Coumadin   Code Status Prior   Surrogate Decision Maker/ POA Patient makes her own decision     Personally reviewed Lab Studies and Imaging     Discussed management of the case with ER provider who recommended admission    Electronically signed by Bhavna Pastrana MD on 8/27/2024 at 2:27 PM    Please note this report has been generated completely or in part utilizing Dragon dictation speech recognition software. There may inaccuracies in transcription or misstatements as a result. Please contact the dictating provider for any clarification, Thanks

## 2024-08-27 NOTE — PROGRESS NOTES
08/27/24 1752   Oxygen Therapy/Pulse Ox   O2 Therapy Room air   O2 Device None (Room air)   Pulse 72   Respirations 13   SpO2 99 %   Spontaneous Parameters   NIF -15 cmH2O      $NIF Charge Row $Yes

## 2024-08-27 NOTE — ED PROVIDER NOTES
Capillary Refill: Capillary refill takes less than 2 seconds.      Coloration: Skin is not jaundiced or pale.      Findings: No rash.   Neurological:      General: No focal deficit present.      Mental Status: She is alert and oriented to person, place, and time. Mental status is at baseline.   Psychiatric:         Mood and Affect: Mood normal.         Behavior: Behavior normal.           DIAGNOSTIC RESULTS   LABS:    Labs Reviewed   CBC WITH AUTO DIFFERENTIAL - Abnormal; Notable for the following components:       Result Value    Hemoglobin 11.8 (*)     Hematocrit 35.7 (*)     RDW 17.5 (*)     All other components within normal limits   COMPREHENSIVE METABOLIC PANEL W/ REFLEX TO MG FOR LOW K - Abnormal; Notable for the following components:    Alkaline Phosphatase 175 (*)     All other components within normal limits   COVID-19, RAPID       When ordered only abnormal lab results are displayed. All other labs were within normal range or not returned as of this dictation.    EKG: When ordered, EKG's are interpreted by the Emergency Department Physician in the absence of a cardiologist.  Please see their note for interpretation of EKG.    RADIOLOGY:   Non-plain film images such as CT, Ultrasound and MRI are read by the radiologist. Plain radiographic images are visualized and preliminarily interpreted by the ED Provider with the below findings:          Interpretation per the Radiologist below, if available at the time of this note:    No orders to display     No results found.     No results found.    PROCEDURES   Unless otherwise noted below, none     Procedures    CRITICAL CARE TIME (.cctime)   I personally saw the patient and independently provided 31 minutes of nonconcurrent critical care out of the total shared critical care time provided     This includes multiple reevaluations, vital sign monitoring, pulse oximetry monitoring, telemetry monitoring, clinical response to the IV medications, reviewing the nursing  voice recognition program.  Efforts were made to edit the dictations but occasionally words are mis-transcribed.)    BETH Gómez CNP (electronically signed)       Alex Moseley APRN - CNP  08/27/24 1057

## 2024-08-27 NOTE — PROGRESS NOTES
NAME:  Radha Borja  YOB: 1970  MEDICAL RECORD NUMBER:  [id number]    Shift Summary: Patient admitted for MG exacerbation. Patient receiving IVIG and here for monitoring.    Family updated: Yes:  Patient updated family    Rhythm: Normal Sinus Rhythm     Most recent vitals:   Visit Vitals  BP (!) 166/83   Pulse 72   Temp 98 °F (36.7 °C) (Oral)   Resp 19   Ht 1.575 m (5' 2\")   Wt 74.5 kg (164 lb 3.9 oz)   SpO2 99%   BMI 30.04 kg/m²           No data found.    No data found.      Respiratory support needed (if any):  - RA    Admission weight Weight - Scale: 74.5 kg (164 lb 3.9 oz) (08/27/24 1118)    Today's weight    Wt Readings from Last 1 Encounters:   08/27/24 74.5 kg (164 lb 3.9 oz)        Dahl need assessed each shift: N/A - no dahl present  UOP >30ml/hr: YES  Last documented BM (in last 48 hrs):  No data found.             Restraints (in use currently or dc'd in last 12 hrs): No    Order current and documentation up to date? No    Lines/Drains reviewed @ bedside.  Peripheral IV 08/27/24 Right;Dorsal Forearm (Active)   Number of days: 0         Drip rates at handoff:    sodium chloride      sodium chloride 100 mL/hr at 08/27/24 1557    sodium chloride         Lab Data:   CBC:   Recent Labs     08/27/24  1139   WBC 9.9   HGB 11.8*   HCT 35.7*   MCV 85.6        BMP:    Recent Labs     08/27/24  1139      K 3.7   CO2 28   BUN 12   CREATININE 0.6     LIVR:   Recent Labs     08/27/24  1139   AST 18   ALT 13     PT/INR: No results for input(s): \"INR\" in the last 72 hours.    Invalid input(s): \"PROT\"  APTT: No results for input(s): \"APTT\" in the last 72 hours.  ABG: No results for input(s): \"PHART\", \"IBZ9LDZ\", \"PO2ART\" in the last 72 hours.    Any consults during the shift? Yes: Consults received: : CC    Any signed and held orders to be released?  No        4 Eyes Skin Assessment       The patient is being assessed for  Shift Handoff    I agree that at least one RN has performed a

## 2024-08-27 NOTE — ED TRIAGE NOTES
Patient arrived to the ED ambulatory from home w/ complaints of headache/migraine.     Patient/EMS reports states Onset yesterday w/ all over headache/migraine w/ generalized body weakness and crackling in her voice. Reports Hx of myasthenia gravis and neurology Dr. Forrest w/ [facility] sent her here to help w/ symptoms before going into a full flair up    Patient A&O x 4, VSS w/ exception of elevated BP(doesn't have HX) ,

## 2024-08-27 NOTE — CARE COORDINATION
Discharge Planning:      (CM) reviewed the patient's chart to assess needs. Patient's Readmission Risk Score is   . Patient's medical insurance is  Payor: BCBS / Plan: BCBS OUT OF STATE / Product Type: *No Product type* / .  Patient's PCP is Jhoana Thurman MD .  No needs anticipated, at this time. CM team to follow. Staff to inform CM if additional discharge needs arise.    Pts preferred pharmacy is   [facility] - [place], [place] - [address] [address] [address] - P [phone] - F [phone]  [facility] [place] [address]  Phone: [phone] Fax: [phone]    Respectfully submitted,    Priscilla MOLINA, EVANS-S  [facility]   [phone]    Electronically signed by TRACY Valladares, LSW on 8/27/2024 at 2:02 PM

## 2024-08-27 NOTE — PROGRESS NOTES
08/27/24 1620   Treatment   Treatment Type Spontaneous parameters   Oxygen Therapy/Pulse Ox   O2 Therapy Room air   O2 Device None (Room air)   Pulse Oximeter Device Mode Continuous   Pulse Oximeter Device Location Finger   Spontaneous Parameters   NIF -25 cmH2O      $NIF Charge Row $Yes

## 2024-08-27 NOTE — PROGRESS NOTES
4 Eyes Skin Assessment     NAME:  Radha Borja  YOB: 1970  MEDICAL RECORD NUMBER:  [id number]    The patient is being assessed for  Admission    I agree that at least one RN has performed a thorough Head to Toe Skin Assessment on the patient. ALL assessment sites listed below have been assessed.      Areas assessed by both nurses:    Head, Face, Ears, Shoulders, Back, Chest, Arms, Elbows, Hands, Sacrum. Buttock, Coccyx, Ischium, Legs. Feet and Heels, and Under Medical Devices         Does the Patient have a Wound? No noted wound(s)       Adam Prevention initiated by RN: Yes  Wound Care Orders initiated by RN: No    Pressure Injury (Stage 3,4, Unstageable, DTI, NWPT, and Complex wounds) if present, place Wound referral order by RN under : No    New Ostomies, if present place, Ostomy referral order under : No     Nurse 1 eSignature: Electronically signed by Brandee Cain RN on 8/27/24 at 6:55 PM EDT    **SHARE this note so that the co-signing nurse can place an eSignature**    Nurse 2 eSignature: {Esignature:[id number]}

## 2024-08-27 NOTE — PROGRESS NOTES
IVIG started through midline. IgA lab drawn and sent down before starting. Benadryl and tylenol given before administration. Patient denies needs at this time, no apparent reaction at this time.

## 2024-08-27 NOTE — PLAN OF CARE
Problem: Discharge Planning  Goal: Discharge to home or other facility with appropriate resources  Outcome: Progressing  Flowsheets (Taken 8/27/2024 1537)  Discharge to home or other facility with appropriate resources:   Identify barriers to discharge with patient and caregiver   Arrange for needed discharge resources and transportation as appropriate   Identify discharge learning needs (meds, wound care, etc)   Arrange for interpreters to assist at discharge as needed   Refer to discharge planning if patient needs post-hospital services based on physician order or complex needs related to functional status, cognitive ability or social support system     Problem: Pain  Goal: Verbalizes/displays adequate comfort level or baseline comfort level  Outcome: Progressing  Flowsheets (Taken 8/27/2024 1537)  Verbalizes/displays adequate comfort level or baseline comfort level:   Assess pain using appropriate pain scale   Encourage patient to monitor pain and request assistance     Problem: Safety - Adult  Goal: Free from fall injury  Outcome: Progressing  Flowsheets (Taken 8/27/2024 1545)  Free From Fall Injury: Instruct family/caregiver on patient safety

## 2024-08-28 VITALS
HEART RATE: 74 BPM | OXYGEN SATURATION: 98 % | DIASTOLIC BLOOD PRESSURE: 78 MMHG | HEIGHT: 62 IN | SYSTOLIC BLOOD PRESSURE: 124 MMHG | RESPIRATION RATE: 18 BRPM | TEMPERATURE: 97.9 F | BODY MASS INDEX: 30.63 KG/M2 | WEIGHT: 166.45 LBS

## 2024-08-28 LAB
ANION GAP SERPL CALCULATED.3IONS-SCNC: 9 MMOL/L (ref 3–16)
BASOPHILS # BLD: 0 K/UL (ref 0–0.2)
BASOPHILS NFR BLD: 0.7 %
BUN SERPL-MCNC: 10 MG/DL (ref 7–20)
CALCIUM SERPL-MCNC: 8.2 MG/DL (ref 8.3–10.6)
CHLORIDE SERPL-SCNC: 108 MMOL/L (ref 99–110)
CO2 SERPL-SCNC: 26 MMOL/L (ref 21–32)
CREAT SERPL-MCNC: <0.5 MG/DL (ref 0.6–1.1)
DEPRECATED RDW RBC AUTO: 17.2 % (ref 12.4–15.4)
EOSINOPHIL # BLD: 0.1 K/UL (ref 0–0.6)
EOSINOPHIL NFR BLD: 0.9 %
GFR SERPLBLD CREATININE-BSD FMLA CKD-EPI: >90 ML/MIN/{1.73_M2}
GLUCOSE SERPL-MCNC: 81 MG/DL (ref 70–99)
HCT VFR BLD AUTO: 31.6 % (ref 36–48)
HGB BLD-MCNC: 10.2 G/DL (ref 12–16)
LYMPHOCYTES # BLD: 1.4 K/UL (ref 1–5.1)
LYMPHOCYTES NFR BLD: 23.2 %
MCH RBC QN AUTO: 27.7 PG (ref 26–34)
MCHC RBC AUTO-ENTMCNC: 32.4 G/DL (ref 31–36)
MCV RBC AUTO: 85.5 FL (ref 80–100)
MONOCYTES # BLD: 0.3 K/UL (ref 0–1.3)
MONOCYTES NFR BLD: 5 %
NEUTROPHILS # BLD: 4.1 K/UL (ref 1.7–7.7)
NEUTROPHILS NFR BLD: 70.2 %
PLATELET # BLD AUTO: 268 K/UL (ref 135–450)
PMV BLD AUTO: 6.9 FL (ref 5–10.5)
POTASSIUM SERPL-SCNC: 3.8 MMOL/L (ref 3.5–5.1)
RBC # BLD AUTO: 3.7 M/UL (ref 4–5.2)
REASON FOR REJECTION: NORMAL
REJECTED TEST: NORMAL
SODIUM SERPL-SCNC: 143 MMOL/L (ref 136–145)
WBC # BLD AUTO: 5.9 K/UL (ref 4–11)

## 2024-08-28 PROCEDURE — 85025 COMPLETE CBC W/AUTO DIFF WBC: CPT

## 2024-08-28 PROCEDURE — 2580000003 HC RX 258: Performed by: HOSPITALIST

## 2024-08-28 PROCEDURE — 6370000000 HC RX 637 (ALT 250 FOR IP): Performed by: HOSPITALIST

## 2024-08-28 PROCEDURE — 97161 PT EVAL LOW COMPLEX 20 MIN: CPT

## 2024-08-28 PROCEDURE — 6360000002 HC RX W HCPCS: Performed by: STUDENT IN AN ORGANIZED HEALTH CARE EDUCATION/TRAINING PROGRAM

## 2024-08-28 PROCEDURE — 97165 OT EVAL LOW COMPLEX 30 MIN: CPT

## 2024-08-28 PROCEDURE — 97530 THERAPEUTIC ACTIVITIES: CPT

## 2024-08-28 PROCEDURE — 94799 UNLISTED PULMONARY SVC/PX: CPT

## 2024-08-28 PROCEDURE — 94760 N-INVAS EAR/PLS OXIMETRY 1: CPT

## 2024-08-28 PROCEDURE — 80048 BASIC METABOLIC PNL TOTAL CA: CPT

## 2024-08-28 PROCEDURE — 6360000002 HC RX W HCPCS: Performed by: HOSPITALIST

## 2024-08-28 RX ORDER — LIDOCAINE 4 G/G
1 PATCH TOPICAL DAILY
Status: DISCONTINUED | OUTPATIENT
Start: 2024-08-28 | End: 2024-08-28

## 2024-08-28 RX ORDER — DIPHENHYDRAMINE HYDROCHLORIDE 50 MG/ML
50 INJECTION INTRAMUSCULAR; INTRAVENOUS DAILY
Status: DISCONTINUED | OUTPATIENT
Start: 2024-08-28 | End: 2024-08-28 | Stop reason: HOSPADM

## 2024-08-28 RX ADMIN — DIPHENHYDRAMINE HYDROCHLORIDE 50 MG: 50 INJECTION, SOLUTION INTRAMUSCULAR; INTRAVENOUS at 09:03

## 2024-08-28 RX ADMIN — ACETAMINOPHEN 650 MG: 325 TABLET ORAL at 09:01

## 2024-08-28 RX ADMIN — SODIUM CHLORIDE: 9 INJECTION, SOLUTION INTRAVENOUS at 02:18

## 2024-08-28 RX ADMIN — CITALOPRAM HYDROBROMIDE 40 MG: 20 TABLET ORAL at 09:02

## 2024-08-28 RX ADMIN — LEVOTHYROXINE SODIUM 137 MCG: 0.11 TABLET ORAL at 06:28

## 2024-08-28 RX ADMIN — IMMUNE GLOBULIN (HUMAN) 5 G: 10 INJECTION INTRAVENOUS; SUBCUTANEOUS at 11:14

## 2024-08-28 RX ADMIN — IMMUNE GLOBULIN (HUMAN) 20 G: 10 INJECTION INTRAVENOUS; SUBCUTANEOUS at 09:58

## 2024-08-28 RX ADMIN — PYRIDOSTIGMINE BROMIDE 60 MG: 60 TABLET ORAL at 13:12

## 2024-08-28 RX ADMIN — PYRIDOSTIGMINE BROMIDE 60 MG: 60 TABLET ORAL at 09:03

## 2024-08-28 ASSESSMENT — PAIN SCALES - GENERAL
PAINLEVEL_OUTOF10: 0

## 2024-08-28 NOTE — PROGRESS NOTES
08/28/24 0412   Treatment   Treatment Type Spontaneous parameters   Oxygen Therapy/Pulse Ox   O2 Device None (Room air)   Pulse 67   Respirations 15   SpO2 99 %   Pulse Oximeter Device Mode Continuous   Pulse Oximeter Device Location Finger   Spontaneous Parameters   NIF -24 cmH2O   VC 1600   $NIF Charge Row $Yes

## 2024-08-28 NOTE — PLAN OF CARE
Problem: Discharge Planning  Goal: Discharge to home or other facility with appropriate resources  Outcome: Progressing  Flowsheets (Taken 8/28/2024 0800)  Discharge to home or other facility with appropriate resources:   Identify barriers to discharge with patient and caregiver   Arrange for needed discharge resources and transportation as appropriate   Refer to discharge planning if patient needs post-hospital services based on physician order or complex needs related to functional status, cognitive ability or social support system   Arrange for interpreters to assist at discharge as needed   Identify discharge learning needs (meds, wound care, etc)     Problem: Pain  Goal: Verbalizes/displays adequate comfort level or baseline comfort level  Outcome: Progressing  Flowsheets  Taken 8/28/2024 0400 by Deon Kirby, ESTELITA  Verbalizes/displays adequate comfort level or baseline comfort level:   Encourage patient to monitor pain and request assistance   Assess pain using appropriate pain scale  Taken 8/28/2024 0000 by Deon Kirby, RN  Verbalizes/displays adequate comfort level or baseline comfort level:   Encourage patient to monitor pain and request assistance   Assess pain using appropriate pain scale     Problem: Safety - Adult  Goal: Free from fall injury  Outcome: Progressing

## 2024-08-28 NOTE — DISCHARGE INSTRUCTIONS
F/u with your primary neurologist in 1-2 weeks, please call 911 or return for any worsening of symptoms

## 2024-08-28 NOTE — PROGRESS NOTES
Discharge paperwork reviewed with pt. All questions answered. PIV and midline removed without complication, dressing put into place. Pt escorted to her car via wheelchair and left in stable condition.

## 2024-08-28 NOTE — PROGRESS NOTES
Progress Note  The patient is being evaluated for possible MG crisis    Updates    Received 2/2 dose of IVIG. No reported complications to me by nursing. Patient has been ambulatory.   She feels like she is improving.   She has remained on room air, without signs of increased work of breathing.   Primary team asking about discharge per RN. Discussed with patient who feels like she is improved enough to go home.   Visitor at bedsides and states she is a lot better.    Active Ambulatory Problems     Diagnosis Date Noted    Myasthenia gravis ([facility]) 10/13/2019    Myasthenia gravis with (acute) exacerbation ([facility]) 11/04/2019    Appendicitis 11/10/2019    Neck stiffness 11/10/2019    Generalized weakness 12/10/2019    Myasthenia gravis with exacerbation, adult form ([facility]) 12/11/2019    Hyperlipidemia 06/30/2020    Seizure ([facility]) 06/30/2020    GERD (gastroesophageal reflux disease) 06/30/2020    Migraine 06/30/2020    Anxiety 06/30/2020    Insomnia 06/30/2020    Myasthenia gravis with exacerbation ([facility]) 06/30/2020    Bradycardia 08/05/2020    Sinus arrhythmia 08/05/2020    Postablative hypothyroidism 08/05/2020    'Light-for-dates' infant with signs of fetal malnutrition 10/13/2021    Left-sided weakness 01/02/2022    Myasthenia gravis with acute exacerbation ([facility]) 03/05/2023    Respiratory failure requiring intubation ([facility]) 03/06/2023     Resolved Ambulatory Problems     Diagnosis Date Noted    No Resolved Ambulatory Problems     Past Medical History:   Diagnosis Date    Arthritis     Cancer ([facility])     Migraines     Seizures ([facility])     Thyroid disease        Current Facility-Administered Medications:     diphenhydrAMINE (BENADRYL) injection 50 mg, 50 mg, IntraVENous, Daily, Bhavna Pastrana MD, 50 mg at 08/28/24 0903    sodium chloride flush 0.9 % injection 5-40 mL, 5-40 mL, IntraVENous, 2 times per day, Bhavna Pastrana MD, 10 mL at 08/27/24 2011    sodium chloride flush 0.9 % injection 5-40 mL, 5-40 mL, IntraVENous, PRN, Victoriano

## 2024-08-28 NOTE — DISCHARGE SUMMARY
General: NAD, awake alert and oriented  Cardiovascular: S1S2 present, regular rate and rhythm, no murmur.  Respiratory: Clear to auscultation bilaterally  Gastrointestinal: Soft, non tender, positive bowel sounds   Genitourinary: no suprapubic tenderness  Musculoskeletal: No edema        Labs and Imaging   No results found.    CBC:   Recent Labs     08/27/24  1139 08/28/24  0444   WBC 9.9 5.9   HGB 11.8* 10.2*    268     BMP:    Recent Labs     08/27/24  1139 08/28/24  0503    143   K 3.7 3.8    108   CO2 28 26   BUN 12 10   CREATININE 0.6 <0.5*   GLUCOSE 96 81     Hepatic:   Recent Labs     08/27/24  1139   AST 18   ALT 13   BILITOT <0.2   ALKPHOS 175*     Lipids:   Lab Results   Component Value Date/Time    CHOL 164 01/03/2022 04:58 AM    HDL 48 01/03/2022 04:58 AM    TRIG 157 03/14/2023 04:04 AM     Hemoglobin A1C:   Lab Results   Component Value Date/Time    LABA1C 4.7 01/03/2022 04:58 AM     TSH:   Lab Results   Component Value Date/Time    TSH 1.73 03/13/2023 03:15 AM     Troponin: No results found for: \"TROPONINT\"  Lactic Acid: No results for input(s): \"LACTA\" in the last 72 hours.  BNP: No results for input(s): \"PROBNP\" in the last 72 hours.  UA:  Lab Results   Component Value Date/Time    NITRU Negative 03/08/2023 04:08 PM    COLORU Yellow 03/08/2023 04:08 PM    PHUR 6.0 03/08/2023 04:08 PM    WBCUA 1 06/30/2020 06:20 PM    RBCUA 1 06/30/2020 06:20 PM    BACTERIA RARE 06/30/2020 06:20 PM    CLARITYU Clear 03/08/2023 04:08 PM    LEUKOCYTESUR Negative 03/08/2023 04:08 PM    UROBILINOGEN 0.2 03/08/2023 04:08 PM    BILIRUBINUR Negative 03/08/2023 04:08 PM    BLOODU Negative 03/08/2023 04:08 PM    GLUCOSEU Negative 03/08/2023 04:08 PM    KETUA 15 03/08/2023 04:08 PM     Urine Cultures:   Lab Results   Component Value Date/Time    LABURIN  12/10/2019 07:10 PM     <50,000 CFU/ml mixed skin/urogenital cecy. No further workup     Blood Cultures:   Lab Results   Component Value Date/Time    BC  No growth after 5 days of incubation. 11/10/2019 12:06 PM     Lab Results   Component Value Date/Time    BLOODCULT2 No growth after 5 days of incubation. 11/10/2019 12:51 PM     Organism:   Lab Results   Component Value Date/Time    ORG Yeast 11/05/2019 11:54 AM       Time Spent Discharging patient 35 minutes    Electronically signed by Bhavna Pastrana MD on 8/28/2024 at 3:42 PM    This note was generated completely or in part using Dragon voice recognition software, please excuse ant inaccuracies or misstatements.

## 2024-08-28 NOTE — PROGRESS NOTES
08/28/24 0013   Treatment   Treatment Type Spontaneous parameters   Oxygen Therapy/Pulse Ox   O2 Device None (Room air)   Pulse 66   Respirations 13   SpO2 98 %   Spontaneous Parameters   NIF -16 cmH2O      $NIF Charge Row $Yes

## 2024-08-28 NOTE — PLAN OF CARE
Problem: Discharge Planning  Goal: Discharge to home or other facility with appropriate resources  8/27/2024 2330 by Deon Kirby, RN  Outcome: Progressing  Flowsheets (Taken 8/27/2024 2000)  Discharge to home or other facility with appropriate resources:   Identify barriers to discharge with patient and caregiver   Arrange for needed discharge resources and transportation as appropriate   Identify discharge learning needs (meds, wound care, etc)   Arrange for interpreters to assist at discharge as needed   Refer to discharge planning if patient needs post-hospital services based on physician order or complex needs related to functional status, cognitive ability or social support system     Problem: Pain  Goal: Verbalizes/displays adequate comfort level or baseline comfort level  8/27/2024 2330 by Deon Kirby, RN  Outcome: Progressing     Problem: Safety - Adult  Goal: Free from fall injury  8/27/2024 2330 by Deon Kirby, RN  Outcome: Progressing

## 2024-08-28 NOTE — PROGRESS NOTES
NAME:  Radha Borja  YOB: 1970  MEDICAL RECORD NUMBER:  [id number]    Shift Summary: Pt. IVIG started at 19:15.  Rate was titrated up to 2.4 mL/kg/hr.  This had noticeable impact on lowering b/p.  Map was down to 71.  Pt. was otherwise asymptomatic so I stayed at this rate for the duration of treatment.  Vitals signs remained stable.  Patient has slept off and on during this shift while getting q2 hr NIF's.  Patient has remained on IV NS at 100 mL/hr.      Family updated: No    Rhythm: Normal Sinus Rhythm     Most recent vitals:   Visit Vitals  /71   Pulse 65   Temp 97.7 °F (36.5 °C) (Oral)   Resp 16   Ht 1.575 m (5' 2\")   Wt 74.5 kg (164 lb 3.9 oz)   SpO2 98%   BMI 30.04 kg/m²           No data found.    No data found.      Respiratory support needed (if any):  - RA    Admission weight Weight - Scale: 74.5 kg (164 lb 3.9 oz) (08/27/24 1118)    Today's weight    Wt Readings from Last 1 Encounters:   08/27/24 74.5 kg (164 lb 3.9 oz)        Dahl need assessed each shift: N/A - no dahl present  UOP >30ml/hr: YES  Last documented BM (in last 48 hrs):  No data found.             Restraints (in use currently or dc'd in last 12 hrs): No    Order current and documentation up to date? No    Lines/Drains reviewed @ bedside.  Peripheral IV 08/27/24 Right;Dorsal Forearm (Active)   Number of days: 0       Midline 08/27/24 Right Basilic (Active)   Number of days: 0         Drip rates at handoff:    sodium chloride      sodium chloride 100 mL/hr at 08/28/24 0218    sodium chloride         Lab Data:   CBC:   Recent Labs     08/27/24  1139   WBC 9.9   HGB 11.8*   HCT 35.7*   MCV 85.6        BMP:    Recent Labs     08/27/24  1139      K 3.7   CO2 28   BUN 12   CREATININE 0.6     LIVR:   Recent Labs     08/27/24  1139   AST 18   ALT 13     PT/INR: No results for input(s): \"INR\" in the last 72 hours.    Invalid input(s): \"PROT\"  APTT: No results for input(s): \"APTT\" in the last 72 hours.  ABG:

## 2024-08-28 NOTE — PROGRESS NOTES
08/27/24 2011   Treatment   Treatment Type Spontaneous parameters   Oxygen Therapy/Pulse Ox   O2 Device None (Room air)   Pulse 75   Respirations 20   SpO2 99 %   Pulse Oximeter Device Mode Continuous   Pulse Oximeter Device Location Finger   Spontaneous Parameters   NIF -24 cmH2O      $NIF Charge Row $Yes

## 2024-08-28 NOTE — PROGRESS NOTES
Occupational Therapy  Facility/Department: [address] ICU  Occupational Therapy Initial Assessment and Tentative D/C      Name: Radha Borja  : 1970  MRN: [id number]  Date of Service: 2024    Discharge Recommendations: Radha Borja scored a 19/24 on the AM-PAC ADL Inpatient form. Current research shows that an AM-PAC score of 18 or greater is typically associated with a discharge to the patient's home setting.     If patient discharges prior to next session this note will serve as a discharge summary.  Please see below for the latest assessment towards goals.     Home with assist PRN  OT Equipment Recommendations  Equipment Needed: Yes  Mobility Devices: ADL Assistive Devices  ADL Assistive Devices: Shower Chair with back       Patient Diagnosis(es): The primary encounter diagnosis was Myasthenia gravis with (acute) exacerbation (HCC). A diagnosis of Goals of care, counseling/discussion was also pertinent to this visit.  Past Medical History:  has a past medical history of Anxiety, Arthritis, Cancer (HCC), GERD (gastroesophageal reflux disease), Hyperlipidemia, Insomnia, Migraines, Myasthenia gravis with exacerbation (HCC), Seizures (HCC), and Thyroid disease.  Past Surgical History:  has a past surgical history that includes Thyroidectomy; tumor removal; Hysterectomy; shoulder surgery; Appendectomy; Cholecystectomy; Upper gastrointestinal endoscopy (N/A, 2019); Colonoscopy (N/A, 2019); Upper gastrointestinal endoscopy (N/A, 2019); esophageal motility study (N/A, 2020); Upper gastrointestinal endoscopy (2020); Upper gastrointestinal endoscopy (2020); Capsule endoscopy (N/A, 2020); Dialysis Catheter Insertion (Right, 10/15/2021); other surgical history (Right, 10/15/2021); IR TUNNELED CVC PLACE WO SQ PORT/PUMP > 5 YEARS (10/15/2021); and IR BIOPSY LIVER PERCUTANEOUS (10/15/2021).           Assessment  Performance deficits / Impairments: Decreased

## 2024-08-28 NOTE — PROGRESS NOTES
Physical Therapy  Facility/Department: [address] ICU  Physical Therapy Initial Assessment    Name: Radha Borja  : 1970  MRN: [id number]  Date of Service: 2024    Discharge Recommendations:  Continue to assess pending progress, Home with assist PRN   PT Equipment Recommendations  Equipment Needed: No  Radha Borja scored a 22/24 on the AM-PAC short mobility form.  At this time, no further PT is recommended upon discharge.  Assessment  Body Structures, Functions, Activity Limitations Requiring Skilled Therapeutic Intervention: Decreased functional mobility ;Decreased strength;Decreased endurance;Decreased balance  Assessment: 53 y/o female admit 2024 with Myasthenia Gravis Exac, Blurred Vision, Speech Changes, Weak/Fall. PMH as noted including : Myasthenia Gravis, Bradycardia, Seizures, Neck/Back Surg, Chronic Neck Weak, B RTC Surg, R THR, Thyroid Ca/Thyroidectomy.  PTA pt living with  in home with few steps to enter and 1st floor bed/bath; independent daily care and functional mobility (without assist device); working (owns a ).  Currently pt reports feeling better, ana oob, transfers/amb within hospital room setting Supervision only.  Anticipate d/c home without need for cont PT.  Therapy Prognosis: Good  Decision Making: Low Complexity  History: 53 y/o female admit 2024 with Myasthenia Gravis Exac, Blurred Vision, Speech Changes, Weak/Fall. PMH as noted including : Myasthenia Gravis, Bradycardia, Seizures, Neck/Back Surg, Chronic Neck Weak, B RTC Surg, R THR, Thyroid Ca/Thyroidectomy.  Exam: See above.  Clinical Presentation: See above.  Barriers to Learning: None.  Requires PT Follow-Up: Yes  Activity Tolerance  Activity Tolerance: Patient tolerated treatment well    Plan  Physical Therapy Plan  General Plan: 2-3 times per week  Current Treatment Recommendations: Strengthening, Therapeutic activities, Balance training, Functional mobility training, Gait training, Stair

## 2024-08-29 NOTE — ED PROVIDER NOTES
This is my MARTINE[facility] Supervisory and shared visit note:     This patient was seen by the advanced practice provider.     I personally saw the patient and made/approved the management plan and take responsibility for the patient management.      Briefly, 54 y.o. female presents with weakness.  Patient states subjectively she feels weak on her left side which is consistent with her myasthenia gravis flareup.  Patient reports voice hoarseness or crackling.  Patient states Dr. Forrest from [facility] sent her in for further evaluation and admission for worsening symptoms.  Patient denies change in vision.  Patient denies nausea, vomiting, chest pain, shortness of breath, cough, fever.    Focused exam:   Gen: awake, alert, and NAD  HEENT: NCAT. EOMI.   CV: RRR w/o MRG  Lungs: CTAB. No incr WOB.   Abdomen: Soft, nontender, nondistended. No rebound/guarding.   Neuro: Moving all extremities, fluent speech, follows commands, 5 out of 5 strength in upper and lower extremities.  Cranial nerves II to XII intact.  Global sensation intact to light touch.  Finger-nose normal.  NIH stroke scale 0.    MDM:   Patient is hemodynamically stable upon arrival to the emergency department.  Patient is afebrile.  Differential diagnosis includes but not limited to TIA, stroke, myasthenia gravis flare, electrolyte abnormality, anemia.  Given patient's history of myasthenia gravis with complaints of voice hoarseness and subjective left-sided weakness consistent with her myasthenia gravis flareup neurology was consulted.  At this time neurology recommends holding off on IVIG and CT imaging.  There is low suspicion for stroke based on physical examination and clinical presentation.  Patient's NIH stroke scale is 0.  Negative for gross derangements.  CBC shows a mild anemia with a hemoglobin of 11.8, COVID test negative.  NIF is an appropriate range and patient is protecting airway.  Patient was admitted for further observation and neurology

## 2024-09-27 NOTE — PROGRESS NOTES
Physical Therapy    Facility/Department: [address] PROGRESSIVE CARE  Initial Assessment    NAME: Berenice Castro  : 1970  MRN: [id number]    Date of Service: 1/3/2022    Discharge Recommendations:  5-7 sessions per week   PT Equipment Recommendations  Equipment Needed: No  Berenice Castro scored a 18/24 on the AM-PAC short mobility form. Current research shows that an AM-PAC score of 17 or less is typically not associated with a discharge to the patient's home setting. Despite the patient's AM-PAC score and their current functional mobility deficits, pt was completely Ind prior and is not able to amb household distances and has limited assist at home therefore it is recommended that the patient have 5-7 sessions per week of Physical Therapy at d/c to increase the patient's independence. At this time, this patient demonstrates the endurance, and/or tolerance for 3 hours of therapy each day, with a treatment frequency of 5-7x/wk. Please see assessment section for further patient specific details. If patient discharges prior to next session this note will serve as a discharge summary. Please see below for the latest assessment towards goals. Assessment   Body structures, Functions, Activity limitations: Decreased functional mobility   Assessment: pt is a 47 yo female who was adm to hosp with diplopia, muscle weakness and recent falls; pt adm to R/O CVA vs myasthenia gravis exacerbation; pt at baseline is Ind without an AD;  She currently is needing a walker and CGA with all functional tasks; pt's  works and cannot provide assist at home during the day; pt currently not amb household distances; currently recommend continued therapy 5-7x/wk to address deficts  Prognosis: Good;Fair  Decision Making: Medium Complexity  Clinical Presentation: evolving  PT Education: PT Role;General Safety  Barriers to Learning: none  REQUIRES PT FOLLOW UP: Yes  Activity Tolerance  Activity Tolerance: Patient limited by endurance; Patient limited by fatigue       Patient Diagnosis(es): The primary encounter diagnosis was Left-sided weakness. Diagnoses of Dysarthria, Myasthenia gravis ([address] and Hypokalemia were also pertinent to this visit. has a past medical history of Anxiety, Arthritis, Cancer ([address] GERD (gastroesophageal reflux disease), Hyperlipidemia, Insomnia, Migraines, Myasthenia gravis with exacerbation ([address] Seizures ([address] and Thyroid disease. has a past surgical history that includes Thyroidectomy; tumor removal; Hysterectomy; shoulder surgery; Appendectomy; Cholecystectomy; Upper gastrointestinal endoscopy (N/A, 12/17/2019); Colonoscopy (N/A, 12/17/2019); Upper gastrointestinal endoscopy (N/A, 12/17/2019); esophageal motility study (N/A, 02/11/2020); Upper gastrointestinal endoscopy (02/28/2020); Upper gastrointestinal endoscopy (02/28/2020); Capsule endoscopy (N/A, 02/28/2020); Dialysis Catheter Insertion (Right, 10/15/2021); other surgical history (Right, 10/15/2021); IR TUNNELED CVC PLACE WO SQ PORT/PUMP > 5 YEARS (10/15/2021); and IR BIOPSY LIVER PERCUTANEOUS (10/15/2021). Restrictions  Restrictions/Precautions  Restrictions/Precautions: Fall Risk  Position Activity Restriction  Other position/activity restrictions: gets infusions for myasthenia gravis  Vision/Hearing  Vision: Impaired  Vision Exceptions: Wears glasses for reading  Hearing: Within functional limits     Subjective  General  Chart Reviewed: Yes  Patient assessed for rehabilitation services?: Yes  Additional Pertinent Hx: per H&P note: \"Patient is a 57-year-old female with past medical history of myasthenia gravis who presents to the hospital for difficulty swallowing, double vision, difficulty walking. According to the patient she has not been able to swallow. She also has difficulty walking and feels her left side is weak than the right side.   According to the patient whenever se tries to eat it feels like her food is stuck in her mouth and she is not able to swallow it down. Patient also mentions she has difficulty walking which is unusual for her myasthenia gravis flareups. Neurology was consulted from the emergency department, recommended admission for stroke work-up plus myasthenia gravis. \" Pt adm to R/O CVA vs myasthenia gravis exacerbation  Response To Previous Treatment: Not applicable  Family / Caregiver Present: No  Follows Commands: Within Functional Limits  Subjective  Subjective: Pt is very pleasant but overall fatigued but agreeable to working with therapy  Pain Screening  Patient Currently in Pain: Denies          Orientation  Orientation  Overall Orientation Status: Within Functional Limits  Social/Functional History  Social/Functional History  Lives With: Spouse  Type of Home: House  Home Layout: One level  Home Access: Stairs to enter with rails  Entrance Stairs - Number of Steps: 3 steps with one rail  Bathroom Shower/Tub: Walk-in shower  Bathroom Toilet: Standard  Bathroom Equipment: Shower chair  Home Equipment: Cane,Rolling walker,4 wheeled walker  ADL Assistance: Independent  Homemaking Responsibilities: Yes (shares with )  Ambulation Assistance: Independent  Transfer Assistance: Independent  Active : Yes  Occupation: Full time employment  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   Additional Comments: 2 recent falls prior to adm  Cognition   Cognition  Overall Cognitive Status: WFL    Objective          AROM RLE (degrees)  RLE AROM: WFL  AROM LLE (degrees)  LLE AROM : WFL  Strength RLE  Comment: functionally poor  Strength LLE  Comment: functionally poor        Bed mobility  Supine to Sit: Contact guard assistance  Transfers  Sit to Stand: Contact guard assistance  Stand to sit: Contact guard assistance  Ambulation  Ambulation?: Yes  Ambulation 1  Surface: level tile  Device: Rolling Walker  Assistance: Contact guard assistance  Quality of Gait: slow small steps with pt dragging feet and significant upper extremity support on walker  Distance: 12'     Balance  Comments: SBA for sitting balance; CGA for standing with RW        Plan   Plan  Times per week: 3-5  Current Treatment Recommendations: Functional Mobility Training,Transfer Training,Gait Training,Endurance Training,Balance Training,Strengthening  Safety Devices  Type of devices: Chair alarm in place,Call light within reach,Left in chair,Nurse notified,All fall risk precautions in place    G-Code       OutComes Score                                                  AM-PAC Score  AM-PAC Inpatient Mobility Raw Score : 18 (01/03/22 1215)  AM-PAC Inpatient T-Scale Score : 43.63 (01/03/22 1215)  Mobility Inpatient CMS 0-100% Score: 46.58 (01/03/22 1215)  Mobility Inpatient CMS G-Code Modifier : CK (01/03/22 1215)          Goals  Short term goals  Time Frame for Short term goals: by discharge  Short term goal 1: bed mob MI  Short term goal 2: transfers MI  Short term goal 3: amb 48' with LRAD sup  Short term goal 4: negotiate stairs when able  Patient Goals   Patient goals : to get better       Therapy Time   Individual Concurrent Group Co-treatment   Time In 1120         Time Out 1210         Minutes 50                 KALYN SAVAGE PT    Electronically signed by KALYN SAVAGE PT on 1/3/2022 at 12:16 PM [Patient Intake Form Reviewed] : Patient intake form was reviewed

## 2025-03-31 ENCOUNTER — TRANSCRIBE ORDERS (OUTPATIENT)
Dept: ADMINISTRATIVE | Age: 55
End: 2025-03-31

## 2025-03-31 DIAGNOSIS — Z90.89 STATUS POST THYMECTOMY: ICD-10-CM

## 2025-03-31 DIAGNOSIS — R93.89 ABNORMAL CT OF THE CHEST: Primary | ICD-10-CM

## 2025-06-13 ENCOUNTER — HOSPITAL ENCOUNTER (OUTPATIENT)
Dept: CT IMAGING | Age: 55
Discharge: HOME | End: 2025-06-13
Attending: PSYCHIATRY & NEUROLOGY
Payer: COMMERCIAL

## 2025-06-13 DIAGNOSIS — R93.89 ABNORMAL CT OF THE CHEST: ICD-10-CM

## 2025-06-13 DIAGNOSIS — Z90.89 STATUS POST THYMECTOMY: ICD-10-CM

## 2025-06-13 PROCEDURE — 6360000004 HC RX CONTRAST MEDICATION: Performed by: PSYCHIATRY & NEUROLOGY

## 2025-06-13 PROCEDURE — 71260 CT THORAX DX C+: CPT

## 2025-06-13 RX ORDER — IOPAMIDOL 755 MG/ML
75 INJECTION, SOLUTION INTRAVASCULAR
Status: COMPLETED | OUTPATIENT
Start: 2025-06-13 | End: 2025-06-13

## 2025-06-13 RX ADMIN — IOPAMIDOL 75 ML: 755 INJECTION, SOLUTION INTRAVENOUS at 12:29

## (undated) DEVICE — FORCEPS BX 240CM 2.4MM L NDL RAD JAW 4 M00513334

## (undated) DEVICE — SOLUTION MNOMTR 80ML ES HI VISC FOR VISCOUS SWALLOW DONE

## (undated) DEVICE — SPONGE CLN W2XL425IN POLYUR ENDOSCP BS TBLR ENZ

## (undated) DEVICE — CONTAINER SPEC 480ML CLR POLYSTYR 10% NEUT BUFF FRMLN ZN

## (undated) DEVICE — BASIC SINGLE BASIN 1-LF: Brand: MEDLINE INDUSTRIES, INC.

## (undated) DEVICE — ENDOSCOPY KIT: Brand: MEDLINE INDUSTRIES, INC.

## (undated) DEVICE — BITE BLOCK ENDOSCP AD 60 FR W/ ADJ STRP PLAS GRN BLOX

## (undated) DEVICE — SYRINGE MED 10ML POLYPR LUERSLIP TIP FLAT TOP W/O SFTY DISP

## (undated) DEVICE — BITE BLK 60FR GRN ENDOSCP AD W STRP SLD DISPOSABLE

## (undated) DEVICE — SOLUTION IV IRRIG WATER 1000ML POUR BRL 2F7114

## (undated) DEVICE — Z DISCONTINUED NO SUB IDED CAPSULE PH GASTROENTEROLOGY ES MON FOR REFLX DEL SYS BRAVO